# Patient Record
Sex: FEMALE | Race: WHITE | NOT HISPANIC OR LATINO | Employment: FULL TIME | URBAN - METROPOLITAN AREA
[De-identification: names, ages, dates, MRNs, and addresses within clinical notes are randomized per-mention and may not be internally consistent; named-entity substitution may affect disease eponyms.]

---

## 2017-03-22 ENCOUNTER — ALLSCRIPTS OFFICE VISIT (OUTPATIENT)
Dept: OTHER | Facility: OTHER | Age: 47
End: 2017-03-22

## 2017-03-22 DIAGNOSIS — M25.551 PAIN IN RIGHT HIP: ICD-10-CM

## 2017-03-23 ENCOUNTER — HOSPITAL ENCOUNTER (OUTPATIENT)
Dept: RADIOLOGY | Facility: HOSPITAL | Age: 47
Discharge: HOME | End: 2017-03-23
Payer: COMMERCIAL

## 2017-03-23 ENCOUNTER — TRANSCRIBE ORDERS (OUTPATIENT)
Dept: ADMINISTRATIVE | Facility: HOSPITAL | Age: 47
End: 2017-03-23

## 2017-03-23 DIAGNOSIS — M25.551 PAIN IN RIGHT HIP: ICD-10-CM

## 2017-03-23 PROCEDURE — 73502 X-RAY EXAM HIP UNI 2-3 VIEWS: CPT

## 2017-03-24 ENCOUNTER — GENERIC CONVERSION - ENCOUNTER (OUTPATIENT)
Dept: OTHER | Facility: OTHER | Age: 47
End: 2017-03-24

## 2017-03-28 ENCOUNTER — GENERIC CONVERSION - ENCOUNTER (OUTPATIENT)
Dept: OTHER | Facility: OTHER | Age: 47
End: 2017-03-28

## 2017-04-04 ENCOUNTER — ALLSCRIPTS OFFICE VISIT (OUTPATIENT)
Dept: OTHER | Facility: OTHER | Age: 47
End: 2017-04-04

## 2017-06-02 ENCOUNTER — GENERIC CONVERSION - ENCOUNTER (OUTPATIENT)
Dept: OTHER | Facility: OTHER | Age: 47
End: 2017-06-02

## 2017-06-07 ENCOUNTER — GENERIC CONVERSION - ENCOUNTER (OUTPATIENT)
Dept: OTHER | Facility: OTHER | Age: 47
End: 2017-06-07

## 2017-08-15 ENCOUNTER — GENERIC CONVERSION - ENCOUNTER (OUTPATIENT)
Dept: OTHER | Facility: OTHER | Age: 47
End: 2017-08-15

## 2017-09-05 ENCOUNTER — GENERIC CONVERSION - ENCOUNTER (OUTPATIENT)
Dept: OTHER | Facility: OTHER | Age: 47
End: 2017-09-05

## 2017-09-06 DIAGNOSIS — C85.90 NON-HODGKIN LYMPHOMA (HCC): ICD-10-CM

## 2017-09-06 DIAGNOSIS — I47.1 SUPRAVENTRICULAR TACHYCARDIA (HCC): ICD-10-CM

## 2017-09-06 DIAGNOSIS — E03.9 HYPOTHYROIDISM: ICD-10-CM

## 2017-09-07 ENCOUNTER — APPOINTMENT (OUTPATIENT)
Dept: LAB | Facility: MEDICAL CENTER | Age: 47
End: 2017-09-07
Payer: COMMERCIAL

## 2017-09-07 ENCOUNTER — TRANSCRIBE ORDERS (OUTPATIENT)
Dept: LAB | Facility: MEDICAL CENTER | Age: 47
End: 2017-09-07

## 2017-09-07 DIAGNOSIS — I47.1 SUPRAVENTRICULAR TACHYCARDIA (HCC): ICD-10-CM

## 2017-09-07 DIAGNOSIS — C85.90 NON-HODGKIN LYMPHOMA (HCC): ICD-10-CM

## 2017-09-07 DIAGNOSIS — E03.9 HYPOTHYROIDISM: ICD-10-CM

## 2017-09-07 LAB
ALBUMIN SERPL BCP-MCNC: 3.4 G/DL (ref 3.5–5)
ALP SERPL-CCNC: 112 U/L (ref 46–116)
ALT SERPL W P-5'-P-CCNC: 14 U/L (ref 12–78)
ANION GAP SERPL CALCULATED.3IONS-SCNC: 10 MMOL/L (ref 4–13)
AST SERPL W P-5'-P-CCNC: 17 U/L (ref 5–45)
BASOPHILS # BLD AUTO: 0.05 THOUSANDS/ΜL (ref 0–0.1)
BASOPHILS NFR BLD AUTO: 1 % (ref 0–1)
BILIRUB SERPL-MCNC: 0.64 MG/DL (ref 0.2–1)
BUN SERPL-MCNC: 14 MG/DL (ref 5–25)
CALCIUM SERPL-MCNC: 9.1 MG/DL (ref 8.3–10.1)
CHLORIDE SERPL-SCNC: 101 MMOL/L (ref 100–108)
CHOLEST SERPL-MCNC: 184 MG/DL (ref 50–200)
CO2 SERPL-SCNC: 24 MMOL/L (ref 21–32)
CREAT SERPL-MCNC: 0.65 MG/DL (ref 0.6–1.3)
EOSINOPHIL # BLD AUTO: 0.31 THOUSAND/ΜL (ref 0–0.61)
EOSINOPHIL NFR BLD AUTO: 4 % (ref 0–6)
ERYTHROCYTE [DISTWIDTH] IN BLOOD BY AUTOMATED COUNT: 13.2 % (ref 11.6–15.1)
GFR SERPL CREATININE-BSD FRML MDRD: 105 ML/MIN/1.73SQ M
GLUCOSE P FAST SERPL-MCNC: 97 MG/DL (ref 65–99)
HCT VFR BLD AUTO: 46.4 % (ref 34.8–46.1)
HDLC SERPL-MCNC: 49 MG/DL (ref 40–60)
HGB BLD-MCNC: 15.4 G/DL (ref 11.5–15.4)
LDLC SERPL CALC-MCNC: 107 MG/DL (ref 0–100)
LYMPHOCYTES # BLD AUTO: 1.25 THOUSANDS/ΜL (ref 0.6–4.47)
LYMPHOCYTES NFR BLD AUTO: 15 % (ref 14–44)
MCH RBC QN AUTO: 29.8 PG (ref 26.8–34.3)
MCHC RBC AUTO-ENTMCNC: 33.2 G/DL (ref 31.4–37.4)
MCV RBC AUTO: 90 FL (ref 82–98)
MONOCYTES # BLD AUTO: 0.63 THOUSAND/ΜL (ref 0.17–1.22)
MONOCYTES NFR BLD AUTO: 8 % (ref 4–12)
NEUTROPHILS # BLD AUTO: 6.12 THOUSANDS/ΜL (ref 1.85–7.62)
NEUTS SEG NFR BLD AUTO: 72 % (ref 43–75)
NRBC BLD AUTO-RTO: 0 /100 WBCS
PLATELET # BLD AUTO: 300 THOUSANDS/UL (ref 149–390)
PMV BLD AUTO: 11.2 FL (ref 8.9–12.7)
POTASSIUM SERPL-SCNC: 4.1 MMOL/L (ref 3.5–5.3)
PROT SERPL-MCNC: 7.7 G/DL (ref 6.4–8.2)
RBC # BLD AUTO: 5.16 MILLION/UL (ref 3.81–5.12)
SODIUM SERPL-SCNC: 135 MMOL/L (ref 136–145)
TRIGL SERPL-MCNC: 141 MG/DL
TSH SERPL DL<=0.05 MIU/L-ACNC: 8.68 UIU/ML (ref 0.36–3.74)
WBC # BLD AUTO: 8.38 THOUSAND/UL (ref 4.31–10.16)

## 2017-09-07 PROCEDURE — 80061 LIPID PANEL: CPT

## 2017-09-07 PROCEDURE — 80053 COMPREHEN METABOLIC PANEL: CPT

## 2017-09-07 PROCEDURE — 85025 COMPLETE CBC W/AUTO DIFF WBC: CPT

## 2017-09-07 PROCEDURE — 84443 ASSAY THYROID STIM HORMONE: CPT

## 2017-09-07 PROCEDURE — 36415 COLL VENOUS BLD VENIPUNCTURE: CPT

## 2017-09-08 ENCOUNTER — GENERIC CONVERSION - ENCOUNTER (OUTPATIENT)
Dept: OTHER | Facility: OTHER | Age: 47
End: 2017-09-08

## 2017-10-09 ENCOUNTER — GENERIC CONVERSION - ENCOUNTER (OUTPATIENT)
Dept: OTHER | Facility: OTHER | Age: 47
End: 2017-10-09

## 2017-10-20 DIAGNOSIS — E03.9 HYPOTHYROIDISM: ICD-10-CM

## 2017-11-30 ENCOUNTER — APPOINTMENT (OUTPATIENT)
Dept: LAB | Facility: MEDICAL CENTER | Age: 47
End: 2017-11-30
Payer: COMMERCIAL

## 2017-11-30 ENCOUNTER — TRANSCRIBE ORDERS (OUTPATIENT)
Dept: LAB | Facility: MEDICAL CENTER | Age: 47
End: 2017-11-30

## 2017-11-30 DIAGNOSIS — E03.9 HYPOTHYROIDISM: ICD-10-CM

## 2017-11-30 PROCEDURE — 36415 COLL VENOUS BLD VENIPUNCTURE: CPT

## 2017-11-30 PROCEDURE — 84443 ASSAY THYROID STIM HORMONE: CPT

## 2017-12-01 ENCOUNTER — GENERIC CONVERSION - ENCOUNTER (OUTPATIENT)
Dept: OTHER | Facility: OTHER | Age: 47
End: 2017-12-01

## 2017-12-01 LAB — TSH SERPL DL<=0.05 MIU/L-ACNC: 8.93 UIU/ML (ref 0.36–3.74)

## 2018-01-04 ENCOUNTER — LAB CONVERSION - ENCOUNTER (OUTPATIENT)
Dept: FAMILY MEDICINE CLINIC | Facility: CLINIC | Age: 48
End: 2018-01-04

## 2018-01-10 NOTE — MISCELLANEOUS
Message  Return to work or school:    She is able to return to work on  12/1/2016      Please excuse from work 11/28/2016 through 11/30/2016 due to NILAM LEVI        Signatures   Electronically signed by : Jinny Holder DO; Nov 30 2016 11:47AM EST                       (Author)

## 2018-01-11 NOTE — RESULT NOTES
Verified Results  (1) CBC/PLT/DIFF [id number] 07:10AM Perlie Days Order Number: [id number]     Test Name Result Flag Reference   WBC COUNT 8 38 Thousand/uL  4 31-10 16   RBC COUNT 5 16 Million/uL H 3 81-5 12   HEMOGLOBIN 15 4 g/dL  11 5-15 4   HEMATOCRIT 46 4 % H 34 8-46  1   MCV 90 fL  82-98   MCH 29 8 pg  26 8-34 3   MCHC 33 2 g/dL  31 4-37 4   RDW 13 2 %  11 6-15 1   MPV 11 2 fL  8 9-12 7   PLATELET COUNT 684 Thousands/uL  149-390   nRBC AUTOMATED 0 /100 WBCs     NEUTROPHILS RELATIVE PERCENT 72 %  43-75   LYMPHOCYTES RELATIVE PERCENT 15 %  14-44   MONOCYTES RELATIVE PERCENT 8 %  4-12   EOSINOPHILS RELATIVE PERCENT 4 %  0-6   BASOPHILS RELATIVE PERCENT 1 %  0-1   NEUTROPHILS ABSOLUTE COUNT 6 12 Thousands/? ??L  1 85-7 62   LYMPHOCYTES ABSOLUTE COUNT 1 25 Thousands/? ??L  0 60-4 47   MONOCYTES ABSOLUTE COUNT 0 63 Thousand/? ??L  0 17-1 22   EOSINOPHILS ABSOLUTE COUNT 0 31 Thousand/? ??L  0 00-0 61   BASOPHILS ABSOLUTE COUNT 0 05 Thousands/? ??L  0 00-0 10     (1) COMPREHENSIVE METABOLIC PANEL [id number] 55:30MF Perlie Days Order Number: [id number]     Test Name Result Flag Reference   SODIUM 135 mmol/L L 136-145   POTASSIUM 4 1 mmol/L  3 5-5 3   CHLORIDE 101 mmol/L  100-108   CARBON DIOXIDE 24 mmol/L  21-32   ANION GAP (CALC) 10 mmol/L  4-13   BLOOD UREA NITROGEN 14 mg/dL  5-25   CREATININE 0 65 mg/dL  0 60-1 30   Standardized to IDMS reference method   CALCIUM 9 1 mg/dL  8 3-10 1   BILI, TOTAL 0 64 mg/dL  0 20-1 00   ALK PHOSPHATAS 112 U/L     ALT (SGPT) 14 U/L  12-78   Specimen collection should occur prior to Sulfasalazine and/or Sulfapyridine administration due to the potential for falsely depressed results  AST(SGOT) 17 U/L  5-45   Specimen collection should occur prior to Sulfasalazine administration due to the potential for falsely depressed results     ALBUMIN 3 4 g/dL L 3 5-5 0   TOTAL PROTEIN 7 7 g/dL  6 4-8 2   eGFR 105 ml/min/1 73sq m     National Kidney Disease Education Program recommendations are as follows:  GFR calculation is accurate only with a steady state creatinine  Chronic Kidney disease less than 60 ml/min/1 73 sq  meters  Kidney failure less than 15 ml/min/1 73 sq  meters  GLUCOSE FASTING 97 mg/dL  65-99   Specimen collection should occur prior to Sulfasalazine administration due to the potential for falsely depressed results  Specimen collection should occur prior to Sulfapyridine administration due to the potential for falsely elevated results  (1) LIPID PANEL, FASTING 07Sep2017 07:10AM [facility] Order Number: [id number]     Test Name Result Flag Reference   CHOLESTEROL 184 mg/dL     HDL,DIRECT 49 mg/dL  40-60   Specimen collection should occur prior to Metamizole administration due to the potential for falsley depressed results  LDL CHOLESTEROL CALCULATED 107 mg/dL H 0-100   Triglyceride:        Normal ??? ??? ??? ??? ??? ??? ??? <150 mg/dl   ??? ??? ???Borderline High ??? ??? 150-199 mg/dl   ??? ??? ? ?? High ??? ??? ??? ??? ??? ??? ??? 200-499 mg/dl   ??? ??? ? ??Very High ??? ??? ??? ??? ??? >499 mg/dl      Cholesterol:       Desirable ??? ??? ??? ??? <200 mg/dl   ??? ??? Borderline High ??? 200-239 mg/dl   ??? ??? High ??? ??? ??? ??? ??? ??? >239 mg/dl      HDL Cholesterol:       High ??? ???>59 mg/dL   ??? ??? Low ??? ??? <41 mg/dL      This screening LDL is a calculated result  It does not have the accuracy of the Direct Measured LDL in the monitoring of patients with hyperlipidemia and/or statin therapy  Direct Measure LDL (IMI099) must be ordered separately in these patients  TRIGLYCERIDES 141 mg/dL  <=150   Specimen collection should occur prior to N-Acetylcysteine or Metamizole administration due to the potential for falsely depressed results       (1) TSH [id number] 07:10AM [facility] Order Number: [id number]     Test Name Result Flag Reference   TSH 8 680 uIU/mL H 0 358-3 740   Patients undergoing fluorescein dye angiography may retain small amounts of fluorescein in the body for 48-72 hours post procedure  Samples containing fluorescein can produce falsely depressed TSH values  If the patient had this procedure,a specimen should be resubmitted post fluorescein clearance  The recommended reference ranges for TSH during pregnancy are as follows:  First trimester 0 1 to 2 5 uIU/mL  Second trimester  0 2 to 3 0 uIU/mL  Third trimester 0 3 to 3 0 uIU/m       Plan  Hypothyroidism    · From  Levothyroxine Sodium 25 MCG Oral Tablet Take 1 tablet by mouth   every day To Levothyroxine Sodium 25 MCG Oral Tablet TAKE ONE & ONE-HALF  TABLETS DAILY   · (1) TSH; Status:Active;  Requested K:[id number];

## 2018-01-12 DIAGNOSIS — E03.9 HYPOTHYROIDISM: ICD-10-CM

## 2018-01-12 NOTE — RESULT NOTES
Verified Results  VAS UPPER LIMB VENOUS DUPLEX SCAN, UNILATERAL/LIMITED [id number] 12:43PM Anjel Cota Order Number: [id number]    - Patient Instructions: To schedule this appointment, please contact Central Scheduling at [phone]   Order Number: [id number]    - Patient Instructions: To schedule this appointment, please contact Central Scheduling at [phone]  Test Name Result Flag Reference   VAS UPPER LIMB VENOUS DUPLEX SCAN, UNILATERAL/LIMITED (Report)     [facility] REPORT   CLINICAL:   Indications: Right Limb Pain [M79 609]  Right sided shoulder pain  History of Hodgkins lymphoma  Risk Factors: The patient has history of malignancy  She has no history of   DVT  FINDINGS:      Segment    Right      Left               Impression    Impression       Int  Jugular Normal (Patent) Normal (Patent)             CONCLUSION:   Impression   RIGHT UPPER LIMB:   No evidence of acute or chronic deep vein thrombosis  No evidence of superficial thrombophlebitis noted  Doppler evaluation shows a normal response to augmentation maneuvers  LEFT UPPER LIMB LIMITED:   Evaluation shows no evidence of thrombus in the internal jugular vein,   subclavian vein, and the axillary vein        Tech note- Results called to Dr Dixie Traylor following the exam       SIGNATURE:   Electronically Signed by: Neel Christianson MD on 2016-07-26 04:46:42 PM

## 2018-01-13 VITALS
HEIGHT: 68 IN | HEART RATE: 64 BPM | DIASTOLIC BLOOD PRESSURE: 76 MMHG | SYSTOLIC BLOOD PRESSURE: 130 MMHG | BODY MASS INDEX: 37.89 KG/M2 | WEIGHT: 250 LBS

## 2018-01-14 VITALS
WEIGHT: 249.13 LBS | DIASTOLIC BLOOD PRESSURE: 72 MMHG | SYSTOLIC BLOOD PRESSURE: 122 MMHG | HEIGHT: 68 IN | BODY MASS INDEX: 37.76 KG/M2

## 2018-01-15 NOTE — RESULT NOTES
Verified Results  US HEAD NECK SOFT TISSUE [id number] 05:02PM Michelle Cook     Test Name Result Flag Reference   US HEAD NECK SOFT TISSUE (Report)     RIGHT NECK ULTRASOUND     INDICATION: Palpable abnormality in the right neck for 3 to 4 weeks  COMPARISON: None  TECHNIQUE:  Real-time ultrasound of the right neck palpable finding was performed with a linear transducer with both volumetric sweeps and still imaging techniques  FINDINGS: In the area of palpable concern there are several lymph nodes identified  The largest is abnormal in size measuring 3 3 x 1 4 x 2 2 cm  It maintains a normal reniform shape  Its hilum is obscured  Additional adjacent mildly hypertrophic    borderline abnormal lymph nodes are seen as well  This is located in the area of palpable concern reported inferior to the ear  This is adjacent to the margin of the parotid gland and superior to the carotid bifurcation  Correlate with clinical    parameters for evidence of reactive versus neoplastic etiology  Consider CT of the neck for further imaging  IMPRESSION:     Abnormal enlarged cervical chain lymph nodes corresponding to area of palpable concern    ##sigslh##sigslh     Findings were relayed and logged by the radiology liaison shortly following the time of this dictation        Signed by:   Carolynn Hassan MD   1/28/16

## 2018-01-16 NOTE — RESULT NOTES
Verified Results  (1) TSH [id number] 08:44AM [facility] Order Number: [id number]    Patients undergoing fluorescein dye angiography may retain small amounts of fluorescein in the body for 48-72 hours post procedure  Samples containing fluorescein can produce falsely depressed TSH values  If the patient had this procedure,a specimen should be resubmitted post fluorescein clearance          The recommended reference ranges for TSH during pregnancy are as follows:  First trimester 0 1 to 2 5 uIU/mL  Second trimester  0 2 to 3 0 uIU/mL  Third trimester 0 3 to 3 0 uIU/m     Test Name Result Flag Reference   TSH 3 734 uIU/mL  0 358-3 740

## 2018-01-17 ENCOUNTER — TRANSCRIBE ORDERS (OUTPATIENT)
Dept: RADIOLOGY | Facility: MEDICAL CENTER | Age: 48
End: 2018-01-17

## 2018-01-17 ENCOUNTER — ALLSCRIPTS OFFICE VISIT (OUTPATIENT)
Dept: OTHER | Facility: OTHER | Age: 48
End: 2018-01-17

## 2018-01-17 ENCOUNTER — APPOINTMENT (OUTPATIENT)
Dept: LAB | Facility: MEDICAL CENTER | Age: 48
End: 2018-01-17
Payer: COMMERCIAL

## 2018-01-17 ENCOUNTER — GENERIC CONVERSION - ENCOUNTER (OUTPATIENT)
Dept: OTHER | Facility: OTHER | Age: 48
End: 2018-01-17

## 2018-01-17 DIAGNOSIS — E03.9 HYPOTHYROIDISM: ICD-10-CM

## 2018-01-17 LAB — TSH SERPL DL<=0.05 MIU/L-ACNC: 14.8 UIU/ML (ref 0.36–3.74)

## 2018-01-17 PROCEDURE — 36415 COLL VENOUS BLD VENIPUNCTURE: CPT

## 2018-01-17 PROCEDURE — 84443 ASSAY THYROID STIM HORMONE: CPT

## 2018-01-18 NOTE — PROGRESS NOTES
Assessment   1  Former smoker (V15 82) (M04 303)   2  Acute upper respiratory infection (465 9) (J06 9)    Plan   Acute upper respiratory infection    · Azithromycin 250 MG Oral Tablet; TAKE 2 TABLETS ON DAY 1 THEN TAKE 1    TABLET A DAY FOR 4 DAYS   · Follow Up if Not Better Evaluation and Treatment  Follow-up  Status: Complete  Done:    97HLH3261  Flu vaccine need    · Stop: Fluzone Quadrivalent 0 5 ML Intramuscular Suspension  Pap smear for cervical cancer screening    · (1) PAP SMEAR CONVENTIONAL; Status:Temporary Deferral - Patient reports item    recently done,CALLED [facility] FOR REPORT;    1/17/2019  Maturation index required? : No    Discussion/Summary      Rx for Z-Juan  Get Mucinex over-the-counter  Call symptoms continue or increase  Patient will get her TSH done today  Possible side effects of new medications were reviewed with the patient/guardian today  The treatment plan was reviewed with the patient/guardian  The patient/guardian understands and agrees with the treatment plan      Chief Complaint   1  Cold Symptoms  Cold      History of Present Illness   HPI: Patient been having cold symptoms for the last couple days  Had a dry cough, feels congested  Had chills, questionable fever  Sneezing a little bit  Has tried TheraFlu  Also been getting some watery stools off and on  No nausea or vomiting    Cold Symptoms:    Cass Moon presents with complaints of constant episodes of moderate cold symptoms  Episodes started about 3 days ago  She is currently experiencing cold symptoms  Symptoms are unchanged  Associated symptoms include nasal congestion,-- dry cough-- and-- chills, but-- no fever  The patient presents with complaints of mild diarrhea, described as loose  Review of Systems        Constitutional: as noted in HPI       ENT: as noted in HPI        Cardiovascular: no complaints of slow or fast heart rate, no chest pain, no palpitations, no leg claudication or lower extremity edema  Respiratory: as noted in HPI  Gastrointestinal: as noted in HPI  Genitourinary: no complaints of dysuria, no incontinence, no pelvic pain, no dysmenorrhea, no vaginal discharge or abnormal vaginal bleeding  Active Problems   1  Acute pain of right hip (719 45) (M25 551)   2  Acute pain of right shoulder (719 41) (M25 511)   3  Depression screen (V79 0) (Z13 89)   4  Fatigue (780 79) (R53 83)   5  Flu vaccine need (V04 81) (Z23)   6  Hypothyroidism (244 9) (E03 9)   7  Increased BMI (783 9) (R63 8)   8  Need for influenza vaccination (V04 81) (Z23)   9  Non Hodgkin's lymphoma (202 80) (C85 90)   10  Pap smear for cervical cancer screening (V76 2) (Z12 4)   11  Paroxysmal atrial tachycardia (427 0) (I47 1)   12  Premature atrial contraction (427 61) (I49 1)   13  Premature ventricular contractions (427 69) (I49 3)   14  Screening for colon cancer (V76 51) (Z12 11)    Past Medical History   1  History of Chronic allergic conjunctivitis (372 14) (H10 45)   2  History of Contusion Of Left Chest Wall With Intact Skin Surface (922 1)   3  History of Denial Of Any Significant Medical History   4  History of Encounter for screening for cardiovascular disorders (V81 2) (Z13 6)   5  History of Foot pain, left (729 5) (M79 672)   6  History of Foot Sprain (845 10)   7  History of allergic rhinitis (V12 69) (Z87 09)   8  History of folliculitis (F89 7) (C47 0)   9  History of pulmonary embolism (V12 55) (Z86 711)   10  History of viral gastroenteritis (V12 09) (Z86 19)   11  History of Lump in neck (784 2) (R22 1)   12  History of Other headache syndrome (339 89) (G44 89)   13  History of Plantar fasciitis (728 71) (M72 2)   14  History of Visit for screening mammogram (I37 75) (Z12 31)  Active Problems And Past Medical History Reviewed: The active problems and past medical history were reviewed and updated today  Family History   Father    1   Family history of Coronary Artery Disease (V17 49)  Brother    2  Family history of Diabetes Mellitus (V18 0)   3  Family history of Diabetes Mellitus (V18 0)  Family History    4  Family history of Diabetes Mellitus (V18 0)   5  Family history of Heart Disease (V17 49)  Family History Reviewed: The family history was reviewed and updated today  Social History    · Denied: History of Alcohol Use (History)   · Former smoker (R22 91) (E60 205)  The social history was reviewed and updated today  The social history was reviewed and is unchanged  Surgical History   1  History of Biopsy Lymph Node   2  History of Lymphadenectomy   3  History of Tonsillectomy  Surgical History Reviewed: The surgical history was reviewed and updated today  Current Meds    1  Levothyroxine Sodium 50 MCG Oral Tablet; Take 1 tablet daily; Therapy: 90RHV8250 to (Florinda Epstein)  Requested for: 51LAS2660; Last     Rx:[id number] Ordered   2  Magnesium Oxide 500 MG Oral Tablet; TAKE 1 TABLET DAILY; Therapy: 33VVH8807 to Recorded   3  Xarelto 20 MG Oral Tablet; One daily Recorded     The medication list was reviewed and updated today  Allergies   1  No Known Drug Allergies    Vitals    Recorded: 31ILG5898 01:22PM   Temperature 30 0 F   Systolic 868   Diastolic 82   Height 5 ft 8 in   Weight 282 lb 8 oz   BMI Calculated 42 95   BSA Calculated 2 37     Physical Exam        Constitutional      General appearance: No acute distress, well appearing and well nourished  Pulmonary      Respiratory effort: No increased work of breathing or signs of respiratory distress  Auscultation of lungs: Clear to auscultation  Cardiovascular      Auscultation of heart: Normal rate and rhythm, normal S1 and S2, without murmurs         Examination of extremities for edema and/or varicosities: Normal        Psychiatric      Orientation to person, place, and time: Normal        Mood and affect: Normal           Signatures    Electronically signed by : Madalyn Ramos Byron Demarco DO; Jan 17 2018  1:58PM EST                       (Author)

## 2018-01-18 NOTE — RESULT NOTES
Verified Results  * XR FOOT 3+ VIEW LEFT [id number] 02:36PM Kaden Yao Order Number: [id number]     Test Name Result Flag Reference   XR FOOT 3+ VW LEFT (Report)     LEFT FOOT     INDICATION: Lateral left foot pain  COMPARISON: None     VIEWS: AP, lateral and oblique ; 3 images     FINDINGS:     There is no acute fracture or dislocation  Small plantar calcaneal spur  No lytic or blastic lesions are seen  Soft tissues are unremarkable  IMPRESSION:     No acute osseous abnormality  Small plantar calcaneal spur  Workstation performed: [id number]     Signed by:   Warrick Sarin Wilbern Fothergill, MD   10/11/16

## 2018-01-18 NOTE — RESULT NOTES
Message   hip xray is normal  how is her hip pain? Verified Results  * XR HIP/PELV 2-3 VWS RIGHT W PELVIS IF PERFORMED [id number] 04:36PM Gideon Mahmood Order Number: [id number]     Test Name Result Flag Reference   * XR HIP/PELV 2-3 VWS RIGHT (Report)     RIGHT HIP     INDICATION: Right hip pain  COMPARISON: None     VIEWS: AP pelvis (2) and 2 coned down views of the hip     IMAGES: 4     FINDINGS:     There is no acute fracture or dislocation  No degenerative changes  No lytic or blastic lesions are seen  Soft tissues are unremarkable  IMPRESSION:     No acute osseous abnormality  Workstation performed: [id number]     Signed by:   Katherne Lesch Raynard Farrow, MD   3/23/17

## 2018-01-22 VITALS
TEMPERATURE: 98.3 F | DIASTOLIC BLOOD PRESSURE: 82 MMHG | SYSTOLIC BLOOD PRESSURE: 140 MMHG | WEIGHT: 282.5 LBS | HEIGHT: 68 IN | BODY MASS INDEX: 42.81 KG/M2

## 2018-01-23 NOTE — RESULT NOTES
Verified Results  (1) TSH [id number] 08:00AM Elizabeth Varela Order Number: [id number]     Test Name Result Flag Reference   TSH 8 930 uIU/mL H 0 358-3 740   Patients undergoing fluorescein dye angiography may retain small amounts of fluorescein in the body for 48-72 hours post procedure  Samples containing fluorescein can produce falsely depressed TSH values  If the patient had this procedure,a specimen should be resubmitted post fluorescein clearance  The recommended reference ranges for TSH during pregnancy are as follows:  First trimester 0 1 to 2 5 uIU/mL  Second trimester  0 2 to 3 0 uIU/mL  Third trimester 0 3 to 3 0 uIU/m       Plan  Hypothyroidism    · Levothyroxine Sodium 50 MCG Oral Tablet; Take 1 tablet daily   · (1) TSH; Status:Active;  Requested EAS:[id number];

## 2018-01-23 NOTE — RESULT NOTES
Verified Results  (1) TSH [id number] 02:06PM Sofia Nash Order Number: [id number]     Test Name Result Flag Reference   TSH 14 800 uIU/mL H 0 358-3 740   Patients undergoing fluorescein dye angiography may retain small amounts of fluorescein in the body for 48-72 hours post procedure  Samples containing fluorescein can produce falsely depressed TSH values  If the patient had this procedure,a specimen should be resubmitted post fluorescein clearance  The recommended reference ranges for TSH during pregnancy are as follows:  First trimester 0 1 to 2 5 uIU/mL  Second trimester  0 2 to 3 0 uIU/mL  Third trimester 0 3 to 3 0 uIU/m       Plan  Hypothyroidism    · Levothyroxine Sodium 75 MCG Oral Tablet; TAKE ONE (1) TABLET DAILY   · (1) TSH; Status:Active;  Requested CWV:90OON6911;

## 2018-02-14 ENCOUNTER — APPOINTMENT (OUTPATIENT)
Dept: LAB | Facility: MEDICAL CENTER | Age: 48
End: 2018-02-14
Payer: COMMERCIAL

## 2018-02-14 ENCOUNTER — TRANSCRIBE ORDERS (OUTPATIENT)
Dept: LAB | Facility: MEDICAL CENTER | Age: 48
End: 2018-02-14

## 2018-02-14 DIAGNOSIS — C84.41 PERIPHERAL T CELL LYMPHOMA OF LYMPH NODES OF NECK (HCC): Primary | ICD-10-CM

## 2018-02-14 DIAGNOSIS — C84.41 PERIPHERAL T CELL LYMPHOMA OF LYMPH NODES OF NECK (HCC): ICD-10-CM

## 2018-02-14 LAB
ALBUMIN SERPL BCP-MCNC: 3.5 G/DL (ref 3.5–5)
ALP SERPL-CCNC: 122 U/L (ref 46–116)
ALT SERPL W P-5'-P-CCNC: 15 U/L (ref 12–78)
ANION GAP SERPL CALCULATED.3IONS-SCNC: 8 MMOL/L (ref 4–13)
AST SERPL W P-5'-P-CCNC: 15 U/L (ref 5–45)
BASOPHILS # BLD AUTO: 0.06 THOUSANDS/ΜL (ref 0–0.1)
BASOPHILS NFR BLD AUTO: 1 % (ref 0–1)
BILIRUB SERPL-MCNC: 0.51 MG/DL (ref 0.2–1)
BUN SERPL-MCNC: 12 MG/DL (ref 5–25)
CALCIUM SERPL-MCNC: 8.9 MG/DL (ref 8.3–10.1)
CHLORIDE SERPL-SCNC: 106 MMOL/L (ref 100–108)
CO2 SERPL-SCNC: 24 MMOL/L (ref 21–32)
CREAT SERPL-MCNC: 0.67 MG/DL (ref 0.6–1.3)
EOSINOPHIL # BLD AUTO: 0.36 THOUSAND/ΜL (ref 0–0.61)
EOSINOPHIL NFR BLD AUTO: 5 % (ref 0–6)
ERYTHROCYTE [DISTWIDTH] IN BLOOD BY AUTOMATED COUNT: 13.2 % (ref 11.6–15.1)
GFR SERPL CREATININE-BSD FRML MDRD: 104 ML/MIN/1.73SQ M
GLUCOSE P FAST SERPL-MCNC: 113 MG/DL (ref 65–99)
HCT VFR BLD AUTO: 45.5 % (ref 34.8–46.1)
HGB BLD-MCNC: 15.2 G/DL (ref 11.5–15.4)
LYMPHOCYTES # BLD AUTO: 1.29 THOUSANDS/ΜL (ref 0.6–4.47)
LYMPHOCYTES NFR BLD AUTO: 17 % (ref 14–44)
MCH RBC QN AUTO: 29.5 PG (ref 26.8–34.3)
MCHC RBC AUTO-ENTMCNC: 33.4 G/DL (ref 31.4–37.4)
MCV RBC AUTO: 88 FL (ref 82–98)
MONOCYTES # BLD AUTO: 0.65 THOUSAND/ΜL (ref 0.17–1.22)
MONOCYTES NFR BLD AUTO: 9 % (ref 4–12)
NEUTROPHILS # BLD AUTO: 5.24 THOUSANDS/ΜL (ref 1.85–7.62)
NEUTS SEG NFR BLD AUTO: 68 % (ref 43–75)
NRBC BLD AUTO-RTO: 0 /100 WBCS
PLATELET # BLD AUTO: 295 THOUSANDS/UL (ref 149–390)
PMV BLD AUTO: 10.5 FL (ref 8.9–12.7)
POTASSIUM SERPL-SCNC: 4.1 MMOL/L (ref 3.5–5.3)
PROT SERPL-MCNC: 7.9 G/DL (ref 6.4–8.2)
RBC # BLD AUTO: 5.16 MILLION/UL (ref 3.81–5.12)
SODIUM SERPL-SCNC: 138 MMOL/L (ref 136–145)
WBC # BLD AUTO: 7.63 THOUSAND/UL (ref 4.31–10.16)

## 2018-02-14 PROCEDURE — 80053 COMPREHEN METABOLIC PANEL: CPT

## 2018-02-14 PROCEDURE — 36415 COLL VENOUS BLD VENIPUNCTURE: CPT

## 2018-02-14 PROCEDURE — 85025 COMPLETE CBC W/AUTO DIFF WBC: CPT

## 2018-03-01 DIAGNOSIS — E03.9 HYPOTHYROIDISM: ICD-10-CM

## 2018-03-05 ENCOUNTER — APPOINTMENT (OUTPATIENT)
Dept: LAB | Facility: MEDICAL CENTER | Age: 48
End: 2018-03-05
Payer: COMMERCIAL

## 2018-03-05 ENCOUNTER — TRANSCRIBE ORDERS (OUTPATIENT)
Dept: RADIOLOGY | Facility: MEDICAL CENTER | Age: 48
End: 2018-03-05

## 2018-03-05 DIAGNOSIS — E03.9 HYPOTHYROIDISM: ICD-10-CM

## 2018-03-05 LAB — TSH SERPL DL<=0.05 MIU/L-ACNC: 5.23 UIU/ML (ref 0.36–3.74)

## 2018-03-05 PROCEDURE — 84443 ASSAY THYROID STIM HORMONE: CPT

## 2018-03-05 PROCEDURE — 36415 COLL VENOUS BLD VENIPUNCTURE: CPT

## 2018-03-14 ENCOUNTER — TELEPHONE (OUTPATIENT)
Dept: FAMILY MEDICINE CLINIC | Facility: CLINIC | Age: 48
End: 2018-03-14

## 2018-03-14 DIAGNOSIS — R53.83 FATIGUE, UNSPECIFIED TYPE: Primary | ICD-10-CM

## 2018-03-14 PROBLEM — I49.1 PREMATURE ATRIAL CONTRACTION: Status: ACTIVE | Noted: 2017-04-04

## 2018-03-14 PROBLEM — I49.3 PREMATURE VENTRICULAR CONTRACTIONS: Status: ACTIVE | Noted: 2017-04-04

## 2018-03-14 NOTE — TELEPHONE ENCOUNTER
Patient wants to get Vitamin D level checked thinks it has to do with her being tired and thyroid issue

## 2018-03-16 ENCOUNTER — APPOINTMENT (OUTPATIENT)
Dept: LAB | Facility: MEDICAL CENTER | Age: 48
End: 2018-03-16
Payer: COMMERCIAL

## 2018-03-16 ENCOUNTER — TRANSCRIBE ORDERS (OUTPATIENT)
Dept: RADIOLOGY | Facility: MEDICAL CENTER | Age: 48
End: 2018-03-16

## 2018-03-16 LAB — 25(OH)D3 SERPL-MCNC: 21.6 NG/ML (ref 30–100)

## 2018-03-16 PROCEDURE — 82306 VITAMIN D 25 HYDROXY: CPT

## 2018-03-16 PROCEDURE — 36415 COLL VENOUS BLD VENIPUNCTURE: CPT

## 2018-03-26 ENCOUNTER — TELEPHONE (OUTPATIENT)
Dept: FAMILY MEDICINE CLINIC | Facility: CLINIC | Age: 48
End: 2018-03-26

## 2018-03-26 NOTE — TELEPHONE ENCOUNTER
Patient had CT scan of neck  Showed some right submandibular lymph nodes    Does she have follow-up with her oncologist?

## 2018-04-05 ENCOUNTER — APPOINTMENT (OUTPATIENT)
Dept: LAB | Facility: MEDICAL CENTER | Age: 48
End: 2018-04-05
Payer: COMMERCIAL

## 2018-04-05 ENCOUNTER — TRANSCRIBE ORDERS (OUTPATIENT)
Dept: RADIOLOGY | Facility: MEDICAL CENTER | Age: 48
End: 2018-04-05

## 2018-04-05 DIAGNOSIS — C84.41 PERIPHERAL T CELL LYMPHOMA OF LYMPH NODES OF NECK (HCC): Primary | ICD-10-CM

## 2018-04-05 LAB
ALBUMIN SERPL BCP-MCNC: 3.6 G/DL (ref 3.5–5)
ALP SERPL-CCNC: 118 U/L (ref 46–116)
ALT SERPL W P-5'-P-CCNC: 16 U/L (ref 12–78)
ANION GAP SERPL CALCULATED.3IONS-SCNC: 5 MMOL/L (ref 4–13)
AST SERPL W P-5'-P-CCNC: 21 U/L (ref 5–45)
BASOPHILS # BLD AUTO: 0.07 THOUSANDS/ΜL (ref 0–0.1)
BASOPHILS NFR BLD AUTO: 1 % (ref 0–1)
BILIRUB SERPL-MCNC: 0.55 MG/DL (ref 0.2–1)
BUN SERPL-MCNC: 14 MG/DL (ref 5–25)
CALCIUM SERPL-MCNC: 8.9 MG/DL (ref 8.3–10.1)
CHLORIDE SERPL-SCNC: 107 MMOL/L (ref 100–108)
CO2 SERPL-SCNC: 27 MMOL/L (ref 21–32)
CREAT SERPL-MCNC: 0.72 MG/DL (ref 0.6–1.3)
EOSINOPHIL # BLD AUTO: 0.25 THOUSAND/ΜL (ref 0–0.61)
EOSINOPHIL NFR BLD AUTO: 4 % (ref 0–6)
ERYTHROCYTE [DISTWIDTH] IN BLOOD BY AUTOMATED COUNT: 13.5 % (ref 11.6–15.1)
GFR SERPL CREATININE-BSD FRML MDRD: 99 ML/MIN/1.73SQ M
GLUCOSE P FAST SERPL-MCNC: 105 MG/DL (ref 65–99)
HCT VFR BLD AUTO: 47.5 % (ref 34.8–46.1)
HGB BLD-MCNC: 15.1 G/DL (ref 11.5–15.4)
LDH SERPL-CCNC: 160 U/L (ref 81–234)
LYMPHOCYTES # BLD AUTO: 1.23 THOUSANDS/ΜL (ref 0.6–4.47)
LYMPHOCYTES NFR BLD AUTO: 17 % (ref 14–44)
MCH RBC QN AUTO: 29.2 PG (ref 26.8–34.3)
MCHC RBC AUTO-ENTMCNC: 31.8 G/DL (ref 31.4–37.4)
MCV RBC AUTO: 92 FL (ref 82–98)
MONOCYTES # BLD AUTO: 0.58 THOUSAND/ΜL (ref 0.17–1.22)
MONOCYTES NFR BLD AUTO: 8 % (ref 4–12)
NEUTROPHILS # BLD AUTO: 5.01 THOUSANDS/ΜL (ref 1.85–7.62)
NEUTS SEG NFR BLD AUTO: 70 % (ref 43–75)
NRBC BLD AUTO-RTO: 0 /100 WBCS
PLATELET # BLD AUTO: 309 THOUSANDS/UL (ref 149–390)
PMV BLD AUTO: 10.6 FL (ref 8.9–12.7)
POTASSIUM SERPL-SCNC: 4.2 MMOL/L (ref 3.5–5.3)
PROT SERPL-MCNC: 8 G/DL (ref 6.4–8.2)
RBC # BLD AUTO: 5.18 MILLION/UL (ref 3.81–5.12)
SODIUM SERPL-SCNC: 139 MMOL/L (ref 136–145)
WBC # BLD AUTO: 7.16 THOUSAND/UL (ref 4.31–10.16)

## 2018-04-05 PROCEDURE — 80053 COMPREHEN METABOLIC PANEL: CPT

## 2018-04-05 PROCEDURE — 85025 COMPLETE CBC W/AUTO DIFF WBC: CPT

## 2018-04-05 PROCEDURE — 36415 COLL VENOUS BLD VENIPUNCTURE: CPT

## 2018-04-05 PROCEDURE — 83615 LACTATE (LD) (LDH) ENZYME: CPT

## 2018-04-09 PROCEDURE — 88173 CYTOPATH EVAL FNA REPORT: CPT | Performed by: PATHOLOGY

## 2018-04-11 ENCOUNTER — LAB REQUISITION (OUTPATIENT)
Dept: LAB | Facility: HOSPITAL | Age: 48
End: 2018-04-11
Payer: COMMERCIAL

## 2018-04-11 DIAGNOSIS — R22.1 LOCALIZED SWELLING, MASS OR LUMP OF NECK: ICD-10-CM

## 2018-04-12 ENCOUNTER — TELEPHONE (OUTPATIENT)
Dept: OTHER | Facility: HOSPITAL | Age: 48
End: 2018-04-12

## 2018-04-20 ENCOUNTER — ANESTHESIA EVENT (OUTPATIENT)
Dept: PERIOP | Facility: HOSPITAL | Age: 48
End: 2018-04-20
Payer: COMMERCIAL

## 2018-04-23 RX ORDER — MELATONIN
1000 EVERY MORNING
COMMUNITY

## 2018-04-23 RX ORDER — LEVOTHYROXINE SODIUM 0.07 MG/1
75 TABLET ORAL DAILY
COMMUNITY
End: 2018-12-19 | Stop reason: SDUPTHER

## 2018-04-23 RX ORDER — MULTIVITAMIN
2 TABLET ORAL 2 TIMES DAILY
COMMUNITY
End: 2021-04-01

## 2018-04-23 NOTE — PRE-PROCEDURE INSTRUCTIONS
Pre-Surgery Instructions:   Medication Instructions    acetaminophen (TYLENOL) 325 mg tablet Patient was instructed by Physician and understands   b complex vitamins tablet Patient was instructed by Physician and understands   cholecalciferol (VITAMIN D3) 1,000 units tablet Patient was instructed by Physician and understands   levothyroxine 75 mcg tablet Patient was instructed by Physician and understands   MAGNESIUM OXIDE PO Patient was instructed by Physician and understands   Multiple Vitamin (MULTIVITAMIN) tablet Patient was instructed by Physician and understands   NON FORMULARY Patient was instructed by Physician and understands   rivaroxaban (XARELTO) 20 mg tablet Patient was instructed by Physician and understands   Zinc 50 MG CAPS Patient was instructed by Physician and understands  Pt instructed by Dr Eduardo Godinez to take Levothyroxine*with a sip of water the morning of surgery  Pt given/reviewed  Luke's preop instructions and chlorhexadine soap   Pt to hold asa/NSAIDS/vitamins/herbal supplements one week before surgery or as per Dr Laurel Burt

## 2018-04-23 NOTE — ANESTHESIA PREPROCEDURE EVALUATION
Review of Systems/Medical History  Patient summary reviewed  Chart reviewed  No history of anesthetic complications     Cardiovascular  Dysrhythmias (Hx palpitations, rapid heartbeat, had Holter monitor--to see cardiologist for F/U), ,   PE,  Pulmonary  Negative pulmonary ROS        GI/Hepatic  Negative GI/hepatic ROS          Negative  ROS        Endo/Other  History of thyroid disease , hypothyroidism,   Obesity  super morbid obesity   GYN  Negative gynecology ROS          Hematology  Negative hematology ROS      Musculoskeletal    Arthritis     Neurology  Negative neurology ROS      Psychology   Negative psychology ROS              Physical Exam    Airway  Comment: Full neck  Mallampati score: II  TM Distance: >3 FB  Neck ROM: full     Dental   Comment: Chipped and missing teeth--upper, No notable dental hx     Cardiovascular  Rhythm: regular, Rate: normal, Cardiovascular exam normal    Pulmonary  Pulmonary exam normal Breath sounds clear to auscultation,     Other Findings        Anesthesia Plan  ASA Score- 3     Anesthesia Type- general with ASA Monitors  Additional Monitors:   Airway Plan:         Plan Factors- Patient instructed to abstain from smoking on day of procedure  Patient smoked on day of surgery  Induction- intravenous  Postoperative Plan-     Informed Consent- Anesthetic plan and risks discussed with patient

## 2018-04-26 ENCOUNTER — ANESTHESIA (OUTPATIENT)
Dept: PERIOP | Facility: HOSPITAL | Age: 48
End: 2018-04-26
Payer: COMMERCIAL

## 2018-04-26 ENCOUNTER — HOSPITAL ENCOUNTER (OUTPATIENT)
Facility: HOSPITAL | Age: 48
Setting detail: OUTPATIENT SURGERY
Discharge: HOME | End: 2018-04-26
Attending: OTOLARYNGOLOGY | Admitting: OTOLARYNGOLOGY
Payer: COMMERCIAL

## 2018-04-26 VITALS
DIASTOLIC BLOOD PRESSURE: 79 MMHG | RESPIRATION RATE: 16 BRPM | BODY MASS INDEX: 44.45 KG/M2 | HEART RATE: 82 BPM | SYSTOLIC BLOOD PRESSURE: 132 MMHG | TEMPERATURE: 97.5 F | WEIGHT: 283.2 LBS | HEIGHT: 67 IN | OXYGEN SATURATION: 95 %

## 2018-04-26 DIAGNOSIS — R59.0 LOCALIZED ENLARGED LYMPH NODES: ICD-10-CM

## 2018-04-26 DIAGNOSIS — C85.91 NON-HODGKIN LYMPHOMA OF LYMPH NODES OF NECK, UNSPECIFIED NON-HODGKIN LYMPHOMA TYPE (HCC): Primary | ICD-10-CM

## 2018-04-26 PROCEDURE — 88342 IMHCHEM/IMCYTCHM 1ST ANTB: CPT | Performed by: PATHOLOGY

## 2018-04-26 PROCEDURE — 88341 IMHCHEM/IMCYTCHM EA ADD ANTB: CPT

## 2018-04-26 PROCEDURE — 88305 TISSUE EXAM BY PATHOLOGIST: CPT | Performed by: PATHOLOGY

## 2018-04-26 PROCEDURE — 88184 FLOWCYTOMETRY/ TC 1 MARKER: CPT | Performed by: OTOLARYNGOLOGY

## 2018-04-26 PROCEDURE — 88365 INSITU HYBRIDIZATION (FISH): CPT | Performed by: PATHOLOGY

## 2018-04-26 PROCEDURE — 88341 IMHCHEM/IMCYTCHM EA ADD ANTB: CPT | Performed by: PATHOLOGY

## 2018-04-26 PROCEDURE — 81342 TRG GENE REARRANGEMENT ANAL: CPT | Performed by: PATHOLOGY

## 2018-04-26 PROCEDURE — 81261 IGH GENE REARRANGE AMP METH: CPT | Performed by: PATHOLOGY

## 2018-04-26 PROCEDURE — 88342 IMHCHEM/IMCYTCHM 1ST ANTB: CPT

## 2018-04-26 RX ORDER — ONDANSETRON 2 MG/ML
4 INJECTION INTRAMUSCULAR; INTRAVENOUS EVERY 6 HOURS PRN
Status: DISCONTINUED | OUTPATIENT
Start: 2018-04-26 | End: 2018-04-26 | Stop reason: HOSPADM

## 2018-04-26 RX ORDER — FENTANYL CITRATE 50 UG/ML
50 INJECTION, SOLUTION INTRAMUSCULAR; INTRAVENOUS
Status: DISCONTINUED | OUTPATIENT
Start: 2018-04-26 | End: 2018-04-26 | Stop reason: HOSPADM

## 2018-04-26 RX ORDER — OXYCODONE HYDROCHLORIDE 5 MG/1
5 TABLET ORAL EVERY 4 HOURS PRN
Qty: 30 TABLET | Refills: 0 | Status: SHIPPED | OUTPATIENT
Start: 2018-04-26 | End: 2018-05-06

## 2018-04-26 RX ORDER — PROMETHAZINE HYDROCHLORIDE 25 MG/ML
12.5 INJECTION, SOLUTION INTRAMUSCULAR; INTRAVENOUS EVERY 4 HOURS PRN
Status: DISCONTINUED | OUTPATIENT
Start: 2018-04-26 | End: 2018-04-26 | Stop reason: HOSPADM

## 2018-04-26 RX ORDER — OXYCODONE HCL 5 MG/5 ML
5 SOLUTION, ORAL ORAL EVERY 4 HOURS PRN
Status: DISCONTINUED | OUTPATIENT
Start: 2018-04-26 | End: 2018-04-26 | Stop reason: HOSPADM

## 2018-04-26 RX ORDER — SODIUM CHLORIDE 9 MG/ML
125 INJECTION, SOLUTION INTRAVENOUS CONTINUOUS
Status: DISCONTINUED | OUTPATIENT
Start: 2018-04-26 | End: 2018-04-26 | Stop reason: HOSPADM

## 2018-04-26 RX ORDER — LIDOCAINE HYDROCHLORIDE AND EPINEPHRINE 10; 10 MG/ML; UG/ML
INJECTION, SOLUTION INFILTRATION; PERINEURAL AS NEEDED
Status: DISCONTINUED | OUTPATIENT
Start: 2018-04-26 | End: 2018-04-26 | Stop reason: HOSPADM

## 2018-04-26 RX ORDER — ONDANSETRON 2 MG/ML
INJECTION INTRAMUSCULAR; INTRAVENOUS AS NEEDED
Status: DISCONTINUED | OUTPATIENT
Start: 2018-04-26 | End: 2018-04-26 | Stop reason: SURG

## 2018-04-26 RX ORDER — PROPOFOL 10 MG/ML
INJECTION, EMULSION INTRAVENOUS AS NEEDED
Status: DISCONTINUED | OUTPATIENT
Start: 2018-04-26 | End: 2018-04-26 | Stop reason: SURG

## 2018-04-26 RX ORDER — MIDAZOLAM HYDROCHLORIDE 1 MG/ML
INJECTION INTRAMUSCULAR; INTRAVENOUS AS NEEDED
Status: DISCONTINUED | OUTPATIENT
Start: 2018-04-26 | End: 2018-04-26 | Stop reason: SURG

## 2018-04-26 RX ORDER — GLYCOPYRROLATE 0.2 MG/ML
INJECTION INTRAMUSCULAR; INTRAVENOUS AS NEEDED
Status: DISCONTINUED | OUTPATIENT
Start: 2018-04-26 | End: 2018-04-26 | Stop reason: SURG

## 2018-04-26 RX ORDER — ACETAMINOPHEN 160 MG/5ML
650 SUSPENSION, ORAL (FINAL DOSE FORM) ORAL ONCE
Status: DISCONTINUED | OUTPATIENT
Start: 2018-04-26 | End: 2018-04-26 | Stop reason: HOSPADM

## 2018-04-26 RX ORDER — LIDOCAINE HYDROCHLORIDE 10 MG/ML
INJECTION, SOLUTION INFILTRATION; PERINEURAL AS NEEDED
Status: DISCONTINUED | OUTPATIENT
Start: 2018-04-26 | End: 2018-04-26 | Stop reason: SURG

## 2018-04-26 RX ORDER — DEXAMETHASONE SODIUM PHOSPHATE 4 MG/ML
INJECTION, SOLUTION INTRA-ARTICULAR; INTRALESIONAL; INTRAMUSCULAR; INTRAVENOUS; SOFT TISSUE AS NEEDED
Status: DISCONTINUED | OUTPATIENT
Start: 2018-04-26 | End: 2018-04-26 | Stop reason: SURG

## 2018-04-26 RX ORDER — MAGNESIUM HYDROXIDE 1200 MG/15ML
LIQUID ORAL AS NEEDED
Status: DISCONTINUED | OUTPATIENT
Start: 2018-04-26 | End: 2018-04-26 | Stop reason: HOSPADM

## 2018-04-26 RX ORDER — FENTANYL CITRATE 50 UG/ML
INJECTION, SOLUTION INTRAMUSCULAR; INTRAVENOUS AS NEEDED
Status: DISCONTINUED | OUTPATIENT
Start: 2018-04-26 | End: 2018-04-26 | Stop reason: SURG

## 2018-04-26 RX ORDER — ROCURONIUM BROMIDE 10 MG/ML
INJECTION, SOLUTION INTRAVENOUS AS NEEDED
Status: DISCONTINUED | OUTPATIENT
Start: 2018-04-26 | End: 2018-04-26 | Stop reason: SURG

## 2018-04-26 RX ADMIN — MIDAZOLAM HYDROCHLORIDE 4 MG: 1 INJECTION, SOLUTION INTRAMUSCULAR; INTRAVENOUS at 13:30

## 2018-04-26 RX ADMIN — LIDOCAINE HYDROCHLORIDE 80 MG: 10 INJECTION, SOLUTION INFILTRATION; PERINEURAL at 13:40

## 2018-04-26 RX ADMIN — PROPOFOL 200 MG: 10 INJECTION, EMULSION INTRAVENOUS at 13:40

## 2018-04-26 RX ADMIN — DEXAMETHASONE SODIUM PHOSPHATE 8 MG: 4 INJECTION, SOLUTION INTRAMUSCULAR; INTRAVENOUS at 14:28

## 2018-04-26 RX ADMIN — ROCURONIUM BROMIDE 50 MG: 10 INJECTION INTRAVENOUS at 13:41

## 2018-04-26 RX ADMIN — SODIUM CHLORIDE 125 ML/HR: 0.9 INJECTION, SOLUTION INTRAVENOUS at 12:15

## 2018-04-26 RX ADMIN — FENTANYL CITRATE 100 MCG: 50 INJECTION, SOLUTION INTRAMUSCULAR; INTRAVENOUS at 13:40

## 2018-04-26 RX ADMIN — NEOSTIGMINE METHYLSULFATE 4 MG: 1 INJECTION, SOLUTION INTRAMUSCULAR; INTRAVENOUS; SUBCUTANEOUS at 14:29

## 2018-04-26 RX ADMIN — TRIMETHOBENZAMIDE HYDROCHLORIDE 200 MG: 100 INJECTION INTRAMUSCULAR at 15:01

## 2018-04-26 RX ADMIN — SODIUM CHLORIDE: 0.9 INJECTION, SOLUTION INTRAVENOUS at 14:30

## 2018-04-26 RX ADMIN — GLYCOPYRROLATE 0.6 MG: 0.2 INJECTION, SOLUTION INTRAMUSCULAR; INTRAVENOUS at 14:29

## 2018-04-26 RX ADMIN — ONDANSETRON HYDROCHLORIDE 4 MG: 2 INJECTION, SOLUTION INTRAVENOUS at 14:28

## 2018-04-26 NOTE — DISCHARGE INSTRUCTIONS
MELQUIADES Crooks  Facial Plastic & Reconstructive Surgery   Post-Operative Care  Office [phone]  Cell [phone]               At Home (in the days immediately following the procedure):          Try to sleep with your head elevated on 2-3 pillows   Iced packs placed over wound will help reduce swelling  They should be used 20 minutes on/ 20 minutes off while awake for the first full day  Crushed ice in ziplock bags or frozen peas or corn work well   The neck should be cleaned with a Q-tip soaked in hydrogen peroxide mixed 50/50 with water   Apply antibiotic ointment (Bacitracin) or Vaseline to the external incision 3-4 times per day   Take your medicines as prescribed  REMEMBER:  DO NOT DRIVE WHILE TAKING PAIN MEDICATIONS   You may shower with luke-warm water only     You may get out of bed and go to the bathroom with assistance  Eat light, soft meals as tolerated, avoiding gas-stimulating foods  Follow-up care:   Eat before coming to the office for post-operative visits  Rest for the first week after the procedure, avoiding excessive physical activities, hard chewing, lifting objects over 8 lbs (about the weight of a phone book), or bending over  We request that you do not travel by plane for one week after surgery  Clean the wound at least twice daily using 1/2 hydrogen peroxide 1/2 water solution to remove any crusting (scabbing)  Lubricate your wound after cleaning with Q-tips and antibiotic ointment to soften hardened crusts  Follow-up visits: At one week, the sutures will be removed; you may drive yourself to this appointment (as long as you are no longer taking prescription/narcotic pain medicines)  After dressing removal, make-up may be worn, avoiding the incision lines  Additional follow-up visits will be scheduled at this time  Note that final results may not be apparent until [place] after surgery      Healing Care:   After surgery try not to roll onto the wound while asleep  Clean the external skin gently but thoroughly with soap  The use of alcohol and tobacco products prolong swelling and healing and are best avoided for 2 weeks after surgery  Do not expose your wound to sun for 4-6 weeks after surgery  Use sunscreen (SPF 30 or higher) for 6 months after surgery if sun exposure is absolutely necessary  Avoid any physical exercise that can cause over-heating or over-exertion for two weeks after the surgery  Complete healing may take 12 months  It is to your advantage to return for all postoperative visits so that long-term results may be evaluated  Remember:  DO NOT TAKE VITAMIN E, ASPIRIN, IBUPROFEN (advil, motrin, nuprin), NAPROSYN (naproxen, aleve) or any other so-called non-steroidal anti-inflammatory drugs (NSAIDS) for 2 weeks before and 2 weeks after surgery  Frequently Asked Questions:  When can I shower and shampoo my hair? You may shower the day after your surgery, BUT KEEP ANY DRESSING DRY  This may mean you wash your face/hair in the sink instead  It is important that you do not use hot water, as this can increase the swelling  Lukewarm water is best       When will the swelling and bruising go away? This usually takes 7-10 days or so, but may take less or more time, depending on the individual     When can I take aspirin or advil? You should not take aspirin, advil (ibuprofen), alleve (naproxen) or any other non-steroidal anti-inflammatory drugs for 2 weeks prior to surgery or 2 weeks afterwards  The same is true for vitamin E, ginko, garlic pills, and other natural supplements  When can I wear my glasses? You will be able to wear contact lenses as soon as you feel comfortable  You may wear glasses one to two weeks after surgery, depending on the type of surgery you had  In any case, you must be careful not to place any pressure on the wound  When can I wear makeup?    Make-up can be applied after suture removal, but not directly on the incisions until 3 weeks after the procedure  When will I see my results? Final results in any reconstructive surgery can take 12 months  However, notable changes should be evident in the weeks immediately after the procedure  What about exercise? Please adhere to the following schedule:   Up to week 1 after surgery:  REST! No strenuous exercise  Walking is ok  Week 1-3 after surgery: You may begin light aerobic exercise, but no bending over/straining/lifting weights  Week 3+: You may begin more strenuous exercise, such as yoga, stretching, bending over, lifting weights  Please remember to start slowly  Week 6+: You may resume contact sports, such as soccer, basketball, etc    How to contact us:    Phone: If you have questions or concerns, please call us at [phone] during business hours (8 am to 5 pm)  On nights and weekends, you may page the ENT surgeon on call  at [facility]   In case of emergency, please call [phone]

## 2018-04-26 NOTE — ANESTHESIA POSTPROCEDURE EVALUATION
Post-Op Assessment Note      CV Status:  Stable    Mental Status:  Alert and awake    Hydration Status:  Euvolemic    PONV Controlled:  Controlled    Airway Patency:  Patent    Post Op Vitals Reviewed: Yes          Staff: Anesthesiologist           BP (P) 154/80 (04/26/18 1445)    Temp (P) 97 5 °F (36 4 °C) (04/26/18 1445)    Pulse (P) 83 (04/26/18 1445)   Resp (P) 22 (04/26/18 1445)    SpO2 (P) 97 % (04/26/18 1445)

## 2018-04-26 NOTE — OP NOTE
OPERATIVE REPORT  PATIENT NAME: Giuseppe Gandara    :  1970  MRN: [id number]  Pt Location: [facility] OR ROOM 08    SURGERY DATE: 2018    Surgeon(s) and Role:     * Cesar Green MD - Primary    Preop Diagnosis:  Localized enlarged lymph nodes [R59 0]    Post-Op Diagnosis Codes:     * Localized enlarged lymph nodes [R59 0]    Procedure(s) (LRB):  OPEN DEEP CERVICAL LYMPH NODE EXCISOION/BIOPSY (N/A)    Specimen(s):  ID Type Source Tests Collected by Time Destination   1 : Right level 2 lymph node  Tissue Lymph Node TISSUE EXAM Cesar Green MD 2018 1419    2 : Right level 2 lymph node Tissue Lymph Node TISSUE EXAM Cesar Green MD 2018 1424        Estimated Blood Loss:   3 mL    Drains:  None     Anesthesia Type:   General    Operative Indications:  Localized enlarged lymph nodes [R59 0]      Operative Findings:  Enlarged abnormal architecture lymph nodes throughout level 2a  Marginal mandibular branch of the facial nerve found and intact  Complications:   None    Procedure and Technique:  The patient was positively identified and transferred onto the operating table in the supine position  Appropriate monitoring devices were put in place, anesthesia was induced and the patient was intubated without difficulty  A shoulder roll was placed, and the patients neck was examined  The previous scar from her prior neck incision was marked in the right submandibular area  Before proceeding further, the time-out procedure was completed  Local anesthesia in the form of 1% lidocaine with 1/100,000 epinephrine was then injected to the marked site  The patients neck was then prepped and draped in the usual sterile fashion  Skin incision was then made at the marked site in a transverse fashion using a 15 blade  Scar was excised  Dissection continued through subcutaneous tissues and platysma using the 15 blade   The capsule of the neck mass was immediately encountered, and the mass was noted to have an appearance and consistency consistent with lymph node  The node was fragile and without obvious capsule  Dissection was carefully carried out around the mass, dissecting it free off the overlying platysma muscle, posteriorly from the SCM, and carrying dissection out around the mass in its entirety using mosquito clamp and bipolar cautery  Remaining fascial attachments were divided, and the mass was removed, measured, and sent to pathology for fresh and permanent section evaluation  The surgical site was irrigated with saline, and hemostasis was ensured using bipolar cautery  The surgical site was then closed in multiple layers  Platysma was reapproximated using 4-0 Vicryl stitches in an running fashion  Skin itself was closed using a 5-0 moncryl stitch in a running sub-cuticular fashion, followed by  steri-strips  Anesthesia was then reversed, and drapes were removed  The patient was awakened, extubated and taken to the recovery room in stable condition  All counts were correct at the end of the case, and no complications were encountered       I was present for the entire procedure and A qualified resident physician was not available    Patient Disposition:  PACU  and extubated and stable    SIGNATURE: Dylon Ness MD  DATE: April 26, 2018  TIME: 2:38 PM

## 2018-04-30 LAB — SCAN RESULT: NORMAL

## 2018-05-04 ENCOUNTER — OFFICE VISIT (OUTPATIENT)
Dept: OTOLARYNGOLOGY | Facility: CLINIC | Age: 48
End: 2018-05-04

## 2018-05-04 DIAGNOSIS — R59.0 LOCALIZED ENLARGED LYMPH NODES: ICD-10-CM

## 2018-05-04 DIAGNOSIS — C84.41 PERIPHERAL T CELL LYMPHOMA OF LYMPH NODES OF NECK (HCC): Primary | ICD-10-CM

## 2018-05-04 PROCEDURE — 99024 POSTOP FOLLOW-UP VISIT: CPT | Performed by: OTOLARYNGOLOGY

## 2018-05-04 NOTE — LETTER
May 4, 2018     Mariana Poole MD  [address] [address]    Patient: Shin Frank   YOB: 1970   Date of Visit: 5/4/2018       Dear Dr Esteban Calderon:    Thank you for referring Shin Frank to me for evaluation  Below are my notes for this consultation  If you have questions, please do not hesitate to call me  I look forward to following your patient along with you           Sincerely,        Rick Ac MD        CC: Isabela Mayo, DO

## 2018-05-04 NOTE — PROGRESS NOTES
Ravi Blanc returns for re-evaluation after excisional biopsy of right level 2 lymph nodes  Biopsy returned for T-cell lymphoma  This is consistent with her previous pathology  She denies any problems with the incision  Otherwise doing very well  Neck incision is healing very well  No drainage  No bruising  No hematoma  We discussed with her the nature of the pathology  She will follow up with her oncologist as scheduled on the 18th  She returned our office in 3 months for re-evaluation or sooner should she have any further problems

## 2018-05-17 ENCOUNTER — TELEPHONE (OUTPATIENT)
Dept: FAMILY MEDICINE CLINIC | Facility: CLINIC | Age: 48
End: 2018-05-17

## 2018-05-17 NOTE — TELEPHONE ENCOUNTER
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

## 2018-05-17 NOTE — TELEPHONE ENCOUNTER
----- Message from Erick Vasquez sent at 5/17/2018  8:21 AM EDT -----  Regarding: FW: Test Results Question  Contact: [phone]      ----- Message -----  From: Yousif Raza  Sent: 5/17/2018   6:58 AM  To: [facility]  Subject: Test Results Question                            I have a question about LAB RESULTS resulted on 5/8/18 at 2:26 PM   Why does it not give my final results on here? And can you call me with the final results please  If i need to make an appt that is fine also  I already know what the prliminary results were and they were sending the biopsy out for another opinion  Thank you!

## 2018-06-11 ENCOUNTER — TRANSCRIBE ORDERS (OUTPATIENT)
Dept: ADMINISTRATIVE | Facility: HOSPITAL | Age: 48
End: 2018-06-11

## 2018-06-11 DIAGNOSIS — C84.41 PERIPHERAL T CELL LYMPHOMA OF LYMPH NODES OF HEAD, FACE, AND NECK (HCC): Primary | ICD-10-CM

## 2018-07-06 ENCOUNTER — OFFICE VISIT (OUTPATIENT)
Dept: FAMILY MEDICINE CLINIC | Facility: CLINIC | Age: 48
End: 2018-07-06
Payer: COMMERCIAL

## 2018-07-06 VITALS
BODY MASS INDEX: 42.59 KG/M2 | DIASTOLIC BLOOD PRESSURE: 76 MMHG | WEIGHT: 281 LBS | HEIGHT: 68 IN | SYSTOLIC BLOOD PRESSURE: 128 MMHG

## 2018-07-06 DIAGNOSIS — M25.552 PAIN OF LEFT HIP JOINT: ICD-10-CM

## 2018-07-06 DIAGNOSIS — M79.675 PAIN OF TOE OF LEFT FOOT: Primary | ICD-10-CM

## 2018-07-06 DIAGNOSIS — E03.9 ACQUIRED HYPOTHYROIDISM: ICD-10-CM

## 2018-07-06 PROBLEM — Z86.711 HISTORY OF PULMONARY EMBOLISM: Status: ACTIVE | Noted: 2018-07-06

## 2018-07-06 PROCEDURE — 99213 OFFICE O/P EST LOW 20 MIN: CPT | Performed by: FAMILY MEDICINE

## 2018-07-06 PROCEDURE — 3008F BODY MASS INDEX DOCD: CPT | Performed by: FAMILY MEDICINE

## 2018-07-06 NOTE — PROGRESS NOTES
Assessment/Plan: For her toe, we will refer to Podiatry  For her hip, patient will see if symptoms continue or increase  She will call us if that is the case  She will follow up with Oncology as scheduled  I did give her slip for TSH to check after she is done with radiation treatment  We will see her back in the next couple months or p r n  No problem-specific Assessment & Plan notes found for this encounter  Diagnoses and all orders for this visit:    Pain of toe of left foot  -     Ambulatory referral to Podiatry; Future    Pain of left hip joint    Acquired hypothyroidism  -     TSH, 3rd generation with Free T4 reflex          Subjective:      Patient ID: Andressa Webb is a 50 y o  female  Patient stated for the last month or so has been having some left 2nd toe pain that hurts her the more she is on it  It is swollen by the end of the day  Hurts at the base of the 2nd toe  Also states if she sitting for awhile when she gets up, she has left hip pain  This pain gets better when she is walking more  She has not tried any medications for it  Of note, patient has a reoccurrence of her lymphoma  This time it is in her right neck, it is B-cell type  She is going to get radiation treatment  She will need to have her thyroid checked after the radiation treatment  Toe Pain    The incident occurred more than 1 week ago  There was no injury mechanism  The pain is present in the left toes  The quality of the pain is described as aching  The pain is moderate  The pain has been intermittent since onset  She reports no foreign bodies present  The symptoms are aggravated by weight bearing  She has tried non-weight bearing for the symptoms  Thyroid Problem   Presents for follow-up visit  Symptoms include weight gain  Patient reports no weight loss  The symptoms have been stable         The following portions of the patient's history were reviewed and updated as appropriate:   She  has a past medical history of Allergic rhinitis; Arthritis; Chronic allergic conjunctivitis; Fluttering sensation of heart; Foot pain, left; Full dentures; History of cancer chemotherapy (2016); History of dilation and curettage; tonsillectomy; Hypothyroid; Neck mass; Non Hodgkin's lymphoma ([facility] ); Obese; Port-a-cath in place (2016); Post-menopausal; Pulmonary embolism ([facility] ); Tachycardia; Tinnitus; Wears glasses; Wears partial dentures; and [place] teeth extracted  She   Patient Active Problem List    Diagnosis Date Noted    History of pulmonary embolism 07/06/2018    Pain of toe of left foot 07/06/2018    Pain of left hip joint 07/06/2018    Localized enlarged lymph nodes 04/26/2018    Premature atrial contraction 04/04/2017    Premature ventricular contractions 04/04/2017    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 ([place] [address] ) 08/31/2016    Non Hodgkin's lymphoma ([place] [address] ) 07/26/2016    Acquired hypothyroidism 07/22/2015    Fatigue 07/21/2015     She  has a past surgical history that includes Cyst Removal (Left); pr bx/remv,lymph node,deep cerv (N/A, 3/15/2016); Lymphadenectomy; Tonsillectomy; Portacath placement; Dilation and curettage of uterus; Tubal ligation; Colonoscopy; and FACIAL/NECK BIOPSY (N/A, 4/26/2018)  Her family history includes Coronary artery disease in her father; Diabetes in her brother and family; Heart disease in her family  She  reports that she quit smoking about 22 years ago  She has a 16 50 pack-year smoking history  She has never used smokeless tobacco  She reports that she drinks alcohol  She reports that she does not use drugs  Current Outpatient Prescriptions   Medication Sig Dispense Refill    acetaminophen (TYLENOL) 325 mg tablet Take 650 mg by mouth every 6 (six) hours as needed for mild pain        b complex vitamins tablet Take 1 tablet by mouth daily      cholecalciferol (VITAMIN D3) 1,000 units tablet Take 1,000 Units by mouth daily      levothyroxine 75 mcg tablet Take 75 mcg by mouth daily      MAGNESIUM OXIDE PO Take by mouth daily      Multiple Vitamin (MULTIVITAMIN) tablet Take 2 tablets by mouth daily      NON FORMULARY CBD oil,,,7drops twice a day      rivaroxaban (XARELTO) 20 mg tablet Take 20 mg by mouth daily with dinner        Zinc 50 MG CAPS Take by mouth daily       No current facility-administered medications for this visit  Current Outpatient Prescriptions on File Prior to Visit   Medication Sig    acetaminophen (TYLENOL) 325 mg tablet Take 650 mg by mouth every 6 (six) hours as needed for mild pain   b complex vitamins tablet Take 1 tablet by mouth daily    cholecalciferol (VITAMIN D3) 1,000 units tablet Take 1,000 Units by mouth daily    levothyroxine 75 mcg tablet Take 75 mcg by mouth daily    MAGNESIUM OXIDE PO Take by mouth daily    Multiple Vitamin (MULTIVITAMIN) tablet Take 2 tablets by mouth daily    NON FORMULARY CBD oil,,,7drops twice a day    rivaroxaban (XARELTO) 20 mg tablet Take 20 mg by mouth daily with dinner      Zinc 50 MG CAPS Take by mouth daily     No current facility-administered medications on file prior to visit  She has No Known Allergies       Review of Systems   Constitutional: Positive for weight gain  Negative for weight loss  Respiratory: Negative  Cardiovascular: Negative  Gastrointestinal: Negative  Endocrine:        As per HPI   Genitourinary: Negative  Hematological:        As per HPI         Objective:      /76   Ht 5' 8" (1 727 m)   Wt 127 kg (281 lb)   BMI 42 73 kg/m²          Physical Exam   Constitutional: She is oriented to person, place, and time  She appears well-developed and well-nourished  No distress  Musculoskeletal: She exhibits tenderness (Base of left 2nd toe )  Neurological: She is alert and oriented to person, place, and time  Skin: She is not diaphoretic  Psychiatric: She has a normal mood and affect   Her behavior is normal  Judgment and thought content normal    Vitals reviewed

## 2018-07-12 ENCOUNTER — TRANSCRIBE ORDERS (OUTPATIENT)
Dept: ADMINISTRATIVE | Facility: HOSPITAL | Age: 48
End: 2018-07-12

## 2018-07-12 DIAGNOSIS — C84.41 PERIPHERAL T CELL LYMPHOMA OF LYMPH NODES OF HEAD, FACE, AND NECK (HCC): Primary | ICD-10-CM

## 2018-07-17 ENCOUNTER — APPOINTMENT (OUTPATIENT)
Dept: LAB | Facility: MEDICAL CENTER | Age: 48
End: 2018-07-17
Payer: COMMERCIAL

## 2018-07-17 ENCOUNTER — TRANSCRIBE ORDERS (OUTPATIENT)
Dept: ADMINISTRATIVE | Facility: HOSPITAL | Age: 48
End: 2018-07-17

## 2018-07-17 DIAGNOSIS — C84.41 PERIPHERAL T CELL LYMPHOMA OF LYMPH NODES OF NECK (HCC): Primary | ICD-10-CM

## 2018-07-17 DIAGNOSIS — C84.41 PERIPHERAL T CELL LYMPHOMA OF LYMPH NODES OF NECK (HCC): ICD-10-CM

## 2018-07-17 LAB
ALBUMIN SERPL BCP-MCNC: 3.5 G/DL (ref 3.5–5)
ALP SERPL-CCNC: 104 U/L (ref 46–116)
ALT SERPL W P-5'-P-CCNC: 19 U/L (ref 12–78)
ANION GAP SERPL CALCULATED.3IONS-SCNC: 7 MMOL/L (ref 4–13)
AST SERPL W P-5'-P-CCNC: 18 U/L (ref 5–45)
BASOPHILS # BLD AUTO: 0.09 THOUSANDS/ΜL (ref 0–0.1)
BASOPHILS NFR BLD AUTO: 1 % (ref 0–1)
BILIRUB SERPL-MCNC: 0.52 MG/DL (ref 0.2–1)
BUN SERPL-MCNC: 14 MG/DL (ref 5–25)
CALCIUM SERPL-MCNC: 8.8 MG/DL (ref 8.3–10.1)
CHLORIDE SERPL-SCNC: 107 MMOL/L (ref 100–108)
CO2 SERPL-SCNC: 22 MMOL/L (ref 21–32)
CREAT SERPL-MCNC: 0.73 MG/DL (ref 0.6–1.3)
EOSINOPHIL # BLD AUTO: 0.29 THOUSAND/ΜL (ref 0–0.61)
EOSINOPHIL NFR BLD AUTO: 4 % (ref 0–6)
ERYTHROCYTE [DISTWIDTH] IN BLOOD BY AUTOMATED COUNT: 13.2 % (ref 11.6–15.1)
GFR SERPL CREATININE-BSD FRML MDRD: 97 ML/MIN/1.73SQ M
GLUCOSE P FAST SERPL-MCNC: 117 MG/DL (ref 65–99)
HCT VFR BLD AUTO: 46.3 % (ref 34.8–46.1)
HGB BLD-MCNC: 14.7 G/DL (ref 11.5–15.4)
IMM GRANULOCYTES # BLD AUTO: 0.03 THOUSAND/UL (ref 0–0.2)
IMM GRANULOCYTES NFR BLD AUTO: 0 % (ref 0–2)
LDH SERPL-CCNC: 186 U/L (ref 81–234)
LYMPHOCYTES # BLD AUTO: 1.17 THOUSANDS/ΜL (ref 0.6–4.47)
LYMPHOCYTES NFR BLD AUTO: 15 % (ref 14–44)
MCH RBC QN AUTO: 29 PG (ref 26.8–34.3)
MCHC RBC AUTO-ENTMCNC: 31.7 G/DL (ref 31.4–37.4)
MCV RBC AUTO: 91 FL (ref 82–98)
MONOCYTES # BLD AUTO: 0.66 THOUSAND/ΜL (ref 0.17–1.22)
MONOCYTES NFR BLD AUTO: 9 % (ref 4–12)
NEUTROPHILS # BLD AUTO: 5.57 THOUSANDS/ΜL (ref 1.85–7.62)
NEUTS SEG NFR BLD AUTO: 71 % (ref 43–75)
NRBC BLD AUTO-RTO: 0 /100 WBCS
PLATELET # BLD AUTO: 303 THOUSANDS/UL (ref 149–390)
PMV BLD AUTO: 10.9 FL (ref 8.9–12.7)
POTASSIUM SERPL-SCNC: 4 MMOL/L (ref 3.5–5.3)
PROT SERPL-MCNC: 7.7 G/DL (ref 6.4–8.2)
RBC # BLD AUTO: 5.07 MILLION/UL (ref 3.81–5.12)
SODIUM SERPL-SCNC: 136 MMOL/L (ref 136–145)
WBC # BLD AUTO: 7.81 THOUSAND/UL (ref 4.31–10.16)

## 2018-07-17 PROCEDURE — 83615 LACTATE (LD) (LDH) ENZYME: CPT

## 2018-07-17 PROCEDURE — 85025 COMPLETE CBC W/AUTO DIFF WBC: CPT

## 2018-07-17 PROCEDURE — 80053 COMPREHEN METABOLIC PANEL: CPT

## 2018-07-17 PROCEDURE — 36415 COLL VENOUS BLD VENIPUNCTURE: CPT

## 2018-07-18 ENCOUNTER — HOSPITAL ENCOUNTER (OUTPATIENT)
Dept: CT IMAGING | Facility: HOSPITAL | Age: 48
Discharge: HOME | End: 2018-07-18
Attending: INTERNAL MEDICINE
Payer: COMMERCIAL

## 2018-07-18 DIAGNOSIS — C84.41 PERIPHERAL T CELL LYMPHOMA OF LYMPH NODES OF HEAD, FACE, AND NECK (HCC): ICD-10-CM

## 2018-07-18 PROCEDURE — 74177 CT ABD & PELVIS W/CONTRAST: CPT

## 2018-07-18 PROCEDURE — 71260 CT THORAX DX C+: CPT

## 2018-07-18 PROCEDURE — 70491 CT SOFT TISSUE NECK W/DYE: CPT

## 2018-07-18 RX ADMIN — IOHEXOL 80 ML: 350 INJECTION, SOLUTION INTRAVENOUS at 11:18

## 2018-07-25 ENCOUNTER — ANESTHESIA EVENT (OUTPATIENT)
Dept: PERIOP | Facility: HOSPITAL | Age: 48
End: 2018-07-25
Payer: COMMERCIAL

## 2018-07-26 ENCOUNTER — ANESTHESIA (OUTPATIENT)
Dept: PERIOP | Facility: HOSPITAL | Age: 48
End: 2018-07-26
Payer: COMMERCIAL

## 2018-07-26 ENCOUNTER — HOSPITAL ENCOUNTER (OUTPATIENT)
Facility: HOSPITAL | Age: 48
Setting detail: OUTPATIENT SURGERY
Discharge: HOME | End: 2018-07-26
Attending: INTERNAL MEDICINE | Admitting: INTERNAL MEDICINE
Payer: COMMERCIAL

## 2018-07-26 VITALS
SYSTOLIC BLOOD PRESSURE: 120 MMHG | HEART RATE: 78 BPM | WEIGHT: 281 LBS | RESPIRATION RATE: 16 BRPM | TEMPERATURE: 97.9 F | DIASTOLIC BLOOD PRESSURE: 80 MMHG | OXYGEN SATURATION: 96 % | BODY MASS INDEX: 42.59 KG/M2 | HEIGHT: 68 IN

## 2018-07-26 DIAGNOSIS — C84.41: ICD-10-CM

## 2018-07-26 DIAGNOSIS — R19.7 DIARRHEA: ICD-10-CM

## 2018-07-26 PROCEDURE — 88342 IMHCHEM/IMCYTCHM 1ST ANTB: CPT | Performed by: PATHOLOGY

## 2018-07-26 PROCEDURE — 88341 IMHCHEM/IMCYTCHM EA ADD ANTB: CPT | Performed by: PATHOLOGY

## 2018-07-26 PROCEDURE — 88305 TISSUE EXAM BY PATHOLOGIST: CPT | Performed by: PATHOLOGY

## 2018-07-26 RX ORDER — SODIUM CHLORIDE, SODIUM LACTATE, POTASSIUM CHLORIDE, CALCIUM CHLORIDE 600; 310; 30; 20 MG/100ML; MG/100ML; MG/100ML; MG/100ML
125 INJECTION, SOLUTION INTRAVENOUS CONTINUOUS
Status: DISCONTINUED | OUTPATIENT
Start: 2018-07-26 | End: 2018-07-30 | Stop reason: HOSPADM

## 2018-07-26 RX ORDER — PROPOFOL 10 MG/ML
INJECTION, EMULSION INTRAVENOUS AS NEEDED
Status: DISCONTINUED | OUTPATIENT
Start: 2018-07-26 | End: 2018-07-26 | Stop reason: SURG

## 2018-07-26 RX ADMIN — PROPOFOL 30 MG: 10 INJECTION, EMULSION INTRAVENOUS at 12:07

## 2018-07-26 RX ADMIN — SODIUM CHLORIDE, SODIUM LACTATE, POTASSIUM CHLORIDE, AND CALCIUM CHLORIDE: .6; .31; .03; .02 INJECTION, SOLUTION INTRAVENOUS at 11:51

## 2018-07-26 RX ADMIN — PROPOFOL 30 MG: 10 INJECTION, EMULSION INTRAVENOUS at 12:08

## 2018-07-26 RX ADMIN — PROPOFOL 30 MG: 10 INJECTION, EMULSION INTRAVENOUS at 12:12

## 2018-07-26 RX ADMIN — PROPOFOL 30 MG: 10 INJECTION, EMULSION INTRAVENOUS at 12:15

## 2018-07-26 RX ADMIN — PROPOFOL 30 MG: 10 INJECTION, EMULSION INTRAVENOUS at 12:06

## 2018-07-26 RX ADMIN — LIDOCAINE HYDROCHLORIDE 100 MG: 20 INJECTION, SOLUTION INTRAVENOUS at 11:54

## 2018-07-26 RX ADMIN — PROPOFOL 30 MG: 10 INJECTION, EMULSION INTRAVENOUS at 12:10

## 2018-07-26 RX ADMIN — PROPOFOL 50 MG: 10 INJECTION, EMULSION INTRAVENOUS at 11:57

## 2018-07-26 RX ADMIN — PROPOFOL 30 MG: 10 INJECTION, EMULSION INTRAVENOUS at 12:04

## 2018-07-26 RX ADMIN — SODIUM CHLORIDE, SODIUM LACTATE, POTASSIUM CHLORIDE, AND CALCIUM CHLORIDE: .6; .31; .03; .02 INJECTION, SOLUTION INTRAVENOUS at 12:05

## 2018-07-26 RX ADMIN — PROPOFOL 30 MG: 10 INJECTION, EMULSION INTRAVENOUS at 12:01

## 2018-07-26 RX ADMIN — PROPOFOL 150 MG: 10 INJECTION, EMULSION INTRAVENOUS at 11:55

## 2018-07-26 RX ADMIN — PROPOFOL 30 MG: 10 INJECTION, EMULSION INTRAVENOUS at 12:17

## 2018-07-26 RX ADMIN — SODIUM CHLORIDE, POTASSIUM CHLORIDE, SODIUM LACTATE AND CALCIUM CHLORIDE 125 ML/HR: 600; 310; 30; 20 INJECTION, SOLUTION INTRAVENOUS at 10:08

## 2018-07-26 RX ADMIN — PROPOFOL 30 MG: 10 INJECTION, EMULSION INTRAVENOUS at 11:59

## 2018-07-26 NOTE — ANESTHESIA POSTPROCEDURE EVALUATION
Post-Op Assessment Note      CV Status:  Stable    Mental Status:  Alert and awake    Hydration Status:  Euvolemic    PONV Controlled:  Controlled    Airway Patency:  Patent    Post Op Vitals Reviewed: Yes          Staff: CRNA           BP   110/61   Temp   97 6   Pulse  77   Resp   16   SpO2   98

## 2018-07-26 NOTE — OP NOTE
OPERATIVE REPORT  PATIENT NAME: Rhett Bruno    :  1970  MRN: [id number]  Pt Location: [address] [place] [address] OR ROOM 01    SURGERY DATE: 2018    Surgeon(s) and Role:     * Eliud Melvin MD - Primary    Preop Diagnosis:  Peripheral T-cell lymphoma of lymph nodes of head, face, or neck ([address] ) [C84 41]  Diarrhea [R19 7]    Post-Op Diagnosis Codes:     * Peripheral T-cell lymphoma of lymph nodes of head, face, or neck (HCC) [C84 41]     * Diarrhea [R19 7]    Procedure(s) (LRB):  ESOPHAGOGASTRODUODENOSCOPY (EGD) with multiple bx (N/A)  COLONOSCOPY (N/A)    Specimen(s):  ID Type Source Tests Collected by Time Destination   1 : antrum Tissue Stomach TISSUE EXAM Eliud Melvin MD 2018 1200    2 : body Tissue Stomach TISSUE EXAM Eliud Melvin MD 2018 1200    3 :  Tissue Large Intestine, Cecum TISSUE EXAM Eliud Melvin MD 2018 1211    4 :  Tissue Large Intestine, Right/Ascending Colon TISSUE EXAM Eliud Melvin MD 2018 1212    5 :  Tissue Large Intestine, Left/Descending Colon TISSUE EXAM Eliud Melvin MD 2018 1213    6 :  Tissue Rectum TISSUE EXAM Eliud Melvin MD 2018 1215        Estimated Blood Loss:   Minimal    Drains: None       Anesthesia Type:   IV Sedation with Anesthesia    Operative Indications:  Peripheral T-cell lymphoma of lymph nodes of head, face, or neck ([facility] ) [C84 41]  Diarrhea [R19 7]      Operative Findings:  EGD:  Normal esophagus  GERD  Small hiatal hernia  Gastritis s/p random biopsies from antrum and body to r/o H  Pylori    Colonoscopy:  Small internal hemorrhoids  Otherwise normal colonoscopy  However given h/o diarrhea and lymphoma, random biopsies obtained from cecum, right and left colon as well as rectal biopsies  Complications:   None    Procedure and Technique:  The patient was identified in the endoscopy suite  Informed consent was obtained  He was placed in left lateral position   A  scope was used to examine the esophagus, stomach and duodenum up to second portion  The sedation was provided by the anesthesia department  The patient tolerated the procedure well and there were no complications  Post-Op diagnosis  Normal esophagus  GERD  Small hiatal hernia  Gastritis s/p random biopsies from antrum and body to r/o H  pylori    Recommendations  1  Follow up pathology  2  GERD dietary and lifestyle changes  3  Continue PPI  The patient was identified in the endoscopy suite  Informed consent was obtained  He was placed in the left lateral position  A  scope was used to examine the entire colon up to cecum  The sedation was provided by the anesthesia department  Colonoscopy withdrawal time was 8 minutes from the cecum  Prep was good  The patient tolerated procedure well and there were no complications  Post-Op Diagnosis  Small internal hemorrhoids  Otherwise normal colonoscopy  However given h/o diarrhea and lymphoma, random biopsies obtained from cecum, right and left colon as well as rectal biopsies  Recommendations  1  Follow up pathology  2  High fiber diet and supplements  3  Next colonoscopy pending results of biopsies       I was present for the entire procedure    Patient Disposition:  PACU     SIGNATURE: Diane Smith MD  DATE: July 26, 2018  TIME: 12:21 PM

## 2018-07-26 NOTE — DISCHARGE INSTRUCTIONS
Colonoscopy   WHAT YOU NEED TO KNOW:   A colonoscopy is a procedure to examine the inside of your colon (intestine) with a scope  Polyps or tissue growths may have been removed during your colonoscopy  It is normal to feel bloated and to have some abdominal discomfort  You should be passing gas  If you have hemorrhoids or you had polyps removed, you may have a small amount of bleeding  DISCHARGE INSTRUCTIONS:   Seek care immediately if:   · You have a large amount of bright red blood in your bowel movements  · Your abdomen is hard and firm and you have severe pain  · You have sudden trouble breathing  Contact your healthcare provider if:   · You develop a rash or hives  · You have a fever within 24 hours of your procedure       · You have not had a bowel movement for 3 days after your procedure  · You have questions or concerns about your condition or care  Activity:   · Do not lift, strain, or run  for 3 days after your procedure  · Rest after your procedure  You have been given medicine to relax you  Do not  drive or make important decisions until the day after your procedure  Return to your normal activity as directed  · Relieve gas and discomfort from bloating  by lying on your right side with a heating pad on your abdomen  You may need to take short walks to help the gas move out  Eat small meals until bloating is relieved  If you had polyps removed: For 7 days after your procedure:  · Do not  take aspirin  · Do not  go on long car rides  Follow up with your healthcare provider as directed:  Write down your questions so you remember to ask them during your visits  © 2017 [address] is for End User's use only and may not be sold, redistributed or otherwise used for commercial purposes  All illustrations and images included in CareNotes® are the copyrighted property of [employer]  or [employer]    The above information is an  only  It is not intended as medical advice for individual conditions or treatments  Talk to your doctor, nurse or pharmacist before following any medical regimen to see if it is safe and effective for you  Upper Endoscopy   WHAT YOU NEED TO KNOW:   An upper endoscopy is also called an upper gastrointestinal (GI) endoscopy, or an esophagogastroduodenoscopy (EGD)  You may feel bloated, gassy, or have some abdominal discomfort after your procedure  Your throat may be sore for 24 to 36 hours  You may burp or pass gas from air that is still inside your body  DISCHARGE INSTRUCTIONS:   Call 911 if:   · You have sudden chest pain or trouble breathing  Seek care immediately if:   · You feel dizzy or faint  · You have trouble swallowing  · You have severe throat pain  · Your bowel movements are very dark or black  · Your abdomen is hard and firm and you have severe pain  · You vomit blood  Contact your healthcare provider if:   · You feel full or bloated and cannot burp or pass gas  · You have not had a bowel movement for 3 days after your procedure  · You have neck pain  · You have a fever or chills  · You have nausea or are vomiting  · You have a rash or hives  · You have questions or concerns about your endoscopy  Relieve a sore throat:  Suck on throat lozenges or crushed ice  Gargle with a small amount of warm salt water  Mix 1 teaspoon of salt and 1 cup of warm water to make salt water  Relieve gas and discomfort from bloating:  Lie on your right side with a heating pad on your abdomen  Take short walks to help pass gas  Eat small meals until bloating is relieved  Rest after your procedure:  Do not drive or make important decisions until the day after your procedure  Return to your normal activity as directed  You can usually return to work the day after your procedure    Follow up with your healthcare provider as directed:  Write down your questions so you remember to ask them during your visits  © 2017 [address] [employer] Information is for End User's use only and may not be sold, redistributed or otherwise used for commercial purposes  All illustrations and images included in CareNotes® are the copyrighted property of [employer]  or [employer]  The above information is an  only  It is not intended as medical advice for individual conditions or treatments  Talk to your doctor, nurse or pharmacist before following any medical regimen to see if it is safe and effective for you

## 2018-07-26 NOTE — ANESTHESIA PREPROCEDURE EVALUATION
Review of Systems/Medical History  Patient summary reviewed  Chart reviewed  No history of anesthetic complications     Cardiovascular   Pulmonary  Smoker ex-smoker  , No sleep apnea ,        GI/Hepatic            Endo/Other  History of thyroid disease , hypothyroidism,   Obesity  morbid obesity   GYN       Hematology   Musculoskeletal    Arthritis     Neurology   Psychology           Physical Exam    Airway       Dental   upper dentures and lower dentures,     Cardiovascular  Rhythm: regular, Rate: normal,     Pulmonary  Breath sounds clear to auscultation,     Other Findings        Anesthesia Plan  ASA Score- 3     Anesthesia Type- IV sedation with anesthesia with ASA Monitors  Additional Monitors:   Airway Plan:         Plan Factors-    Induction- intravenous  Postoperative Plan-     Informed Consent- Anesthetic plan and risks discussed with patient

## 2018-07-30 ENCOUNTER — TRANSCRIBE ORDERS (OUTPATIENT)
Dept: ADMINISTRATIVE | Facility: HOSPITAL | Age: 48
End: 2018-07-30

## 2018-07-30 DIAGNOSIS — C84.41 PERIPHERAL T CELL LYMPHOMA OF LYMPH NODES OF HEAD, FACE, AND NECK (HCC): Primary | ICD-10-CM

## 2018-08-10 ENCOUNTER — TRANSCRIBE ORDERS (OUTPATIENT)
Dept: RADIOLOGY | Facility: MEDICAL CENTER | Age: 48
End: 2018-08-10

## 2018-08-10 ENCOUNTER — APPOINTMENT (OUTPATIENT)
Dept: LAB | Facility: MEDICAL CENTER | Age: 48
End: 2018-08-10
Payer: COMMERCIAL

## 2018-08-10 DIAGNOSIS — C85.11 B-CELL LYMPHOMA OF LYMPH NODES OF NECK, UNSPECIFIED B-CELL LYMPHOMA TYPE (HCC): ICD-10-CM

## 2018-08-10 DIAGNOSIS — C84.41 PERIPHERAL T CELL LYMPHOMA OF LYMPH NODES OF NECK (HCC): ICD-10-CM

## 2018-08-10 DIAGNOSIS — C83.00 LYMPHOMA MALIGNANT, LYMPHOCYTIC (HCC): Primary | ICD-10-CM

## 2018-08-10 DIAGNOSIS — C83.00 LYMPHOMA MALIGNANT, LYMPHOCYTIC (HCC): ICD-10-CM

## 2018-08-10 LAB
ALBUMIN SERPL BCP-MCNC: 3.4 G/DL (ref 3.5–5)
ALP SERPL-CCNC: 103 U/L (ref 46–116)
ALT SERPL W P-5'-P-CCNC: 16 U/L (ref 12–78)
ANION GAP SERPL CALCULATED.3IONS-SCNC: 7 MMOL/L (ref 4–13)
AST SERPL W P-5'-P-CCNC: 17 U/L (ref 5–45)
BASOPHILS # BLD AUTO: 0.09 THOUSANDS/ΜL (ref 0–0.1)
BASOPHILS NFR BLD AUTO: 1 % (ref 0–1)
BILIRUB SERPL-MCNC: 0.67 MG/DL (ref 0.2–1)
BUN SERPL-MCNC: 9 MG/DL (ref 5–25)
CALCIUM SERPL-MCNC: 8.5 MG/DL (ref 8.3–10.1)
CHLORIDE SERPL-SCNC: 107 MMOL/L (ref 100–108)
CO2 SERPL-SCNC: 24 MMOL/L (ref 21–32)
CREAT SERPL-MCNC: 0.7 MG/DL (ref 0.6–1.3)
EOSINOPHIL # BLD AUTO: 0.33 THOUSAND/ΜL (ref 0–0.61)
EOSINOPHIL NFR BLD AUTO: 4 % (ref 0–6)
ERYTHROCYTE [DISTWIDTH] IN BLOOD BY AUTOMATED COUNT: 13.2 % (ref 11.6–15.1)
GFR SERPL CREATININE-BSD FRML MDRD: 102 ML/MIN/1.73SQ M
GLUCOSE P FAST SERPL-MCNC: 110 MG/DL (ref 65–99)
HCT VFR BLD AUTO: 46.7 % (ref 34.8–46.1)
HGB BLD-MCNC: 14.7 G/DL (ref 11.5–15.4)
IGA SERPL-MCNC: 329 MG/DL (ref 70–400)
IGG SERPL-MCNC: 1290 MG/DL (ref 700–1600)
IGM SERPL-MCNC: 29 MG/DL (ref 40–230)
IMM GRANULOCYTES # BLD AUTO: 0.03 THOUSAND/UL (ref 0–0.2)
IMM GRANULOCYTES NFR BLD AUTO: 0 % (ref 0–2)
LYMPHOCYTES # BLD AUTO: 1.11 THOUSANDS/ΜL (ref 0.6–4.47)
LYMPHOCYTES NFR BLD AUTO: 13 % (ref 14–44)
MCH RBC QN AUTO: 28.8 PG (ref 26.8–34.3)
MCHC RBC AUTO-ENTMCNC: 31.5 G/DL (ref 31.4–37.4)
MCV RBC AUTO: 91 FL (ref 82–98)
MONOCYTES # BLD AUTO: 0.74 THOUSAND/ΜL (ref 0.17–1.22)
MONOCYTES NFR BLD AUTO: 9 % (ref 4–12)
NEUTROPHILS # BLD AUTO: 6.04 THOUSANDS/ΜL (ref 1.85–7.62)
NEUTS SEG NFR BLD AUTO: 73 % (ref 43–75)
NRBC BLD AUTO-RTO: 0 /100 WBCS
PLATELET # BLD AUTO: 285 THOUSANDS/UL (ref 149–390)
PMV BLD AUTO: 10.7 FL (ref 8.9–12.7)
POTASSIUM SERPL-SCNC: 4 MMOL/L (ref 3.5–5.3)
PROT SERPL-MCNC: 7.4 G/DL (ref 6.4–8.2)
RBC # BLD AUTO: 5.11 MILLION/UL (ref 3.81–5.12)
SODIUM SERPL-SCNC: 138 MMOL/L (ref 136–145)
WBC # BLD AUTO: 8.34 THOUSAND/UL (ref 4.31–10.16)

## 2018-08-10 PROCEDURE — 85025 COMPLETE CBC W/AUTO DIFF WBC: CPT

## 2018-08-10 PROCEDURE — 86803 HEPATITIS C AB TEST: CPT

## 2018-08-10 PROCEDURE — 36415 COLL VENOUS BLD VENIPUNCTURE: CPT

## 2018-08-10 PROCEDURE — 82784 ASSAY IGA/IGD/IGG/IGM EACH: CPT

## 2018-08-10 PROCEDURE — 80053 COMPREHEN METABOLIC PANEL: CPT

## 2018-08-10 PROCEDURE — 87340 HEPATITIS B SURFACE AG IA: CPT

## 2018-08-11 LAB
HBV SURFACE AG SER QL: NORMAL
HCV AB SER QL: NORMAL

## 2018-08-15 ENCOUNTER — TELEPHONE (OUTPATIENT)
Dept: FAMILY MEDICINE CLINIC | Facility: CLINIC | Age: 48
End: 2018-08-15

## 2018-08-17 ENCOUNTER — OFFICE VISIT (OUTPATIENT)
Dept: OTOLARYNGOLOGY | Facility: CLINIC | Age: 48
End: 2018-08-17
Payer: COMMERCIAL

## 2018-08-17 VITALS
SYSTOLIC BLOOD PRESSURE: 122 MMHG | HEIGHT: 68 IN | WEIGHT: 281 LBS | HEART RATE: 75 BPM | BODY MASS INDEX: 42.59 KG/M2 | DIASTOLIC BLOOD PRESSURE: 62 MMHG | RESPIRATION RATE: 16 BRPM

## 2018-08-17 DIAGNOSIS — C84.48 PERIPHERAL T CELL LYMPHOMA OF LYMPH NODES OF MULTIPLE SITES (HCC): Primary | ICD-10-CM

## 2018-08-17 PROCEDURE — 99213 OFFICE O/P EST LOW 20 MIN: CPT | Performed by: OTOLARYNGOLOGY

## 2018-08-17 NOTE — LETTER
August 17, 2018     Khurram Steele DO  [address] [facility] [address]    Patient: Stef White   YOB: 1970   Date of Visit: 8/17/2018       Dear Dr Ines Thornton: Thank you for referring Stef White to me for evaluation  Below are my notes for this consultation  If you have questions, please do not hesitate to call me  I look forward to following your patient along with you  Sincerely,        Jo Xiong MD        CC: MD Jo Robledo MD  8/17/2018  6:09 PM  Sign at close encounter  Stef White is a 50 y  o female who presents for re-evaluation of lymphoma  She has recently started chemotherapy with plan for possible radiation therapy at a later time  She feels that she has healed very well from her biopsy  No recent weight loss  She has had some resolution of the swelling of her lymph nodes after 1 treatment of chemotherapy      Past Medical History:   Diagnosis Date    Allergic rhinitis     last assessed 04/06/16    Arthritis     b/l feet    Chronic allergic conjunctivitis     Fluttering sensation of heart     "periodically, not often since on magnesium"    Foot pain, left     Full dentures     upper    History of cancer chemotherapy 2016    History of dilation and curettage     Hx of tonsillectomy     Hypothyroid     Irregular heart beat     Neck mass     R neck mass-excised 3/15/2016,, 4/23/18 noted enlarged lymph node right neck-bx today 4/26/2018    Non Hodgkin's lymphoma ([address] )     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    Obese     Port-a-cath in place 2016    pt reports 'Has it flushed q 4weeks" right chest wall    Post-menopausal     since chemo 4/2016  no menses    Pulmonary embolism ([address] )     last assessed 10/31/16 attributed to Non-Hodgkins hx    Tachycardia     awaiting cardiology eval,,,4/23/18 "pt reports saw cardiologist at that time and placed on magnesium and "hardly notices it"    Tinnitus     occas    Wears glasses     Wears partial dentures     /lower    [place] teeth extracted        /62 (BP Location: Left arm, Patient Position: Sitting, Cuff Size: Standard)   Pulse 75   Resp 16   Ht 5' 8" (1 727 m)   Wt 127 kg (281 lb)   BMI 42 73 kg/m²        Physical Exam   Constitutional: Oriented to person, place, and time  Well-developed and well-nourished, no apparent distress, non-toxic appearance  Cooperative, able to hear and answer questions without difficulty  Voice: Normal voice quality  Head: Normocephalic, atraumatic  No scars, masses or lesions  Face: Symmetric, no edema, no sinus tenderness  Eyes: Vision grossly intact, extra-ocular movement intact  Right Ear: External ear normal   Auditory canal clear  No post-auricular erythema or tenderness  Left Ear: External ear normal   Auditory canal clear  No post-auricular erythema or tenderness  Nose: Septum midline, nares clear  Mucosa moist, turbinates well appearing  No crusting, polyps or discharge evident  Oral cavity: Dentition intact  Mucosa moist, lips normal   Tongue mobile, floor of mouth normal   Hard palate unremarkable  No masses or lesions  Oropharynx: Uvula is midline, soft palate normal   Unremarkable oropharyngeal inlet  Tonsils unremarkable  Posterior pharyngeal wall clear  No masses or lesions  Salivary glands:  Parotid glands and submandibular glands symmetric, no enlargement or tenderness  Neck: Bilateral level 2 and 3 lymphadenopathy with 2-3 cm lymph nodes in level 2a bilaterally  Thyroid: normal in size, unremarkable without tenderness or palpable nodules  Pulmonary/Chest: Normal effort and rate  No respiratory distress  Musculoskeletal: Normal range of motion  Neurological: Cranial nerves 2-12 intact  Skin: Skin is warm and dry  Psychiatric: Normal mood and affect          A/P:  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

## 2018-08-17 NOTE — PROGRESS NOTES
Refugio Miller is a 50 y  o female who presents for re-evaluation of lymphoma  She has recently started chemotherapy with plan for possible radiation therapy at a later time  She feels that she has healed very well from her biopsy  No recent weight loss  She has had some resolution of the swelling of her lymph nodes after 1 treatment of chemotherapy  Past Medical History:   Diagnosis Date    Allergic rhinitis     last assessed 04/06/16    Arthritis     b/l feet    Chronic allergic conjunctivitis     Fluttering sensation of heart     "periodically, not often since on magnesium"    Foot pain, left     Full dentures     upper    History of cancer chemotherapy 2016    History of dilation and curettage     Hx of tonsillectomy     Hypothyroid     Irregular heart beat     Neck mass     R neck mass-excised 3/15/2016,, 4/23/18 noted enlarged lymph node right neck-bx today 4/26/2018    Non Hodgkin's lymphoma ([address] )     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    Obese     Port-a-cath in place 2016    pt reports 'Has it flushed q 4weeks" right chest wall    Post-menopausal     since chemo 4/2016  no menses    Pulmonary embolism ([address] )     last assessed 10/31/16 attributed to Non-Hodgkins hx    Tachycardia     awaiting cardiology eval,,,4/23/18 "pt reports saw cardiologist at that time and placed on magnesium and "hardly notices it"    Tinnitus     occas    Wears glasses     Wears partial dentures     /lower    [place] teeth extracted        /62 (BP Location: Left arm, Patient Position: Sitting, Cuff Size: Standard)   Pulse 75   Resp 16   Ht 5' 8" (1 727 m)   Wt 127 kg (281 lb)   BMI 42 73 kg/m²       Physical Exam   Constitutional: Oriented to person, place, and time  Well-developed and well-nourished, no apparent distress, non-toxic appearance  Cooperative, able to hear and answer questions without difficulty  Voice: Normal voice quality  Head: Normocephalic, atraumatic    No scars, masses or lesions  Face: Symmetric, no edema, no sinus tenderness  Eyes: Vision grossly intact, extra-ocular movement intact  Right Ear: External ear normal   Auditory canal clear  No post-auricular erythema or tenderness  Left Ear: External ear normal   Auditory canal clear  No post-auricular erythema or tenderness  Nose: Septum midline, nares clear  Mucosa moist, turbinates well appearing  No crusting, polyps or discharge evident  Oral cavity: Dentition intact  Mucosa moist, lips normal   Tongue mobile, floor of mouth normal   Hard palate unremarkable  No masses or lesions  Oropharynx: Uvula is midline, soft palate normal   Unremarkable oropharyngeal inlet  Tonsils unremarkable  Posterior pharyngeal wall clear  No masses or lesions  Salivary glands:  Parotid glands and submandibular glands symmetric, no enlargement or tenderness  Neck: Bilateral level 2 and 3 lymphadenopathy with 2-3 cm lymph nodes in level 2a bilaterally  Thyroid: normal in size, unremarkable without tenderness or palpable nodules  Pulmonary/Chest: Normal effort and rate  No respiratory distress  Musculoskeletal: Normal range of motion  Neurological: Cranial nerves 2-12 intact  Skin: Skin is warm and dry  Psychiatric: Normal mood and affect  A/P:  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

## 2018-10-08 ENCOUNTER — TRANSCRIBE ORDERS (OUTPATIENT)
Dept: ADMINISTRATIVE | Facility: HOSPITAL | Age: 48
End: 2018-10-08

## 2018-10-08 ENCOUNTER — APPOINTMENT (OUTPATIENT)
Dept: LAB | Facility: HOSPITAL | Age: 48
End: 2018-10-08
Attending: INTERNAL MEDICINE
Payer: COMMERCIAL

## 2018-10-08 DIAGNOSIS — C84.41 PERIPHERAL T CELL LYMPHOMA OF LYMPH NODES OF HEAD, FACE, AND NECK (HCC): ICD-10-CM

## 2018-10-08 DIAGNOSIS — C84.41 PERIPHERAL T CELL LYMPHOMA OF LYMPH NODES OF HEAD, FACE, AND NECK (HCC): Primary | ICD-10-CM

## 2018-10-08 LAB
ALBUMIN SERPL BCP-MCNC: 4 G/DL (ref 3.5–5.7)
ALP SERPL-CCNC: 85 U/L (ref 40–150)
ALT SERPL W P-5'-P-CCNC: 9 U/L (ref 7–52)
ANION GAP SERPL CALCULATED.3IONS-SCNC: 8 MMOL/L (ref 4–13)
AST SERPL W P-5'-P-CCNC: 17 U/L (ref 13–39)
BASOPHILS # BLD AUTO: 0.1 THOUSANDS/ΜL (ref 0–0.1)
BASOPHILS NFR BLD AUTO: 1 % (ref 0–2)
BILIRUB SERPL-MCNC: 0.6 MG/DL (ref 0.2–1)
BUN SERPL-MCNC: 11 MG/DL (ref 7–25)
CALCIUM SERPL-MCNC: 9.5 MG/DL (ref 8.6–10.5)
CHLORIDE SERPL-SCNC: 104 MMOL/L (ref 98–107)
CO2 SERPL-SCNC: 26 MMOL/L (ref 21–31)
CREAT SERPL-MCNC: 0.67 MG/DL (ref 0.6–1.2)
EOSINOPHIL # BLD AUTO: 0.3 THOUSAND/ΜL (ref 0–0.61)
EOSINOPHIL NFR BLD AUTO: 4 % (ref 0–5)
ERYTHROCYTE [DISTWIDTH] IN BLOOD BY AUTOMATED COUNT: 13.8 % (ref 11.5–14.5)
GFR SERPL CREATININE-BSD FRML MDRD: 104 ML/MIN/1.73SQ M
GLUCOSE P FAST SERPL-MCNC: 110 MG/DL (ref 65–99)
HCT VFR BLD AUTO: 44.8 % (ref 34.8–46.1)
HGB BLD-MCNC: 15.3 G/DL (ref 12–16)
IGA SERPL-MCNC: 336 MG/DL (ref 70–400)
IGG SERPL-MCNC: 1360 MG/DL (ref 700–1600)
IGM SERPL-MCNC: 24 MG/DL (ref 40–230)
LYMPHOCYTES # BLD AUTO: 0.7 THOUSANDS/ΜL (ref 0.6–4.47)
LYMPHOCYTES NFR BLD AUTO: 8 % (ref 21–51)
MCH RBC QN AUTO: 30.5 PG (ref 26–34)
MCHC RBC AUTO-ENTMCNC: 34.2 G/DL (ref 31–37)
MCV RBC AUTO: 89 FL (ref 81–99)
MONOCYTES # BLD AUTO: 0.7 THOUSAND/ΜL (ref 0.17–1.22)
MONOCYTES NFR BLD AUTO: 8 % (ref 2–12)
NEUTROPHILS # BLD AUTO: 6.9 THOUSANDS/ΜL (ref 1.4–6.5)
NEUTS SEG NFR BLD AUTO: 79 % (ref 42–75)
NRBC BLD AUTO-RTO: 0 /100 WBCS
PLATELET # BLD AUTO: 311 THOUSANDS/UL (ref 149–390)
PMV BLD AUTO: 8.5 FL (ref 8.6–11.7)
POTASSIUM SERPL-SCNC: 4.2 MMOL/L (ref 3.5–5.5)
PROT SERPL-MCNC: 7.6 G/DL (ref 6.4–8.9)
RBC # BLD AUTO: 5.01 MILLION/UL (ref 3.9–5.2)
SODIUM SERPL-SCNC: 138 MMOL/L (ref 134–143)
TSH SERPL DL<=0.05 MIU/L-ACNC: 3.61 UIU/ML (ref 0.45–5.33)
WBC # BLD AUTO: 8.8 THOUSAND/UL (ref 4.8–10.8)

## 2018-10-08 PROCEDURE — 80053 COMPREHEN METABOLIC PANEL: CPT

## 2018-10-08 PROCEDURE — 36415 COLL VENOUS BLD VENIPUNCTURE: CPT

## 2018-10-08 PROCEDURE — 82784 ASSAY IGA/IGD/IGG/IGM EACH: CPT

## 2018-10-08 PROCEDURE — 84443 ASSAY THYROID STIM HORMONE: CPT | Performed by: FAMILY MEDICINE

## 2018-10-08 PROCEDURE — 85025 COMPLETE CBC W/AUTO DIFF WBC: CPT

## 2018-10-11 ENCOUNTER — HOSPITAL ENCOUNTER (OUTPATIENT)
Dept: CT IMAGING | Facility: HOSPITAL | Age: 48
Discharge: HOME | End: 2018-10-11
Attending: INTERNAL MEDICINE
Payer: COMMERCIAL

## 2018-10-11 DIAGNOSIS — C84.41 PERIPHERAL T CELL LYMPHOMA OF LYMPH NODES OF HEAD, FACE, AND NECK (HCC): ICD-10-CM

## 2018-10-11 PROCEDURE — 70492 CT SFT TSUE NCK W/O & W/DYE: CPT

## 2018-10-11 PROCEDURE — 71270 CT THORAX DX C-/C+: CPT

## 2018-10-11 PROCEDURE — 74170 CT ABD WO CNTRST FLWD CNTRST: CPT

## 2018-10-11 RX ADMIN — IOHEXOL 80 ML: 350 INJECTION, SOLUTION INTRAVENOUS at 08:04

## 2018-11-06 ENCOUNTER — APPOINTMENT (OUTPATIENT)
Dept: LAB | Facility: MEDICAL CENTER | Age: 48
End: 2018-11-06
Payer: COMMERCIAL

## 2018-11-06 ENCOUNTER — TRANSCRIBE ORDERS (OUTPATIENT)
Dept: LAB | Facility: MEDICAL CENTER | Age: 48
End: 2018-11-06

## 2018-11-06 DIAGNOSIS — C85.11 B-CELL LYMPHOMA OF LYMPH NODES OF NECK, UNSPECIFIED B-CELL LYMPHOMA TYPE (HCC): ICD-10-CM

## 2018-11-06 DIAGNOSIS — C84.41 PERIPHERAL T CELL LYMPHOMA OF LYMPH NODES OF NECK (HCC): Primary | ICD-10-CM

## 2018-11-06 DIAGNOSIS — C84.41 PERIPHERAL T CELL LYMPHOMA OF LYMPH NODES OF NECK (HCC): ICD-10-CM

## 2018-11-06 LAB
ALBUMIN SERPL BCP-MCNC: 3.3 G/DL (ref 3.5–5)
ALP SERPL-CCNC: 111 U/L (ref 46–116)
ALT SERPL W P-5'-P-CCNC: 15 U/L (ref 12–78)
ANION GAP SERPL CALCULATED.3IONS-SCNC: 6 MMOL/L (ref 4–13)
AST SERPL W P-5'-P-CCNC: 17 U/L (ref 5–45)
BASOPHILS # BLD AUTO: 0.08 THOUSANDS/ΜL (ref 0–0.1)
BASOPHILS NFR BLD AUTO: 1 % (ref 0–1)
BILIRUB SERPL-MCNC: 0.8 MG/DL (ref 0.2–1)
BUN SERPL-MCNC: 12 MG/DL (ref 5–25)
CALCIUM SERPL-MCNC: 8.6 MG/DL (ref 8.3–10.1)
CHLORIDE SERPL-SCNC: 105 MMOL/L (ref 100–108)
CO2 SERPL-SCNC: 24 MMOL/L (ref 21–32)
CREAT SERPL-MCNC: 0.72 MG/DL (ref 0.6–1.3)
EOSINOPHIL # BLD AUTO: 0.4 THOUSAND/ΜL (ref 0–0.61)
EOSINOPHIL NFR BLD AUTO: 4 % (ref 0–6)
ERYTHROCYTE [DISTWIDTH] IN BLOOD BY AUTOMATED COUNT: 13.5 % (ref 11.6–15.1)
GFR SERPL CREATININE-BSD FRML MDRD: 99 ML/MIN/1.73SQ M
GLUCOSE P FAST SERPL-MCNC: 108 MG/DL (ref 65–99)
HCT VFR BLD AUTO: 46.5 % (ref 34.8–46.1)
HGB BLD-MCNC: 14.8 G/DL (ref 11.5–15.4)
IMM GRANULOCYTES # BLD AUTO: 0.03 THOUSAND/UL (ref 0–0.2)
IMM GRANULOCYTES NFR BLD AUTO: 0 % (ref 0–2)
LYMPHOCYTES # BLD AUTO: 0.82 THOUSANDS/ΜL (ref 0.6–4.47)
LYMPHOCYTES NFR BLD AUTO: 9 % (ref 14–44)
MCH RBC QN AUTO: 29.4 PG (ref 26.8–34.3)
MCHC RBC AUTO-ENTMCNC: 31.8 G/DL (ref 31.4–37.4)
MCV RBC AUTO: 92 FL (ref 82–98)
MONOCYTES # BLD AUTO: 0.94 THOUSAND/ΜL (ref 0.17–1.22)
MONOCYTES NFR BLD AUTO: 10 % (ref 4–12)
NEUTROPHILS # BLD AUTO: 7.04 THOUSANDS/ΜL (ref 1.85–7.62)
NEUTS SEG NFR BLD AUTO: 76 % (ref 43–75)
NRBC BLD AUTO-RTO: 0 /100 WBCS
PLATELET # BLD AUTO: 299 THOUSANDS/UL (ref 149–390)
PMV BLD AUTO: 10.7 FL (ref 8.9–12.7)
POTASSIUM SERPL-SCNC: 4 MMOL/L (ref 3.5–5.3)
PROT SERPL-MCNC: 7.5 G/DL (ref 6.4–8.2)
RBC # BLD AUTO: 5.03 MILLION/UL (ref 3.81–5.12)
SODIUM SERPL-SCNC: 135 MMOL/L (ref 136–145)
WBC # BLD AUTO: 9.31 THOUSAND/UL (ref 4.31–10.16)

## 2018-11-06 PROCEDURE — 85025 COMPLETE CBC W/AUTO DIFF WBC: CPT

## 2018-11-06 PROCEDURE — 80053 COMPREHEN METABOLIC PANEL: CPT

## 2018-11-06 PROCEDURE — 36415 COLL VENOUS BLD VENIPUNCTURE: CPT

## 2018-11-07 ENCOUNTER — APPOINTMENT (EMERGENCY)
Dept: RADIOLOGY | Facility: HOSPITAL | Age: 48
End: 2018-11-07
Payer: COMMERCIAL

## 2018-11-07 ENCOUNTER — HOSPITAL ENCOUNTER (EMERGENCY)
Facility: HOSPITAL | Age: 48
Discharge: HOME | End: 2018-11-07
Attending: EMERGENCY MEDICINE | Admitting: EMERGENCY MEDICINE
Payer: COMMERCIAL

## 2018-11-07 VITALS
BODY MASS INDEX: 44.92 KG/M2 | WEIGHT: 293 LBS | DIASTOLIC BLOOD PRESSURE: 75 MMHG | HEART RATE: 73 BPM | SYSTOLIC BLOOD PRESSURE: 115 MMHG | TEMPERATURE: 97 F | RESPIRATION RATE: 20 BRPM | OXYGEN SATURATION: 93 %

## 2018-11-07 DIAGNOSIS — C84.48 PERIPHERAL T CELL LYMPHOMA OF LYMPH NODES OF MULTIPLE SITES (HCC): ICD-10-CM

## 2018-11-07 DIAGNOSIS — R91.8 HILAR MASS: ICD-10-CM

## 2018-11-07 DIAGNOSIS — C85.90 NON HODGKIN'S LYMPHOMA (HCC): ICD-10-CM

## 2018-11-07 DIAGNOSIS — R07.9 CHEST PAIN, UNSPECIFIED TYPE: Primary | ICD-10-CM

## 2018-11-07 LAB
ALBUMIN SERPL BCP-MCNC: 3.1 G/DL (ref 3.5–5)
ALP SERPL-CCNC: 121 U/L (ref 46–116)
ALT SERPL W P-5'-P-CCNC: 16 U/L (ref 12–78)
ANION GAP SERPL CALCULATED.3IONS-SCNC: 10 MMOL/L (ref 4–13)
APTT PPP: 28 SECONDS (ref 24–36)
AST SERPL W P-5'-P-CCNC: 16 U/L (ref 5–45)
BASOPHILS # BLD AUTO: 0.06 THOUSANDS/ΜL (ref 0–0.1)
BASOPHILS NFR BLD AUTO: 1 % (ref 0–1)
BILIRUB SERPL-MCNC: 0.3 MG/DL (ref 0.2–1)
BUN SERPL-MCNC: 11 MG/DL (ref 5–25)
CALCIUM SERPL-MCNC: 8.7 MG/DL (ref 8.3–10.1)
CHLORIDE SERPL-SCNC: 104 MMOL/L (ref 100–108)
CO2 SERPL-SCNC: 26 MMOL/L (ref 21–32)
CREAT SERPL-MCNC: 0.93 MG/DL (ref 0.6–1.3)
DEPRECATED D DIMER PPP: <270 NG/ML (FEU) (ref 0–424)
EOSINOPHIL # BLD AUTO: 0.37 THOUSAND/ΜL (ref 0–0.61)
EOSINOPHIL NFR BLD AUTO: 4 % (ref 0–6)
ERYTHROCYTE [DISTWIDTH] IN BLOOD BY AUTOMATED COUNT: 13.5 % (ref 11.6–15.1)
GFR SERPL CREATININE-BSD FRML MDRD: 72 ML/MIN/1.73SQ M
GLUCOSE SERPL-MCNC: 122 MG/DL (ref 65–140)
HCT VFR BLD AUTO: 43.9 % (ref 34.8–46.1)
HGB BLD-MCNC: 14.3 G/DL (ref 11.5–15.4)
IMM GRANULOCYTES # BLD AUTO: 0.03 THOUSAND/UL (ref 0–0.2)
IMM GRANULOCYTES NFR BLD AUTO: 0 % (ref 0–2)
INR PPP: 1 (ref 0.86–1.17)
LYMPHOCYTES # BLD AUTO: 0.85 THOUSANDS/ΜL (ref 0.6–4.47)
LYMPHOCYTES NFR BLD AUTO: 10 % (ref 14–44)
MCH RBC QN AUTO: 29.7 PG (ref 26.8–34.3)
MCHC RBC AUTO-ENTMCNC: 32.6 G/DL (ref 31.4–37.4)
MCV RBC AUTO: 91 FL (ref 82–98)
MONOCYTES # BLD AUTO: 0.89 THOUSAND/ΜL (ref 0.17–1.22)
MONOCYTES NFR BLD AUTO: 11 % (ref 4–12)
NEUTROPHILS # BLD AUTO: 6.24 THOUSANDS/ΜL (ref 1.85–7.62)
NEUTS SEG NFR BLD AUTO: 74 % (ref 43–75)
NRBC BLD AUTO-RTO: 0 /100 WBCS
PLATELET # BLD AUTO: 294 THOUSANDS/UL (ref 149–390)
PMV BLD AUTO: 9.9 FL (ref 8.9–12.7)
POTASSIUM SERPL-SCNC: 3.7 MMOL/L (ref 3.5–5.3)
PROT SERPL-MCNC: 7.3 G/DL (ref 6.4–8.2)
PROTHROMBIN TIME: 12.7 SECONDS (ref 11.8–14.2)
RBC # BLD AUTO: 4.81 MILLION/UL (ref 3.81–5.12)
SODIUM SERPL-SCNC: 140 MMOL/L (ref 136–145)
TROPONIN I SERPL-MCNC: <0.02 NG/ML
WBC # BLD AUTO: 8.44 THOUSAND/UL (ref 4.31–10.16)

## 2018-11-07 PROCEDURE — 71046 X-RAY EXAM CHEST 2 VIEWS: CPT

## 2018-11-07 PROCEDURE — 84484 ASSAY OF TROPONIN QUANT: CPT | Performed by: EMERGENCY MEDICINE

## 2018-11-07 PROCEDURE — 36415 COLL VENOUS BLD VENIPUNCTURE: CPT | Performed by: EMERGENCY MEDICINE

## 2018-11-07 PROCEDURE — 96361 HYDRATE IV INFUSION ADD-ON: CPT

## 2018-11-07 PROCEDURE — 85730 THROMBOPLASTIN TIME PARTIAL: CPT | Performed by: EMERGENCY MEDICINE

## 2018-11-07 PROCEDURE — 80053 COMPREHEN METABOLIC PANEL: CPT | Performed by: EMERGENCY MEDICINE

## 2018-11-07 PROCEDURE — 96374 THER/PROPH/DIAG INJ IV PUSH: CPT

## 2018-11-07 PROCEDURE — 85610 PROTHROMBIN TIME: CPT | Performed by: EMERGENCY MEDICINE

## 2018-11-07 PROCEDURE — 85379 FIBRIN DEGRADATION QUANT: CPT | Performed by: EMERGENCY MEDICINE

## 2018-11-07 PROCEDURE — 99285 EMERGENCY DEPT VISIT HI MDM: CPT

## 2018-11-07 PROCEDURE — 93005 ELECTROCARDIOGRAM TRACING: CPT

## 2018-11-07 PROCEDURE — 85025 COMPLETE CBC W/AUTO DIFF WBC: CPT | Performed by: EMERGENCY MEDICINE

## 2018-11-07 RX ORDER — SODIUM CHLORIDE 9 MG/ML
125 INJECTION, SOLUTION INTRAVENOUS CONTINUOUS
Status: DISCONTINUED | OUTPATIENT
Start: 2018-11-07 | End: 2018-11-07 | Stop reason: HOSPADM

## 2018-11-07 RX ORDER — DIPHENHYDRAMINE HYDROCHLORIDE 50 MG/ML
25 INJECTION INTRAMUSCULAR; INTRAVENOUS ONCE
Status: COMPLETED | OUTPATIENT
Start: 2018-11-07 | End: 2018-11-07

## 2018-11-07 RX ADMIN — DIPHENHYDRAMINE HYDROCHLORIDE 25 MG: 50 INJECTION, SOLUTION INTRAMUSCULAR; INTRAVENOUS at 17:54

## 2018-11-07 RX ADMIN — SODIUM CHLORIDE 125 ML/HR: 0.9 INJECTION, SOLUTION INTRAVENOUS at 16:19

## 2018-11-07 NOTE — DISCHARGE INSTRUCTIONS
Rest   If uncontrolled / worsening symptoms call 911  Follow with oncology tomorrow    Chest Pain   WHAT YOU NEED TO KNOW:   What causes chest pain? Chest pain can be caused by a range of conditions, from not serious to life-threatening  Chest pain can be a symptom of a digestive problem, such as acid reflux or a stomach ulcer  An anxiety attack or a strong emotion, such as anger, can also cause chest pain  Infection, inflammation, or a fracture in the bones or cartilage in your chest can cause pain or discomfort  Sometimes chest pain or pressure is caused by poor blood flow to your heart (angina)  Chest pain may also be caused by life-threatening conditions such as a heart attack or blood clot in your lungs  What other symptoms might I have with chest pain? · A burning feeling behind your breastbone    · A racing or slow heartbeat     · Fever or sweating     · Nausea or vomiting     · Shortness of breath     · Discomfort or pressure that spreads from your chest to your back, jaw, or arm     · Feeling weak, tired, or faint  How is the cause of chest pain diagnosed? Your healthcare provider will examine you  Describe your chest pain in as much detail as possible  Tell him or her where your pain is and when it began  Tell the provider if you notice anything that makes the pain worse or better  Tell him or her if it is constant or comes and goes  Your healthcare provider will ask about any medicines you use and medical conditions you have  He or she will also examine you  You may also need any of the following tests:  · An EKG  is a test that records your heart's electrical activity  · Blood tests  check for heart damage and signs of a heart attack  · An echocardiogram  uses sound waves to see if blood is flowing normally through your heart  · An ultrasound, x-ray, CT, or MRI scan  may show the cause of your chest pain   You may be given contrast liquid to help your heart show up better in the pictures  Tell the healthcare provider if you have ever had an allergic reaction to contrast liquid  Do not enter the MRI room with anything metal  Metal can cause serious injury  Tell the healthcare provider if you have any metal in or on your body  · An endoscopy  may be done to check for ulcers or problem with your esophagus  How is chest pain treated? Medicines may be given to treat the cause of your chest pain  Examples include pain medicine, anxiety medicine, or medicines to increase blood flow to your heart  Do not  take certain medicines without asking your healthcare provider first  These include NSAIDs, herbal or vitamin supplements, or hormones (estrogen or progestin)  What are some healthy living tips? The following are general healthy guidelines  If your chest pain is caused by a heart problem, your healthcare provider will give you specific guidelines to follow  · Do not smoke  Nicotine and other chemicals in cigarettes and cigars can cause lung and heart damage  Ask your healthcare provider for information if you currently smoke and need help to quit  E-cigarettes or smokeless tobacco still contain nicotine  Talk to your healthcare provider before you use these products  · Eat a variety of healthy, low-fat foods  Healthy foods include fruits, vegetables, whole-grain breads, low-fat dairy products, beans, lean meats, and fish  Ask for more information about a heart healthy diet  · Ask about activity  Your healthcare provider will tell you which activities to limit or avoid  Ask when you can drive, return to work, and have sex  Ask about the best exercise plan for you  · Maintain a healthy weight  Ask your healthcare provider how much you should weigh  Ask him or her to help you create a weight loss plan if you are overweight    Call 911 if:   · You have any of the following signs of a heart attack:      ¨ Squeezing, pressure, or pain in your chest that lasts longer than 5 minutes or returns    ¨ Discomfort or pain in your back, neck, jaw, stomach, or arm     ¨ Trouble breathing    ¨ Nausea or vomiting    ¨ Lightheadedness or a sudden cold sweat, especially with chest pain or trouble breathing    When should I seek immediate care? · You have chest discomfort that gets worse, even with medicine  · You cough or vomit blood  · Your bowel movements are black or bloody  · You cannot stop vomiting, or it hurts to swallow  When should I contact my healthcare provider? · You have questions or concerns about your condition or care  CARE AGREEMENT:   You have the right to help plan your care  Learn about your health condition and how it may be treated  Discuss treatment options with your caregivers to decide what care you want to receive  You always have the right to refuse treatment  The above information is an  only  It is not intended as medical advice for individual conditions or treatments  Talk to your doctor, nurse or pharmacist before following any medical regimen to see if it is safe and effective for you  © 2017 [address] [employer] Information is for End User's use only and may not be sold, redistributed or otherwise used for commercial purposes  All illustrations and images included in CareNotes® are the copyrighted property of A D A M [employer]  or Jm [employer]

## 2018-11-07 NOTE — ED PROVIDER NOTES
History  Chief Complaint   Patient presents with    Chest Pain     Patient states left sided chest pain  Patient states she was coming from receiving chemo therapy when the pain started  Patient states pain is worse with a deep breath  Pt gives hx of having chemo tx today  For dx of Perf T-Cell Lymphoma  Pt states immediately after felt pressure left upper chest and left upper back with taking a deep breath  No fever/chills  No N/V  No headache/dizziness  Pt states it was first  Time for [facility]  Perf  T-Cell Lymphoma; PE; Hypothyroid  History provided by:  Patient  Chest Pain   Pain location:  L chest  Pain quality: aching and pressure    Pain severity:  Mild  Onset quality:  Sudden  Chronicity:  New  Context: breathing and at rest    Context: no movement and no trauma    Relieved by:  None tried  Worsened by:  Deep breathing  Ineffective treatments:  None tried  Associated symptoms: no abdominal pain, no altered mental status, no anorexia, no anxiety, no claudication, no cough, no diaphoresis, no dizziness, no dysphagia, no fever, no headache, no heartburn, no nausea, no near-syncope, no palpitations, no syncope and not vomiting  Back pain: left upper  Risk factors: obesity and prior DVT/PE    Risk factors: no coronary artery disease and no diabetes mellitus        Prior to Admission Medications   Prescriptions Last Dose Informant Patient Reported? Taking? MAGNESIUM OXIDE PO   Yes Yes   Sig: Take by mouth daily   Multiple Vitamin (MULTIVITAMIN) tablet   Yes Yes   Sig: Take 2 tablets by mouth daily   Zinc 50 MG CAPS   Yes Yes   Sig: Take by mouth daily   acetaminophen (TYLENOL) 325 mg tablet   Yes Yes   Sig: Take 650 mg by mouth every 6 (six) hours as needed for mild pain     b complex vitamins tablet   Yes Yes   Sig: Take 1 tablet by mouth daily   cholecalciferol (VITAMIN D3) 1,000 units tablet   Yes Yes   Sig: Take 1,000 Units by mouth daily   levothyroxine 75 mcg tablet   Yes Yes Sig: Take 75 mcg by mouth daily   rivaroxaban (XARELTO) 20 mg tablet   Yes Yes   Sig: Take 20 mg by mouth daily with dinner        Facility-Administered Medications: None       Past Medical History:   Diagnosis Date    Allergic rhinitis     last assessed 04/06/16    Arthritis     b/l feet    Chronic allergic conjunctivitis     Fluttering sensation of heart     "periodically, not often since on magnesium"    Foot pain, left     Full dentures     upper    History of cancer chemotherapy 2016    History of dilation and curettage     Hx of tonsillectomy     Hypothyroid     Irregular heart beat     Neck mass     R neck mass-excised 3/15/2016,, 4/23/18 noted enlarged lymph node right neck-bx today 4/26/2018    Non Hodgkin's lymphoma ([facility] [address] )     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    Obese     Port-A-Cath in place 2016    pt reports 'Has it flushed q 4weeks" right chest wall    Post-menopausal     since chemo 4/2016  no menses    Pulmonary embolism ([facility] [address] )     last assessed 10/31/16 attributed to Non-Hodgkins hx    Tachycardia     awaiting cardiology eval,,,4/23/18 "pt reports saw cardiologist at that time and placed on magnesium and "hardly notices it"    Tinnitus     occas    Wears glasses     Wears partial dentures     /lower    Edgar teeth extracted        Past Surgical History:   Procedure Laterality Date    COLONOSCOPY      COLONOSCOPY N/A 7/26/2018    Procedure: COLONOSCOPY with multiple bx;  Surgeon: Mindy Qiu MD;  Location: [facility] MAIN OR;  Service: Gastroenterology    CYST REMOVAL Left     L  neck    DILATION AND CURETTAGE OF UTERUS      ESOPHAGOGASTRODUODENOSCOPY N/A 7/26/2018    Procedure: ESOPHAGOGASTRODUODENOSCOPY (EGD) with multiple bx;  Surgeon: Mindy Qiu MD;  Location: [facility] MAIN OR;  Service: Gastroenterology    FACIAL/NECK BIOPSY N/A 4/26/2018    Procedure: OPEN DEEP CERVICAL LYMPH NODE EXCISOION/BIOPSY;  Surgeon: Danny Hays MD;  Location: [facility];  Service: ENT    LYMPHADENECTOMY      PORTACATH PLACEMENT      MS BX/REMV,LYMPH NODE,DEEP CERV N/A 3/15/2016    Procedure: DISSECTION [facility] NODE NECK/DEEP NECK MASS ;  Surgeon: Kylee Lindo DO;  Location: [facility];  Service: ENT    TONSILLECTOMY      TUBAL LIGATION         Family History   Problem Relation Age of Onset    Coronary artery disease Father     Diabetes Brother     Diabetes Family     Heart disease Family      I have reviewed and agree with the history as documented  Social History   Substance Use Topics    Smoking status: Former Smoker     Packs/day: 1 50     Years: 11 00     Quit date: 1996    Smokeless tobacco: Never Used    Alcohol use No        Review of Systems   Constitutional: Negative for appetite change, chills, diaphoresis and fever  HENT: Negative  Negative for congestion, drooling, facial swelling, trouble swallowing and voice change  Eyes: Negative  Negative for pain and visual disturbance  Respiratory: Negative for apnea, cough, choking, chest tightness and stridor  Cardiovascular: Positive for chest pain  Negative for palpitations, claudication, syncope and near-syncope  Gastrointestinal: Negative  Negative for abdominal pain, anorexia, heartburn, nausea and vomiting  Genitourinary: Negative  Negative for flank pain, hematuria and pelvic pain  Musculoskeletal: Negative for neck pain and neck stiffness  Back pain: left upper  Skin: Negative  Negative for pallor, rash and wound  Allergic/Immunologic: Positive for immunocompromised state  Neurological: Negative  Negative for dizziness, tremors, syncope, facial asymmetry, speech difficulty, light-headedness and headaches  Psychiatric/Behavioral: Negative  Negative for confusion, decreased concentration, hallucinations and self-injury  Physical Exam  Physical Exam   Constitutional: She is oriented to person, place, and time  She appears well-developed and well-nourished  She is active and cooperative  Non-toxic appearance  She does not have a sickly appearance  She does not appear ill  No distress  obese   HENT:   Head: Normocephalic and atraumatic  Right Ear: Hearing, tympanic membrane and ear canal normal    Left Ear: Hearing, tympanic membrane and ear canal normal    Nose: Nose normal    Mouth/Throat: Oropharynx is clear and moist  Mucous membranes are not dry and not cyanotic  No oropharyngeal exudate, posterior oropharyngeal edema or posterior oropharyngeal erythema  Eyes: Pupils are equal, round, and reactive to light  Conjunctivae and EOM are normal    Neck: Normal range of motion and phonation normal  Neck supple  No JVD present  No spinous process tenderness present  Cardiovascular: Normal rate, regular rhythm, intact distal pulses and normal pulses  No extrasystoles are present  No perf edema or calf tenderness   Pulmonary/Chest: Effort normal  No accessory muscle usage or stridor  No tachypnea  No respiratory distress  She has no wheezes  She has no rhonchi  She has no rales  Abdominal: Soft  Bowel sounds are normal  She exhibits no distension  There is no tenderness  There is no rigidity, no guarding and no CVA tenderness  Neurological: She is alert and oriented to person, place, and time  She has normal strength and normal reflexes  No cranial nerve deficit  She displays a negative Romberg sign  Skin: Skin is warm and dry  No petechiae and no rash noted  She is not diaphoretic  No cyanosis  No pallor  Psychiatric: She has a normal mood and affect  Her speech is normal and behavior is normal  Thought content normal  Cognition and memory are normal    Vitals reviewed        Vital Signs  ED Triage Vitals [11/07/18 1558]   Temperature Pulse Respirations Blood Pressure SpO2   (!) 97 °F (36 1 °C) 91 16 142/79 93 %      Temp Source Heart Rate Source Patient Position - Orthostatic VS BP Location FiO2 (%)   Temporal Monitor Sitting Left arm --      Pain Score       3           Vitals: 11/07/18 1700 11/07/18 1730 11/07/18 1800 11/07/18 1830   BP: 119/57 117/67 118/76 115/75   Pulse: 78 80 74 73   Patient Position - Orthostatic VS: Lying Lying Lying Lying       Visual Acuity      ED Medications  Medications   diphenhydrAMINE (BENADRYL) injection 25 mg (25 mg Intravenous Given 11/7/18 1754)       Diagnostic Studies  Results Reviewed     Procedure Component Value Units Date/Time    Comprehensive metabolic panel [[id number]]  (Abnormal) Collected:  11/07/18 1618    Lab Status:  Final result Specimen:  Blood from Arm, Right Updated:  11/07/18 1647     Sodium 140 mmol/L      Potassium 3 7 mmol/L      Chloride 104 mmol/L      CO2 26 mmol/L      ANION GAP 10 mmol/L      BUN 11 mg/dL      Creatinine 0 93 mg/dL      Glucose 122 mg/dL      Calcium 8 7 mg/dL      AST 16 U/L      ALT 16 U/L      Alkaline Phosphatase 121 (H) U/L      Total Protein 7 3 g/dL      Albumin 3 1 (L) g/dL      Total Bilirubin 0 30 mg/dL      eGFR 72 ml/min/1 73sq m     Narrative:         National Kidney Disease Education Program recommendations are as follows:  GFR calculation is accurate only with a steady state creatinine  Chronic Kidney disease less than 60 ml/min/1 73 sq  meters  Kidney failure less than 15 ml/min/1 73 sq  meters      Troponin I [[id number]]  (Normal) Collected:  11/07/18 1618    Lab Status:  Final result Specimen:  Blood from Arm, Right Updated:  11/07/18 1642     Troponin I <0 02 ng/mL     D-Dimer [[id number]]  (Normal) Collected:  11/07/18 1618    Lab Status:  Final result Specimen:  Blood from Arm, Right Updated:  11/07/18 1637     D-Dimer, Quant <270 ng/ml (FEU)     Protime-INR [[id number]]  (Normal) Collected:  11/07/18 1618    Lab Status:  Final result Specimen:  Blood from Arm, Right Updated:  11/07/18 1634     Protime 12 7 seconds      INR 1 00    APTT [[id number]]  (Normal) Collected:  11/07/18 1618    Lab Status:  Final result Specimen:  Blood from Arm, Right Updated:  11/07/18 1634     PTT 28 seconds CBC and differential [[id number]]  (Abnormal) Collected:  11/07/18 1618    Lab Status:  Final result Specimen:  Blood from Arm, Right Updated:  11/07/18 1623     WBC 8 44 Thousand/uL      RBC 4 81 Million/uL      Hemoglobin 14 3 g/dL      Hematocrit 43 9 %      MCV 91 fL      MCH 29 7 pg      MCHC 32 6 g/dL      RDW 13 5 %      MPV 9 9 fL      Platelets 050 Thousands/uL      nRBC 0 /100 WBCs      Neutrophils Relative 74 %      Immat GRANS % 0 %      Lymphocytes Relative 10 (L) %      Monocytes Relative 11 %      Eosinophils Relative 4 %      Basophils Relative 1 %      Neutrophils Absolute 6 24 Thousands/µL      Immature Grans Absolute 0 03 Thousand/uL      Lymphocytes Absolute 0 85 Thousands/µL      Monocytes Absolute 0 89 Thousand/µL      Eosinophils Absolute 0 37 Thousand/µL      Basophils Absolute 0 06 Thousands/µL                  XR chest 2 views   Final Result by SALENA Hdz MD (11/07 1654)      Left hilar mass  Suspect small amount of loculated left interlobar fluid  Workstation performed: [id number]                    Procedures  ECG 12 Lead Documentation  Date/Time: 11/7/2018 4:27 PM  Performed by: Lesvia Ly  Authorized by: Annelise ARNETT     ECG reviewed by me, the ED Provider: yes    Patient location:  ED  Interpretation:     Interpretation: non-specific    Rate:     ECG rate assessment: normal    Rhythm:     Rhythm: sinus rhythm    Ectopy:     Ectopy: none             Phone Contacts  ED Phone Contact    ED Course  ED Course as of Nov 08 1111   Wed Nov 07, 2018   1626 WBC: 8 44   1626 Hemoglobin: 14 3   1626 HCT: 43 9   1649 WBC: 8 44   1649 Hemoglobin: 14 3   1649 HCT: 43 9   1649 Pain improving     1707 D-DIMER QUANTITATIVE: <270   1708 Left hilar mass  Suspect small amount of loculated left interlobar fluid  7314 Paged oncology- Dr Efren Roca    [id number] Discussed case and reviewed results , etc with Melida Pierre  Would try Benadryl and see how pt feels   If improved - would follow in office tomorrow    Pt with resolved discomfort after Benadryl      HEART Risk Score      Most Recent Value   History  0 Filed at: 11/07/2018 1824   ECG  1 Filed at: 11/07/2018 1824   Age  1 Filed at: 11/07/2018 1824   Risk Factors  2 Filed at: 11/07/2018 1824   Troponin  0 Filed at: 11/07/2018 1824   Heart Score Risk Calculator   History  0 Filed at: 11/07/2018 1824   ECG  1 Filed at: 11/07/2018 1824   Age  1 Filed at: 11/07/2018 1824   Risk Factors  2 Filed at: 11/07/2018 1824   Troponin  0 Filed at: 11/07/2018 1824   HEART Score  4 Filed at: 11/07/2018 1824   HEART Score  4 Filed at: 11/07/2018 1824                            MDM  CritCare Time    Disposition  Final diagnoses:   Chest pain, unspecified type   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 ([facility] [address] )   Non Hodgkin's lymphoma ([facility] [address] )   Hilar mass     Time reflects when diagnosis was documented in both MDM as applicable and the Disposition within this note     Time User Action Codes Description Comment    11/7/2018  6:22 PM Efren Whitsett Add [R07 9] Chest pain, unspecified type     11/7/2018  6:22 PM Efren Kiara Add [C84 48] 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 ([facility] [address] )     11/7/2018  6:22 PM Efren Whitsett Add [C85 90] Non Hodgkin's lymphoma ([facility] [address] )     11/7/2018  6:22 PM Efren Kiara Add [R91 8] Hilar mass       ED Disposition     ED Disposition Condition Comment    Discharge  [facility] discharge to home/self care      Condition at discharge: Stable        Follow-up Information     Follow up With Specialties Details Why [address], [address]   [address]  [address]  [phone]            Discharge Medication List as of 11/7/2018  6:24 PM      CONTINUE these medications which have NOT CHANGED    Details   acetaminophen (TYLENOL) 325 mg tablet Take 650 mg by mouth every 6 (six) hours as needed for mild pain , Historical Med      b complex vitamins tablet Take 1 tablet by mouth daily, Historical Med      cholecalciferol (VITAMIN D3) 1,000 units tablet Take 1,000 Units by mouth daily, Historical Med      levothyroxine 75 mcg tablet Take 75 mcg by mouth daily, Historical Med      MAGNESIUM OXIDE PO Take by mouth daily, Historical Med      Multiple Vitamin (MULTIVITAMIN) tablet Take 2 tablets by mouth daily, Historical Med      rivaroxaban (XARELTO) 20 mg tablet Take 20 mg by mouth daily with dinner  , Starting Tue 1/30/2018, Historical Med      Zinc 50 MG CAPS Take by mouth daily, Historical Med           No discharge procedures on file      ED Provider  Electronically Signed by           Mary Bo DO  11/08/18 2617

## 2018-11-08 LAB
ATRIAL RATE: 86 BPM
P AXIS: 41 DEGREES
PR INTERVAL: 140 MS
QRS AXIS: 3 DEGREES
QRSD INTERVAL: 88 MS
QT INTERVAL: 382 MS
QTC INTERVAL: 457 MS
T WAVE AXIS: 16 DEGREES
VENTRICULAR RATE: 86 BPM

## 2018-11-08 PROCEDURE — 93010 ELECTROCARDIOGRAM REPORT: CPT | Performed by: INTERNAL MEDICINE

## 2018-11-13 ENCOUNTER — APPOINTMENT (OUTPATIENT)
Dept: LAB | Facility: MEDICAL CENTER | Age: 48
End: 2018-11-13
Payer: COMMERCIAL

## 2018-11-13 ENCOUNTER — TRANSCRIBE ORDERS (OUTPATIENT)
Dept: LAB | Facility: MEDICAL CENTER | Age: 48
End: 2018-11-13

## 2018-11-13 DIAGNOSIS — C85.80 MARGINAL ZONE B-CELL LYMPHOMA (HCC): Primary | ICD-10-CM

## 2018-11-13 DIAGNOSIS — C85.80: ICD-10-CM

## 2018-11-13 DIAGNOSIS — C85.80 MARGINAL ZONE B-CELL LYMPHOMA (HCC): ICD-10-CM

## 2018-11-13 LAB
ALBUMIN SERPL BCP-MCNC: 3.4 G/DL (ref 3.5–5)
ALP SERPL-CCNC: 115 U/L (ref 46–116)
ALT SERPL W P-5'-P-CCNC: 13 U/L (ref 12–78)
ANION GAP SERPL CALCULATED.3IONS-SCNC: 5 MMOL/L (ref 4–13)
AST SERPL W P-5'-P-CCNC: 13 U/L (ref 5–45)
BASOPHILS # BLD AUTO: 0.11 THOUSANDS/ΜL (ref 0–0.1)
BASOPHILS NFR BLD AUTO: 1 % (ref 0–1)
BILIRUB SERPL-MCNC: 0.45 MG/DL (ref 0.2–1)
BUN SERPL-MCNC: 14 MG/DL (ref 5–25)
CALCIUM SERPL-MCNC: 8.7 MG/DL (ref 8.3–10.1)
CHLORIDE SERPL-SCNC: 107 MMOL/L (ref 100–108)
CO2 SERPL-SCNC: 26 MMOL/L (ref 21–32)
CREAT SERPL-MCNC: 0.78 MG/DL (ref 0.6–1.3)
EOSINOPHIL # BLD AUTO: 0.42 THOUSAND/ΜL (ref 0–0.61)
EOSINOPHIL NFR BLD AUTO: 5 % (ref 0–6)
ERYTHROCYTE [DISTWIDTH] IN BLOOD BY AUTOMATED COUNT: 13.2 % (ref 11.6–15.1)
GFR SERPL CREATININE-BSD FRML MDRD: 90 ML/MIN/1.73SQ M
GLUCOSE P FAST SERPL-MCNC: 95 MG/DL (ref 65–99)
HCT VFR BLD AUTO: 46.7 % (ref 34.8–46.1)
HGB BLD-MCNC: 14.9 G/DL (ref 11.5–15.4)
IMM GRANULOCYTES # BLD AUTO: 0.04 THOUSAND/UL (ref 0–0.2)
IMM GRANULOCYTES NFR BLD AUTO: 1 % (ref 0–2)
LDH SERPL-CCNC: 171 U/L (ref 81–234)
LYMPHOCYTES # BLD AUTO: 0.18 THOUSANDS/ΜL (ref 0.6–4.47)
LYMPHOCYTES NFR BLD AUTO: 2 % (ref 14–44)
MCH RBC QN AUTO: 29.7 PG (ref 26.8–34.3)
MCHC RBC AUTO-ENTMCNC: 31.9 G/DL (ref 31.4–37.4)
MCV RBC AUTO: 93 FL (ref 82–98)
MONOCYTES # BLD AUTO: 0.63 THOUSAND/ΜL (ref 0.17–1.22)
MONOCYTES NFR BLD AUTO: 7 % (ref 4–12)
NEUTROPHILS # BLD AUTO: 7.3 THOUSANDS/ΜL (ref 1.85–7.62)
NEUTS SEG NFR BLD AUTO: 84 % (ref 43–75)
NRBC BLD AUTO-RTO: 0 /100 WBCS
PLATELET # BLD AUTO: 331 THOUSANDS/UL (ref 149–390)
PMV BLD AUTO: 10.5 FL (ref 8.9–12.7)
POTASSIUM SERPL-SCNC: 4.3 MMOL/L (ref 3.5–5.3)
PROT SERPL-MCNC: 7.5 G/DL (ref 6.4–8.2)
RBC # BLD AUTO: 5.02 MILLION/UL (ref 3.81–5.12)
SODIUM SERPL-SCNC: 138 MMOL/L (ref 136–145)
WBC # BLD AUTO: 8.68 THOUSAND/UL (ref 4.31–10.16)

## 2018-11-13 PROCEDURE — 36415 COLL VENOUS BLD VENIPUNCTURE: CPT

## 2018-11-13 PROCEDURE — 80053 COMPREHEN METABOLIC PANEL: CPT

## 2018-11-13 PROCEDURE — 85025 COMPLETE CBC W/AUTO DIFF WBC: CPT

## 2018-11-13 PROCEDURE — 83615 LACTATE (LD) (LDH) ENZYME: CPT

## 2018-12-04 ENCOUNTER — APPOINTMENT (OUTPATIENT)
Dept: LAB | Facility: MEDICAL CENTER | Age: 48
End: 2018-12-04
Payer: COMMERCIAL

## 2018-12-04 DIAGNOSIS — C85.11 B-CELL LYMPHOMA OF LYMPH NODES OF NECK, UNSPECIFIED B-CELL LYMPHOMA TYPE (HCC): ICD-10-CM

## 2018-12-04 LAB
ALBUMIN SERPL BCP-MCNC: 3.2 G/DL (ref 3.5–5)
ALP SERPL-CCNC: 120 U/L (ref 46–116)
ALT SERPL W P-5'-P-CCNC: 15 U/L (ref 12–78)
ANION GAP SERPL CALCULATED.3IONS-SCNC: 8 MMOL/L (ref 4–13)
AST SERPL W P-5'-P-CCNC: 11 U/L (ref 5–45)
BASOPHILS # BLD AUTO: 0.09 THOUSANDS/ΜL (ref 0–0.1)
BASOPHILS NFR BLD AUTO: 1 % (ref 0–1)
BILIRUB SERPL-MCNC: 0.37 MG/DL (ref 0.2–1)
BUN SERPL-MCNC: 14 MG/DL (ref 5–25)
CALCIUM SERPL-MCNC: 8.7 MG/DL (ref 8.3–10.1)
CHLORIDE SERPL-SCNC: 108 MMOL/L (ref 100–108)
CO2 SERPL-SCNC: 23 MMOL/L (ref 21–32)
CREAT SERPL-MCNC: 0.72 MG/DL (ref 0.6–1.3)
EOSINOPHIL # BLD AUTO: 0.58 THOUSAND/ΜL (ref 0–0.61)
EOSINOPHIL NFR BLD AUTO: 7 % (ref 0–6)
ERYTHROCYTE [DISTWIDTH] IN BLOOD BY AUTOMATED COUNT: 13.1 % (ref 11.6–15.1)
GFR SERPL CREATININE-BSD FRML MDRD: 99 ML/MIN/1.73SQ M
GLUCOSE P FAST SERPL-MCNC: 114 MG/DL (ref 65–99)
HCT VFR BLD AUTO: 47.2 % (ref 34.8–46.1)
HGB BLD-MCNC: 15.1 G/DL (ref 11.5–15.4)
IMM GRANULOCYTES # BLD AUTO: 0.02 THOUSAND/UL (ref 0–0.2)
IMM GRANULOCYTES NFR BLD AUTO: 0 % (ref 0–2)
LYMPHOCYTES # BLD AUTO: 0.69 THOUSANDS/ΜL (ref 0.6–4.47)
LYMPHOCYTES NFR BLD AUTO: 9 % (ref 14–44)
MCH RBC QN AUTO: 29.5 PG (ref 26.8–34.3)
MCHC RBC AUTO-ENTMCNC: 32 G/DL (ref 31.4–37.4)
MCV RBC AUTO: 92 FL (ref 82–98)
MONOCYTES # BLD AUTO: 0.7 THOUSAND/ΜL (ref 0.17–1.22)
MONOCYTES NFR BLD AUTO: 9 % (ref 4–12)
NEUTROPHILS # BLD AUTO: 5.77 THOUSANDS/ΜL (ref 1.85–7.62)
NEUTS SEG NFR BLD AUTO: 74 % (ref 43–75)
NRBC BLD AUTO-RTO: 0 /100 WBCS
PLATELET # BLD AUTO: 195 THOUSANDS/UL (ref 149–390)
PMV BLD AUTO: 10.4 FL (ref 8.9–12.7)
POTASSIUM SERPL-SCNC: 4.1 MMOL/L (ref 3.5–5.3)
PROT SERPL-MCNC: 7.4 G/DL (ref 6.4–8.2)
RBC # BLD AUTO: 5.11 MILLION/UL (ref 3.81–5.12)
SODIUM SERPL-SCNC: 139 MMOL/L (ref 136–145)
WBC # BLD AUTO: 7.85 THOUSAND/UL (ref 4.31–10.16)

## 2018-12-04 PROCEDURE — 85025 COMPLETE CBC W/AUTO DIFF WBC: CPT

## 2018-12-04 PROCEDURE — 36415 COLL VENOUS BLD VENIPUNCTURE: CPT

## 2018-12-04 PROCEDURE — 80053 COMPREHEN METABOLIC PANEL: CPT

## 2018-12-11 ENCOUNTER — APPOINTMENT (OUTPATIENT)
Dept: LAB | Facility: MEDICAL CENTER | Age: 48
End: 2018-12-11
Payer: COMMERCIAL

## 2018-12-11 ENCOUNTER — TRANSCRIBE ORDERS (OUTPATIENT)
Dept: ADMINISTRATIVE | Facility: HOSPITAL | Age: 48
End: 2018-12-11

## 2018-12-11 DIAGNOSIS — C85.80 MARGINAL ZONE B-CELL LYMPHOMA (HCC): Primary | ICD-10-CM

## 2018-12-11 DIAGNOSIS — C85.11 B-CELL LYMPHOMA OF LYMPH NODES OF NECK, UNSPECIFIED B-CELL LYMPHOMA TYPE (HCC): ICD-10-CM

## 2018-12-11 DIAGNOSIS — C85.80 MARGINAL ZONE B-CELL LYMPHOMA (HCC): ICD-10-CM

## 2018-12-11 LAB
BASOPHILS # BLD AUTO: 0.07 THOUSANDS/ΜL (ref 0–0.1)
BASOPHILS NFR BLD AUTO: 1 % (ref 0–1)
EOSINOPHIL # BLD AUTO: 0.66 THOUSAND/ΜL (ref 0–0.61)
EOSINOPHIL NFR BLD AUTO: 9 % (ref 0–6)
ERYTHROCYTE [DISTWIDTH] IN BLOOD BY AUTOMATED COUNT: 13 % (ref 11.6–15.1)
HCT VFR BLD AUTO: 45.6 % (ref 34.8–46.1)
HGB BLD-MCNC: 15.1 G/DL (ref 11.5–15.4)
IMM GRANULOCYTES # BLD AUTO: 0.01 THOUSAND/UL (ref 0–0.2)
IMM GRANULOCYTES NFR BLD AUTO: 0 % (ref 0–2)
LYMPHOCYTES # BLD AUTO: 0.27 THOUSANDS/ΜL (ref 0.6–4.47)
LYMPHOCYTES NFR BLD AUTO: 4 % (ref 14–44)
MCH RBC QN AUTO: 30.4 PG (ref 26.8–34.3)
MCHC RBC AUTO-ENTMCNC: 33.1 G/DL (ref 31.4–37.4)
MCV RBC AUTO: 92 FL (ref 82–98)
MONOCYTES # BLD AUTO: 0.49 THOUSAND/ΜL (ref 0.17–1.22)
MONOCYTES NFR BLD AUTO: 7 % (ref 4–12)
NEUTROPHILS # BLD AUTO: 5.72 THOUSANDS/ΜL (ref 1.85–7.62)
NEUTS SEG NFR BLD AUTO: 79 % (ref 43–75)
NRBC BLD AUTO-RTO: 0 /100 WBCS
PLATELET # BLD AUTO: 264 THOUSANDS/UL (ref 149–390)
PMV BLD AUTO: 10.4 FL (ref 8.9–12.7)
RBC # BLD AUTO: 4.96 MILLION/UL (ref 3.81–5.12)
WBC # BLD AUTO: 7.22 THOUSAND/UL (ref 4.31–10.16)

## 2018-12-11 PROCEDURE — 85025 COMPLETE CBC W/AUTO DIFF WBC: CPT

## 2018-12-11 PROCEDURE — 36415 COLL VENOUS BLD VENIPUNCTURE: CPT

## 2018-12-19 DIAGNOSIS — E03.9 ACQUIRED HYPOTHYROIDISM: Primary | ICD-10-CM

## 2018-12-20 RX ORDER — LEVOTHYROXINE SODIUM 0.07 MG/1
TABLET ORAL
Qty: 90 TABLET | Refills: 3 | Status: SHIPPED | OUTPATIENT
Start: 2018-12-20 | End: 2019-09-06 | Stop reason: SDUPTHER

## 2018-12-31 ENCOUNTER — TRANSCRIBE ORDERS (OUTPATIENT)
Dept: ADMINISTRATIVE | Facility: HOSPITAL | Age: 48
End: 2018-12-31

## 2018-12-31 ENCOUNTER — APPOINTMENT (OUTPATIENT)
Dept: LAB | Facility: MEDICAL CENTER | Age: 48
End: 2018-12-31
Payer: COMMERCIAL

## 2018-12-31 DIAGNOSIS — C85.11 B-CELL LYMPHOMA OF LYMPH NODES OF NECK, UNSPECIFIED B-CELL LYMPHOMA TYPE (HCC): ICD-10-CM

## 2018-12-31 DIAGNOSIS — C85.11 B-CELL LYMPHOMA OF LYMPH NODES OF NECK, UNSPECIFIED B-CELL LYMPHOMA TYPE (HCC): Primary | ICD-10-CM

## 2018-12-31 LAB
ALBUMIN SERPL BCP-MCNC: 3.6 G/DL (ref 3.5–5)
ALP SERPL-CCNC: 120 U/L (ref 46–116)
ALT SERPL W P-5'-P-CCNC: 13 U/L (ref 12–78)
ANION GAP SERPL CALCULATED.3IONS-SCNC: 9 MMOL/L (ref 4–13)
AST SERPL W P-5'-P-CCNC: 15 U/L (ref 5–45)
BASOPHILS # BLD AUTO: 0.1 THOUSANDS/ΜL (ref 0–0.1)
BASOPHILS NFR BLD AUTO: 2 % (ref 0–1)
BILIRUB SERPL-MCNC: 0.59 MG/DL (ref 0.2–1)
BUN SERPL-MCNC: 11 MG/DL (ref 5–25)
CALCIUM SERPL-MCNC: 9 MG/DL (ref 8.3–10.1)
CHLORIDE SERPL-SCNC: 107 MMOL/L (ref 100–108)
CO2 SERPL-SCNC: 24 MMOL/L (ref 21–32)
CREAT SERPL-MCNC: 0.83 MG/DL (ref 0.6–1.3)
EOSINOPHIL # BLD AUTO: 0.73 THOUSAND/ΜL (ref 0–0.61)
EOSINOPHIL NFR BLD AUTO: 11 % (ref 0–6)
ERYTHROCYTE [DISTWIDTH] IN BLOOD BY AUTOMATED COUNT: 13.6 % (ref 11.6–15.1)
GFR SERPL CREATININE-BSD FRML MDRD: 82 ML/MIN/1.73SQ M
GLUCOSE P FAST SERPL-MCNC: 107 MG/DL (ref 65–99)
HCT VFR BLD AUTO: 46.5 % (ref 34.8–46.1)
HGB BLD-MCNC: 15 G/DL (ref 11.5–15.4)
IMM GRANULOCYTES # BLD AUTO: 0.02 THOUSAND/UL (ref 0–0.2)
IMM GRANULOCYTES NFR BLD AUTO: 0 % (ref 0–2)
LDH SERPL-CCNC: 204 U/L (ref 81–234)
LYMPHOCYTES # BLD AUTO: 0.67 THOUSANDS/ΜL (ref 0.6–4.47)
LYMPHOCYTES NFR BLD AUTO: 10 % (ref 14–44)
MCH RBC QN AUTO: 29.9 PG (ref 26.8–34.3)
MCHC RBC AUTO-ENTMCNC: 32.3 G/DL (ref 31.4–37.4)
MCV RBC AUTO: 93 FL (ref 82–98)
MONOCYTES # BLD AUTO: 0.7 THOUSAND/ΜL (ref 0.17–1.22)
MONOCYTES NFR BLD AUTO: 11 % (ref 4–12)
NEUTROPHILS # BLD AUTO: 4.37 THOUSANDS/ΜL (ref 1.85–7.62)
NEUTS SEG NFR BLD AUTO: 66 % (ref 43–75)
NRBC BLD AUTO-RTO: 0 /100 WBCS
PLATELET # BLD AUTO: 216 THOUSANDS/UL (ref 149–390)
PMV BLD AUTO: 10.4 FL (ref 8.9–12.7)
POTASSIUM SERPL-SCNC: 4 MMOL/L (ref 3.5–5.3)
PROT SERPL-MCNC: 7.5 G/DL (ref 6.4–8.2)
RBC # BLD AUTO: 5.01 MILLION/UL (ref 3.81–5.12)
SODIUM SERPL-SCNC: 140 MMOL/L (ref 136–145)
WBC # BLD AUTO: 6.59 THOUSAND/UL (ref 4.31–10.16)

## 2018-12-31 PROCEDURE — 83615 LACTATE (LD) (LDH) ENZYME: CPT

## 2018-12-31 PROCEDURE — 36415 COLL VENOUS BLD VENIPUNCTURE: CPT

## 2018-12-31 PROCEDURE — 85025 COMPLETE CBC W/AUTO DIFF WBC: CPT

## 2018-12-31 PROCEDURE — 80053 COMPREHEN METABOLIC PANEL: CPT

## 2019-01-02 ENCOUNTER — TRANSCRIBE ORDERS (OUTPATIENT)
Dept: ADMINISTRATIVE | Facility: HOSPITAL | Age: 49
End: 2019-01-02

## 2019-01-02 DIAGNOSIS — C85.80 MARGINAL ZONE LYMPHOMA (HCC): ICD-10-CM

## 2019-01-02 DIAGNOSIS — C85.11 B-CELL LYMPHOMA OF LYMPH NODES OF NECK, UNSPECIFIED B-CELL LYMPHOMA TYPE (HCC): ICD-10-CM

## 2019-01-02 DIAGNOSIS — C85.80 LYMPHOSARCOMA, MIXED CELL TYPE (HCC): Primary | ICD-10-CM

## 2019-01-07 ENCOUNTER — APPOINTMENT (OUTPATIENT)
Dept: LAB | Facility: HOSPITAL | Age: 49
End: 2019-01-07
Attending: INTERNAL MEDICINE
Payer: COMMERCIAL

## 2019-01-07 DIAGNOSIS — C85.80 MARGINAL ZONE LYMPHOMA (HCC): ICD-10-CM

## 2019-01-07 DIAGNOSIS — C85.11 B-CELL LYMPHOMA OF LYMPH NODES OF NECK, UNSPECIFIED B-CELL LYMPHOMA TYPE (HCC): ICD-10-CM

## 2019-01-07 LAB
BASOPHILS # BLD AUTO: 0.1 THOUSANDS/ΜL (ref 0–0.1)
BASOPHILS NFR BLD AUTO: 1 % (ref 0–2)
EOSINOPHIL # BLD AUTO: 1 THOUSAND/ΜL (ref 0–0.61)
EOSINOPHIL NFR BLD AUTO: 13 % (ref 0–5)
ERYTHROCYTE [DISTWIDTH] IN BLOOD BY AUTOMATED COUNT: 14.2 % (ref 11.5–14.5)
HCT VFR BLD AUTO: 45 % (ref 34.8–46.1)
HGB BLD-MCNC: 15.2 G/DL (ref 12–16)
LYMPHOCYTES # BLD AUTO: 0.2 THOUSANDS/ΜL (ref 0.6–4.47)
LYMPHOCYTES NFR BLD AUTO: 2 % (ref 21–51)
MCH RBC QN AUTO: 30.6 PG (ref 26–34)
MCHC RBC AUTO-ENTMCNC: 33.9 G/DL (ref 31–37)
MCV RBC AUTO: 91 FL (ref 81–99)
MONOCYTES # BLD AUTO: 0.5 THOUSAND/ΜL (ref 0.17–1.22)
MONOCYTES NFR BLD AUTO: 6 % (ref 2–12)
NEUTROPHILS # BLD AUTO: 5.8 THOUSANDS/ΜL (ref 1.4–6.5)
NEUTS SEG NFR BLD AUTO: 77 % (ref 42–75)
NRBC BLD AUTO-RTO: 0 /100 WBCS
PLATELET # BLD AUTO: 269 THOUSANDS/UL (ref 149–390)
PMV BLD AUTO: 8.2 FL (ref 8.6–11.7)
RBC # BLD AUTO: 4.97 MILLION/UL (ref 3.9–5.2)
WBC # BLD AUTO: 7.5 THOUSAND/UL (ref 4.8–10.8)

## 2019-01-07 PROCEDURE — 85025 COMPLETE CBC W/AUTO DIFF WBC: CPT

## 2019-01-07 PROCEDURE — 36415 COLL VENOUS BLD VENIPUNCTURE: CPT

## 2019-01-18 ENCOUNTER — APPOINTMENT (OUTPATIENT)
Dept: LAB | Facility: HOSPITAL | Age: 49
End: 2019-01-18
Payer: COMMERCIAL

## 2019-01-18 ENCOUNTER — HOSPITAL ENCOUNTER (OUTPATIENT)
Dept: RADIOLOGY | Facility: HOSPITAL | Age: 49
Discharge: HOME | End: 2019-01-18
Payer: COMMERCIAL

## 2019-01-18 ENCOUNTER — TRANSCRIBE ORDERS (OUTPATIENT)
Dept: ADMINISTRATIVE | Facility: HOSPITAL | Age: 49
End: 2019-01-18

## 2019-01-18 DIAGNOSIS — Z92.21 PERSONAL HISTORY OF CHEMOTHERAPY: ICD-10-CM

## 2019-01-18 DIAGNOSIS — C85.80 MARGINAL ZONE B-CELL LYMPHOMA (HCC): ICD-10-CM

## 2019-01-18 DIAGNOSIS — C85.80 MARGINAL ZONE B-CELL LYMPHOMA (HCC): Primary | ICD-10-CM

## 2019-01-18 LAB
BASOPHILS # BLD AUTO: 0.1 THOUSANDS/ΜL (ref 0–0.1)
BASOPHILS NFR BLD AUTO: 1 % (ref 0–2)
EOSINOPHIL # BLD AUTO: 0.8 THOUSAND/ΜL (ref 0–0.61)
EOSINOPHIL NFR BLD AUTO: 10 % (ref 0–5)
ERYTHROCYTE [DISTWIDTH] IN BLOOD BY AUTOMATED COUNT: 14.7 % (ref 11.5–14.5)
HCT VFR BLD AUTO: 43.5 % (ref 34.8–46.1)
HGB BLD-MCNC: 14.6 G/DL (ref 12–16)
LYMPHOCYTES # BLD AUTO: 0.4 THOUSANDS/ΜL (ref 0.6–4.47)
LYMPHOCYTES NFR BLD AUTO: 6 % (ref 21–51)
MCH RBC QN AUTO: 30.8 PG (ref 26–34)
MCHC RBC AUTO-ENTMCNC: 33.6 G/DL (ref 31–37)
MCV RBC AUTO: 92 FL (ref 81–99)
MONOCYTES # BLD AUTO: 1.3 THOUSAND/ΜL (ref 0.17–1.22)
MONOCYTES NFR BLD AUTO: 17 % (ref 2–12)
NEUTROPHILS # BLD AUTO: 5 THOUSANDS/ΜL (ref 1.4–6.5)
NEUTS SEG NFR BLD AUTO: 66 % (ref 42–75)
NRBC BLD AUTO-RTO: 0 /100 WBCS
PLATELET # BLD AUTO: 276 THOUSANDS/UL (ref 149–390)
PMV BLD AUTO: 8.6 FL (ref 8.6–11.7)
RBC # BLD AUTO: 4.74 MILLION/UL (ref 3.9–5.2)
WBC # BLD AUTO: 7.6 THOUSAND/UL (ref 4.8–10.8)

## 2019-01-18 PROCEDURE — 71046 X-RAY EXAM CHEST 2 VIEWS: CPT

## 2019-01-18 PROCEDURE — 36415 COLL VENOUS BLD VENIPUNCTURE: CPT

## 2019-01-18 PROCEDURE — 85025 COMPLETE CBC W/AUTO DIFF WBC: CPT

## 2019-01-24 ENCOUNTER — HOSPITAL ENCOUNTER (OUTPATIENT)
Dept: CT IMAGING | Facility: HOSPITAL | Age: 49
Discharge: HOME | End: 2019-01-24
Payer: COMMERCIAL

## 2019-01-24 ENCOUNTER — HOSPITAL ENCOUNTER (OUTPATIENT)
Dept: CT IMAGING | Facility: HOSPITAL | Age: 49
Discharge: HOME | End: 2019-01-24
Attending: INTERNAL MEDICINE
Payer: COMMERCIAL

## 2019-01-24 DIAGNOSIS — C85.80 LYMPHOSARCOMA, MIXED CELL TYPE (HCC): ICD-10-CM

## 2019-01-24 PROCEDURE — 71260 CT THORAX DX C+: CPT

## 2019-01-24 PROCEDURE — 70491 CT SOFT TISSUE NECK W/DYE: CPT

## 2019-01-24 PROCEDURE — 74177 CT ABD & PELVIS W/CONTRAST: CPT

## 2019-01-24 RX ADMIN — IOHEXOL 100 ML: 350 INJECTION, SOLUTION INTRAVENOUS at 08:11

## 2019-01-26 ENCOUNTER — HOSPITAL ENCOUNTER (EMERGENCY)
Facility: HOSPITAL | Age: 49
Discharge: HOME | End: 2019-01-27
Attending: EMERGENCY MEDICINE | Admitting: EMERGENCY MEDICINE
Payer: COMMERCIAL

## 2019-01-26 ENCOUNTER — APPOINTMENT (EMERGENCY)
Dept: RADIOLOGY | Facility: HOSPITAL | Age: 49
End: 2019-01-26
Payer: COMMERCIAL

## 2019-01-26 DIAGNOSIS — J20.9 BRONCHOSPASM WITH BRONCHITIS, ACUTE: Primary | ICD-10-CM

## 2019-01-26 DIAGNOSIS — J10.1 INFLUENZA A: ICD-10-CM

## 2019-01-26 LAB
ALBUMIN SERPL BCP-MCNC: 3.2 G/DL (ref 3.5–5)
ALP SERPL-CCNC: 126 U/L (ref 46–116)
ALT SERPL W P-5'-P-CCNC: 10 U/L (ref 12–78)
ANION GAP SERPL CALCULATED.3IONS-SCNC: 12 MMOL/L (ref 4–13)
APTT PPP: 33 SECONDS (ref 26–38)
AST SERPL W P-5'-P-CCNC: 15 U/L (ref 5–45)
BASOPHILS # BLD AUTO: 0.08 THOUSANDS/ΜL (ref 0–0.1)
BASOPHILS NFR BLD AUTO: 1 % (ref 0–1)
BILIRUB SERPL-MCNC: 0.3 MG/DL (ref 0.2–1)
BUN SERPL-MCNC: 12 MG/DL (ref 5–25)
CALCIUM SERPL-MCNC: 9 MG/DL (ref 8.3–10.1)
CHLORIDE SERPL-SCNC: 105 MMOL/L (ref 100–108)
CO2 SERPL-SCNC: 26 MMOL/L (ref 21–32)
CREAT SERPL-MCNC: 0.84 MG/DL (ref 0.6–1.3)
DEPRECATED D DIMER PPP: 273 NG/ML (FEU)
EOSINOPHIL # BLD AUTO: 0.72 THOUSAND/ΜL (ref 0–0.61)
EOSINOPHIL NFR BLD AUTO: 13 % (ref 0–6)
ERYTHROCYTE [DISTWIDTH] IN BLOOD BY AUTOMATED COUNT: 14.4 % (ref 11.6–15.1)
GFR SERPL CREATININE-BSD FRML MDRD: 81 ML/MIN/1.73SQ M
GLUCOSE SERPL-MCNC: 132 MG/DL (ref 65–140)
HCT VFR BLD AUTO: 42.5 % (ref 34.8–46.1)
HGB BLD-MCNC: 13.9 G/DL (ref 11.5–15.4)
IMM GRANULOCYTES # BLD AUTO: 0.02 THOUSAND/UL (ref 0–0.2)
IMM GRANULOCYTES NFR BLD AUTO: 0 % (ref 0–2)
INR PPP: 1.56 (ref 0.86–1.17)
LYMPHOCYTES # BLD AUTO: 0.33 THOUSANDS/ΜL (ref 0.6–4.47)
LYMPHOCYTES NFR BLD AUTO: 6 % (ref 14–44)
MCH RBC QN AUTO: 30.8 PG (ref 26.8–34.3)
MCHC RBC AUTO-ENTMCNC: 32.7 G/DL (ref 31.4–37.4)
MCV RBC AUTO: 94 FL (ref 82–98)
MONOCYTES # BLD AUTO: 0.78 THOUSAND/ΜL (ref 0.17–1.22)
MONOCYTES NFR BLD AUTO: 14 % (ref 4–12)
NEUTROPHILS # BLD AUTO: 3.69 THOUSANDS/ΜL (ref 1.85–7.62)
NEUTS SEG NFR BLD AUTO: 66 % (ref 43–75)
NRBC BLD AUTO-RTO: 0 /100 WBCS
PLATELET # BLD AUTO: 166 THOUSANDS/UL (ref 149–390)
PMV BLD AUTO: 10.2 FL (ref 8.9–12.7)
POTASSIUM SERPL-SCNC: 3.7 MMOL/L (ref 3.5–5.3)
PROT SERPL-MCNC: 7.1 G/DL (ref 6.4–8.2)
PROTHROMBIN TIME: 17.9 SECONDS (ref 11.8–14.2)
RBC # BLD AUTO: 4.51 MILLION/UL (ref 3.81–5.12)
SODIUM SERPL-SCNC: 143 MMOL/L (ref 136–145)
TROPONIN I SERPL-MCNC: <0.02 NG/ML
WBC # BLD AUTO: 5.62 THOUSAND/UL (ref 4.31–10.16)

## 2019-01-26 PROCEDURE — 94640 AIRWAY INHALATION TREATMENT: CPT

## 2019-01-26 PROCEDURE — 99285 EMERGENCY DEPT VISIT HI MDM: CPT

## 2019-01-26 PROCEDURE — 80053 COMPREHEN METABOLIC PANEL: CPT | Performed by: EMERGENCY MEDICINE

## 2019-01-26 PROCEDURE — 85379 FIBRIN DEGRADATION QUANT: CPT | Performed by: EMERGENCY MEDICINE

## 2019-01-26 PROCEDURE — 85610 PROTHROMBIN TIME: CPT | Performed by: EMERGENCY MEDICINE

## 2019-01-26 PROCEDURE — 36415 COLL VENOUS BLD VENIPUNCTURE: CPT

## 2019-01-26 PROCEDURE — 71046 X-RAY EXAM CHEST 2 VIEWS: CPT

## 2019-01-26 PROCEDURE — 85730 THROMBOPLASTIN TIME PARTIAL: CPT | Performed by: EMERGENCY MEDICINE

## 2019-01-26 PROCEDURE — 93005 ELECTROCARDIOGRAM TRACING: CPT

## 2019-01-26 PROCEDURE — 84484 ASSAY OF TROPONIN QUANT: CPT | Performed by: EMERGENCY MEDICINE

## 2019-01-26 PROCEDURE — 85025 COMPLETE CBC W/AUTO DIFF WBC: CPT | Performed by: EMERGENCY MEDICINE

## 2019-01-26 RX ORDER — IPRATROPIUM BROMIDE AND ALBUTEROL SULFATE 2.5; .5 MG/3ML; MG/3ML
3 SOLUTION RESPIRATORY (INHALATION)
Status: DISCONTINUED | OUTPATIENT
Start: 2019-01-26 | End: 2019-01-27 | Stop reason: HOSPADM

## 2019-01-26 RX ADMIN — IPRATROPIUM BROMIDE AND ALBUTEROL SULFATE 3 ML: 2.5; .5 SOLUTION RESPIRATORY (INHALATION) at 21:33

## 2019-01-27 VITALS
OXYGEN SATURATION: 95 % | BODY MASS INDEX: 44.18 KG/M2 | TEMPERATURE: 99.7 F | RESPIRATION RATE: 20 BRPM | HEART RATE: 96 BPM | SYSTOLIC BLOOD PRESSURE: 125 MMHG | WEIGHT: 290.57 LBS | DIASTOLIC BLOOD PRESSURE: 73 MMHG

## 2019-01-27 LAB
FLUAV AG SPEC QL IA: POSITIVE
FLUBV AG SPEC QL IA: NEGATIVE

## 2019-01-27 PROCEDURE — 87400 INFLUENZA A/B EACH AG IA: CPT | Performed by: EMERGENCY MEDICINE

## 2019-01-27 RX ORDER — ALBUTEROL SULFATE 90 UG/1
2 AEROSOL, METERED RESPIRATORY (INHALATION) EVERY 4 HOURS PRN
Status: DISCONTINUED | OUTPATIENT
Start: 2019-01-27 | End: 2019-01-27 | Stop reason: HOSPADM

## 2019-01-27 RX ORDER — IPRATROPIUM BROMIDE AND ALBUTEROL SULFATE 2.5; .5 MG/3ML; MG/3ML
3 SOLUTION RESPIRATORY (INHALATION)
Status: DISCONTINUED | OUTPATIENT
Start: 2019-01-27 | End: 2019-01-27 | Stop reason: HOSPADM

## 2019-01-27 RX ORDER — ALBUTEROL SULFATE 90 UG/1
2 AEROSOL, METERED RESPIRATORY (INHALATION) EVERY 4 HOURS PRN
Qty: 1 INHALER | Refills: 0 | Status: SHIPPED | OUTPATIENT
Start: 2019-01-27 | End: 2020-01-27

## 2019-01-27 RX ORDER — OSELTAMIVIR PHOSPHATE 75 MG/1
75 CAPSULE ORAL ONCE
Status: COMPLETED | OUTPATIENT
Start: 2019-01-27 | End: 2019-01-27

## 2019-01-27 RX ORDER — PREDNISONE 20 MG/1
40 TABLET ORAL ONCE
Status: COMPLETED | OUTPATIENT
Start: 2019-01-27 | End: 2019-01-27

## 2019-01-27 RX ORDER — ALBUTEROL SULFATE 2.5 MG/3ML
2.5 SOLUTION RESPIRATORY (INHALATION) ONCE
Status: COMPLETED | OUTPATIENT
Start: 2019-01-27 | End: 2019-01-27

## 2019-01-27 RX ORDER — PREDNISONE 20 MG/1
TABLET ORAL
Qty: 8 TABLET | Refills: 0 | Status: SHIPPED | OUTPATIENT
Start: 2019-01-27 | End: 2019-03-15 | Stop reason: ALTCHOICE

## 2019-01-27 RX ORDER — IPRATROPIUM BROMIDE AND ALBUTEROL SULFATE 2.5; .5 MG/3ML; MG/3ML
3 SOLUTION RESPIRATORY (INHALATION)
Qty: 360 ML | Refills: 0 | Status: SHIPPED | OUTPATIENT
Start: 2019-01-27 | End: 2019-05-21 | Stop reason: SDUPTHER

## 2019-01-27 RX ORDER — ALBUTEROL SULFATE 2.5 MG/3ML
2.5 SOLUTION RESPIRATORY (INHALATION) EVERY 4 HOURS PRN
Qty: 75 ML | Refills: 0 | Status: SHIPPED | OUTPATIENT
Start: 2019-01-27 | End: 2019-05-21 | Stop reason: SDUPTHER

## 2019-01-27 RX ORDER — OSELTAMIVIR PHOSPHATE 75 MG/1
75 CAPSULE ORAL 2 TIMES DAILY
Qty: 10 CAPSULE | Refills: 0 | Status: SHIPPED | OUTPATIENT
Start: 2019-01-27 | End: 2019-02-01

## 2019-01-27 RX ADMIN — PREDNISONE 40 MG: 20 TABLET ORAL at 03:28

## 2019-01-27 RX ADMIN — ALBUTEROL SULFATE 2.5 MG: 2.5 SOLUTION RESPIRATORY (INHALATION) at 00:26

## 2019-01-27 RX ADMIN — OSELTAMIVIR PHOSPHATE 75 MG: 75 CAPSULE ORAL at 03:29

## 2019-01-27 RX ADMIN — ALBUTEROL SULFATE 2 PUFF: 90 AEROSOL, METERED RESPIRATORY (INHALATION) at 03:30

## 2019-01-27 RX ADMIN — IPRATROPIUM BROMIDE AND ALBUTEROL SULFATE 3 ML: 2.5; .5 SOLUTION RESPIRATORY (INHALATION) at 03:29

## 2019-01-27 NOTE — ED PROVIDER NOTES
History  Chief Complaint   Patient presents with    Shortness of Breath     Patient has been short of breath and wheezing for "four days" Patient is current on an antibiotic for the same thing  22-year-old female presents with continued shortness of breath and wheezing  Despite antibiotic treatment  She has a history of B-cell non-Hodgkin's lymphoma  She is has undergone 3 cycles of Rituxan / bendamustine is followed by Dr Pradeep Bonilla of [facility]  She completed  In the middle of the month and is currently on Levaquin 750 mg daily she is on day 8 of a 10 day course  She is denying any fever chills but complains of wheezing and cough she has a slightly stuffy nose she has no prior history of asthma and is not needed to use meter dose inhalers in the past   She is scheduled for next chemotherapy treatment on the 30th she is denying any chest pain she was diagnosed with a pulmonary embolism at the completion of initial chemotherapy in 2016 and is on Xarelto  He denies any nausea or vomiting she otherwise feels well she has had no lower extremity edema no rashes no abdominal pain no pleuritic pain  Prior to Admission Medications   Prescriptions Last Dose Informant Patient Reported? Taking?    MAGNESIUM OXIDE PO   Yes Yes   Sig: Take by mouth daily   Multiple Vitamin (MULTIVITAMIN) tablet   Yes Yes   Sig: Take 2 tablets by mouth daily   Zinc 50 MG CAPS   Yes Yes   Sig: Take by mouth daily   b complex vitamins tablet   Yes Yes   Sig: Take 1 tablet by mouth daily   cholecalciferol (VITAMIN D3) 1,000 units tablet   Yes Yes   Sig: Take 1,000 Units by mouth daily   levothyroxine 75 mcg tablet   No Yes   Sig: TAKE 1 TABLET BY MOUTH  DAILY   rivaroxaban (XARELTO) 20 mg tablet   Yes Yes   Sig: Take 20 mg by mouth daily with dinner        Facility-Administered Medications: None       Past Medical History:   Diagnosis Date    Allergic rhinitis     last assessed 04/06/16    Arthritis     b/l feet    Chronic allergic conjunctivitis     Fluttering sensation of heart     "periodically, not often since on magnesium"    Foot pain, left     Full dentures     upper    History of cancer chemotherapy 2016    History of dilation and curettage     Hx of tonsillectomy     Hypothyroid     Irregular heart beat     Neck mass     R neck mass-excised 3/15/2016,, 4/23/18 noted enlarged lymph node right neck-bx today 4/26/2018    Non Hodgkin's lymphoma ([address] )     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    Obese     Port-A-Cath in place 2016    pt reports 'Has it flushed q 4weeks" right chest wall    Post-menopausal     since chemo 4/2016  no menses    Pulmonary embolism ([address] )     last assessed 10/31/16 attributed to Non-Hodgkins hx    Tachycardia     awaiting cardiology eval,,,4/23/18 "pt reports saw cardiologist at that time and placed on magnesium and "hardly notices it"    Tinnitus     occas    Wears glasses     Wears partial dentures     /lower    Mcintosh teeth extracted        Past Surgical History:   Procedure Laterality Date    COLONOSCOPY      COLONOSCOPY N/A 7/26/2018    Procedure: COLONOSCOPY with multiple bx;  Surgeon: Dalila Higgins MD;  Location: [facility] MAIN OR;  Service: Gastroenterology    CYST REMOVAL Left     L  neck    DILATION AND CURETTAGE OF UTERUS      ESOPHAGOGASTRODUODENOSCOPY N/A 7/26/2018    Procedure: ESOPHAGOGASTRODUODENOSCOPY (EGD) with multiple bx;  Surgeon: Dalila Higgins MD;  Location: [facility] MAIN OR;  Service: Gastroenterology    FACIAL/NECK BIOPSY N/A 4/26/2018    Procedure: OPEN DEEP CERVICAL LYMPH NODE EXCISOION/BIOPSY;  Surgeon: Alex Colón MD;  Location: [facility];  Service: ENT    LYMPHADENECTOMY      PORTACATH PLACEMENT      [place] BX/REMV,LYMPH NODE,DEEP CERV N/A 3/15/2016    Procedure: DISSECTION [facility] NODE NECK/DEEP NECK MASS ;  Surgeon: Christine Lambert DO;  Location: [facility];  Service: ENT    TONSILLECTOMY      TUBAL LIGATION         Family History   Problem Relation Age of Onset    Coronary artery disease Father     Diabetes Brother     Diabetes Family     Heart disease Family      I have reviewed and agree with the history as documented  Social History   Substance Use Topics    Smoking status: Former Smoker     Packs/day: 1 50     Years: 11 00     Quit date: 1996    Smokeless tobacco: Never Used    Alcohol use No        Review of Systems   Constitutional: Negative for activity change, chills and fever  HENT: Positive for congestion  Negative for ear pain, rhinorrhea, sneezing and sore throat  Eyes: Negative for discharge  Respiratory: Positive for cough, chest tightness, shortness of breath and wheezing  Cardiovascular: Negative for chest pain and leg swelling  Gastrointestinal: Positive for abdominal pain  Negative for blood in stool, diarrhea, nausea and vomiting  Endocrine: Negative for polyuria  Genitourinary: Negative for dysuria, frequency and urgency  Musculoskeletal: Negative for back pain and myalgias  Skin: Negative for rash  Neurological: Negative for dizziness and numbness  Hematological: Negative for adenopathy  Psychiatric/Behavioral: Negative for confusion  All other systems reviewed and are negative  Physical Exam  Physical Exam   Constitutional: She is oriented to person, place, and time  She appears well-developed and well-nourished  No distress  HENT:   Head: Normocephalic and atraumatic  Right Ear: External ear normal    Left Ear: External ear normal    Nose: Nose normal    Mouth/Throat: Oropharynx is clear and moist  No oropharyngeal exudate  Eyes: Pupils are equal, round, and reactive to light  Conjunctivae and EOM are normal  Right eye exhibits no discharge  Left eye exhibits no discharge  Neck: Normal range of motion  Neck supple  No midline or paraspinous tenderness   Cardiovascular: Normal rate, regular rhythm, normal heart sounds and intact distal pulses      Pulmonary/Chest: Effort normal  No respiratory distress  She has wheezes  She has no rales  She exhibits no tenderness  Abdominal: Soft  Bowel sounds are normal  She exhibits no distension and no mass  There is no tenderness  There is no rebound and no guarding  Back no midline or CVA tenderness   Musculoskeletal: Normal range of motion  She exhibits no edema, tenderness or deformity  Neurological: She is alert and oriented to person, place, and time  No cranial nerve deficit or sensory deficit  She exhibits normal muscle tone  Coordination normal    Gait steady   Skin: Skin is warm and dry  Capillary refill takes less than 2 seconds  She is not diaphoretic  Psychiatric: She has a normal mood and affect  Vitals reviewed        Vital Signs  ED Triage Vitals [01/26/19 2120]   Temperature Pulse Respirations Blood Pressure SpO2   99 7 °F (37 6 °C) 97 21 160/88 93 %      Temp Source Heart Rate Source Patient Position - Orthostatic VS BP Location FiO2 (%)   Temporal Monitor Sitting Left arm --      Pain Score       No Pain           Vitals:    01/27/19 0100 01/27/19 0130 01/27/19 0230 01/27/19 0330   BP: 122/77 128/76 114/60 125/73   Pulse: (!) 108 103 97 96   Patient Position - Orthostatic VS:           Visual Acuity      ED Medications  Medications   albuterol inhalation solution 2 5 mg (2 5 mg Nebulization Given 1/27/19 0026)   oseltamivir (TAMIFLU) capsule 75 mg (75 mg Oral Given 1/27/19 0329)   predniSONE tablet 40 mg (40 mg Oral Given 1/27/19 0328)       Diagnostic Studies  Results Reviewed     Procedure Component Value Units Date/Time    Rapid Influenza Screen with Reflex PCR [[id number]]  (Abnormal) Collected:  01/27/19 0037    Lab Status:  Final result Specimen:  Nasopharyngeal from Nasopharyngeal Swab Updated:  01/27/19 0132     Rapid Influenza A Ag Positive (A)     Rapid Influenza B Ag Negative    D-dimer, quantitative [[id number]]  (Normal) Collected:  01/26/19 2130    Lab Status:  Final result Specimen:  Blood from Arm, Right Updated: 01/26/19 2357     D-Dimer, Quant 273 ng/ml (FEU)     Troponin I [[id number]]  (Normal) Collected:  01/26/19 2130    Lab Status:  Final result Specimen:  Blood from Arm, Right Updated:  01/26/19 2156     Troponin I <0 02 ng/mL     Comprehensive metabolic panel [[id number]]  (Abnormal) Collected:  01/26/19 2130    Lab Status:  Final result Specimen:  Blood from Arm, Right Updated:  01/26/19 2152     Sodium 143 mmol/L      Potassium 3 7 mmol/L      Chloride 105 mmol/L      CO2 26 mmol/L      ANION GAP 12 mmol/L      BUN 12 mg/dL      Creatinine 0 84 mg/dL      Glucose 132 mg/dL      Calcium 9 0 mg/dL      AST 15 U/L      ALT 10 (L) U/L      Alkaline Phosphatase 126 (H) U/L      Total Protein 7 1 g/dL      Albumin 3 2 (L) g/dL      Total Bilirubin 0 30 mg/dL      eGFR 81 ml/min/1 73sq m     Narrative:         National Kidney Disease Education Program recommendations are as follows:  GFR calculation is accurate only with a steady state creatinine  Chronic Kidney disease less than 60 ml/min/1 73 sq  meters  Kidney failure less than 15 ml/min/1 73 sq  meters      Protime-INR [[id number]]  (Abnormal) Collected:  01/26/19 2130    Lab Status:  Final result Specimen:  Blood from Arm, Right Updated:  01/26/19 2149     Protime 17 9 (H) seconds      INR 1 56 (H)    APTT [[id number]]  (Normal) Collected:  01/26/19 2130    Lab Status:  Final result Specimen:  Blood from Arm, Right Updated:  01/26/19 2149     PTT 33 seconds     CBC and differential [[id number]]  (Abnormal) Collected:  01/26/19 2130    Lab Status:  Final result Specimen:  Blood from Arm, Right Updated:  01/26/19 2139     WBC 5 62 Thousand/uL      RBC 4 51 Million/uL      Hemoglobin 13 9 g/dL      Hematocrit 42 5 %      MCV 94 fL      MCH 30 8 pg      MCHC 32 7 g/dL      RDW 14 4 %      MPV 10 2 fL      Platelets 336 Thousands/uL      nRBC 0 /100 WBCs      Neutrophils Relative 66 %      Immat GRANS % 0 %      Lymphocytes Relative 6 (L) %      Monocytes Relative 14 (H) % Eosinophils Relative 13 (H) %      Basophils Relative 1 %      Neutrophils Absolute 3 69 Thousands/µL      Immature Grans Absolute 0 02 Thousand/uL      Lymphocytes Absolute 0 33 (L) Thousands/µL      Monocytes Absolute 0 78 Thousand/µL      Eosinophils Absolute 0 72 (H) Thousand/µL      Basophils Absolute 0 08 Thousands/µL                  X-ray chest 2 views   Final Result by Ines Duran DO (01/27 0221)   Redemonstration of the cavitary masslike lesion in the left perihilar region which measures approximately 7 7 x 5 8 cm in size  Some additional airspace opacities/atelectasis in the left perihilar region and left lower lung field are redemonstrated as    well  The findings are better appreciated on the recent CT of the chest, abdomen, and pelvis  Overall there has been no significant interval change  Workstation performed: [id number]                    Procedures  ECG 12 Lead Documentation  Date/Time: 1/26/2019 11:27 PM  Performed by: Niles Lesch  Authorized by: Niles Lesch     Indications / Diagnosis:  Sob  ECG reviewed by me, the ED Provider: yes    Patient location:  ED  Rate:     ECG rate:  95    ECG rate assessment: normal    Rhythm:     Rhythm: sinus rhythm    QRS:     QRS axis:  Normal  Comments:      No acute ischemic changes             Phone Contacts  ED Phone Contact    ED Course  ED Course as of Jan 27 1803   Ismael Hoang Jan 27, 2019   0016 Will proceed with pulm consult once cxr read  Patient feels improved after 1st neb will admin second neb    0233 Case reviewed with Dr Garima Pena recommends tamiflu,  prednisone, continue levaquin, albuterol mdi f/u with heme/onc on Monday                                MDM  Number of Diagnoses or Management Options  Bronchospasm with bronchitis, acute: Influenza A:   Diagnosis management comments: Mdm:   Postobstructive asthma    Influenza, pneumonia, PE, acute coronary syndrome,    CritCare Time    Disposition  Final diagnoses: Bronchospasm with bronchitis, acute   Influenza A     Time reflects when diagnosis was documented in both MDM as applicable and the Disposition within this note     Time User Action Codes Description Comment    1/27/2019  2:54 AM She Santizo Add [J20 9] Bronchospasm with bronchitis, acute     1/27/2019  2:54 AM Saadia Crawley Add [J10 1] Influenza A       ED Disposition     ED Disposition Condition Comment    Discharge  [facility] discharge to home/self care  Condition at discharge: Good        Follow-up Information     Follow up With Specialties Details Why Contact Info       Call Dr Meghan Butt heme/onc for followup              Discharge Medication List as of 1/27/2019  3:02 AM      START taking these medications    Details   albuterol (2 5 mg/3 mL) 0 083 % nebulizer solution Take 1 vial (2 5 mg total) by nebulization every 4 (four) hours as needed for wheezing, Starting Sun 1/27/2019, Until Mon 1/27/2020, Print      albuterol (PROVENTIL HFA,VENTOLIN HFA) 90 mcg/act inhaler Inhale 2 puffs every 4 (four) hours as needed for wheezing, Starting Sun 1/27/2019, Until Mon 1/27/2020, Print      ipratropium-albuterol (DUO-NEB) 0 5-2 5 mg/3 mL nebulizer solution Take 1 vial (3 mL total) by nebulization every 6 (six) hours, Starting Sun 1/27/2019, Print      oseltamivir (TAMIFLU) 75 mg capsule Take 1 capsule (75 mg total) by mouth 2 (two) times a day for 9 doses, Starting Sun 1/27/2019, Until Fri 2/1/2019, Print      predniSONE 20 mg tablet 2-20mg tabs (40mg) by mouth daily with food for 4 days, Print         CONTINUE these medications which have NOT CHANGED    Details   b complex vitamins tablet Take 1 tablet by mouth daily, Historical Med      cholecalciferol (VITAMIN D3) 1,000 units tablet Take 1,000 Units by mouth daily, Historical Med      levothyroxine 75 mcg tablet TAKE 1 TABLET BY MOUTH  DAILY, Normal      MAGNESIUM OXIDE PO Take by mouth daily, Historical Med      Multiple Vitamin (MULTIVITAMIN) tablet Take 2 tablets by mouth daily, Historical Med      rivaroxaban (XARELTO) 20 mg tablet Take 20 mg by mouth daily with dinner  , Starting Tue 1/30/2018, Historical Med      Zinc 50 MG CAPS Take by mouth daily, Historical Med           No discharge procedures on file      ED Provider  Electronically Signed by           Deann Knowles MD  01/27/19 6523

## 2019-01-27 NOTE — DISCHARGE INSTRUCTIONS
Acute Bronchitis   WHAT YOU NEED TO KNOW:   Acute bronchitis is swelling and irritation in the air passages of your lungs  This irritation may cause you to cough or have other breathing problems  Acute bronchitis often starts because of another illness, such as a cold or the flu  The illness spreads from your nose and throat to your windpipe and airways  Bronchitis is often called a chest cold  Acute bronchitis lasts about 3 to 6 weeks and is usually not a serious illness  Your cough can last for several weeks  DISCHARGE INSTRUCTIONS:   Return to the emergency department if:   · You cough up blood  · Your lips or fingernails turn blue  · You feel like you are not getting enough air when you breathe  Contact your healthcare provider if:   · You have a fever  · Your breathing problems do not go away or get worse  · Your cough does not get better within 4 weeks  · You have questions or concerns about your condition or care  Self-care:   · Get more rest   Rest helps your body to heal  Slowly start to do more each day  Rest when you feel it is needed  · Avoid irritants in the air  Avoid chemicals, fumes, and dust  Wear a face mask if you must work around dust or fumes  Stay inside on days when air pollution levels are high  If you have allergies, stay inside when pollen counts are high  Do not use aerosol products, such as spray-on deodorant, bug spray, and hair spray  · Do not smoke or be around others who smoke  Nicotine and other chemicals in cigarettes and cigars damages the cilia that move mucus out of your lungs  Ask your healthcare provider for information if you currently smoke and need help to quit  E-cigarettes or smokeless tobacco still contain nicotine  Talk to your healthcare provider before you use these products  · Drink liquids as directed  Liquids help keep your air passages moist and help you cough up mucus   You may need to drink more liquids when you have acute bronchitis  Ask how much liquid to drink each day and which liquids are best for you  · Use a humidifier or vaporizer  Use a cool mist humidifier or a vaporizer to increase air moisture in your home  This may make it easier for you to breathe and help decrease your cough  Decrease risk for acute bronchitis:   · Get the vaccinations you need  Ask your healthcare provider if you should get vaccinated against the flu or pneumonia  · Prevent the spread of germs  You can decrease your risk of acute bronchitis and other illnesses by doing the following:     [facility] your hands often with soap and water  Carry germ-killing hand lotion or gel with you  You can use the lotion or gel to clean your hands when soap and water are not available  ¨ Do not touch your eyes, nose, or mouth unless you have washed your hands first     ¨ Always cover your mouth when you cough to prevent the spread of germs  It is best to cough into a tissue or your shirt sleeve instead of into your hand  Ask those around you cover their mouths when they cough  ¨ Try to avoid people who have a cold or the flu  If you are sick, stay away from others as much as possible  Medicines: Your healthcare provider may  give you any of the following:  · Ibuprofen or acetaminophen  are medicines that help lower your fever  They are available without a doctor's order  Ask your healthcare provider which medicine is right for you  Ask how much to take and how often to take it  Follow directions  These medicines can cause stomach bleeding if not taken correctly  Ibuprofen can cause kidney damage  Do not take ibuprofen if you have kidney disease, an ulcer, or allergies to aspirin  Acetaminophen can cause liver damage  Do not take more than 4,000 milligrams in 24 hours  · Decongestants  help loosen mucus in your lungs and make it easier to cough up  This can help you breathe easier  · Cough suppressants  decrease your urge to cough   If your cough produces mucus, do not take a cough suppressant unless your healthcare provider tells you to  Your healthcare provider may suggest that you take a cough suppressant at night so you can rest     · Inhalers  may be given  Your healthcare provider may give you one or more inhalers to help you breathe easier and cough less  An inhaler gives your medicine to open your airways  Ask your healthcare provider to show you how to use your inhaler correctly  · Take your medicine as directed  Contact your healthcare provider if you think your medicine is not helping or if you have side effects  Tell him of her if you are allergic to any medicine  Keep a list of the medicines, vitamins, and herbs you take  Include the amounts, and when and why you take them  Bring the list or the pill bottles to follow-up visits  Carry your medicine list with you in case of an emergency  Follow up with your healthcare provider as directed:  Write down questions you have so you will remember to ask them during your follow-up visits  © 2017 [employer] Information is for End User's use only and may not be sold, redistributed or otherwise used for commercial purposes  All illustrations and images included in CareNotes® are the copyrighted property of [employer]  or [employer]  The above information is an  only  It is not intended as medical advice for individual conditions or treatments  Talk to your doctor, nurse or pharmacist before following any medical regimen to see if it is safe and effective for you  Influenza   WHAT YOU NEED TO KNOW:   Influenza (the flu) is an infection caused by the influenza virus  The flu is easily spread when an infected person coughs, sneezes, or has close contact with others  You may be able to spread the flu to others for 1 week or longer after signs or symptoms appear     DISCHARGE INSTRUCTIONS:   Call 911 for any of the following:   · You have trouble breathing, and your lips look purple or blue  · You have a seizure  Return to the emergency department if:   · You are dizzy, or you are urinating less or not at all  · You have a headache with a stiff neck, and you feel tired or confused  · You have new pain or pressure in your chest     · Your symptoms, such as shortness of breath, vomiting, or diarrhea, get worse  · Your symptoms, such as fever and coughing, seem to get better, but then get worse  Contact your healthcare provider if:   · You have new muscle pain or weakness  · You have questions or concerns about your condition or care  Medicines: You may need any of the following:  · Acetaminophen  decreases pain and fever  It is available without a doctor's order  Ask how much to take and how often to take it  Follow directions  Acetaminophen can cause liver damage if not taken correctly  · NSAIDs , such as ibuprofen, help decrease swelling, pain, and fever  This medicine is available with or without a doctor's order  NSAIDs can cause stomach bleeding or kidney problems in certain people  If you take blood thinner medicine, always ask your healthcare provider if NSAIDs are safe for you  Always read the medicine label and follow directions  · Antivirals  help fight a viral infection  · Take your medicine as directed  Contact your healthcare provider if you think your medicine is not helping or if you have side effects  Tell him or her if you are allergic to any medicine  Keep a list of the medicines, vitamins, and herbs you take  Include the amounts, and when and why you take them  Bring the list or the pill bottles to follow-up visits  Carry your medicine list with you in case of an emergency  Rest  as much as you can to help you recover  Drink liquids as directed  to help prevent dehydration  Ask how much liquid to drink each day and which liquids are best for you  Prevent the spread of influenza:   · Wash your hands often  Use soap and water  Wash your hands after you use the bathroom, change a child's diapers, or sneeze  Wash your hands before you prepare or eat food  Use gel hand cleanser when soap and water are not available  Do not touch your eyes, nose, or mouth unless you have washed your hands first            · Cover your mouth when you sneeze or cough  Cough into a tissue or the bend of your arm  · Clean shared items with a germ-killing   Clean table surfaces, doorknobs, and light switches  Do not share towels, silverware, and dishes with people who are sick  Wash bed sheets, towels, silverware, and dishes with soap and water  · Wear a mask  over your mouth and nose if you are sick or are near anyone who is sick  · Stay away from others  if you are sick  · Influenza vaccine  helps prevent influenza (flu)  Everyone older than 6 months should get a yearly influenza vaccine  Get the vaccine as soon as it is available, usually in September or October each year  Follow up with your healthcare provider as directed:  Write down your questions so you remember to ask them during your visits  © 2017 [address] [facility] Information is for End User's use only and may not be sold, redistributed or otherwise used for commercial purposes  All illustrations and images included in CareNotes® are the copyrighted property of [employer] M [employer] Inc  or [employer]  The above information is an  only  It is not intended as medical advice for individual conditions or treatments  Talk to your doctor, nurse or pharmacist before following any medical regimen to see if it is safe and effective for you  Plenty of fluids  Albuterol  Neb or meter dose inhaler every 4 hours as needed for chest tightness wheezing or cough   DuoNeb 4 times a day for 5 days      Finished Levaquin   finished Tamiflu   finished additional 4 days of prednisone   return at any time for worsening or change in breathing lightheadedness chest pain needing inhalers or nebs more often than every 4 hours

## 2019-01-28 LAB
ATRIAL RATE: 95 BPM
P AXIS: 44 DEGREES
PR INTERVAL: 138 MS
QRS AXIS: 7 DEGREES
QRSD INTERVAL: 82 MS
QT INTERVAL: 366 MS
QTC INTERVAL: 459 MS
T WAVE AXIS: 14 DEGREES
VENTRICULAR RATE: 95 BPM

## 2019-01-28 PROCEDURE — 93010 ELECTROCARDIOGRAM REPORT: CPT | Performed by: INTERNAL MEDICINE

## 2019-03-01 ENCOUNTER — TRANSCRIBE ORDERS (OUTPATIENT)
Dept: ADMINISTRATIVE | Facility: HOSPITAL | Age: 49
End: 2019-03-01

## 2019-03-01 ENCOUNTER — APPOINTMENT (OUTPATIENT)
Dept: LAB | Facility: HOSPITAL | Age: 49
End: 2019-03-01
Payer: COMMERCIAL

## 2019-03-01 DIAGNOSIS — C85.11 UNSPECIFIED B-CELL LYMPHOMA, LYMPH NODES OF HEAD, FACE, AND NECK (HCC): ICD-10-CM

## 2019-03-01 DIAGNOSIS — C85.80 MARGINAL ZONE B-CELL LYMPHOMA (HCC): ICD-10-CM

## 2019-03-01 DIAGNOSIS — C85.80 MARGINAL ZONE B-CELL LYMPHOMA (HCC): Primary | ICD-10-CM

## 2019-03-01 LAB
ALBUMIN SERPL BCP-MCNC: 3.2 G/DL (ref 3.5–5)
ALP SERPL-CCNC: 126 U/L (ref 46–116)
ALT SERPL W P-5'-P-CCNC: 18 U/L (ref 12–78)
ANION GAP SERPL CALCULATED.3IONS-SCNC: 10 MMOL/L (ref 4–13)
AST SERPL W P-5'-P-CCNC: 15 U/L (ref 5–45)
BASOPHILS # BLD AUTO: 0.07 THOUSANDS/ΜL (ref 0–0.1)
BASOPHILS NFR BLD AUTO: 1 % (ref 0–1)
BILIRUB SERPL-MCNC: 0.3 MG/DL (ref 0.2–1)
BUN SERPL-MCNC: 19 MG/DL (ref 5–25)
CALCIUM SERPL-MCNC: 8.3 MG/DL (ref 8.3–10.1)
CHLORIDE SERPL-SCNC: 104 MMOL/L (ref 100–108)
CO2 SERPL-SCNC: 24 MMOL/L (ref 21–32)
CREAT SERPL-MCNC: 0.84 MG/DL (ref 0.6–1.3)
EOSINOPHIL # BLD AUTO: 0.47 THOUSAND/ΜL (ref 0–0.61)
EOSINOPHIL NFR BLD AUTO: 7 % (ref 0–6)
ERYTHROCYTE [DISTWIDTH] IN BLOOD BY AUTOMATED COUNT: 15.3 % (ref 11.6–15.1)
GFR SERPL CREATININE-BSD FRML MDRD: 81 ML/MIN/1.73SQ M
GLUCOSE SERPL-MCNC: 107 MG/DL (ref 65–140)
HCT VFR BLD AUTO: 40.7 % (ref 34.8–46.1)
HGB BLD-MCNC: 13.2 G/DL (ref 11.5–15.4)
IMM GRANULOCYTES # BLD AUTO: 0.03 THOUSAND/UL (ref 0–0.2)
IMM GRANULOCYTES NFR BLD AUTO: 0 % (ref 0–2)
LYMPHOCYTES # BLD AUTO: 0.07 THOUSANDS/ΜL (ref 0.6–4.47)
LYMPHOCYTES NFR BLD AUTO: 1 % (ref 14–44)
MCH RBC QN AUTO: 31.5 PG (ref 26.8–34.3)
MCHC RBC AUTO-ENTMCNC: 32.4 G/DL (ref 31.4–37.4)
MCV RBC AUTO: 97 FL (ref 82–98)
MONOCYTES # BLD AUTO: 1.5 THOUSAND/ΜL (ref 0.17–1.22)
MONOCYTES NFR BLD AUTO: 21 % (ref 4–12)
NEUTROPHILS # BLD AUTO: 4.9 THOUSANDS/ΜL (ref 1.85–7.62)
NEUTS SEG NFR BLD AUTO: 70 % (ref 43–75)
NRBC BLD AUTO-RTO: 0 /100 WBCS
PLATELET # BLD AUTO: 208 THOUSANDS/UL (ref 149–390)
PMV BLD AUTO: 9.6 FL (ref 8.9–12.7)
POTASSIUM SERPL-SCNC: 4 MMOL/L (ref 3.5–5.3)
PROT SERPL-MCNC: 6.4 G/DL (ref 6.4–8.2)
RBC # BLD AUTO: 4.19 MILLION/UL (ref 3.81–5.12)
SODIUM SERPL-SCNC: 138 MMOL/L (ref 136–145)
WBC # BLD AUTO: 7.04 THOUSAND/UL (ref 4.31–10.16)

## 2019-03-01 PROCEDURE — 36415 COLL VENOUS BLD VENIPUNCTURE: CPT

## 2019-03-01 PROCEDURE — 85025 COMPLETE CBC W/AUTO DIFF WBC: CPT

## 2019-03-01 PROCEDURE — 80053 COMPREHEN METABOLIC PANEL: CPT

## 2019-03-08 ENCOUNTER — TRANSITIONAL CARE MANAGEMENT (OUTPATIENT)
Dept: FAMILY MEDICINE CLINIC | Facility: CLINIC | Age: 49
End: 2019-03-08

## 2019-03-11 ENCOUNTER — OFFICE VISIT (OUTPATIENT)
Dept: SURGERY | Facility: CLINIC | Age: 49
End: 2019-03-11
Payer: COMMERCIAL

## 2019-03-11 VITALS
WEIGHT: 271 LBS | TEMPERATURE: 98.9 F | RESPIRATION RATE: 20 BRPM | SYSTOLIC BLOOD PRESSURE: 122 MMHG | BODY MASS INDEX: 41.07 KG/M2 | DIASTOLIC BLOOD PRESSURE: 68 MMHG | HEIGHT: 68 IN | HEART RATE: 88 BPM

## 2019-03-11 DIAGNOSIS — C84.48 PERIPHERAL T CELL LYMPHOMA OF LYMPH NODES OF MULTIPLE SITES (HCC): Primary | ICD-10-CM

## 2019-03-11 DIAGNOSIS — I26.99 PULMONARY EMBOLISM ON RIGHT (HCC): ICD-10-CM

## 2019-03-11 PROBLEM — C85.80 MARGINAL ZONE LYMPHOMA (HCC): Status: ACTIVE | Noted: 2018-11-02

## 2019-03-11 PROCEDURE — 99204 OFFICE O/P NEW MOD 45 MIN: CPT | Performed by: SURGERY

## 2019-03-11 RX ORDER — ACETAMINOPHEN 325 MG/1
650 TABLET ORAL EVERY 4 HOURS PRN
COMMUNITY

## 2019-03-11 RX ORDER — SODIUM CHLORIDE, SODIUM LACTATE, POTASSIUM CHLORIDE, CALCIUM CHLORIDE 600; 310; 30; 20 MG/100ML; MG/100ML; MG/100ML; MG/100ML
125 INJECTION, SOLUTION INTRAVENOUS CONTINUOUS
Status: CANCELLED | OUTPATIENT
Start: 2019-03-11

## 2019-03-11 RX ORDER — ONDANSETRON 2 MG/ML
4 INJECTION INTRAMUSCULAR; INTRAVENOUS ONCE
Status: CANCELLED | OUTPATIENT
Start: 2019-03-11 | End: 2019-03-11

## 2019-03-11 RX ORDER — CEFAZOLIN SODIUM 1 G/50ML
1000 SOLUTION INTRAVENOUS ONCE
Status: CANCELLED | OUTPATIENT
Start: 2019-03-21 | End: 2019-03-11

## 2019-03-11 RX ORDER — LORATADINE 10 MG/1
10 TABLET ORAL AS NEEDED
COMMUNITY
End: 2020-02-24

## 2019-03-11 RX ORDER — SENNA AND DOCUSATE SODIUM 50; 8.6 MG/1; MG/1
1 TABLET, FILM COATED ORAL 2 TIMES DAILY PRN
COMMUNITY
Start: 2019-03-06 | End: 2022-02-15 | Stop reason: ALTCHOICE

## 2019-03-11 RX ORDER — ZINC SULFATE 50(220)MG
50 CAPSULE ORAL
COMMUNITY
End: 2019-03-15 | Stop reason: SDUPTHER

## 2019-03-11 NOTE — H&P (VIEW-ONLY)
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 [facility])  The patient presents with a 1year-old right subclavian vein MediPort who is catheter has fractured between the right clavicle and right 1st rib  She is in between cycles of chemotherapy and wishes to have it replaced prior to the next scheduled treatment in 2 weeks  After review of the report from a [facility] dated March 7, 2019 I have recommended replacement of the MediPort catheter  I will attempt to replace it over wire  If this is not technically possible we will simply remove the existing catheter in replace the entirely with anyone  The technical details of the procedures well as the benefits and risks were explained to the patient the presence of her  including the risks related to anesthesia, bleeding, infection, neurovascular injury, and 2% risk of pneumothorax requiring admission and chest tube placement  All questions were answered to the satisfaction of the patient and informed consent obtained to proceed  Pulmonary embolism on right [facility])  Patient was instructed to hold her Xarelto 5 days prior to the scheduled procedure  Diagnoses and all orders for this visit:    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 [facility])    Pulmonary embolism on right ([address] )    Other orders  -     senna-docusate sodium (SENOKOT-S) 8 6-50 mg per tablet; Take 1 tablet by mouth 2 (two) times a day as needed  -     acetaminophen (TYLENOL) 325 mg tablet; Take 325 mg by mouth  -     zinc sulfate (ZINCATE) 220 mg capsule; Take 50 mg by mouth  -     loratadine (CLARITIN) 10 mg tablet; Take 10 mg by mouth          Subjective:      Patient ID: Joan Medina is a 46 y o  female  03- Patient is here today for removal of Mediport of right chest and pre op Mediport of left chest    The Mediport in her right chest has a crack in it   Britany Chang      The following portions of the patient's history were reviewed and updated as appropriate: allergies, current medications, past family history, past medical history, past social history, past surgical history and problem list     Review of Systems   Hematological:        Patient has a remote history for pulmonary embolism 3 years ago for which she is currently still on Xarelto  All other systems reviewed and are negative  Objective:      /68   Pulse 88   Temp 98 9 °F (37 2 °C) (Tympanic)   Resp 20   Ht 5' 8" (1 727 m)   Wt 123 kg (271 lb)   BMI 41 21 kg/m²           Physical Exam   Constitutional: She is oriented to person, place, and time  She appears well-developed and well-nourished  HENT:   Head: Normocephalic and atraumatic  Eyes: Pupils are equal, round, and reactive to light  Conjunctivae are normal    Neck: Normal range of motion  Neck supple  Cardiovascular: Normal rate and regular rhythm  Pulmonary/Chest: Effort normal and breath sounds normal    Abdominal: Soft  Bowel sounds are normal    Musculoskeletal: Normal range of motion  Neurological: She is alert and oriented to person, place, and time  Skin: Skin is warm and dry  Right subclavian vein MediPort in its normal position on the right chest wall     Psychiatric: Her behavior is normal  Judgment and thought content normal

## 2019-03-11 NOTE — H&P
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 [facility])  The patient presents with a 1year-old right subclavian vein MediPort who is catheter has fractured between the right clavicle and right 1st rib  She is in between cycles of chemotherapy and wishes to have it replaced prior to the next scheduled treatment in 2 weeks  After review of the report from a [facility] dated March 7, 2019 I have recommended replacement of the MediPort catheter  I will attempt to replace it over wire  If this is not technically possible we will simply remove the existing catheter in replace the entirely with anyone  The technical details of the procedures well as the benefits and risks were explained to the patient the presence of her  including the risks related to anesthesia, bleeding, infection, neurovascular injury, and 2% risk of pneumothorax requiring admission and chest tube placement  All questions were answered to the satisfaction of the patient and informed consent obtained to proceed  Pulmonary embolism on right [facility])  Patient was instructed to hold her Xarelto 5 days prior to the scheduled procedure  Diagnoses and all orders for this visit:    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 [facility])    Pulmonary embolism on right ([address] )    Other orders  -     senna-docusate sodium (SENOKOT-S) 8 6-50 mg per tablet; Take 1 tablet by mouth 2 (two) times a day as needed  -     acetaminophen (TYLENOL) 325 mg tablet; Take 325 mg by mouth  -     zinc sulfate (ZINCATE) 220 mg capsule; Take 50 mg by mouth  -     loratadine (CLARITIN) 10 mg tablet; Take 10 mg by mouth          Subjective:      Patient ID: Jose Nova is a 46 y o  female  03- Patient is here today for removal of Mediport of right chest and pre op Mediport of left chest    The Mediport in her right chest has a crack in it   Ruchi Russell      The following portions of the patient's history were reviewed and updated as appropriate: allergies, current medications, past family history, past medical history, past social history, past surgical history and problem list     Review of Systems   Hematological:        Patient has a remote history for pulmonary embolism 3 years ago for which she is currently still on Xarelto  All other systems reviewed and are negative  Objective:      /68   Pulse 88   Temp 98 9 °F (37 2 °C) (Tympanic)   Resp 20   Ht 5' 8" (1 727 m)   Wt 123 kg (271 lb)   BMI 41 21 kg/m²           Physical Exam   Constitutional: She is oriented to person, place, and time  She appears well-developed and well-nourished  HENT:   Head: Normocephalic and atraumatic  Eyes: Pupils are equal, round, and reactive to light  Conjunctivae are normal    Neck: Normal range of motion  Neck supple  Cardiovascular: Normal rate and regular rhythm  Pulmonary/Chest: Effort normal and breath sounds normal    Abdominal: Soft  Bowel sounds are normal    Musculoskeletal: Normal range of motion  Neurological: She is alert and oriented to person, place, and time  Skin: Skin is warm and dry  Right subclavian vein MediPort in its normal position on the right chest wall     Psychiatric: Her behavior is normal  Judgment and thought content normal

## 2019-03-11 NOTE — ASSESSMENT & PLAN NOTE
The patient presents with a 1year-old right subclavian vein MediPort who is catheter has fractured between the right clavicle and right 1st rib  She is in between cycles of chemotherapy and wishes to have it replaced prior to the next scheduled treatment in 2 weeks  After review of the report from a [facility] dated March 7, 2019 I have recommended replacement of the MediPort catheter  I will attempt to replace it over wire  If this is not technically possible we will simply remove the existing catheter in replace the entirely with anyone  The technical details of the procedures well as the benefits and risks were explained to the patient the presence of her  including the risks related to anesthesia, bleeding, infection, neurovascular injury, and 2% risk of pneumothorax requiring admission and chest tube placement  All questions were answered to the satisfaction of the patient and informed consent obtained to proceed

## 2019-03-11 NOTE — LETTER
March 11, 2019     Carmelita Curiel, DO  [address] 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 [facility])  The patient presents with a 1year-old right subclavian vein MediPort who is catheter has fractured between the right clavicle and right 1st rib  She is in between cycles of chemotherapy and wishes to have it replaced prior to the next scheduled treatment in 2 weeks  After review of the report from a [facility] dated March 7, 2019 I have recommended replacement of the MediPort catheter  I will attempt to replace it over wire  If this is not technically possible we will simply remove the existing catheter in replace the entirely with anyone  The technical details of the procedures well as the benefits and risks were explained to the patient the presence of her  including the risks related to anesthesia, bleeding, infection, neurovascular injury, and 2% risk of pneumothorax requiring admission and chest tube placement  All questions were answered to the satisfaction of the patient and informed consent obtained to proceed  Pulmonary embolism on right [facility])  Patient was instructed to hold her Xarelto 5 days prior to the scheduled procedure         Diagnoses and all orders for this visit:    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 [facility])    Pulmonary embolism on right ([address] )    Other orders  -     senna-docusate sodium (SENOKOT-S) 8 6-50 mg per tablet; Take 1 tablet by mouth 2 (two) times a day as needed  -     acetaminophen (TYLENOL) 325 mg tablet; Take 325 mg by mouth  -     zinc sulfate (ZINCATE) 220 mg capsule; Take 50 mg by mouth  -     loratadine (CLARITIN) 10 mg tablet; Take 10 mg by mouth          Subjective:      Patient ID: Evelina Wheat is a 46 y o  female  03- Patient is here today for removal of Mediport of right chest and pre op Mediport of left chest    The Mediport in her right chest has a crack in it  Mort Shackleton      The following portions of the patient's history were reviewed and updated as appropriate: allergies, current medications, past family history, past medical history, past social history, past surgical history and problem list     Review of Systems   Hematological:        Patient has a remote history for pulmonary embolism 3 years ago for which she is currently still on Xarelto  All other systems reviewed and are negative  Objective:      /68   Pulse 88   Temp 98 9 °F (37 2 °C) (Tympanic)   Resp 20   Ht 5' 8" (1 727 m)   Wt 123 kg (271 lb)   BMI 41 21 kg/m²           Physical Exam   Constitutional: She is oriented to person, place, and time  She appears well-developed and well-nourished  HENT:   Head: Normocephalic and atraumatic  Eyes: Pupils are equal, round, and reactive to light  Conjunctivae are normal    Neck: Normal range of motion  Neck supple  Cardiovascular: Normal rate and regular rhythm  Pulmonary/Chest: Effort normal and breath sounds normal    Abdominal: Soft  Bowel sounds are normal    Musculoskeletal: Normal range of motion  Neurological: She is alert and oriented to person, place, and time  Skin: Skin is warm and dry  Right subclavian vein MediPort in its normal position on the right chest wall     Psychiatric: Her behavior is normal  Judgment and thought content normal

## 2019-03-12 ENCOUNTER — TRANSCRIBE ORDERS (OUTPATIENT)
Dept: LAB | Facility: MEDICAL CENTER | Age: 49
End: 2019-03-12

## 2019-03-12 ENCOUNTER — APPOINTMENT (OUTPATIENT)
Dept: LAB | Facility: MEDICAL CENTER | Age: 49
End: 2019-03-12
Payer: COMMERCIAL

## 2019-03-12 DIAGNOSIS — C85.11 B-CELL LYMPHOMA OF LYMPH NODES OF NECK, UNSPECIFIED B-CELL LYMPHOMA TYPE (HCC): ICD-10-CM

## 2019-03-12 DIAGNOSIS — C83.30 DIFFUSE LARGE B-CELL LYMPHOMA, UNSPECIFIED BODY REGION (HCC): ICD-10-CM

## 2019-03-12 DIAGNOSIS — C85.11 B-CELL LYMPHOMA OF LYMPH NODES OF NECK, UNSPECIFIED B-CELL LYMPHOMA TYPE (HCC): Primary | ICD-10-CM

## 2019-03-12 LAB
ALBUMIN SERPL BCP-MCNC: 3.5 G/DL (ref 3.5–5)
ALP SERPL-CCNC: 151 U/L (ref 46–116)
ALT SERPL W P-5'-P-CCNC: 20 U/L (ref 12–78)
ANION GAP SERPL CALCULATED.3IONS-SCNC: 9 MMOL/L (ref 4–13)
AST SERPL W P-5'-P-CCNC: 24 U/L (ref 5–45)
BASOPHILS # BLD MANUAL: 0 THOUSAND/UL (ref 0–0.1)
BASOPHILS NFR MAR MANUAL: 0 % (ref 0–1)
BILIRUB SERPL-MCNC: 1.14 MG/DL (ref 0.2–1)
BUN SERPL-MCNC: 13 MG/DL (ref 5–25)
CALCIUM SERPL-MCNC: 8.5 MG/DL (ref 8.3–10.1)
CHLORIDE SERPL-SCNC: 100 MMOL/L (ref 100–108)
CO2 SERPL-SCNC: 26 MMOL/L (ref 21–32)
CREAT SERPL-MCNC: 0.65 MG/DL (ref 0.6–1.3)
EOSINOPHIL # BLD MANUAL: 0.47 THOUSAND/UL (ref 0–0.4)
EOSINOPHIL NFR BLD MANUAL: 6 % (ref 0–6)
ERYTHROCYTE [DISTWIDTH] IN BLOOD BY AUTOMATED COUNT: 13.6 % (ref 11.6–15.1)
GFR SERPL CREATININE-BSD FRML MDRD: 104 ML/MIN/1.73SQ M
GLUCOSE SERPL-MCNC: 122 MG/DL (ref 65–140)
HCT VFR BLD AUTO: 40.5 % (ref 34.8–46.1)
HGB BLD-MCNC: 13.6 G/DL (ref 11.5–15.4)
LYMPHOCYTES # BLD AUTO: 0.63 THOUSAND/UL (ref 0.6–4.47)
LYMPHOCYTES # BLD AUTO: 8 % (ref 14–44)
MAGNESIUM SERPL-MCNC: 2.2 MG/DL (ref 1.6–2.6)
MCH RBC QN AUTO: 31.7 PG (ref 26.8–34.3)
MCHC RBC AUTO-ENTMCNC: 33.6 G/DL (ref 31.4–37.4)
MCV RBC AUTO: 94 FL (ref 82–98)
MONOCYTES # BLD AUTO: 0.39 THOUSAND/UL (ref 0–1.22)
MONOCYTES NFR BLD: 5 % (ref 4–12)
NEUTROPHILS # BLD MANUAL: 6.33 THOUSAND/UL (ref 1.85–7.62)
NEUTS SEG NFR BLD AUTO: 81 % (ref 43–75)
NRBC BLD AUTO-RTO: 0 /100 WBCS
PLATELET # BLD AUTO: 125 THOUSANDS/UL (ref 149–390)
PLATELET BLD QL SMEAR: ABNORMAL
PMV BLD AUTO: 11.2 FL (ref 8.9–12.7)
POTASSIUM SERPL-SCNC: 3.8 MMOL/L (ref 3.5–5.3)
PROT SERPL-MCNC: 6.6 G/DL (ref 6.4–8.2)
RBC # BLD AUTO: 4.29 MILLION/UL (ref 3.81–5.12)
RBC MORPH BLD: NORMAL
SODIUM SERPL-SCNC: 135 MMOL/L (ref 136–145)
TOTAL CELLS COUNTED SPEC: 100
WBC # BLD AUTO: 7.82 THOUSAND/UL (ref 4.31–10.16)

## 2019-03-12 PROCEDURE — 80053 COMPREHEN METABOLIC PANEL: CPT

## 2019-03-12 PROCEDURE — 83735 ASSAY OF MAGNESIUM: CPT

## 2019-03-12 PROCEDURE — 85027 COMPLETE CBC AUTOMATED: CPT

## 2019-03-12 PROCEDURE — 85007 BL SMEAR W/DIFF WBC COUNT: CPT

## 2019-03-12 PROCEDURE — 36415 COLL VENOUS BLD VENIPUNCTURE: CPT

## 2019-03-15 RX ORDER — PROCHLORPERAZINE MALEATE 10 MG
10 TABLET ORAL EVERY 6 HOURS PRN
COMMUNITY
Start: 2018-08-09 | End: 2022-02-15 | Stop reason: ALTCHOICE

## 2019-03-15 RX ORDER — FLUTICASONE FUROATE AND VILANTEROL 100; 25 UG/1; UG/1
1 POWDER RESPIRATORY (INHALATION) EVERY MORNING
COMMUNITY

## 2019-03-15 NOTE — PRE-PROCEDURE INSTRUCTIONS
Pre-Surgery Instructions:   Medication Instructions    acetaminophen (TYLENOL) 325 mg tablet Instructed patient per Anesthesia Guidelines  Last dose on 3/20    albuterol (2 5 mg/3 mL) 0 083 % nebulizer solution Instructed patient per Anesthesia Guidelines  Last dose on 3/20    albuterol (PROVENTIL HFA,VENTOLIN HFA) 90 mcg/act inhaler Instructed patient per Anesthesia Guidelines  Use DOS 3/21    b complex vitamins tablet Instructed patient per Anesthesia Guidelines  Last dose on 3/20    cholecalciferol (VITAMIN D3) 1,000 units tablet Instructed patient per Anesthesia Guidelines  Last dose on 3/20    fluticasone-vilanterol (BREO ELLIPTA) 100-25 mcg/inh inhaler Instructed patient per Anesthesia Guidelines  Use DOS 3/21    ipratropium-albuterol (DUO-NEB) 0 5-2 5 mg/3 mL nebulizer solution Instructed patient per Anesthesia Guidelines  Use on DOS 3/21    levothyroxine 75 mcg tablet Instructed patient per Anesthesia Guidelines  Take on DOS 3/21 with a sip of water    loratadine (CLARITIN) 10 mg tablet Instructed patient per Anesthesia Guidelines  Take on DOS 3/21 with a sip of water    LORazepam (ATIVAN) 0 5 mg tablet Instructed patient per Anesthesia Guidelines  May take if needed DOS 3/21    MAGNESIUM OXIDE PO Instructed patient per Anesthesia Guidelines  Last dose on 3/20    Multiple Vitamin (MULTIVITAMIN) tablet Instructed patient per Anesthesia Guidelines  Last dose on 3/20    ondansetron (ZOFRAN) 8 mg tablet Instructed patient per Anesthesia Guidelines  Last dose on 3/20      prochlorperazine (COMPAZINE) 10 mg tablet Instructed patient per Anesthesia Guidelines  Last dose on 3/20    rivaroxaban (XARELTO) 20 mg tablet Instructed patient per Anesthesia Guidelines  Last dose on 3/16    senna-docusate sodium (SENOKOT-S) 8 6-50 mg per tablet Instructed patient per Anesthesia Guidelines  Last dose on 3/20    Zinc 50 MG CAPS Instructed patient per Anesthesia Guidelines    Last dose on 3/20 Spoke to [facility]    My Surgical Experience    The following information was developed to assist you to prepare for your operation  What do I need to do before coming to the hospital?   Arrange for a responsible person to drive you to and from the hospital    Arrange care for your children at home  Children are not allowed in the recovery areas of the hospital  Plan to wear clothing that is easy to put on and take off  Bathing  o Shower the evening before and the morning of your surgery with Hibiclens,an antibacterial soap  I reviewed the Pre Op Showering Instructions for Surgery Patients Sheet   o Remove nail polish and all body piercing jewelry  o Do not shave any body part for at least 24 hours before surgery-this includes face, arms, legs and upper body  Food  o Nothing to eat or drink after midnight the night before your surgery  This includes candy and chewing gum  o Do not drink alcohol 24 hours before surgery  Medicine  o Follow instructions you received from your surgeon about which medicines you may take on the day of surgery  o If instructed to take medicine on the morning of surgery, take pills with just a small sip of water  Call your prescribing doctor for specific infroamtion on what to do if you take insulin    What should I bring to the hospital?    Bring:   A list of the daily medicines, vitamins, minerals, herbals and nutritional supplements you take  Include the dosages of medicines and the time you take them each day   Glasses, dentures or hearing aids   Minimal clothing; you will be wearing hospital sleepwear   Photo ID; required to verify your identity   If you have a Living Will or Power of , bring a copy of the documents    Do not bring   Medicines or inhalers   Money, valuables or jewelry    What other information should I know about the day of surgery?  Notify your surgeons if you develop a cold, sore throat, cough, fever, rash or any other illness     Report to the Ambulatory Surgical/Same Day Surgery Unit   You will be instructed to stop at Registration only if you have not been pre-registered   Inform your  fi they do not stay that they will be asked by the staff to leave a phone number where they can be reached   Be available to be reached before surgery  In the event the operating room schedule changes, you may be asked to come in earlier or later than expected    *It is important to tell your doctor and others involved in your health care if you are taking or have been taking any non-prescription drugs, vitamins, minerals, herbals or other nutritional supplements   Any of these may interact with some food or medicines and cause a reaction

## 2019-03-18 ENCOUNTER — ANESTHESIA EVENT (OUTPATIENT)
Dept: PERIOP | Facility: HOSPITAL | Age: 49
End: 2019-03-18
Payer: COMMERCIAL

## 2019-03-18 NOTE — ANESTHESIA PREPROCEDURE EVALUATION
Review of Systems/Medical History  Patient summary reviewed  Chart reviewed      Cardiovascular  EKG reviewed,    Pulmonary  Smoker ex-smoker  , Shortness of breath,   Comment: Large lymph node impinging on bronchiole tube  GI/Hepatic  Negative GI/hepatic ROS   GERD ,             Endo/Other  History of thyroid disease , hypothyroidism,      GYN       Hematology    Lymphoma   Musculoskeletal    Arthritis     Neurology   Psychology         Lab Results   Component Value Date    WBC 7 82 03/12/2019    HGB 13 6 03/12/2019    HCT 40 5 03/12/2019    MCV 94 03/12/2019     (L) 03/12/2019     Lab Results   Component Value Date    GLUCOSE 94 07/22/2015    CALCIUM 8 5 03/12/2019     07/22/2015    K 3 8 03/12/2019    CO2 26 03/12/2019     03/12/2019    BUN 13 03/12/2019    CREATININE 0 65 03/12/2019     Lab Results   Component Value Date    INR 1 56 (H) 01/26/2019    INR 1 00 11/07/2018    INR 1 04 09/26/2016    PROTIME 17 9 (H) 01/26/2019    PROTIME 12 7 11/07/2018    PROTIME 13 8 09/26/2016     Lab Results   Component Value Date    PTT 33 01/26/2019     Replacement of existing Portocath  Physical Exam    Airway    Mallampati score: II  TM Distance: >3 FB  Neck ROM: full     Dental       Cardiovascular  Rhythm: regular, Rate: normal,     Pulmonary  Breath sounds clear to auscultation,     Other Findings        Anesthesia Plan  ASA Score- 3     Anesthesia Type- IV sedation with anesthesia with ASA Monitors  Additional Monitors:   Airway Plan:         Plan Factors-    Induction- intravenous  Postoperative Plan-     Informed Consent- Anesthetic plan and risks discussed with patient

## 2019-03-21 ENCOUNTER — APPOINTMENT (OUTPATIENT)
Dept: LAB | Facility: HOSPITAL | Age: 49
End: 2019-03-21
Payer: COMMERCIAL

## 2019-03-21 ENCOUNTER — ANESTHESIA (OUTPATIENT)
Dept: PERIOP | Facility: HOSPITAL | Age: 49
End: 2019-03-21
Payer: COMMERCIAL

## 2019-03-21 ENCOUNTER — APPOINTMENT (OUTPATIENT)
Dept: RADIOLOGY | Facility: HOSPITAL | Age: 49
End: 2019-03-21
Attending: SURGERY
Payer: COMMERCIAL

## 2019-03-21 ENCOUNTER — TRANSCRIBE ORDERS (OUTPATIENT)
Dept: ADMINISTRATIVE | Facility: HOSPITAL | Age: 49
End: 2019-03-21

## 2019-03-21 ENCOUNTER — APPOINTMENT (OUTPATIENT)
Dept: RADIOLOGY | Facility: HOSPITAL | Age: 49
End: 2019-03-21
Payer: COMMERCIAL

## 2019-03-21 ENCOUNTER — HOSPITAL ENCOUNTER (OUTPATIENT)
Facility: HOSPITAL | Age: 49
Setting detail: OUTPATIENT SURGERY
Discharge: HOME | End: 2019-03-21
Attending: SURGERY | Admitting: SURGERY
Payer: COMMERCIAL

## 2019-03-21 VITALS
SYSTOLIC BLOOD PRESSURE: 133 MMHG | RESPIRATION RATE: 18 BRPM | TEMPERATURE: 96.4 F | HEIGHT: 68 IN | WEIGHT: 279 LBS | DIASTOLIC BLOOD PRESSURE: 63 MMHG | HEART RATE: 77 BPM | BODY MASS INDEX: 42.28 KG/M2 | OXYGEN SATURATION: 96 %

## 2019-03-21 DIAGNOSIS — C85.11 B-CELL LYMPHOMA OF LYMPH NODES OF NECK, UNSPECIFIED B-CELL LYMPHOMA TYPE (HCC): ICD-10-CM

## 2019-03-21 LAB
ALBUMIN SERPL BCP-MCNC: 4.1 G/DL (ref 3.5–5.7)
ALP SERPL-CCNC: 108 U/L (ref 40–150)
ALT SERPL W P-5'-P-CCNC: 13 U/L (ref 7–52)
ANION GAP SERPL CALCULATED.3IONS-SCNC: 11 MMOL/L (ref 4–13)
AST SERPL W P-5'-P-CCNC: 31 U/L (ref 13–39)
BILIRUB SERPL-MCNC: 0.4 MG/DL (ref 0.2–1)
BUN SERPL-MCNC: 9 MG/DL (ref 7–25)
CALCIUM SERPL-MCNC: 9.5 MG/DL (ref 8.6–10.5)
CHLORIDE SERPL-SCNC: 104 MMOL/L (ref 98–107)
CO2 SERPL-SCNC: 24 MMOL/L (ref 21–31)
CREAT SERPL-MCNC: 0.76 MG/DL (ref 0.6–1.2)
EOSINOPHIL # BLD AUTO: 0.28 THOUSAND/UL (ref 0–0.61)
EOSINOPHIL NFR BLD MANUAL: 3 % (ref 0–6)
ERYTHROCYTE [DISTWIDTH] IN BLOOD BY AUTOMATED COUNT: 13.9 % (ref 11.5–14.5)
GFR SERPL CREATININE-BSD FRML MDRD: 92 ML/MIN/1.73SQ M
GLUCOSE P FAST SERPL-MCNC: 110 MG/DL (ref 65–99)
GLUCOSE SERPL-MCNC: 110 MG/DL (ref 65–99)
HCT VFR BLD AUTO: 37.7 % (ref 42–47)
HGB BLD-MCNC: 12.8 G/DL (ref 12–16)
LYMPHOCYTES # BLD AUTO: 0.93 THOUSAND/UL (ref 0.6–4.47)
LYMPHOCYTES # BLD AUTO: 10 % (ref 20–51)
MCH RBC QN AUTO: 31.6 PG (ref 26–34)
MCHC RBC AUTO-ENTMCNC: 34 G/DL (ref 31–37)
MCV RBC AUTO: 93 FL (ref 81–99)
MONOCYTES # BLD AUTO: 0.84 THOUSAND/UL (ref 0–1.22)
MONOCYTES NFR BLD AUTO: 9 % (ref 4–12)
MYELOCYTES NFR BLD MANUAL: 3 % (ref 0–1)
NEUTS SEG # BLD: 6.98 THOUSAND/UL (ref 1.81–6.82)
NEUTS SEG NFR BLD AUTO: 75 % (ref 42–75)
PLATELET # BLD AUTO: 247 THOUSANDS/UL (ref 149–390)
PLATELET BLD QL SMEAR: ADEQUATE
PMV BLD AUTO: 7.7 FL (ref 8.6–11.7)
POTASSIUM SERPL-SCNC: 4.1 MMOL/L (ref 3.5–5.5)
PROT SERPL-MCNC: 6.7 G/DL (ref 6.4–8.9)
RBC # BLD AUTO: 4.05 MILLION/UL (ref 3.9–5.2)
RBC MORPH BLD: NORMAL
SODIUM SERPL-SCNC: 139 MMOL/L (ref 134–143)
TOTAL CELLS COUNTED SPEC: 100
WBC # BLD AUTO: 9.3 THOUSAND/UL (ref 4.8–10.8)

## 2019-03-21 PROCEDURE — 76000 FLUOROSCOPY <1 HR PHYS/QHP: CPT

## 2019-03-21 PROCEDURE — 36561 INSERT TUNNELED CV CATH: CPT | Performed by: SURGERY

## 2019-03-21 PROCEDURE — 36561 INSERT TUNNELED CV CATH: CPT | Performed by: PHYSICIAN ASSISTANT

## 2019-03-21 PROCEDURE — 71045 X-RAY EXAM CHEST 1 VIEW: CPT

## 2019-03-21 PROCEDURE — 85007 BL SMEAR W/DIFF WBC COUNT: CPT | Performed by: INTERNAL MEDICINE

## 2019-03-21 PROCEDURE — 80053 COMPREHEN METABOLIC PANEL: CPT | Performed by: INTERNAL MEDICINE

## 2019-03-21 PROCEDURE — 85027 COMPLETE CBC AUTOMATED: CPT | Performed by: INTERNAL MEDICINE

## 2019-03-21 PROCEDURE — C1788 PORT, INDWELLING, IMP: HCPCS | Performed by: SURGERY

## 2019-03-21 DEVICE — POWERPORT M.R.I. IMPLANTABLE PORT WITH ATTACHABLE 8F CHRONOFLEX OPEN-ENDED SINGLE-LUMEN VENOUS CATHETER INTERMEDIATE KIT  (WITHOUT SUTURE PLUGS)
Type: IMPLANTABLE DEVICE | Site: CHEST | Status: FUNCTIONAL
Brand: POWERPORT M.R.I., CHRONOFLEX

## 2019-03-21 RX ORDER — SODIUM CHLORIDE, SODIUM LACTATE, POTASSIUM CHLORIDE, CALCIUM CHLORIDE 600; 310; 30; 20 MG/100ML; MG/100ML; MG/100ML; MG/100ML
75 INJECTION, SOLUTION INTRAVENOUS CONTINUOUS
Status: DISCONTINUED | OUTPATIENT
Start: 2019-03-21 | End: 2019-03-22 | Stop reason: HOSPADM

## 2019-03-21 RX ORDER — ONDANSETRON 2 MG/ML
4 INJECTION INTRAMUSCULAR; INTRAVENOUS EVERY 6 HOURS PRN
Status: DISCONTINUED | OUTPATIENT
Start: 2019-03-21 | End: 2019-03-22 | Stop reason: HOSPADM

## 2019-03-21 RX ORDER — ONDANSETRON 2 MG/ML
4 INJECTION INTRAMUSCULAR; INTRAVENOUS ONCE
Status: COMPLETED | OUTPATIENT
Start: 2019-03-21 | End: 2019-03-21

## 2019-03-21 RX ORDER — OXYCODONE HYDROCHLORIDE AND ACETAMINOPHEN 5; 325 MG/1; MG/1
1 TABLET ORAL EVERY 4 HOURS PRN
Status: DISCONTINUED | OUTPATIENT
Start: 2019-03-21 | End: 2019-03-22 | Stop reason: HOSPADM

## 2019-03-21 RX ORDER — FENTANYL CITRATE 50 UG/ML
INJECTION, SOLUTION INTRAMUSCULAR; INTRAVENOUS AS NEEDED
Status: DISCONTINUED | OUTPATIENT
Start: 2019-03-21 | End: 2019-03-21 | Stop reason: SURG

## 2019-03-21 RX ORDER — MIDAZOLAM HYDROCHLORIDE 1 MG/ML
INJECTION INTRAMUSCULAR; INTRAVENOUS AS NEEDED
Status: DISCONTINUED | OUTPATIENT
Start: 2019-03-21 | End: 2019-03-21 | Stop reason: SURG

## 2019-03-21 RX ORDER — SODIUM CHLORIDE, SODIUM LACTATE, POTASSIUM CHLORIDE, CALCIUM CHLORIDE 600; 310; 30; 20 MG/100ML; MG/100ML; MG/100ML; MG/100ML
125 INJECTION, SOLUTION INTRAVENOUS CONTINUOUS
Status: DISCONTINUED | OUTPATIENT
Start: 2019-03-21 | End: 2019-03-21 | Stop reason: HOSPADM

## 2019-03-21 RX ORDER — OXYCODONE HYDROCHLORIDE AND ACETAMINOPHEN 5; 325 MG/1; MG/1
2 TABLET ORAL EVERY 4 HOURS PRN
Status: DISCONTINUED | OUTPATIENT
Start: 2019-03-21 | End: 2019-03-22 | Stop reason: HOSPADM

## 2019-03-21 RX ORDER — PROPOFOL 10 MG/ML
INJECTION, EMULSION INTRAVENOUS AS NEEDED
Status: DISCONTINUED | OUTPATIENT
Start: 2019-03-21 | End: 2019-03-21 | Stop reason: SURG

## 2019-03-21 RX ORDER — ACETAMINOPHEN 325 MG/1
650 TABLET ORAL EVERY 6 HOURS PRN
Status: DISCONTINUED | OUTPATIENT
Start: 2019-03-21 | End: 2019-03-22 | Stop reason: HOSPADM

## 2019-03-21 RX ORDER — LEVALBUTEROL INHALATION SOLUTION 0.63 MG/3ML
0.63 SOLUTION RESPIRATORY (INHALATION) EVERY 8 HOURS PRN
Status: DISCONTINUED | OUTPATIENT
Start: 2019-03-21 | End: 2019-03-22 | Stop reason: HOSPADM

## 2019-03-21 RX ORDER — LIDOCAINE HYDROCHLORIDE AND EPINEPHRINE 10; 10 MG/ML; UG/ML
INJECTION, SOLUTION INFILTRATION; PERINEURAL AS NEEDED
Status: DISCONTINUED | OUTPATIENT
Start: 2019-03-21 | End: 2019-03-21 | Stop reason: HOSPADM

## 2019-03-21 RX ORDER — CEFAZOLIN SODIUM 1 G/50ML
1000 SOLUTION INTRAVENOUS ONCE
Status: COMPLETED | OUTPATIENT
Start: 2019-03-21 | End: 2019-03-21

## 2019-03-21 RX ADMIN — FENTANYL CITRATE 50 MCG: 50 INJECTION INTRAMUSCULAR; INTRAVENOUS at 10:06

## 2019-03-21 RX ADMIN — MIDAZOLAM HYDROCHLORIDE 1 MG: 1 INJECTION, SOLUTION INTRAMUSCULAR; INTRAVENOUS at 09:55

## 2019-03-21 RX ADMIN — SODIUM CHLORIDE, POTASSIUM CHLORIDE, SODIUM LACTATE AND CALCIUM CHLORIDE 125 ML/HR: 600; 310; 30; 20 INJECTION, SOLUTION INTRAVENOUS at 08:28

## 2019-03-21 RX ADMIN — ONDANSETRON 4 MG: 2 INJECTION INTRAMUSCULAR; INTRAVENOUS at 08:29

## 2019-03-21 RX ADMIN — PROPOFOL 80 MG: 10 INJECTION, EMULSION INTRAVENOUS at 09:56

## 2019-03-21 RX ADMIN — PROPOFOL 20 MG: 10 INJECTION, EMULSION INTRAVENOUS at 10:11

## 2019-03-21 RX ADMIN — CEFAZOLIN SODIUM 1000 MG: 1 SOLUTION INTRAVENOUS at 09:53

## 2019-03-21 RX ADMIN — MIDAZOLAM HYDROCHLORIDE 1 MG: 1 INJECTION, SOLUTION INTRAMUSCULAR; INTRAVENOUS at 09:47

## 2019-03-21 RX ADMIN — PROPOFOL 10 MG: 10 INJECTION, EMULSION INTRAVENOUS at 10:01

## 2019-03-21 RX ADMIN — FENTANYL CITRATE 50 MCG: 50 INJECTION INTRAMUSCULAR; INTRAVENOUS at 09:54

## 2019-03-21 NOTE — OP NOTE
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 ([facility] [address] ) [C84 48]    Post-Op Diagnosis Codes:     * 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 ([facility] [address] ) [C84 48]    Procedure(s) (LRB):  INSERTION VENOUS PORT (PORT-A-CATH) remove and replace (Right)    Specimen(s):  * No specimens in log *    Estimated Blood Loss:   Minimal    Drains:  * No LDAs found *    Anesthesia Type:   IV Sedation with Anesthesia    Operative Indications:  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 [facility]) [C84 48]  The patient is a 46year old female presenting with a fractured MediPort catheter for which replacement is now indicated prior to her next infusion therapy  Operative Findings: At the time of the procedure the catheter was able to be dissected out  Using Seldinger technique a wire was advanced through the existing catheter  Proper position confirmed fluoroscopically after which the old catheter was removed  The fracture was identified in the expected location based upon the patient's preoperative catheter interrogation  A new  8 [employer] MediPort was placed without event under fluoroscopic guidance  The catheter was accessed  It aspirated blood flush saline without resistance  Heparinized saline instilled and a chest x-ray obtained in recovery      Complications:   None    Procedure and Technique:  The patient was taken to the operating room  Time-out performed  Skin prepped and draped  The skin infiltrated with 1% lidocaine local anesthesia with epinephrine  The  vein cannulated percutaneously  Using Seldinger technique wire advanced and needle removed  Proper position of the wire confirmed fluoroscopically  The site for the MediPort was marked and infiltrated with 1% lidocaine local anesthesia with epinephrine  Skin incised  Hemostasis ensured with electrocautery  Catheter tunneled into its proper position  It was cut to length and connected to the MediPort  The MediPort was placed in the subcutaneous pocket  Under direct fluoroscopic guidance the dilator and introducer advanced over the wire  Wire and dilator removed  Catheter advanced through the peel-away introducer and the peel-away introducer removed  The length of the MediPort inspected fluoroscopically  It was found to be in good position  No kinks were noted  The MediPort was then accessed with the [facility] needle  It aspirated blood and flushed saline without resistance  Heparinized saline instilled in the catheter lumen  The needle removed  Wounds closed with subcuticular 4 Monocryl suture  Wounds dressed  The patient taken to recovery in stable condition where a chest x-ray was obtained  The patient tolerated the procedure well       I was present for the entire procedure    Patient Disposition:  PACU     SIGNATURE: Whitney Dixon MD  DATE: March 21, 2019  TIME: 10:40 AM

## 2019-03-21 NOTE — ANESTHESIA POSTPROCEDURE EVALUATION
Post-Op Assessment Note    CV Status:  Stable  Pain Score: 0    Pain management: adequate     Mental Status:  Alert   Hydration Status:  Stable   PONV Controlled:  None   Airway Patency:  Patent   Post Op Vitals Reviewed: Yes      Staff: Anesthesiologist           /74 (03/21/19 1029)    Temp 97 9 °F (36 6 °C) (03/21/19 1029)    Pulse 90 (03/21/19 1029)   Resp 18 (03/21/19 1029)    SpO2 97 % (03/21/19 1029)

## 2019-03-21 NOTE — DISCHARGE INSTRUCTIONS
How to Care for Your Implanted Venous Access Port   WHAT YOU NEED TO KNOW:   An implanted venous access port is a device used to give treatments and take blood  It may also be called a central venous access device (CVAD)  The port is a small container that is placed under your skin, usually in your upper chest  A port can also be placed in your arm or abdomen  The port is attached to a catheter that enters a large vein  DISCHARGE INSTRUCTIONS:   Prevent an infection:   · Wash your hands often  Use soap and water  Clean your hands before and after you care for your port  Remind everyone who cares for your port to wash their hands  · Wear clean medical gloves when you care for your port  Do not touch or handle your port unless you need to care for it  · Clean the skin around your port every day  Ask your healthcare provider what to use to clean your skin  · Check your skin for infection every day  Look for redness, swelling, or fluid oozing from the port site  Medicines:   · Topical medicine  may be needed to numb your skin before your port is accessed with a needle  Ask your healthcare provider if and when you should use the medicine  · Take your medicine as directed  Contact your healthcare provider if you think your medicine is not helping or if you have side effects  Tell him if you are allergic to any medicine  Keep a list of the medicines, vitamins, and herbs you take  Include the amounts, and when and why you take them  Bring the list or the pill bottles to follow-up visits  Carry your medicine list with you in case of an emergency  How to access your port: Your healthcare provider will show you or a family member how to access your port with a needle  Never  try to use your port without proper training  To access your port:  · Gather your supplies  If you have a cough, wear a mask while you prepare and access your port  · You may need to attach tubing to the needle   The tubing has a clamp that must stay closed when not in use  You will attach a syringe that contains saline to the tubing  Open the clamp and slowly push saline through the tubing and needle  Then close the clamp to remove air from the tubing before you access your port  Leave the syringe attached to the tubing  · Clean your port site and the skin around it  Ask your healthcare provider what solution to use  Clean your skin for 90 seconds or as directed  Allow the  to dry completely  Do not blow on the site to dry the area  · You may need to apply topical medicine to numb the port area  Use the numbing medicine as directed  The more your port is used, the less it will hurt  · With one hand, feel for the edges of your port  Use this hand to stretch the skin across your port, to help hold the port in place  With your other hand, insert the needle through your skin and into the center of the port  Push the needle in until you hit the back wall of the port  Once the needle is in place, open the tubing clamp, and slowly pull back on the syringe  If blood flows back into the tubing and syringe, the needle is in the proper place  If you do not see blood, you will need to change the position of the needle  Close the clamp on the tubing  Ask your healthcare provider how to throw away the blood-filled syringe correctly  · You may need to cover the site with a gauze bandage while the needle is in place  This keeps air, dirt, and germs from entering the port  Ask your healthcare provider how often you should change the bandage if you leave the needle in place  When to flush your port:  Flush your port with saline (salt water) before, after, and between medicines and treatments  Flush your port with heparin (a blood thinner) between each port use  Your port also needs to be flushed with heparin every 4 weeks when it is not being used regularly   You will use a syringe to push a small amount of saline or heparin into the port and catheter  Flush your port to keep the catheter from getting blocked and to prevent medicines from mixing in the tubing  How to give medicines through your port: Your healthcare provider will show you or a family member how to give medicines or liquids through your port  Medicines and treatments enter your port through tubing attached to the needle  How to remove the needle from your port: Wash your hands and put on clean medical gloves  Flush your port as directed  Remove the needle  Ask how to dispose of your needles if you do not have a needle container  You may need to cover your port site for a short time after you remove the needle  Implanted venous access information card: Your healthcare provider will give you a card with information about your port type  Keep this information in a safe place that is easy to find  Activity:  When your port is not being used, you can do your usual daily activities  You will be able to bathe, shower, or swim  Follow up with your healthcare provider as directed:  Write down your questions so you remember to ask them during your visits  Contact your healthcare provider if:   · You have a fever  · You run out of supplies to care for your skin or port  · Your port site is red, swollen, or draining pus  · Your port site turns cold, changes color, or you cannot feel it  · The veins in your neck or chest bulge  · You have trouble using your port  · You have questions or concerns about your condition or care  Seek care immediately or call 911 if:   · Blood soaks through your bandage  · You hear a bubbling noise when your port is flushed  · The skin over or around your port breaks open  · Your heart is jumping or fluttering  · You have a headache, blurred vision, and feel confused  · You have pain in your arm, neck, shoulder, or chest     · You have trouble breathing that is getting worse over time    © 2017 [employer] Information is for End User's use only and may not be sold, redistributed or otherwise used for commercial purposes  All illustrations and images included in CareNotes® are the copyrighted property of [employer]  or [employer]  The above information is an  only  It is not intended as medical advice for individual conditions or treatments  Talk to your doctor, nurse or pharmacist before following any medical regimen to see if it is safe and effective for you

## 2019-03-21 NOTE — DISCHARGE INSTR - AVS FIRST PAGE
[facility]    Discharge Instructions  Light activity for 2 weeks  No heavy lifting for 2 weeks  Max 10 lbs for 2 weeks  No driving for 3-7 days or until pain is well controlled  Remove dressing in 3-5 days  Surgical glue will fall off with time  You may shower over dressing, replace if soiled  Take discharge medications as prescribed  Notify our office for nausea, vomiting, fever, diarrhea, chest pain, trouble breathing  Follow up in our office in 2 weeks or sooner if needed  Call with additional questions or concerns [phone]

## 2019-04-01 ENCOUNTER — APPOINTMENT (OUTPATIENT)
Dept: LAB | Facility: MEDICAL CENTER | Age: 49
End: 2019-04-01
Payer: COMMERCIAL

## 2019-04-01 DIAGNOSIS — C83.30 DIFFUSE LARGE B-CELL LYMPHOMA, UNSPECIFIED BODY REGION (HCC): ICD-10-CM

## 2019-04-01 LAB
ANISOCYTOSIS BLD QL SMEAR: PRESENT
BASOPHILS # BLD MANUAL: 0.26 THOUSAND/UL (ref 0–0.1)
BASOPHILS NFR MAR MANUAL: 1 % (ref 0–1)
EOSINOPHIL # BLD MANUAL: 0.26 THOUSAND/UL (ref 0–0.4)
EOSINOPHIL NFR BLD MANUAL: 1 % (ref 0–6)
ERYTHROCYTE [DISTWIDTH] IN BLOOD BY AUTOMATED COUNT: 17.3 % (ref 11.6–15.1)
HCT VFR BLD AUTO: 36.8 % (ref 34.8–46.1)
HGB BLD-MCNC: 12.4 G/DL (ref 11.5–15.4)
LYMPHOCYTES # BLD AUTO: 0.52 THOUSAND/UL (ref 0.6–4.47)
LYMPHOCYTES # BLD AUTO: 2 % (ref 14–44)
MCH RBC QN AUTO: 33 PG (ref 26.8–34.3)
MCHC RBC AUTO-ENTMCNC: 33.7 G/DL (ref 31.4–37.4)
MCV RBC AUTO: 98 FL (ref 82–98)
MONOCYTES # BLD AUTO: 0.52 THOUSAND/UL (ref 0–1.22)
MONOCYTES NFR BLD: 2 % (ref 4–12)
NEUTROPHILS # BLD MANUAL: 24.53 THOUSAND/UL (ref 1.85–7.62)
NEUTS SEG NFR BLD AUTO: 94 % (ref 43–75)
NRBC BLD AUTO-RTO: 0 /100 WBCS
PLATELET # BLD AUTO: 187 THOUSANDS/UL (ref 149–390)
PLATELET BLD QL SMEAR: ADEQUATE
PMV BLD AUTO: 11.1 FL (ref 8.9–12.7)
RBC # BLD AUTO: 3.76 MILLION/UL (ref 3.81–5.12)
RBC MORPH BLD: PRESENT
WBC # BLD AUTO: 26.1 THOUSAND/UL (ref 4.31–10.16)

## 2019-04-01 PROCEDURE — 36415 COLL VENOUS BLD VENIPUNCTURE: CPT

## 2019-04-01 PROCEDURE — 85027 COMPLETE CBC AUTOMATED: CPT

## 2019-04-01 PROCEDURE — 85007 BL SMEAR W/DIFF WBC COUNT: CPT

## 2019-04-05 ENCOUNTER — OFFICE VISIT (OUTPATIENT)
Dept: SURGERY | Facility: CLINIC | Age: 49
End: 2019-04-05
Payer: COMMERCIAL

## 2019-04-05 DIAGNOSIS — Z45.2 ENCOUNTER FOR CARE RELATED TO VASCULAR ACCESS PORT: Primary | ICD-10-CM

## 2019-04-05 PROCEDURE — 99212 OFFICE O/P EST SF 10 MIN: CPT | Performed by: PHYSICIAN ASSISTANT

## 2019-04-05 RX ORDER — OMEPRAZOLE 10 MG/1
10 CAPSULE, DELAYED RELEASE ORAL
COMMUNITY

## 2019-04-17 ENCOUNTER — TRANSCRIBE ORDERS (OUTPATIENT)
Dept: ADMINISTRATIVE | Facility: HOSPITAL | Age: 49
End: 2019-04-17

## 2019-04-17 ENCOUNTER — APPOINTMENT (OUTPATIENT)
Dept: LAB | Facility: MEDICAL CENTER | Age: 49
End: 2019-04-17
Payer: COMMERCIAL

## 2019-04-17 DIAGNOSIS — C85.80 MARGINAL ZONE B-CELL LYMPHOMA (HCC): Primary | ICD-10-CM

## 2019-04-17 DIAGNOSIS — C85.80 MARGINAL ZONE B-CELL LYMPHOMA (HCC): ICD-10-CM

## 2019-04-17 DIAGNOSIS — C85.11 B-CELL LYMPHOMA OF LYMPH NODES OF NECK, UNSPECIFIED B-CELL LYMPHOMA TYPE (HCC): ICD-10-CM

## 2019-04-17 LAB
ALBUMIN SERPL BCP-MCNC: 3.6 G/DL (ref 3.5–5)
ALP SERPL-CCNC: 128 U/L (ref 46–116)
ALT SERPL W P-5'-P-CCNC: 15 U/L (ref 12–78)
ANION GAP SERPL CALCULATED.3IONS-SCNC: 7 MMOL/L (ref 4–13)
AST SERPL W P-5'-P-CCNC: 17 U/L (ref 5–45)
BASOPHILS # BLD AUTO: 0.1 THOUSANDS/ΜL (ref 0–0.1)
BASOPHILS NFR BLD AUTO: 2 % (ref 0–1)
BILIRUB SERPL-MCNC: 0.64 MG/DL (ref 0.2–1)
BUN SERPL-MCNC: 18 MG/DL (ref 5–25)
CALCIUM SERPL-MCNC: 8.5 MG/DL (ref 8.3–10.1)
CHLORIDE SERPL-SCNC: 110 MMOL/L (ref 100–108)
CO2 SERPL-SCNC: 24 MMOL/L (ref 21–32)
CREAT SERPL-MCNC: 0.77 MG/DL (ref 0.6–1.3)
EOSINOPHIL # BLD AUTO: 0.32 THOUSAND/ΜL (ref 0–0.61)
EOSINOPHIL NFR BLD AUTO: 6 % (ref 0–6)
ERYTHROCYTE [DISTWIDTH] IN BLOOD BY AUTOMATED COUNT: 19.6 % (ref 11.6–15.1)
GFR SERPL CREATININE-BSD FRML MDRD: 90 ML/MIN/1.73SQ M
GLUCOSE P FAST SERPL-MCNC: 112 MG/DL (ref 65–99)
HCT VFR BLD AUTO: 33.4 % (ref 34.8–46.1)
HGB BLD-MCNC: 11 G/DL (ref 11.5–15.4)
IMM GRANULOCYTES # BLD AUTO: 0.03 THOUSAND/UL (ref 0–0.2)
IMM GRANULOCYTES NFR BLD AUTO: 1 % (ref 0–2)
LYMPHOCYTES # BLD AUTO: 0.56 THOUSANDS/ΜL (ref 0.6–4.47)
LYMPHOCYTES NFR BLD AUTO: 10 % (ref 14–44)
MCH RBC QN AUTO: 33 PG (ref 26.8–34.3)
MCHC RBC AUTO-ENTMCNC: 32.9 G/DL (ref 31.4–37.4)
MCV RBC AUTO: 100 FL (ref 82–98)
MONOCYTES # BLD AUTO: 0.87 THOUSAND/ΜL (ref 0.17–1.22)
MONOCYTES NFR BLD AUTO: 16 % (ref 4–12)
NEUTROPHILS # BLD AUTO: 3.49 THOUSANDS/ΜL (ref 1.85–7.62)
NEUTS SEG NFR BLD AUTO: 65 % (ref 43–75)
NRBC BLD AUTO-RTO: 0 /100 WBCS
PLATELET # BLD AUTO: 241 THOUSANDS/UL (ref 149–390)
PMV BLD AUTO: 10 FL (ref 8.9–12.7)
POTASSIUM SERPL-SCNC: 4.2 MMOL/L (ref 3.5–5.3)
PROT SERPL-MCNC: 6.5 G/DL (ref 6.4–8.2)
RBC # BLD AUTO: 3.33 MILLION/UL (ref 3.81–5.12)
SODIUM SERPL-SCNC: 141 MMOL/L (ref 136–145)
WBC # BLD AUTO: 5.37 THOUSAND/UL (ref 4.31–10.16)

## 2019-04-17 PROCEDURE — 85025 COMPLETE CBC W/AUTO DIFF WBC: CPT

## 2019-04-17 PROCEDURE — 80053 COMPREHEN METABOLIC PANEL: CPT

## 2019-04-17 PROCEDURE — 36415 COLL VENOUS BLD VENIPUNCTURE: CPT

## 2019-05-09 ENCOUNTER — OFFICE VISIT (OUTPATIENT)
Dept: FAMILY MEDICINE CLINIC | Facility: CLINIC | Age: 49
End: 2019-05-09
Payer: COMMERCIAL

## 2019-05-09 VITALS
OXYGEN SATURATION: 96 % | BODY MASS INDEX: 43.56 KG/M2 | HEIGHT: 68 IN | HEART RATE: 67 BPM | SYSTOLIC BLOOD PRESSURE: 132 MMHG | WEIGHT: 287.4 LBS | DIASTOLIC BLOOD PRESSURE: 74 MMHG | TEMPERATURE: 98.4 F

## 2019-05-09 DIAGNOSIS — L03.211 CELLULITIS OF FACE: Primary | ICD-10-CM

## 2019-05-09 PROBLEM — C83.30 MALIGNANT LYMPHOMA, LARGE CELL, DIFFUSE (HCC): Status: ACTIVE | Noted: 2019-03-13

## 2019-05-09 PROBLEM — C83.30 MALIGNANT LYMPHOMA, LARGE CELL, DIFFUSE (HCC): Status: RESOLVED | Noted: 2019-03-13 | Resolved: 2019-05-09

## 2019-05-09 PROBLEM — Z45.2 ENCOUNTER FOR CARE RELATED TO VASCULAR ACCESS PORT: Status: RESOLVED | Noted: 2019-04-05 | Resolved: 2019-05-09

## 2019-05-09 PROBLEM — I26.99 PULMONARY EMBOLISM ON RIGHT (HCC): Status: RESOLVED | Noted: 2018-07-12 | Resolved: 2019-05-09

## 2019-05-09 PROCEDURE — 99213 OFFICE O/P EST LOW 20 MIN: CPT | Performed by: PHYSICIAN ASSISTANT

## 2019-05-09 RX ORDER — CEPHALEXIN 500 MG/1
500 CAPSULE ORAL EVERY 8 HOURS SCHEDULED
Qty: 21 CAPSULE | Refills: 0 | Status: SHIPPED | OUTPATIENT
Start: 2019-05-09 | End: 2019-05-16

## 2019-05-21 ENCOUNTER — OFFICE VISIT (OUTPATIENT)
Dept: FAMILY MEDICINE CLINIC | Facility: CLINIC | Age: 49
End: 2019-05-21
Payer: COMMERCIAL

## 2019-05-21 VITALS
WEIGHT: 287.6 LBS | TEMPERATURE: 98.4 F | SYSTOLIC BLOOD PRESSURE: 132 MMHG | DIASTOLIC BLOOD PRESSURE: 72 MMHG | HEIGHT: 68 IN | BODY MASS INDEX: 43.59 KG/M2

## 2019-05-21 DIAGNOSIS — J20.9 BRONCHOSPASM WITH BRONCHITIS, ACUTE: Primary | ICD-10-CM

## 2019-05-21 DIAGNOSIS — R05.3 PERSISTENT DRY COUGH: ICD-10-CM

## 2019-05-21 DIAGNOSIS — L29.9 ITCHY SKIN: ICD-10-CM

## 2019-05-21 PROCEDURE — 99213 OFFICE O/P EST LOW 20 MIN: CPT | Performed by: PHYSICIAN ASSISTANT

## 2019-05-21 RX ORDER — BENZONATATE 100 MG/1
100 CAPSULE ORAL 3 TIMES DAILY PRN
Qty: 20 CAPSULE | Refills: 0 | Status: SHIPPED | OUTPATIENT
Start: 2019-05-21 | End: 2019-05-30 | Stop reason: SDUPTHER

## 2019-05-21 RX ORDER — ALBUTEROL SULFATE 2.5 MG/3ML
2.5 SOLUTION RESPIRATORY (INHALATION) EVERY 4 HOURS PRN
Qty: 75 ML | Refills: 0 | Status: SHIPPED | OUTPATIENT
Start: 2019-05-21 | End: 2019-05-21

## 2019-05-21 RX ORDER — IPRATROPIUM BROMIDE AND ALBUTEROL SULFATE 2.5; .5 MG/3ML; MG/3ML
3 SOLUTION RESPIRATORY (INHALATION)
Qty: 360 ML | Refills: 0 | Status: SHIPPED | OUTPATIENT
Start: 2019-05-21 | End: 2021-04-01

## 2019-05-21 RX ORDER — IPRATROPIUM BROMIDE AND ALBUTEROL SULFATE 2.5; .5 MG/3ML; MG/3ML
3 SOLUTION RESPIRATORY (INHALATION)
Qty: 360 ML | Refills: 0 | Status: SHIPPED | OUTPATIENT
Start: 2019-05-21 | End: 2019-05-21

## 2019-05-21 RX ORDER — ALBUTEROL SULFATE 2.5 MG/3ML
2.5 SOLUTION RESPIRATORY (INHALATION) EVERY 4 HOURS PRN
Qty: 75 ML | Refills: 0 | Status: SHIPPED | OUTPATIENT
Start: 2019-05-21 | End: 2019-07-31 | Stop reason: SDUPTHER

## 2019-05-22 ENCOUNTER — APPOINTMENT (OUTPATIENT)
Dept: RADIOLOGY | Facility: MEDICAL CENTER | Age: 49
End: 2019-05-22
Payer: COMMERCIAL

## 2019-05-22 DIAGNOSIS — R05.3 PERSISTENT DRY COUGH: ICD-10-CM

## 2019-05-22 DIAGNOSIS — J20.9 BRONCHOSPASM WITH BRONCHITIS, ACUTE: ICD-10-CM

## 2019-05-22 PROCEDURE — 71046 X-RAY EXAM CHEST 2 VIEWS: CPT

## 2019-05-26 ENCOUNTER — APPOINTMENT (EMERGENCY)
Dept: RADIOLOGY | Facility: HOSPITAL | Age: 49
End: 2019-05-26
Payer: COMMERCIAL

## 2019-05-26 ENCOUNTER — APPOINTMENT (EMERGENCY)
Dept: CT IMAGING | Facility: HOSPITAL | Age: 49
End: 2019-05-26
Payer: COMMERCIAL

## 2019-05-26 ENCOUNTER — HOSPITAL ENCOUNTER (EMERGENCY)
Facility: HOSPITAL | Age: 49
Discharge: ANOTHER HOSPITAL | End: 2019-05-26
Attending: EMERGENCY MEDICINE | Admitting: EMERGENCY MEDICINE
Payer: COMMERCIAL

## 2019-05-26 VITALS
SYSTOLIC BLOOD PRESSURE: 106 MMHG | HEART RATE: 152 BPM | WEIGHT: 287.04 LBS | TEMPERATURE: 98 F | BODY MASS INDEX: 43.5 KG/M2 | HEIGHT: 68 IN | DIASTOLIC BLOOD PRESSURE: 58 MMHG | RESPIRATION RATE: 19 BRPM | OXYGEN SATURATION: 97 %

## 2019-05-26 DIAGNOSIS — R00.2 PALPITATIONS: Primary | ICD-10-CM

## 2019-05-26 DIAGNOSIS — R91.8 HILAR MASS: ICD-10-CM

## 2019-05-26 DIAGNOSIS — I48.91 ATRIAL FIBRILLATION WITH RAPID VENTRICULAR RESPONSE (HCC): ICD-10-CM

## 2019-05-26 LAB
ALBUMIN SERPL BCP-MCNC: 3.4 G/DL (ref 3.5–5)
ALP SERPL-CCNC: 127 U/L (ref 46–116)
ALT SERPL W P-5'-P-CCNC: 18 U/L (ref 12–78)
ANION GAP BLD CALC-SCNC: 18 MMOL/L (ref 4–13)
ANION GAP SERPL CALCULATED.3IONS-SCNC: 10 MMOL/L (ref 4–13)
ANION GAP SERPL CALCULATED.3IONS-SCNC: 10 MMOL/L (ref 4–13)
APTT PPP: 26 SECONDS (ref 26–38)
AST SERPL W P-5'-P-CCNC: 15 U/L (ref 5–45)
BASOPHILS # BLD AUTO: 0.08 THOUSANDS/ΜL (ref 0–0.1)
BASOPHILS NFR BLD AUTO: 1 % (ref 0–1)
BILIRUB SERPL-MCNC: 0.6 MG/DL (ref 0.2–1)
BUN BLD-MCNC: 9 MG/DL (ref 5–25)
BUN SERPL-MCNC: 11 MG/DL (ref 5–25)
BUN SERPL-MCNC: 11 MG/DL (ref 5–25)
CA-I BLD-SCNC: 1.12 MMOL/L (ref 1.12–1.32)
CALCIUM SERPL-MCNC: 9.4 MG/DL (ref 8.3–10.1)
CALCIUM SERPL-MCNC: 9.4 MG/DL (ref 8.3–10.1)
CHLORIDE BLD-SCNC: 104 MMOL/L (ref 100–108)
CHLORIDE SERPL-SCNC: 103 MMOL/L (ref 100–108)
CHLORIDE SERPL-SCNC: 104 MMOL/L (ref 100–108)
CO2 SERPL-SCNC: 24 MMOL/L (ref 21–32)
CO2 SERPL-SCNC: 25 MMOL/L (ref 21–32)
CREAT BLD-MCNC: 0.8 MG/DL (ref 0.6–1.3)
CREAT SERPL-MCNC: 0.92 MG/DL (ref 0.6–1.3)
CREAT SERPL-MCNC: 0.92 MG/DL (ref 0.6–1.3)
EOSINOPHIL # BLD AUTO: 0.38 THOUSAND/ΜL (ref 0–0.61)
EOSINOPHIL NFR BLD AUTO: 5 % (ref 0–6)
ERYTHROCYTE [DISTWIDTH] IN BLOOD BY AUTOMATED COUNT: 14.2 % (ref 11.6–15.1)
GFR SERPL CREATININE-BSD FRML MDRD: 73 ML/MIN/1.73SQ M
GFR SERPL CREATININE-BSD FRML MDRD: 73 ML/MIN/1.73SQ M
GFR SERPL CREATININE-BSD FRML MDRD: 86 ML/MIN/1.73SQ M
GLUCOSE SERPL-MCNC: 142 MG/DL (ref 65–140)
GLUCOSE SERPL-MCNC: 142 MG/DL (ref 65–140)
GLUCOSE SERPL-MCNC: 146 MG/DL (ref 65–140)
HCT VFR BLD AUTO: 44.8 % (ref 34.8–46.1)
HCT VFR BLD CALC: 47 % (ref 34.8–46.1)
HGB BLD-MCNC: 14.3 G/DL (ref 11.5–15.4)
HGB BLDA-MCNC: 16 G/DL (ref 11.5–15.4)
IMM GRANULOCYTES # BLD AUTO: 0.02 THOUSAND/UL (ref 0–0.2)
IMM GRANULOCYTES NFR BLD AUTO: 0 % (ref 0–2)
INR PPP: 1.14 (ref 0.86–1.17)
LACTATE SERPL-SCNC: 1.7 MMOL/L (ref 0.5–2)
LYMPHOCYTES # BLD AUTO: 0.95 THOUSANDS/ΜL (ref 0.6–4.47)
LYMPHOCYTES NFR BLD AUTO: 12 % (ref 14–44)
MAGNESIUM SERPL-MCNC: 2.1 MG/DL (ref 1.6–2.6)
MCH RBC QN AUTO: 32.9 PG (ref 26.8–34.3)
MCHC RBC AUTO-ENTMCNC: 31.9 G/DL (ref 31.4–37.4)
MCV RBC AUTO: 103 FL (ref 82–98)
MONOCYTES # BLD AUTO: 1.25 THOUSAND/ΜL (ref 0.17–1.22)
MONOCYTES NFR BLD AUTO: 15 % (ref 4–12)
NEUTROPHILS # BLD AUTO: 5.54 THOUSANDS/ΜL (ref 1.85–7.62)
NEUTS SEG NFR BLD AUTO: 67 % (ref 43–75)
NRBC BLD AUTO-RTO: 0 /100 WBCS
NT-PROBNP SERPL-MCNC: 148 PG/ML
PCO2 BLD: 23 MMOL/L (ref 21–32)
PLATELET # BLD AUTO: 184 THOUSANDS/UL (ref 149–390)
PMV BLD AUTO: 10.6 FL (ref 8.9–12.7)
POTASSIUM BLD-SCNC: 3.9 MMOL/L (ref 3.5–5.3)
POTASSIUM SERPL-SCNC: 3.9 MMOL/L (ref 3.5–5.3)
POTASSIUM SERPL-SCNC: 3.9 MMOL/L (ref 3.5–5.3)
PROT SERPL-MCNC: 7.1 G/DL (ref 6.4–8.2)
PROTHROMBIN TIME: 14 SECONDS (ref 11.8–14.2)
RBC # BLD AUTO: 4.34 MILLION/UL (ref 3.81–5.12)
SODIUM BLD-SCNC: 141 MMOL/L (ref 136–145)
SODIUM SERPL-SCNC: 137 MMOL/L (ref 136–145)
SODIUM SERPL-SCNC: 139 MMOL/L (ref 136–145)
SPECIMEN SOURCE: ABNORMAL
TROPONIN I SERPL-MCNC: <0.02 NG/ML
TSH SERPL DL<=0.05 MIU/L-ACNC: 2.11 UIU/ML (ref 0.36–3.74)
WBC # BLD AUTO: 8.22 THOUSAND/UL (ref 4.31–10.16)

## 2019-05-26 PROCEDURE — 71275 CT ANGIOGRAPHY CHEST: CPT

## 2019-05-26 PROCEDURE — 85025 COMPLETE CBC W/AUTO DIFF WBC: CPT | Performed by: EMERGENCY MEDICINE

## 2019-05-26 PROCEDURE — 83735 ASSAY OF MAGNESIUM: CPT | Performed by: EMERGENCY MEDICINE

## 2019-05-26 PROCEDURE — 83605 ASSAY OF LACTIC ACID: CPT | Performed by: PHYSICIAN ASSISTANT

## 2019-05-26 PROCEDURE — 80047 BASIC METABLC PNL IONIZED CA: CPT

## 2019-05-26 PROCEDURE — 84484 ASSAY OF TROPONIN QUANT: CPT | Performed by: EMERGENCY MEDICINE

## 2019-05-26 PROCEDURE — 80053 COMPREHEN METABOLIC PANEL: CPT | Performed by: EMERGENCY MEDICINE

## 2019-05-26 PROCEDURE — 96365 THER/PROPH/DIAG IV INF INIT: CPT

## 2019-05-26 PROCEDURE — 83880 ASSAY OF NATRIURETIC PEPTIDE: CPT | Performed by: EMERGENCY MEDICINE

## 2019-05-26 PROCEDURE — 96361 HYDRATE IV INFUSION ADD-ON: CPT

## 2019-05-26 PROCEDURE — 85730 THROMBOPLASTIN TIME PARTIAL: CPT | Performed by: EMERGENCY MEDICINE

## 2019-05-26 PROCEDURE — 80048 BASIC METABOLIC PNL TOTAL CA: CPT | Performed by: EMERGENCY MEDICINE

## 2019-05-26 PROCEDURE — 96367 TX/PROPH/DG ADDL SEQ IV INF: CPT

## 2019-05-26 PROCEDURE — 96376 TX/PRO/DX INJ SAME DRUG ADON: CPT

## 2019-05-26 PROCEDURE — 85610 PROTHROMBIN TIME: CPT | Performed by: EMERGENCY MEDICINE

## 2019-05-26 PROCEDURE — 84443 ASSAY THYROID STIM HORMONE: CPT | Performed by: EMERGENCY MEDICINE

## 2019-05-26 PROCEDURE — 85014 HEMATOCRIT: CPT

## 2019-05-26 PROCEDURE — 36415 COLL VENOUS BLD VENIPUNCTURE: CPT | Performed by: EMERGENCY MEDICINE

## 2019-05-26 PROCEDURE — 99285 EMERGENCY DEPT VISIT HI MDM: CPT

## 2019-05-26 PROCEDURE — 93005 ELECTROCARDIOGRAM TRACING: CPT

## 2019-05-26 PROCEDURE — 99285 EMERGENCY DEPT VISIT HI MDM: CPT | Performed by: PHYSICIAN ASSISTANT

## 2019-05-26 RX ORDER — ACETAMINOPHEN 325 MG/1
650 TABLET ORAL ONCE
Status: COMPLETED | OUTPATIENT
Start: 2019-05-26 | End: 2019-05-26

## 2019-05-26 RX ORDER — DILTIAZEM HYDROCHLORIDE 5 MG/ML
45 INJECTION INTRAVENOUS ONCE
Status: COMPLETED | OUTPATIENT
Start: 2019-05-26 | End: 2019-05-26

## 2019-05-26 RX ORDER — CALCIUM CHLORIDE 100 MG/ML
1 INJECTION INTRAVENOUS; INTRAVENTRICULAR ONCE
Status: DISCONTINUED | OUTPATIENT
Start: 2019-05-26 | End: 2019-05-26

## 2019-05-26 RX ORDER — CETIRIZINE HYDROCHLORIDE 10 MG/1
10 TABLET ORAL DAILY
COMMUNITY
End: 2021-04-01

## 2019-05-26 RX ORDER — 0.9 % SODIUM CHLORIDE 0.9 %
3 VIAL (ML) INJECTION AS NEEDED
Status: DISCONTINUED | OUTPATIENT
Start: 2019-05-26 | End: 2019-05-26 | Stop reason: HOSPADM

## 2019-05-26 RX ORDER — DILTIAZEM HYDROCHLORIDE 5 MG/ML
20 INJECTION INTRAVENOUS ONCE
Status: COMPLETED | OUTPATIENT
Start: 2019-05-26 | End: 2019-05-26

## 2019-05-26 RX ORDER — DILTIAZEM HYDROCHLORIDE 5 MG/ML
30 INJECTION INTRAVENOUS ONCE
Status: COMPLETED | OUTPATIENT
Start: 2019-05-26 | End: 2019-05-26

## 2019-05-26 RX ADMIN — ACETAMINOPHEN 650 MG: 325 TABLET, FILM COATED ORAL at 17:00

## 2019-05-26 RX ADMIN — SODIUM CHLORIDE 1000 ML: 0.9 INJECTION, SOLUTION INTRAVENOUS at 14:48

## 2019-05-26 RX ADMIN — DILTIAZEM HYDROCHLORIDE 20 MG: 5 INJECTION INTRAVENOUS at 13:16

## 2019-05-26 RX ADMIN — SODIUM CHLORIDE 1000 ML: 0.9 INJECTION, SOLUTION INTRAVENOUS at 13:20

## 2019-05-26 RX ADMIN — DILTIAZEM HYDROCHLORIDE 45 MG: 5 INJECTION INTRAVENOUS at 13:24

## 2019-05-26 RX ADMIN — SODIUM CHLORIDE 1000 ML: 0.9 INJECTION, SOLUTION INTRAVENOUS at 13:51

## 2019-05-26 RX ADMIN — DILTIAZEM HYDROCHLORIDE 30 MG: 5 INJECTION INTRAVENOUS at 15:03

## 2019-05-26 RX ADMIN — IOHEXOL 85 ML: 350 INJECTION, SOLUTION INTRAVENOUS at 13:46

## 2019-05-26 RX ADMIN — DILTIAZEM HYDROCHLORIDE 5 MG/HR: 5 INJECTION INTRAVENOUS at 15:24

## 2019-05-26 RX ADMIN — Medication 1 G: at 14:50

## 2019-05-28 LAB
ATRIAL RATE: 293 BPM
ATRIAL RATE: 300 BPM
P AXIS: 259 DEGREES
QRS AXIS: -12 DEGREES
QRS AXIS: -2 DEGREES
QRSD INTERVAL: 138 MS
QRSD INTERVAL: 84 MS
QT INTERVAL: 314 MS
QT INTERVAL: 336 MS
QTC INTERVAL: 449 MS
QTC INTERVAL: 538 MS
T WAVE AXIS: -20 DEGREES
T WAVE AXIS: -62 DEGREES
VENTRICULAR RATE: 123 BPM
VENTRICULAR RATE: 154 BPM

## 2019-05-28 PROCEDURE — 93010 ELECTROCARDIOGRAM REPORT: CPT | Performed by: INTERNAL MEDICINE

## 2019-05-30 DIAGNOSIS — R05.9 COUGH: Primary | ICD-10-CM

## 2019-05-30 DIAGNOSIS — R05.3 PERSISTENT DRY COUGH: ICD-10-CM

## 2019-05-30 RX ORDER — BENZONATATE 100 MG/1
100 CAPSULE ORAL 3 TIMES DAILY PRN
Qty: 20 CAPSULE | Refills: 0 | Status: SHIPPED | OUTPATIENT
Start: 2019-05-30 | End: 2021-01-11 | Stop reason: ALTCHOICE

## 2019-06-04 ENCOUNTER — APPOINTMENT (OUTPATIENT)
Dept: LAB | Facility: MEDICAL CENTER | Age: 49
End: 2019-06-04
Payer: COMMERCIAL

## 2019-06-04 ENCOUNTER — TRANSCRIBE ORDERS (OUTPATIENT)
Dept: ADMINISTRATIVE | Facility: HOSPITAL | Age: 49
End: 2019-06-04

## 2019-06-04 DIAGNOSIS — C83.30 DIFFUSE LARGE B-CELL LYMPHOMA, UNSPECIFIED BODY REGION (HCC): Primary | ICD-10-CM

## 2019-06-04 DIAGNOSIS — C83.30 DIFFUSE LARGE B-CELL LYMPHOMA, UNSPECIFIED BODY REGION (HCC): ICD-10-CM

## 2019-06-04 LAB
ALBUMIN SERPL BCP-MCNC: 3.3 G/DL (ref 3.5–5)
ALP SERPL-CCNC: 123 U/L (ref 46–116)
ALT SERPL W P-5'-P-CCNC: 12 U/L (ref 12–78)
ANION GAP SERPL CALCULATED.3IONS-SCNC: 7 MMOL/L (ref 4–13)
AST SERPL W P-5'-P-CCNC: 14 U/L (ref 5–45)
BASOPHILS # BLD AUTO: 0.07 THOUSANDS/ΜL (ref 0–0.1)
BASOPHILS NFR BLD AUTO: 1 % (ref 0–1)
BILIRUB SERPL-MCNC: 0.33 MG/DL (ref 0.2–1)
BUN SERPL-MCNC: 16 MG/DL (ref 5–25)
CALCIUM SERPL-MCNC: 8.5 MG/DL (ref 8.3–10.1)
CHLORIDE SERPL-SCNC: 104 MMOL/L (ref 100–108)
CO2 SERPL-SCNC: 28 MMOL/L (ref 21–32)
CREAT SERPL-MCNC: 0.92 MG/DL (ref 0.6–1.3)
EOSINOPHIL # BLD AUTO: 0.32 THOUSAND/ΜL (ref 0–0.61)
EOSINOPHIL NFR BLD AUTO: 6 % (ref 0–6)
ERYTHROCYTE [DISTWIDTH] IN BLOOD BY AUTOMATED COUNT: 14.1 % (ref 11.6–15.1)
GFR SERPL CREATININE-BSD FRML MDRD: 73 ML/MIN/1.73SQ M
GLUCOSE SERPL-MCNC: 152 MG/DL (ref 65–140)
HCT VFR BLD AUTO: 37.3 % (ref 34.8–46.1)
HGB BLD-MCNC: 12.1 G/DL (ref 11.5–15.4)
IMM GRANULOCYTES # BLD AUTO: 0.03 THOUSAND/UL (ref 0–0.2)
IMM GRANULOCYTES NFR BLD AUTO: 1 % (ref 0–2)
LYMPHOCYTES # BLD AUTO: 0.52 THOUSANDS/ΜL (ref 0.6–4.47)
LYMPHOCYTES NFR BLD AUTO: 9 % (ref 14–44)
MCH RBC QN AUTO: 32.8 PG (ref 26.8–34.3)
MCHC RBC AUTO-ENTMCNC: 32.4 G/DL (ref 31.4–37.4)
MCV RBC AUTO: 101 FL (ref 82–98)
MONOCYTES # BLD AUTO: 0.79 THOUSAND/ΜL (ref 0.17–1.22)
MONOCYTES NFR BLD AUTO: 14 % (ref 4–12)
NEUTROPHILS # BLD AUTO: 3.93 THOUSANDS/ΜL (ref 1.85–7.62)
NEUTS SEG NFR BLD AUTO: 69 % (ref 43–75)
NRBC BLD AUTO-RTO: 0 /100 WBCS
PLATELET # BLD AUTO: 164 THOUSANDS/UL (ref 149–390)
PMV BLD AUTO: 11.4 FL (ref 8.9–12.7)
POTASSIUM SERPL-SCNC: 3.7 MMOL/L (ref 3.5–5.3)
PROT SERPL-MCNC: 6.4 G/DL (ref 6.4–8.2)
RBC # BLD AUTO: 3.69 MILLION/UL (ref 3.81–5.12)
SODIUM SERPL-SCNC: 139 MMOL/L (ref 136–145)
WBC # BLD AUTO: 5.66 THOUSAND/UL (ref 4.31–10.16)

## 2019-06-04 PROCEDURE — 80053 COMPREHEN METABOLIC PANEL: CPT

## 2019-06-04 PROCEDURE — 36415 COLL VENOUS BLD VENIPUNCTURE: CPT

## 2019-06-04 PROCEDURE — 85025 COMPLETE CBC W/AUTO DIFF WBC: CPT

## 2019-06-05 ENCOUNTER — TRANSCRIBE ORDERS (OUTPATIENT)
Dept: ADMINISTRATIVE | Facility: HOSPITAL | Age: 49
End: 2019-06-05

## 2019-06-05 DIAGNOSIS — Z86.79 H/O ATRIAL FLUTTER: Primary | ICD-10-CM

## 2019-06-10 ENCOUNTER — TRANSCRIBE ORDERS (OUTPATIENT)
Dept: ADMINISTRATIVE | Facility: HOSPITAL | Age: 49
End: 2019-06-10

## 2019-06-10 ENCOUNTER — APPOINTMENT (OUTPATIENT)
Dept: LAB | Facility: MEDICAL CENTER | Age: 49
End: 2019-06-10
Payer: COMMERCIAL

## 2019-06-10 DIAGNOSIS — C85.80 MARGINAL ZONE LYMPHOMA (HCC): ICD-10-CM

## 2019-06-10 DIAGNOSIS — Z92.21 HISTORY OF CANCER CHEMOTHERAPY: ICD-10-CM

## 2019-06-10 DIAGNOSIS — C84.41 PERIPHERAL T CELL LYMPHOMA OF LYMPH NODES OF NECK (HCC): ICD-10-CM

## 2019-06-10 DIAGNOSIS — C83.30 LYMPHOMA, LARGE-CELL, DIFFUSE (HCC): Primary | ICD-10-CM

## 2019-06-10 DIAGNOSIS — C83.30 LYMPHOMA, LARGE-CELL, DIFFUSE (HCC): ICD-10-CM

## 2019-06-10 LAB
ALBUMIN SERPL BCP-MCNC: 3.3 G/DL (ref 3.5–5)
ALP SERPL-CCNC: 107 U/L (ref 46–116)
ALT SERPL W P-5'-P-CCNC: 12 U/L (ref 12–78)
ANION GAP SERPL CALCULATED.3IONS-SCNC: 6 MMOL/L (ref 4–13)
AST SERPL W P-5'-P-CCNC: 12 U/L (ref 5–45)
BASOPHILS # BLD AUTO: 0.1 THOUSANDS/ΜL (ref 0–0.1)
BASOPHILS NFR BLD AUTO: 2 % (ref 0–1)
BILIRUB SERPL-MCNC: 0.6 MG/DL (ref 0.2–1)
BUN SERPL-MCNC: 11 MG/DL (ref 5–25)
CALCIUM SERPL-MCNC: 8.7 MG/DL (ref 8.3–10.1)
CHLORIDE SERPL-SCNC: 106 MMOL/L (ref 100–108)
CO2 SERPL-SCNC: 26 MMOL/L (ref 21–32)
CREAT SERPL-MCNC: 0.8 MG/DL (ref 0.6–1.3)
EOSINOPHIL # BLD AUTO: 0.42 THOUSAND/ΜL (ref 0–0.61)
EOSINOPHIL NFR BLD AUTO: 7 % (ref 0–6)
ERYTHROCYTE [DISTWIDTH] IN BLOOD BY AUTOMATED COUNT: 13.3 % (ref 11.6–15.1)
GFR SERPL CREATININE-BSD FRML MDRD: 86 ML/MIN/1.73SQ M
GLUCOSE P FAST SERPL-MCNC: 96 MG/DL (ref 65–99)
HCT VFR BLD AUTO: 40.2 % (ref 34.8–46.1)
HGB BLD-MCNC: 13 G/DL (ref 11.5–15.4)
IMM GRANULOCYTES # BLD AUTO: 0.03 THOUSAND/UL (ref 0–0.2)
IMM GRANULOCYTES NFR BLD AUTO: 1 % (ref 0–2)
LYMPHOCYTES # BLD AUTO: 1.35 THOUSANDS/ΜL (ref 0.6–4.47)
LYMPHOCYTES NFR BLD AUTO: 22 % (ref 14–44)
MCH RBC QN AUTO: 32.6 PG (ref 26.8–34.3)
MCHC RBC AUTO-ENTMCNC: 32.3 G/DL (ref 31.4–37.4)
MCV RBC AUTO: 101 FL (ref 82–98)
MONOCYTES # BLD AUTO: 0.81 THOUSAND/ΜL (ref 0.17–1.22)
MONOCYTES NFR BLD AUTO: 13 % (ref 4–12)
NEUTROPHILS # BLD AUTO: 3.51 THOUSANDS/ΜL (ref 1.85–7.62)
NEUTS SEG NFR BLD AUTO: 55 % (ref 43–75)
NRBC BLD AUTO-RTO: 0 /100 WBCS
PLATELET # BLD AUTO: 170 THOUSANDS/UL (ref 149–390)
PMV BLD AUTO: 11.8 FL (ref 8.9–12.7)
POTASSIUM SERPL-SCNC: 4.1 MMOL/L (ref 3.5–5.3)
PROT SERPL-MCNC: 6.4 G/DL (ref 6.4–8.2)
RBC # BLD AUTO: 3.99 MILLION/UL (ref 3.81–5.12)
SODIUM SERPL-SCNC: 138 MMOL/L (ref 136–145)
WBC # BLD AUTO: 6.22 THOUSAND/UL (ref 4.31–10.16)

## 2019-06-10 PROCEDURE — 85025 COMPLETE CBC W/AUTO DIFF WBC: CPT

## 2019-06-10 PROCEDURE — 36415 COLL VENOUS BLD VENIPUNCTURE: CPT

## 2019-06-10 PROCEDURE — 80053 COMPREHEN METABOLIC PANEL: CPT

## 2019-06-17 ENCOUNTER — TRANSCRIBE ORDERS (OUTPATIENT)
Dept: ADMINISTRATIVE | Facility: HOSPITAL | Age: 49
End: 2019-06-17

## 2019-06-19 ENCOUNTER — APPOINTMENT (OUTPATIENT)
Dept: LAB | Facility: MEDICAL CENTER | Age: 49
End: 2019-06-19
Payer: COMMERCIAL

## 2019-06-19 ENCOUNTER — TRANSCRIBE ORDERS (OUTPATIENT)
Dept: ADMINISTRATIVE | Facility: HOSPITAL | Age: 49
End: 2019-06-19

## 2019-06-19 DIAGNOSIS — C85.80 OTHER SPECIFIED TYPES OF NON-HODGKIN LYMPHOMA, UNSPECIFIED SITE (HCC): Primary | ICD-10-CM

## 2019-06-19 DIAGNOSIS — C85.80 LARGE CELL LYMPHOMA (HCC): Primary | ICD-10-CM

## 2019-06-19 DIAGNOSIS — C85.80 LARGE CELL LYMPHOMA (HCC): ICD-10-CM

## 2019-06-19 LAB
ALBUMIN SERPL BCP-MCNC: 3.4 G/DL (ref 3.5–5)
ALP SERPL-CCNC: 104 U/L (ref 46–116)
ALT SERPL W P-5'-P-CCNC: 15 U/L (ref 12–78)
ANION GAP SERPL CALCULATED.3IONS-SCNC: 7 MMOL/L (ref 4–13)
APTT PPP: 36 SECONDS (ref 26–38)
AST SERPL W P-5'-P-CCNC: 11 U/L (ref 5–45)
B-HCG SERPL-ACNC: 4 MIU/ML
BASOPHILS # BLD AUTO: 0.14 THOUSANDS/ΜL (ref 0–0.1)
BASOPHILS NFR BLD AUTO: 2 % (ref 0–1)
BILIRUB DIRECT SERPL-MCNC: 0.11 MG/DL (ref 0–0.2)
BILIRUB SERPL-MCNC: 0.51 MG/DL (ref 0.2–1)
BUN SERPL-MCNC: 11 MG/DL (ref 5–25)
CALCIUM SERPL-MCNC: 8.9 MG/DL (ref 8.3–10.1)
CHLORIDE SERPL-SCNC: 108 MMOL/L (ref 100–108)
CO2 SERPL-SCNC: 26 MMOL/L (ref 21–32)
CREAT SERPL-MCNC: 0.74 MG/DL (ref 0.6–1.3)
EOSINOPHIL # BLD AUTO: 0.47 THOUSAND/ΜL (ref 0–0.61)
EOSINOPHIL NFR BLD AUTO: 5 % (ref 0–6)
ERYTHROCYTE [DISTWIDTH] IN BLOOD BY AUTOMATED COUNT: 13.3 % (ref 11.6–15.1)
ERYTHROCYTE [SEDIMENTATION RATE] IN BLOOD: 33 MM/HOUR (ref 0–20)
GFR SERPL CREATININE-BSD FRML MDRD: 95 ML/MIN/1.73SQ M
GLUCOSE P FAST SERPL-MCNC: 82 MG/DL (ref 65–99)
HCT VFR BLD AUTO: 40.4 % (ref 34.8–46.1)
HGB BLD-MCNC: 13.4 G/DL (ref 11.5–15.4)
IGA SERPL-MCNC: 157 MG/DL (ref 70–400)
IGG SERPL-MCNC: 680 MG/DL (ref 700–1600)
IGM SERPL-MCNC: 11 MG/DL (ref 40–230)
IMM GRANULOCYTES # BLD AUTO: 0.13 THOUSAND/UL (ref 0–0.2)
IMM GRANULOCYTES NFR BLD AUTO: 1 % (ref 0–2)
INR PPP: 3.89 (ref 0.86–1.17)
LDH SERPL-CCNC: 216 U/L (ref 81–234)
LYMPHOCYTES # BLD AUTO: 1.37 THOUSANDS/ΜL (ref 0.6–4.47)
LYMPHOCYTES NFR BLD AUTO: 15 % (ref 14–44)
MCH RBC QN AUTO: 31.5 PG (ref 26.8–34.3)
MCHC RBC AUTO-ENTMCNC: 33.2 G/DL (ref 31.4–37.4)
MCV RBC AUTO: 95 FL (ref 82–98)
MONOCYTES # BLD AUTO: 1.08 THOUSAND/ΜL (ref 0.17–1.22)
MONOCYTES NFR BLD AUTO: 12 % (ref 4–12)
NEUTROPHILS # BLD AUTO: 6.22 THOUSANDS/ΜL (ref 1.85–7.62)
NEUTS SEG NFR BLD AUTO: 65 % (ref 43–75)
NRBC BLD AUTO-RTO: 0 /100 WBCS
PLATELET # BLD AUTO: 213 THOUSANDS/UL (ref 149–390)
PMV BLD AUTO: 12.8 FL (ref 8.9–12.7)
POTASSIUM SERPL-SCNC: 4.2 MMOL/L (ref 3.5–5.3)
PROT SERPL-MCNC: 6.4 G/DL (ref 6.4–8.2)
PROTHROMBIN TIME: 38.1 SECONDS (ref 11.8–14.2)
RBC # BLD AUTO: 4.25 MILLION/UL (ref 3.81–5.12)
SODIUM SERPL-SCNC: 141 MMOL/L (ref 136–145)
WBC # BLD AUTO: 9.41 THOUSAND/UL (ref 4.31–10.16)

## 2019-06-19 PROCEDURE — 86803 HEPATITIS C AB TEST: CPT

## 2019-06-19 PROCEDURE — 83615 LACTATE (LD) (LDH) ENZYME: CPT

## 2019-06-19 PROCEDURE — 80048 BASIC METABOLIC PNL TOTAL CA: CPT

## 2019-06-19 PROCEDURE — 86706 HEP B SURFACE ANTIBODY: CPT

## 2019-06-19 PROCEDURE — 82784 ASSAY IGA/IGD/IGG/IGM EACH: CPT

## 2019-06-19 PROCEDURE — 85025 COMPLETE CBC W/AUTO DIFF WBC: CPT

## 2019-06-19 PROCEDURE — 86038 ANTINUCLEAR ANTIBODIES: CPT

## 2019-06-19 PROCEDURE — 80076 HEPATIC FUNCTION PANEL: CPT

## 2019-06-19 PROCEDURE — 86704 HEP B CORE ANTIBODY TOTAL: CPT

## 2019-06-19 PROCEDURE — 87389 HIV-1 AG W/HIV-1&-2 AB AG IA: CPT

## 2019-06-19 PROCEDURE — 36415 COLL VENOUS BLD VENIPUNCTURE: CPT

## 2019-06-19 PROCEDURE — 85610 PROTHROMBIN TIME: CPT

## 2019-06-19 PROCEDURE — 85652 RBC SED RATE AUTOMATED: CPT

## 2019-06-19 PROCEDURE — 87340 HEPATITIS B SURFACE AG IA: CPT

## 2019-06-19 PROCEDURE — 85730 THROMBOPLASTIN TIME PARTIAL: CPT

## 2019-06-19 PROCEDURE — 84702 CHORIONIC GONADOTROPIN TEST: CPT

## 2019-06-19 PROCEDURE — 87799 DETECT AGENT NOS DNA QUANT: CPT

## 2019-06-20 LAB
HBV CORE AB SER QL: NORMAL
HBV SURFACE AB SER-ACNC: <3.1 MIU/ML
HBV SURFACE AG SER QL: NORMAL
HCV AB SER QL: NORMAL

## 2019-06-21 LAB
HIV 1+2 AB+HIV1 P24 AG SERPL QL IA: NORMAL
RYE IGE QN: NEGATIVE

## 2019-06-25 ENCOUNTER — TRANSCRIBE ORDERS (OUTPATIENT)
Dept: ADMINISTRATIVE | Facility: HOSPITAL | Age: 49
End: 2019-06-25

## 2019-06-25 ENCOUNTER — APPOINTMENT (OUTPATIENT)
Dept: LAB | Facility: MEDICAL CENTER | Age: 49
End: 2019-06-25
Payer: COMMERCIAL

## 2019-06-25 DIAGNOSIS — C85.80 LARGE CELL LYMPHOMA (HCC): ICD-10-CM

## 2019-06-25 DIAGNOSIS — C83.30 DIFFUSE LARGE B-CELL LYMPHOMA, UNSPECIFIED BODY REGION (HCC): Primary | ICD-10-CM

## 2019-06-25 DIAGNOSIS — Z92.21 HISTORY OF CHEMOTHERAPY: ICD-10-CM

## 2019-06-25 DIAGNOSIS — C85.80 MARGINAL ZONE LYMPHOMA (HCC): ICD-10-CM

## 2019-06-25 DIAGNOSIS — C84.41 PERIPHERAL T CELL LYMPHOMA OF LYMPH NODES OF NECK (HCC): ICD-10-CM

## 2019-06-25 DIAGNOSIS — C83.30 DIFFUSE LARGE B-CELL LYMPHOMA, UNSPECIFIED BODY REGION (HCC): ICD-10-CM

## 2019-06-25 LAB
ALBUMIN SERPL BCP-MCNC: 3.5 G/DL (ref 3.5–5)
ALP SERPL-CCNC: 104 U/L (ref 46–116)
ALT SERPL W P-5'-P-CCNC: 12 U/L (ref 12–78)
ANION GAP SERPL CALCULATED.3IONS-SCNC: 6 MMOL/L (ref 4–13)
APTT PPP: 33 SECONDS (ref 23–37)
AST SERPL W P-5'-P-CCNC: 11 U/L (ref 5–45)
BASOPHILS # BLD AUTO: 0.13 THOUSANDS/ΜL (ref 0–0.1)
BASOPHILS NFR BLD AUTO: 1 % (ref 0–1)
BILIRUB SERPL-MCNC: 0.47 MG/DL (ref 0.2–1)
BUN SERPL-MCNC: 18 MG/DL (ref 5–25)
CALCIUM SERPL-MCNC: 9.1 MG/DL (ref 8.3–10.1)
CHLORIDE SERPL-SCNC: 107 MMOL/L (ref 100–108)
CO2 SERPL-SCNC: 25 MMOL/L (ref 21–32)
CREAT SERPL-MCNC: 0.77 MG/DL (ref 0.6–1.3)
EOSINOPHIL # BLD AUTO: 0.46 THOUSAND/ΜL (ref 0–0.61)
EOSINOPHIL NFR BLD AUTO: 5 % (ref 0–6)
ERYTHROCYTE [DISTWIDTH] IN BLOOD BY AUTOMATED COUNT: 13.5 % (ref 11.6–15.1)
GFR SERPL CREATININE-BSD FRML MDRD: 90 ML/MIN/1.73SQ M
GLUCOSE P FAST SERPL-MCNC: 102 MG/DL (ref 65–99)
HCT VFR BLD AUTO: 40.6 % (ref 34.8–46.1)
HGB BLD-MCNC: 13.2 G/DL (ref 11.5–15.4)
IMM GRANULOCYTES # BLD AUTO: 0.1 THOUSAND/UL (ref 0–0.2)
IMM GRANULOCYTES NFR BLD AUTO: 1 % (ref 0–2)
INR PPP: 1.72 (ref 0.84–1.19)
LYMPHOCYTES # BLD AUTO: 1.43 THOUSANDS/ΜL (ref 0.6–4.47)
LYMPHOCYTES NFR BLD AUTO: 16 % (ref 14–44)
MCH RBC QN AUTO: 31.8 PG (ref 26.8–34.3)
MCHC RBC AUTO-ENTMCNC: 32.5 G/DL (ref 31.4–37.4)
MCV RBC AUTO: 98 FL (ref 82–98)
MISCELLANEOUS LAB TEST RESULT: NORMAL
MONOCYTES # BLD AUTO: 0.76 THOUSAND/ΜL (ref 0.17–1.22)
MONOCYTES NFR BLD AUTO: 8 % (ref 4–12)
NEUTROPHILS # BLD AUTO: 6.21 THOUSANDS/ΜL (ref 1.85–7.62)
NEUTS SEG NFR BLD AUTO: 69 % (ref 43–75)
NRBC BLD AUTO-RTO: 0 /100 WBCS
PLATELET # BLD AUTO: 187 THOUSANDS/UL (ref 149–390)
PMV BLD AUTO: 12.5 FL (ref 8.9–12.7)
POTASSIUM SERPL-SCNC: 4.1 MMOL/L (ref 3.5–5.3)
PROT SERPL-MCNC: 6.8 G/DL (ref 6.4–8.2)
PROTHROMBIN TIME: 19.7 SECONDS (ref 11.6–14.5)
RBC # BLD AUTO: 4.15 MILLION/UL (ref 3.81–5.12)
SODIUM SERPL-SCNC: 138 MMOL/L (ref 136–145)
WBC # BLD AUTO: 9.09 THOUSAND/UL (ref 4.31–10.16)

## 2019-06-25 PROCEDURE — 85025 COMPLETE CBC W/AUTO DIFF WBC: CPT

## 2019-06-25 PROCEDURE — 36415 COLL VENOUS BLD VENIPUNCTURE: CPT

## 2019-06-25 PROCEDURE — 85610 PROTHROMBIN TIME: CPT

## 2019-06-25 PROCEDURE — 80053 COMPREHEN METABOLIC PANEL: CPT

## 2019-06-25 PROCEDURE — 85730 THROMBOPLASTIN TIME PARTIAL: CPT

## 2019-06-27 ENCOUNTER — OFFICE VISIT (OUTPATIENT)
Dept: CARDIOLOGY CLINIC | Facility: CLINIC | Age: 49
End: 2019-06-27
Payer: COMMERCIAL

## 2019-06-27 VITALS
HEIGHT: 68 IN | BODY MASS INDEX: 42.59 KG/M2 | HEART RATE: 72 BPM | WEIGHT: 281 LBS | SYSTOLIC BLOOD PRESSURE: 90 MMHG | DIASTOLIC BLOOD PRESSURE: 58 MMHG

## 2019-06-27 DIAGNOSIS — C84.48 PERIPHERAL T CELL LYMPHOMA OF LYMPH NODES OF MULTIPLE SITES (HCC): ICD-10-CM

## 2019-06-27 DIAGNOSIS — Z86.711 HISTORY OF PULMONARY EMBOLISM: ICD-10-CM

## 2019-06-27 DIAGNOSIS — I48.92 ATRIAL FLUTTER, UNSPECIFIED TYPE (HCC): Primary | ICD-10-CM

## 2019-06-27 DIAGNOSIS — I48.0 PAROXYSMAL ATRIAL FIBRILLATION (HCC): ICD-10-CM

## 2019-06-27 PROCEDURE — 93000 ELECTROCARDIOGRAM COMPLETE: CPT | Performed by: INTERNAL MEDICINE

## 2019-06-27 PROCEDURE — 99214 OFFICE O/P EST MOD 30 MIN: CPT | Performed by: INTERNAL MEDICINE

## 2019-06-27 RX ORDER — METOPROLOL TARTRATE 100 MG/1
100 TABLET ORAL 2 TIMES DAILY
Refills: 3 | COMMUNITY
Start: 2019-05-28 | End: 2019-09-06 | Stop reason: SDUPTHER

## 2019-06-28 ENCOUNTER — HOSPITAL ENCOUNTER (OUTPATIENT)
Dept: PULMONOLOGY | Facility: HOSPITAL | Age: 49
Discharge: HOME | End: 2019-06-28
Payer: COMMERCIAL

## 2019-06-28 DIAGNOSIS — C85.80 OTHER SPECIFIED TYPES OF NON-HODGKIN LYMPHOMA, UNSPECIFIED SITE (HCC): ICD-10-CM

## 2019-06-28 PROCEDURE — 94060 EVALUATION OF WHEEZING: CPT | Performed by: INTERNAL MEDICINE

## 2019-06-28 PROCEDURE — 94729 DIFFUSING CAPACITY: CPT | Performed by: INTERNAL MEDICINE

## 2019-06-28 PROCEDURE — 94060 EVALUATION OF WHEEZING: CPT

## 2019-06-28 PROCEDURE — 94726 PLETHYSMOGRAPHY LUNG VOLUMES: CPT | Performed by: INTERNAL MEDICINE

## 2019-06-28 PROCEDURE — 94727 GAS DIL/WSHOT DETER LNG VOL: CPT

## 2019-06-28 PROCEDURE — 94760 N-INVAS EAR/PLS OXIMETRY 1: CPT

## 2019-06-28 PROCEDURE — 94729 DIFFUSING CAPACITY: CPT

## 2019-06-28 RX ORDER — ALBUTEROL SULFATE 2.5 MG/3ML
2.5 SOLUTION RESPIRATORY (INHALATION) ONCE AS NEEDED
Status: COMPLETED | OUTPATIENT
Start: 2019-06-28 | End: 2019-06-28

## 2019-06-28 RX ADMIN — ALBUTEROL SULFATE 2.5 MG: 2.5 SOLUTION RESPIRATORY (INHALATION) at 07:34

## 2019-07-19 ENCOUNTER — APPOINTMENT (OUTPATIENT)
Dept: LAB | Facility: HOSPITAL | Age: 49
End: 2019-07-19
Payer: COMMERCIAL

## 2019-07-19 ENCOUNTER — TELEPHONE (OUTPATIENT)
Dept: CARDIOLOGY CLINIC | Facility: CLINIC | Age: 49
End: 2019-07-19

## 2019-07-19 ENCOUNTER — TRANSCRIBE ORDERS (OUTPATIENT)
Dept: ADMINISTRATIVE | Facility: HOSPITAL | Age: 49
End: 2019-07-19

## 2019-07-19 ENCOUNTER — HOSPITAL ENCOUNTER (OUTPATIENT)
Dept: NON INVASIVE DIAGNOSTICS | Facility: CLINIC | Age: 49
Discharge: HOME | End: 2019-07-19
Payer: COMMERCIAL

## 2019-07-19 DIAGNOSIS — C84.41 PERIPHERAL T CELL LYMPHOMA OF LYMPH NODES OF NECK (HCC): ICD-10-CM

## 2019-07-19 DIAGNOSIS — R00.2 PALPITATIONS: ICD-10-CM

## 2019-07-19 DIAGNOSIS — R00.2 PALPITATIONS: Primary | ICD-10-CM

## 2019-07-19 DIAGNOSIS — C83.30 DIFFUSE LARGE B-CELL LYMPHOMA, UNSPECIFIED BODY REGION (HCC): ICD-10-CM

## 2019-07-19 DIAGNOSIS — C85.80 MARGINAL ZONE LYMPHOMA (HCC): ICD-10-CM

## 2019-07-19 DIAGNOSIS — Z92.21 PERSONAL HISTORY OF CHEMOTHERAPY: ICD-10-CM

## 2019-07-19 DIAGNOSIS — C83.30 DIFFUSE LARGE B-CELL LYMPHOMA, UNSPECIFIED BODY REGION (HCC): Primary | ICD-10-CM

## 2019-07-19 LAB
ALBUMIN SERPL BCP-MCNC: 3.4 G/DL (ref 3.5–5)
ALP SERPL-CCNC: 114 U/L (ref 46–116)
ALT SERPL W P-5'-P-CCNC: 12 U/L (ref 12–78)
ANION GAP SERPL CALCULATED.3IONS-SCNC: 9 MMOL/L (ref 4–13)
AST SERPL W P-5'-P-CCNC: 11 U/L (ref 5–45)
BASOPHILS # BLD AUTO: 0.12 THOUSANDS/ΜL (ref 0–0.1)
BASOPHILS NFR BLD AUTO: 1 % (ref 0–1)
BILIRUB SERPL-MCNC: 0.5 MG/DL (ref 0.2–1)
BUN SERPL-MCNC: 14 MG/DL (ref 5–25)
CALCIUM SERPL-MCNC: 8.7 MG/DL (ref 8.3–10.1)
CHLORIDE SERPL-SCNC: 101 MMOL/L (ref 100–108)
CO2 SERPL-SCNC: 27 MMOL/L (ref 21–32)
CREAT SERPL-MCNC: 0.85 MG/DL (ref 0.6–1.3)
EOSINOPHIL # BLD AUTO: 0.47 THOUSAND/ΜL (ref 0–0.61)
EOSINOPHIL NFR BLD AUTO: 4 % (ref 0–6)
ERYTHROCYTE [DISTWIDTH] IN BLOOD BY AUTOMATED COUNT: 13.8 % (ref 11.6–15.1)
GFR SERPL CREATININE-BSD FRML MDRD: 80 ML/MIN/1.73SQ M
GLUCOSE SERPL-MCNC: 96 MG/DL (ref 65–140)
HCT VFR BLD AUTO: 39.9 % (ref 34.8–46.1)
HGB BLD-MCNC: 12.7 G/DL (ref 11.5–15.4)
IMM GRANULOCYTES # BLD AUTO: 0.2 THOUSAND/UL (ref 0–0.2)
IMM GRANULOCYTES NFR BLD AUTO: 2 % (ref 0–2)
LYMPHOCYTES # BLD AUTO: 1.26 THOUSANDS/ΜL (ref 0.6–4.47)
LYMPHOCYTES NFR BLD AUTO: 10 % (ref 14–44)
MCH RBC QN AUTO: 31.1 PG (ref 26.8–34.3)
MCHC RBC AUTO-ENTMCNC: 31.8 G/DL (ref 31.4–37.4)
MCV RBC AUTO: 98 FL (ref 82–98)
MONOCYTES # BLD AUTO: 1.57 THOUSAND/ΜL (ref 0.17–1.22)
MONOCYTES NFR BLD AUTO: 12 % (ref 4–12)
NEUTROPHILS # BLD AUTO: 9.53 THOUSANDS/ΜL (ref 1.85–7.62)
NEUTS SEG NFR BLD AUTO: 71 % (ref 43–75)
NRBC BLD AUTO-RTO: 0 /100 WBCS
PLATELET # BLD AUTO: 215 THOUSANDS/UL (ref 149–390)
PMV BLD AUTO: 11.5 FL (ref 8.9–12.7)
POTASSIUM SERPL-SCNC: 4 MMOL/L (ref 3.5–5.3)
PROT SERPL-MCNC: 6.7 G/DL (ref 6.4–8.2)
RBC # BLD AUTO: 4.09 MILLION/UL (ref 3.81–5.12)
SODIUM SERPL-SCNC: 137 MMOL/L (ref 136–145)
WBC # BLD AUTO: 13.15 THOUSAND/UL (ref 4.31–10.16)

## 2019-07-19 PROCEDURE — 85025 COMPLETE CBC W/AUTO DIFF WBC: CPT

## 2019-07-19 PROCEDURE — 93226 XTRNL ECG REC<48 HR SCAN A/R: CPT

## 2019-07-19 PROCEDURE — 36415 COLL VENOUS BLD VENIPUNCTURE: CPT

## 2019-07-19 PROCEDURE — 93225 XTRNL ECG REC<48 HRS REC: CPT

## 2019-07-19 PROCEDURE — 80053 COMPREHEN METABOLIC PANEL: CPT

## 2019-07-19 NOTE — TELEPHONE ENCOUNTER
Pt called complaining of her heart fluttering  She is on metoprolol and took it this morning, didn't seem to make it worse or better  She is just wondering if there is something she should do or if its a serious concern  She said her heart rate fluctuates from 100 to 70 bpm when she checks it on her monitor at home

## 2019-07-22 ENCOUNTER — TELEPHONE (OUTPATIENT)
Dept: SLEEP CENTER | Facility: CLINIC | Age: 49
End: 2019-07-22

## 2019-07-22 NOTE — TELEPHONE ENCOUNTER
----- Message from Satish Brandt MD sent at 7/20/2019  9:05 AM EDT -----  Approved  ----- Message -----  From: Marly Huerta MA  Sent: 7/19/2019  11:53 AM EDT  To: Sleep Medicine [place] Provider    This sleep study needs approval      If approved please sign and return to clerical pool  If denied please include reasons why  Also provide alternative testing if warranted  Please sign and return to clerical pool

## 2019-07-23 ENCOUNTER — TRANSCRIBE ORDERS (OUTPATIENT)
Dept: ADMINISTRATIVE | Facility: HOSPITAL | Age: 49
End: 2019-07-23

## 2019-07-23 ENCOUNTER — APPOINTMENT (OUTPATIENT)
Dept: RADIOLOGY | Facility: MEDICAL CENTER | Age: 49
End: 2019-07-23
Payer: COMMERCIAL

## 2019-07-23 DIAGNOSIS — R06.02 SHORTNESS OF BREATH: Primary | ICD-10-CM

## 2019-07-23 DIAGNOSIS — R05.9 COUGH: ICD-10-CM

## 2019-07-23 DIAGNOSIS — R06.02 SHORTNESS OF BREATH: ICD-10-CM

## 2019-07-23 PROCEDURE — 71046 X-RAY EXAM CHEST 2 VIEWS: CPT

## 2019-07-26 DIAGNOSIS — I48.92 ATRIAL FLUTTER, UNSPECIFIED TYPE (HCC): ICD-10-CM

## 2019-07-26 DIAGNOSIS — R00.2 PALPITATIONS: Primary | ICD-10-CM

## 2019-07-26 PROCEDURE — 93227 XTRNL ECG REC<48 HR R&I: CPT | Performed by: INTERNAL MEDICINE

## 2019-07-26 RX ORDER — DILTIAZEM HYDROCHLORIDE 120 MG/1
120 CAPSULE, EXTENDED RELEASE ORAL DAILY
Qty: 30 CAPSULE | Refills: 5 | Status: SHIPPED | OUTPATIENT
Start: 2019-07-26 | End: 2019-09-06 | Stop reason: SDUPTHER

## 2019-07-31 DIAGNOSIS — J20.9 BRONCHOSPASM WITH BRONCHITIS, ACUTE: ICD-10-CM

## 2019-07-31 RX ORDER — ALBUTEROL SULFATE 2.5 MG/3ML
2.5 SOLUTION RESPIRATORY (INHALATION) EVERY 4 HOURS PRN
Qty: 75 ML | Refills: 5 | Status: SHIPPED | OUTPATIENT
Start: 2019-07-31 | End: 2020-07-30

## 2019-08-06 ENCOUNTER — APPOINTMENT (EMERGENCY)
Dept: CT IMAGING | Facility: HOSPITAL | Age: 49
End: 2019-08-06
Payer: COMMERCIAL

## 2019-08-06 ENCOUNTER — HOSPITAL ENCOUNTER (EMERGENCY)
Facility: HOSPITAL | Age: 49
Discharge: HOME | End: 2019-08-06
Attending: EMERGENCY MEDICINE
Payer: COMMERCIAL

## 2019-08-06 ENCOUNTER — APPOINTMENT (EMERGENCY)
Dept: RADIOLOGY | Facility: HOSPITAL | Age: 49
End: 2019-08-06
Payer: COMMERCIAL

## 2019-08-06 ENCOUNTER — TRANSCRIBE ORDERS (OUTPATIENT)
Dept: ADMINISTRATIVE | Facility: HOSPITAL | Age: 49
End: 2019-08-06

## 2019-08-06 VITALS
BODY MASS INDEX: 44.07 KG/M2 | SYSTOLIC BLOOD PRESSURE: 107 MMHG | HEIGHT: 68 IN | DIASTOLIC BLOOD PRESSURE: 61 MMHG | WEIGHT: 290.79 LBS | OXYGEN SATURATION: 93 % | HEART RATE: 74 BPM | TEMPERATURE: 97.9 F | RESPIRATION RATE: 20 BRPM

## 2019-08-06 DIAGNOSIS — R06.83 SNORING: Primary | ICD-10-CM

## 2019-08-06 DIAGNOSIS — R91.8 MASS OF LEFT LUNG: ICD-10-CM

## 2019-08-06 DIAGNOSIS — I48.92 ATRIAL FLUTTER WITH RAPID VENTRICULAR RESPONSE (HCC): Primary | ICD-10-CM

## 2019-08-06 LAB
ALBUMIN SERPL BCP-MCNC: 3.2 G/DL (ref 3.5–5)
ALP SERPL-CCNC: 119 U/L (ref 46–116)
ALT SERPL W P-5'-P-CCNC: 10 U/L (ref 12–78)
ANION GAP SERPL CALCULATED.3IONS-SCNC: 11 MMOL/L (ref 4–13)
APTT PPP: 28 SECONDS (ref 23–37)
AST SERPL W P-5'-P-CCNC: 11 U/L (ref 5–45)
BACTERIA UR QL AUTO: NORMAL /HPF
BASOPHILS # BLD AUTO: 0.1 THOUSANDS/ΜL (ref 0–0.1)
BASOPHILS NFR BLD AUTO: 1 % (ref 0–1)
BILIRUB SERPL-MCNC: 0.5 MG/DL (ref 0.2–1)
BILIRUB UR QL STRIP: NEGATIVE
BUN SERPL-MCNC: 15 MG/DL (ref 5–25)
CALCIUM SERPL-MCNC: 9 MG/DL (ref 8.3–10.1)
CHLORIDE SERPL-SCNC: 105 MMOL/L (ref 100–108)
CLARITY UR: ABNORMAL
CO2 SERPL-SCNC: 25 MMOL/L (ref 21–32)
COLOR UR: YELLOW
CREAT SERPL-MCNC: 1.07 MG/DL (ref 0.6–1.3)
EOSINOPHIL # BLD AUTO: 0.24 THOUSAND/ΜL (ref 0–0.61)
EOSINOPHIL NFR BLD AUTO: 2 % (ref 0–6)
ERYTHROCYTE [DISTWIDTH] IN BLOOD BY AUTOMATED COUNT: 14.2 % (ref 11.6–15.1)
GFR SERPL CREATININE-BSD FRML MDRD: 61 ML/MIN/1.73SQ M
GLUCOSE SERPL-MCNC: 157 MG/DL (ref 65–140)
GLUCOSE UR STRIP-MCNC: NEGATIVE MG/DL
HCT VFR BLD AUTO: 40.6 % (ref 34.8–46.1)
HGB BLD-MCNC: 12.7 G/DL (ref 11.5–15.4)
HGB UR QL STRIP.AUTO: ABNORMAL
IMM GRANULOCYTES # BLD AUTO: 0.13 THOUSAND/UL (ref 0–0.2)
IMM GRANULOCYTES NFR BLD AUTO: 1 % (ref 0–2)
INR PPP: 1 (ref 0.84–1.19)
KETONES UR STRIP-MCNC: NEGATIVE MG/DL
LACTATE SERPL-SCNC: 2.2 MMOL/L (ref 0.5–2)
LEUKOCYTE ESTERASE UR QL STRIP: NEGATIVE
LIPASE SERPL-CCNC: 155 U/L (ref 73–393)
LYMPHOCYTES # BLD AUTO: 0.92 THOUSANDS/ΜL (ref 0.6–4.47)
LYMPHOCYTES NFR BLD AUTO: 7 % (ref 14–44)
MAGNESIUM SERPL-MCNC: 1.9 MG/DL (ref 1.6–2.6)
MCH RBC QN AUTO: 30.2 PG (ref 26.8–34.3)
MCHC RBC AUTO-ENTMCNC: 31.3 G/DL (ref 31.4–37.4)
MCV RBC AUTO: 96 FL (ref 82–98)
MONOCYTES # BLD AUTO: 1 THOUSAND/ΜL (ref 0.17–1.22)
MONOCYTES NFR BLD AUTO: 8 % (ref 4–12)
NEUTROPHILS # BLD AUTO: 10.23 THOUSANDS/ΜL (ref 1.85–7.62)
NEUTS SEG NFR BLD AUTO: 81 % (ref 43–75)
NITRITE UR QL STRIP: NEGATIVE
NON-SQ EPI CELLS URNS QL MICRO: NORMAL /HPF
NRBC BLD AUTO-RTO: 0 /100 WBCS
PH UR STRIP.AUTO: 6.5 [PH]
PLATELET # BLD AUTO: 213 THOUSANDS/UL (ref 149–390)
PMV BLD AUTO: 11.5 FL (ref 8.9–12.7)
POTASSIUM SERPL-SCNC: 3.9 MMOL/L (ref 3.5–5.3)
PROT SERPL-MCNC: 6.9 G/DL (ref 6.4–8.2)
PROT UR STRIP-MCNC: NEGATIVE MG/DL
PROTHROMBIN TIME: 13.2 SECONDS (ref 11.6–14.5)
RBC # BLD AUTO: 4.21 MILLION/UL (ref 3.81–5.12)
RBC #/AREA URNS AUTO: NORMAL /HPF
SODIUM SERPL-SCNC: 141 MMOL/L (ref 136–145)
SP GR UR STRIP.AUTO: <=1.005 (ref 1–1.03)
TROPONIN I SERPL-MCNC: <0.02 NG/ML
UROBILINOGEN UR QL STRIP.AUTO: 0.2 E.U./DL
WBC # BLD AUTO: 12.62 THOUSAND/UL (ref 4.31–10.16)
WBC #/AREA URNS AUTO: NORMAL /HPF

## 2019-08-06 PROCEDURE — 83690 ASSAY OF LIPASE: CPT | Performed by: EMERGENCY MEDICINE

## 2019-08-06 PROCEDURE — 80053 COMPREHEN METABOLIC PANEL: CPT | Performed by: EMERGENCY MEDICINE

## 2019-08-06 PROCEDURE — 99285 EMERGENCY DEPT VISIT HI MDM: CPT

## 2019-08-06 PROCEDURE — 93005 ELECTROCARDIOGRAM TRACING: CPT

## 2019-08-06 PROCEDURE — 85730 THROMBOPLASTIN TIME PARTIAL: CPT | Performed by: EMERGENCY MEDICINE

## 2019-08-06 PROCEDURE — NC001 PR NO CHARGE: Performed by: PHYSICIAN ASSISTANT

## 2019-08-06 PROCEDURE — 81001 URINALYSIS AUTO W/SCOPE: CPT | Performed by: EMERGENCY MEDICINE

## 2019-08-06 PROCEDURE — 96365 THER/PROPH/DIAG IV INF INIT: CPT

## 2019-08-06 PROCEDURE — 84484 ASSAY OF TROPONIN QUANT: CPT | Performed by: EMERGENCY MEDICINE

## 2019-08-06 PROCEDURE — 99285 EMERGENCY DEPT VISIT HI MDM: CPT | Performed by: EMERGENCY MEDICINE

## 2019-08-06 PROCEDURE — 83605 ASSAY OF LACTIC ACID: CPT | Performed by: EMERGENCY MEDICINE

## 2019-08-06 PROCEDURE — 85610 PROTHROMBIN TIME: CPT | Performed by: EMERGENCY MEDICINE

## 2019-08-06 PROCEDURE — 85025 COMPLETE CBC W/AUTO DIFF WBC: CPT | Performed by: EMERGENCY MEDICINE

## 2019-08-06 PROCEDURE — 96366 THER/PROPH/DIAG IV INF ADDON: CPT

## 2019-08-06 PROCEDURE — 71045 X-RAY EXAM CHEST 1 VIEW: CPT

## 2019-08-06 PROCEDURE — 83735 ASSAY OF MAGNESIUM: CPT | Performed by: EMERGENCY MEDICINE

## 2019-08-06 RX ORDER — CEFTRIAXONE 1 G/50ML
1000 INJECTION, SOLUTION INTRAVENOUS ONCE
Status: DISCONTINUED | OUTPATIENT
Start: 2019-08-06 | End: 2019-08-06

## 2019-08-06 RX ORDER — AZITHROMYCIN 250 MG/1
500 TABLET, FILM COATED ORAL ONCE
Status: DISCONTINUED | OUTPATIENT
Start: 2019-08-06 | End: 2019-08-06

## 2019-08-06 RX ADMIN — DILTIAZEM HYDROCHLORIDE 5 MG/HR: 5 INJECTION INTRAVENOUS at 11:39

## 2019-08-06 RX ADMIN — SODIUM CHLORIDE 1000 ML: 0.9 INJECTION, SOLUTION INTRAVENOUS at 11:26

## 2019-08-06 NOTE — ED PROVIDER NOTES
History  Chief Complaint   Patient presents with    Atrial Flutter     Just got done eating and took the dog out and started feeling fluttering in her chest about 20 min ago  Does get chest fluttering, but goes away  Today it didnt  Prior to Admission Medications   Prescriptions Last Dose Informant Patient Reported? Taking? Ibrutinib (IMBRUVICA) 560 MG TABS   Yes No   Sig: Take 560 mg by mouth daily    LORazepam (ATIVAN) 0 5 mg tablet  Self Yes No   Sig: Take 0 5 mg by mouth every 8 (eight) hours as needed    MAGNESIUM OXIDE PO  Self Yes No   Sig: Take 400 mg by mouth daily    Multiple Vitamin (MULTIVITAMIN) tablet  Self Yes No   Sig: Take 2 tablets by mouth 2 (two) times a day    Zinc 50 MG CAPS  Self Yes No   Sig: Take 1 capsule by mouth every morning    acetaminophen (TYLENOL) 325 mg tablet  Self Yes No   Sig: Take 650 mg by mouth every 4 (four) hours as needed    albuterol (2 5 mg/3 mL) 0 083 % nebulizer solution   No No   Sig: Take 1 vial (2 5 mg total) by nebulization every 4 (four) hours as needed for wheezing   albuterol (PROVENTIL HFA,VENTOLIN HFA) 90 mcg/act inhaler  Self No No   Sig: Inhale 2 puffs every 4 (four) hours as needed for wheezing   b complex vitamins tablet  Self Yes No   Sig: Take 1 tablet by mouth 2 (two) times a day    benzonatate (TESSALON PERLES) 100 mg capsule   No No   Sig: Take 1 capsule (100 mg total) by mouth 3 (three) times a day as needed for cough   cetirizine (ZyrTEC) 10 mg tablet   Yes No   Sig: Take 10 mg by mouth daily   cholecalciferol (VITAMIN D3) 1,000 units tablet  Self Yes No   Sig: Take 1,000 Units by mouth every morning    diltiazem (TIAZAC) 120 MG 24 hr capsule   No No   Sig: Take 1 capsule (120 mg total) by mouth daily   fluticasone-vilanterol (BREO ELLIPTA) 100-25 mcg/inh inhaler   Yes No   Sig: Inhale 1 puff every morning Rinse mouth after use     ipratropium-albuterol (DUO-NEB) 0 5-2 5 mg/3 mL nebulizer solution   No No   Sig: Take 1 vial (3 mL total) by nebulization every 6 (six) hours   levothyroxine 75 mcg tablet  Self No No   Sig: TAKE 1 TABLET BY MOUTH  DAILY   Patient taking differently: TAKE 1 TABLET BY MOUTH  every morning   loratadine (CLARITIN) 10 mg tablet  Self Yes No   Sig: Take 10 mg by mouth as needed    metoprolol tartrate (LOPRESSOR) 100 mg tablet   Yes No   Sig: Take 100 mg by mouth 2 (two) times a day   omeprazole (PriLOSEC) 10 mg delayed release capsule   Yes No   Sig: Take 10 mg by mouth   ondansetron (ZOFRAN) 8 mg tablet  Self Yes No   Sig: Take 8 mg by mouth every 8 (eight) hours as needed for nausea or vomiting (after chemo)    prochlorperazine (COMPAZINE) 10 mg tablet   Yes No   Sig: Take 10 mg by mouth every 6 (six) hours as needed   rivaroxaban (XARELTO) 20 mg tablet  Self Yes No   Sig: Take 20 mg by mouth daily with dinner     senna-docusate sodium (SENOKOT-S) 8 6-50 mg per tablet  Self Yes No   Sig: Take 1 tablet by mouth 2 (two) times a day as needed for constipation (as needed during chemo tx)       Facility-Administered Medications: None       Past Medical History:   Diagnosis Date    Allergic rhinitis     last assessed 04/06/16    Arthritis     b/l feet    Chronic allergic conjunctivitis     Fluttering heart     takes magnesium for same   EKG OK    Fluttering sensation of heart     "periodically, not often since on magnesium"    Foot pain, left     Full dentures     upper    History of cancer chemotherapy 2016    History of dilation and curettage     Hx of tonsillectomy     Hypothyroid     Irregular heart beat     Lymph nodes enlarged     in chest pushing on her bronchioles necessitating inhalers    Neck mass     R neck mass-excised 3/15/2016,, 4/23/18 noted enlarged lymph node right neck-bx today 4/26/2018    Non Hodgkin's lymphoma ([address] )     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    Obese     Port-A-Cath in place 2016    pt reports 'Has it flushed q 4weeks" right chest wall    Post-menopausal     since chemo 4/2016  no menses  Pulmonary embolism ([facility] [address] )     last assessed 10/31/16 attributed to Non-Hodgkins hx    Pulmonary embolism on right [facility]) 7/12/2018    Last Assessment & Plan:  She will need to hold her Xarelto 2 days prior to her procedure and may resume therapy 1-2 days post procedure (depending on whether she is producing blood tinged sputum or not)      Shortness of breath     due to chest tumor    Tachycardia     awaiting cardiology eval,,,4/23/18 "pt reports saw cardiologist at that time and placed on magnesium and "hardly notices it"    Tinnitus     occas    Wears glasses     Wears partial dentures     /lower    [place] teeth extracted        Past Surgical History:   Procedure Laterality Date    COLONOSCOPY      COLONOSCOPY N/A 7/26/2018    Procedure: COLONOSCOPY with multiple bx;  Surgeon: Aris Espinal MD;  Location: [address] [place] [address] MAIN OR;  Service: Gastroenterology    CYST REMOVAL Left     L  neck    DILATION AND CURETTAGE OF UTERUS      ESOPHAGOGASTRODUODENOSCOPY N/A 7/26/2018    Procedure: ESOPHAGOGASTRODUODENOSCOPY (EGD) with multiple bx;  Surgeon: Aris Espinal MD;  Location: [facility] MAIN OR;  Service: Gastroenterology    FACIAL/NECK BIOPSY N/A 4/26/2018    Procedure: OPEN DEEP CERVICAL LYMPH NODE EXCISOION/BIOPSY;  Surgeon: Samantha Nunez MD;  Location: [facility] Main OR;  Service: ENT    LYMPHADENECTOMY      PORTACATH PLACEMENT      [place] BX/REMV,LYMPH NODE,DEEP CERV N/A 3/15/2016    Procedure: DISSECTION [facility] NODE NECK/DEEP NECK MASS ;  Surgeon: Karin Amaya DO;  Location: [facility] Main OR;  Service: ENT    TONSILLECTOMY      TUBAL LIGATION      TUNNELED VENOUS PORT PLACEMENT Right 3/21/2019    Procedure: INSERTION VENOUS PORT (PORT-A-CATH) remove and replace;  Surgeon: Raeford Alpers, MD;  Location: [facility] MAIN OR;  Service: General       Family History   Problem Relation Age of Onset    Coronary artery disease Father     Diabetes Brother     Diabetes Family     Heart disease Family      I have reviewed and agree with the history as documented  Social History     Tobacco Use    Smoking status: Former Smoker     Packs/day: 1 50     Years: 11 00     Pack years: 16 50     Last attempt to quit:      Years since quittin 6    Smokeless tobacco: Never Used   Substance Use Topics    Alcohol use: Not Currently    Drug use: No        Review of Systems    Physical Exam  Physical Exam    Vital Signs  ED Triage Vitals [19 1109]   Temperature Pulse Respirations Blood Pressure SpO2   97 9 °F (36 6 °C) (!) 146 20 124/66 95 %      Temp Source Heart Rate Source Patient Position - Orthostatic VS BP Location FiO2 (%)   Temporal Monitor Sitting Right arm --      Pain Score       No Pain           Vitals:    19 1109   BP: 124/66   Pulse: (!) 146   Patient Position - Orthostatic VS: Sitting         Visual Acuity      ED Medications  Medications   diltiazem (CARDIZEM) 125 mg in sodium chloride 0 9 % 125 mL infusion (has no administration in time range)       Diagnostic Studies  Results Reviewed     Procedure Component Value Units Date/Time    CBC and differential [[id number]]     Lab Status:  No result Specimen:  Blood                  No orders to display              Procedures  Procedures       ED Course  ED Course as of Aug 06 [address] [place] [address] Aug 06, 2019   1113 Previous records reviewed, was seen by cardiology, Dr Mario Ortez, on 19  Pt seen also by attending Dr Megan Stewart who placed orders and will be seeing patient  Please disregard this note  MDM    Disposition  Final diagnoses:   None     ED Disposition     None      Follow-up Information    None         Patient's Medications   Discharge Prescriptions    No medications on file     No discharge procedures on file      ED Provider  Electronically Signed by           Simone Smith PA-C  [id number]

## 2019-08-06 NOTE — ED NOTES
Per Dr Roya Dially will be at a continuous drip of 5 mg/hour     Ry Servin RN  08/06/19 Renzo Delgado RN  08/06/19 2610

## 2019-08-06 NOTE — ED PROVIDER NOTES
History  Chief Complaint   Patient presents with    Atrial Flutter     Just got done eating and took the dog out and started feeling fluttering in her chest about 20 min ago  Does get chest fluttering, but goes away  Today it didnt  Palpitations   Palpitations quality:  Irregular  Onset quality:  Sudden  Timing:  Constant  Progression:  Unchanged  Chronicity:  Recurrent  Relieved by:  Nothing  Worsened by:  Nothing  Ineffective treatments:  Breathing exercises  Associated symptoms: no back pain, no chest pain, no cough, no diaphoresis, no dizziness, no nausea, no shortness of breath, no vomiting and no weakness    Risk factors: hx of PE             Pt presents from home, history of lymphoma and PE, on experimental cancer treatment, presents with heart palpitations  Pt is taking her meds as prescribed  Symptoms are worse with exertion and improved with rest   Pt o/w denies any symptoms  Pt denies ha, fevers, cough, cp, sob, n/v/d/c, abd pain, dysuria, focal def or syncope  Prior to Admission Medications   Prescriptions Last Dose Informant Patient Reported? Taking?    Ibrutinib (IMBRUVICA) 560 MG TABS   Yes Yes   Sig: Take 560 mg by mouth daily    LORazepam (ATIVAN) 0 5 mg tablet  Self Yes Yes   Sig: Take 0 5 mg by mouth every 8 (eight) hours as needed    MAGNESIUM OXIDE PO  Self Yes Yes   Sig: Take 400 mg by mouth daily    Multiple Vitamin (MULTIVITAMIN) tablet  Self Yes Yes   Sig: Take 2 tablets by mouth 2 (two) times a day    Zinc 50 MG CAPS  Self Yes Yes   Sig: Take 1 capsule by mouth every morning    acetaminophen (TYLENOL) 325 mg tablet  Self Yes Yes   Sig: Take 650 mg by mouth every 4 (four) hours as needed    albuterol (2 5 mg/3 mL) 0 083 % nebulizer solution   No Yes   Sig: Take 1 vial (2 5 mg total) by nebulization every 4 (four) hours as needed for wheezing   albuterol (PROVENTIL HFA,VENTOLIN HFA) 90 mcg/act inhaler  Self No Yes   Sig: Inhale 2 puffs every 4 (four) hours as needed for wheezing b complex vitamins tablet  Self Yes Yes   Sig: Take 1 tablet by mouth 2 (two) times a day    benzonatate (TESSALON PERLES) 100 mg capsule   No Yes   Sig: Take 1 capsule (100 mg total) by mouth 3 (three) times a day as needed for cough   cetirizine (ZyrTEC) 10 mg tablet   Yes Yes   Sig: Take 10 mg by mouth daily   cholecalciferol (VITAMIN D3) 1,000 units tablet  Self Yes Yes   Sig: Take 1,000 Units by mouth every morning    diltiazem (TIAZAC) 120 MG 24 hr capsule   No Yes   Sig: Take 1 capsule (120 mg total) by mouth daily   fluticasone-vilanterol (BREO ELLIPTA) 100-25 mcg/inh inhaler   Yes Yes   Sig: Inhale 1 puff every morning Rinse mouth after use     ipratropium-albuterol (DUO-NEB) 0 5-2 5 mg/3 mL nebulizer solution   No Yes   Sig: Take 1 vial (3 mL total) by nebulization every 6 (six) hours   levothyroxine 75 mcg tablet  Self No Yes   Sig: TAKE 1 TABLET BY MOUTH  DAILY   Patient taking differently: TAKE 1 TABLET BY MOUTH  every morning   loratadine (CLARITIN) 10 mg tablet  Self Yes Yes   Sig: Take 10 mg by mouth as needed    metoprolol tartrate (LOPRESSOR) 100 mg tablet   Yes Yes   Sig: Take 100 mg by mouth 2 (two) times a day   omeprazole (PriLOSEC) 10 mg delayed release capsule   Yes Yes   Sig: Take 10 mg by mouth   ondansetron (ZOFRAN) 8 mg tablet  Self Yes Yes   Sig: Take 8 mg by mouth every 8 (eight) hours as needed for nausea or vomiting (after chemo)    prochlorperazine (COMPAZINE) 10 mg tablet   Yes Yes   Sig: Take 10 mg by mouth every 6 (six) hours as needed   rivaroxaban (XARELTO) 20 mg tablet  Self Yes Yes   Sig: Take 20 mg by mouth daily with dinner     senna-docusate sodium (SENOKOT-S) 8 6-50 mg per tablet  Self Yes Yes   Sig: Take 1 tablet by mouth 2 (two) times a day as needed for constipation (as needed during chemo tx)       Facility-Administered Medications: None       Past Medical History:   Diagnosis Date    Allergic rhinitis     last assessed 04/06/16    Arthritis     b/l feet    Chronic allergic conjunctivitis     Fluttering heart     takes magnesium for same   EKG OK    Fluttering sensation of heart     "periodically, not often since on magnesium"    Foot pain, left     Full dentures     upper    History of cancer chemotherapy 2016    History of dilation and curettage     Hx of tonsillectomy     Hypothyroid     Irregular heart beat     Lymph nodes enlarged     in chest pushing on her bronchioles necessitating inhalers    Neck mass     R neck mass-excised 3/15/2016,, 4/23/18 noted enlarged lymph node right neck-bx today 4/26/2018    Non Hodgkin's lymphoma ([address] )     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    Obese     Port-A-Cath in place 2016    pt reports 'Has it flushed q 4weeks" right chest wall    Post-menopausal     since chemo 4/2016  no menses    Pulmonary embolism ([address] )     last assessed 10/31/16 attributed to Non-Hodgkins hx    Pulmonary embolism on right ([address] ) 7/12/2018    Last Assessment & Plan:  She will need to hold her Xarelto 2 days prior to her procedure and may resume therapy 1-2 days post procedure (depending on whether she is producing blood tinged sputum or not)      Shortness of breath     due to chest tumor    Tachycardia     awaiting cardiology eval,,,4/23/18 "pt reports saw cardiologist at that time and placed on magnesium and "hardly notices it"    Tinnitus     occas    Wears glasses     Wears partial dentures     /lower    [place] teeth extracted        Past Surgical History:   Procedure Laterality Date    COLONOSCOPY      COLONOSCOPY N/A 7/26/2018    Procedure: COLONOSCOPY with multiple bx;  Surgeon: Aris Espinal MD;  Location: [facility] MAIN OR;  Service: Gastroenterology    CYST REMOVAL Left     L  neck    DILATION AND CURETTAGE OF UTERUS      ESOPHAGOGASTRODUODENOSCOPY N/A 7/26/2018    Procedure: ESOPHAGOGASTRODUODENOSCOPY (EGD) with multiple bx;  Surgeon: Aris Espinal MD;  Location: [facility] MAIN OR;  Service: Gastroenterology    FACIAL/NECK BIOPSY N/A 2018    Procedure: OPEN DEEP CERVICAL LYMPH NODE EXCISOION/BIOPSY;  Surgeon: Ildefonso Hunter MD;  Location: [facility];  Service: ENT    LYMPHADENECTOMY      PORTACATH PLACEMENT      SC BX/REMV,LYMPH NODE,DEEP CERV N/A 3/15/2016    Procedure: DISSECTION [facility] NODE NECK/DEEP NECK MASS ;  Surgeon: Anitra Curry DO;  Location: [facility];  Service: ENT    TONSILLECTOMY      TUBAL LIGATION      TUNNELED VENOUS PORT PLACEMENT Right 3/21/2019    Procedure: INSERTION VENOUS PORT (PORT-A-CATH) remove and replace;  Surgeon: Mateusz Coronel MD;  Location: [address] [place], [place] [address] [place] MAIN OR;  Service: General       Family History   Problem Relation Age of Onset    Coronary artery disease Father     Diabetes Brother     Diabetes Family     Heart disease Family      I have reviewed and agree with the history as documented  Social History     Tobacco Use    Smoking status: Former Smoker     Packs/day: 1 50     Years: 11 00     Pack years: 16 50     Last attempt to quit:      Years since quittin 6    Smokeless tobacco: Never Used   Substance Use Topics    Alcohol use: Not Currently    Drug use: No        Review of Systems   Constitutional: Negative for activity change, appetite change, diaphoresis, fatigue and fever  HENT: Negative for congestion, facial swelling, mouth sores and trouble swallowing  Eyes: Negative for photophobia, discharge and visual disturbance  Respiratory: Negative for apnea, cough, shortness of breath and wheezing  Cardiovascular: Positive for palpitations  Negative for chest pain and leg swelling  Gastrointestinal: Negative for abdominal pain, constipation, diarrhea, nausea and vomiting  Endocrine: Negative for heat intolerance and polydipsia  Genitourinary: Negative for dysuria, flank pain, frequency and hematuria  Musculoskeletal: Negative for back pain, gait problem, myalgias and neck pain  Skin: Negative for rash and wound     Allergic/Immunologic: Negative for immunocompromised state  Neurological: Negative for dizziness, syncope, weakness, light-headedness and headaches  Hematological: Negative for adenopathy  Psychiatric/Behavioral: Negative for agitation, confusion and self-injury  The patient is not nervous/anxious  Physical Exam  Physical Exam   Constitutional: She is oriented to person, place, and time  She appears well-developed and well-nourished  No distress  HENT:   Head: Normocephalic and atraumatic  Right Ear: External ear normal    Left Ear: External ear normal    Nose: Nose normal    Mouth/Throat: Oropharynx is clear and moist  No oropharyngeal exudate  Eyes: Pupils are equal, round, and reactive to light  Conjunctivae and EOM are normal  Right eye exhibits no discharge  Left eye exhibits no discharge  No scleral icterus  Neck: Normal range of motion  Neck supple  No JVD present  No tracheal deviation present  No thyromegaly present  Cardiovascular: Normal heart sounds  An irregularly irregular rhythm present  Tachycardia present  No murmur heard  Pulmonary/Chest: Effort normal and breath sounds normal  No stridor  No respiratory distress  She has no wheezes  She has no rales  She exhibits no tenderness  Abdominal: Soft  Bowel sounds are normal  She exhibits no distension and no mass  There is no tenderness  There is no rebound and no guarding  Musculoskeletal: Normal range of motion  She exhibits no edema, tenderness or deformity  Lymphadenopathy:     She has no cervical adenopathy  Neurological: She is alert and oriented to person, place, and time  She has normal reflexes  She displays normal reflexes  No cranial nerve deficit  She exhibits normal muscle tone  Coordination normal    Skin: Skin is warm and dry  No rash noted  She is not diaphoretic  No erythema  No pallor  Psychiatric: She has a normal mood and affect   Her behavior is normal  Judgment and thought content normal    Nursing note and vitals reviewed  Vital Signs  ED Triage Vitals [08/06/19 1109]   Temperature Pulse Respirations Blood Pressure SpO2   97 9 °F (36 6 °C) (!) 146 20 124/66 95 %      Temp Source Heart Rate Source Patient Position - Orthostatic VS BP Location FiO2 (%)   Temporal Monitor Sitting Right arm --      Pain Score       No Pain           Vitals:    08/06/19 1230 08/06/19 1300 08/06/19 1400 08/06/19 1500   BP: 95/51 116/62 123/56 107/61   Pulse: 95 102 75 74   Patient Position - Orthostatic VS: Sitting Sitting Sitting Sitting         Visual Acuity      ED Medications  Medications   sodium chloride 0 9 % bolus 1,000 mL (0 mL Intravenous Stopped 8/6/19 1521)       Diagnostic Studies  Results Reviewed     Procedure Component Value Units Date/Time    Lactic acid, plasma [[id number]]  (Abnormal) Collected:  08/06/19 1124    Lab Status:  Final result Specimen:  Blood from Arm, Right Updated:  08/06/19 1202     LACTIC ACID 2 2 mmol/L     Narrative:       Result may be elevated if tourniquet was used during collection      Comprehensive metabolic panel [[id number]]  (Abnormal) Collected:  08/06/19 1124    Lab Status:  Final result Specimen:  Blood from Arm, Right Updated:  08/06/19 1157     Sodium 141 mmol/L      Potassium 3 9 mmol/L      Chloride 105 mmol/L      CO2 25 mmol/L      ANION GAP 11 mmol/L      BUN 15 mg/dL      Creatinine 1 07 mg/dL      Glucose 157 mg/dL      Calcium 9 0 mg/dL      AST 11 U/L      ALT 10 U/L      Alkaline Phosphatase 119 U/L      Total Protein 6 9 g/dL      Albumin 3 2 g/dL      Total Bilirubin 0 50 mg/dL      eGFR 61 ml/min/1 73sq m     Narrative:       [facility] guidelines for Chronic Kidney Disease (CKD):     Stage 1 with normal or high GFR (GFR > 90 mL/min/1 73 square meters)    Stage 2 Mild CKD (GFR = 60-89 mL/min/1 73 square meters)    Stage 3A Moderate CKD (GFR = 45-59 mL/min/1 73 square meters)    Stage 3B Moderate CKD (GFR = 30-44 mL/min/1 73 square meters)    Stage 4 Severe CKD (GFR = 15-29 mL/min/1 73 square meters)    Stage 5 End Stage CKD (GFR <15 mL/min/1 73 square meters)  Note: GFR calculation is accurate only with a steady state creatinine    Urine Microscopic [[id number]]  (Normal) Collected:  08/06/19 1132    Lab Status:  Final result Specimen:  Urine, Clean Catch Updated:  08/06/19 1153     RBC, UA None Seen /hpf      WBC, UA None Seen /hpf      Epithelial Cells Occasional /hpf      Bacteria, UA None Seen /hpf     Troponin I [[id number]]  (Normal) Collected:  08/06/19 1124    Lab Status:  Final result Specimen:  Blood from Arm, Right Updated:  08/06/19 1149     Troponin I <0 02 ng/mL     Protime-INR [[id number]]  (Normal) Collected:  08/06/19 1124    Lab Status:  Final result Specimen:  Blood from Arm, Right Updated:  08/06/19 1142     Protime 13 2 seconds      INR 1 00    APTT [[id number]]  (Normal) Collected:  08/06/19 1124    Lab Status:  Final result Specimen:  Blood from Arm, Right Updated:  08/06/19 1142     PTT 28 seconds     Lipase [[id number]]  (Normal) Collected:  08/06/19 1124    Lab Status:  Final result Specimen:  Blood from Arm, Right Updated:  08/06/19 1142     Lipase 155 u/L     Magnesium [[id number]]  (Normal) Collected:  08/06/19 1124    Lab Status:  Final result Specimen:  Blood from Arm, Right Updated:  08/06/19 1142     Magnesium 1 9 mg/dL     UA w Reflex to Microscopic w Reflex to Culture [[id number]]  (Abnormal) Collected:  08/06/19 1132    Lab Status:  Final result Specimen:  Urine, Clean Catch Updated:  08/06/19 1140     Color, UA Yellow     Clarity, UA Slightly Cloudy     Specific Gravity, UA <=1 005     pH, UA 6 5     Leukocytes, UA Negative     Nitrite, UA Negative     Protein, UA Negative mg/dl      Glucose, UA Negative mg/dl      Ketones, UA Negative mg/dl      Urobilinogen, UA 0 2 E U /dl      Bilirubin, UA Negative     Blood, UA Trace-Intact    CBC and differential [[id number]]  (Abnormal) Collected:  08/06/19 1124    Lab Status:  Final result Specimen:  Blood from Arm, Right Updated:  08/06/19 1133     WBC 12 62 Thousand/uL      RBC 4 21 Million/uL      Hemoglobin 12 7 g/dL      Hematocrit 40 6 %      MCV 96 fL      MCH 30 2 pg      MCHC 31 3 g/dL      RDW 14 2 %      MPV 11 5 fL      Platelets 526 Thousands/uL      nRBC 0 /100 WBCs      Neutrophils Relative 81 %      Immat GRANS % 1 %      Lymphocytes Relative 7 %      Monocytes Relative 8 %      Eosinophils Relative 2 %      Basophils Relative 1 %      Neutrophils Absolute 10 23 Thousands/µL      Immature Grans Absolute 0 13 Thousand/uL      Lymphocytes Absolute 0 92 Thousands/µL      Monocytes Absolute 1 00 Thousand/µL      Eosinophils Absolute 0 24 Thousand/µL      Basophils Absolute 0 10 Thousands/µL                  XR chest portable   Final Result by Eugenia Nina MD (08/06 1357)      Significant increase in left-sided consolidation with near whiteout of the left lung  Given rate of increased from prior, finding is most suggestive of superimposed pneumonia rather than rapid expansion of known left perihilar neoplasm  Workstation performed: [id number]                    Procedures  Procedures       ED Course                         Wells' Criteria for PE      Most Recent Value   Wells' Criteria for PE   Clinical signs and symptoms of DVT  0 Filed at: 08/06/2019 1441   PE is primary diagnosis or equally likely  3 Filed at: 08/06/2019 1441   HR >100  1 5 Filed at: 08/06/2019 1441   Immobilization at least 3 days or Surgery in the previous 4 weeks  0 Filed at: 08/06/2019 1441   Previous, objectively diagnosed PE or DVT  0 Filed at: 08/06/2019 1441   Hemoptysis  0 Filed at: 08/06/2019 1441   Malignancy with treatment within 6 months or palliative  1 Filed at: 08/06/2019 1441   Wells' Criteria Total  5 5 Filed at: 08/06/2019 1441            MDM  Number of Diagnoses or Management Options  Atrial flutter with rapid ventricular response ([address] ):    Mass of left lung:   Diagnosis management comments: IMP: afib/flutter due to dehydration, electrolyte abnormality, medication side affect  Consider CHF, PE, pneumonia  Doubt acs, dissection, tamponade, cva, bacteremia, surgical abd process  Plan: cardiac labs, ekg, cxr, ct chest, give ivf and iv rate control medications prn   - lactic acid 2 2  - cxr shows worsening left sided infiltrate  - ekg shows afib/flutter with RVR  - Pt w/ worsening infiltrate and flutter with conversion back to a sinus rhythm  Pt is requesting to go at this time  She understands that she may have a PE or other life threatening issue, but she wants to go and f/up w/ her pcp    She is instructed to return immediately for any worsening        Amount and/or Complexity of Data Reviewed  Clinical lab tests: ordered and reviewed  Tests in the radiology section of CPT®: ordered and reviewed  Tests in the medicine section of CPT®: ordered and reviewed  Decide to obtain previous medical records or to obtain history from someone other than the patient: yes  Obtain history from someone other than the patient: yes (Pt's )  Review and summarize past medical records: yes  Independent visualization of images, tracings, or specimens: yes    Risk of Complications, Morbidity, and/or Mortality  Presenting problems: high  Diagnostic procedures: high  Management options: high    Patient Progress  Patient progress: improved      Disposition  Final diagnoses:   Atrial flutter with rapid ventricular response ([address] )   Mass of left lung     Time reflects when diagnosis was documented in both MDM as applicable and the Disposition within this note     Time User Action Codes Description Comment    8/6/2019  2:53 PM Candida Landa Add [I48 92] Atrial flutter with rapid ventricular response ([address] )     8/6/2019  2:53 PM Candida Landa Add [R91 8] Mass of left lung       ED Disposition     ED Disposition Condition Date/Time Comment    Discharge Stable Tue Aug 6, 2019 [address] [place] [address] discharge to home/self care              Follow-up Information     Follow up With Specialties Details Why Contact Info    Latrice White DO Family Medicine Schedule an appointment as soon as possible for a visit in 1 day Return immediately, If symptoms worsen [address] [address]  [place] [address]  [phone]            Discharge Medication List as of 8/6/2019  2:54 PM      CONTINUE these medications which have NOT CHANGED    Details   acetaminophen (TYLENOL) 325 mg tablet Take 650 mg by mouth every 4 (four) hours as needed , Historical Med      albuterol (2 5 mg/3 mL) 0 083 % nebulizer solution Take 1 vial (2 5 mg total) by nebulization every 4 (four) hours as needed for wheezing, Starting Wed 7/31/2019, Until Thu 7/30/2020, Normal      albuterol (PROVENTIL HFA,VENTOLIN HFA) 90 mcg/act inhaler Inhale 2 puffs every 4 (four) hours as needed for wheezing, Starting Sun 1/27/2019, Until Mon 1/27/2020, Print      b complex vitamins tablet Take 1 tablet by mouth 2 (two) times a day , Historical Med      benzonatate (TESSALON PERLES) 100 mg capsule Take 1 capsule (100 mg total) by mouth 3 (three) times a day as needed for cough, Starting Thu 5/30/2019, Normal      cetirizine (ZyrTEC) 10 mg tablet Take 10 mg by mouth daily, Historical Med      cholecalciferol (VITAMIN D3) 1,000 units tablet Take 1,000 Units by mouth every morning , Historical Med      diltiazem (TIAZAC) 120 MG 24 hr capsule Take 1 capsule (120 mg total) by mouth daily, Starting Fri 7/26/2019, Normal      fluticasone-vilanterol (BREO ELLIPTA) 100-25 mcg/inh inhaler Inhale 1 puff every morning Rinse mouth after use , Historical Med      Ibrutinib (IMBRUVICA) 560 MG TABS Take 560 mg by mouth daily , Starting Thu 6/27/2019, Historical Med      ipratropium-albuterol (DUO-NEB) 0 5-2 5 mg/3 mL nebulizer solution Take 1 vial (3 mL total) by nebulization every 6 (six) hours, Starting Tue 5/21/2019, Normal      levothyroxine 75 mcg tablet TAKE 1 TABLET BY MOUTH  DAILY, Normal      loratadine (CLARITIN) 10 mg tablet Take 10 mg by mouth as needed , Historical Med      LORazepam (ATIVAN) 0 5 mg tablet Take 0 5 mg by mouth every 8 (eight) hours as needed , Starting Thu 11/8/2018, Historical Med      MAGNESIUM OXIDE PO Take 400 mg by mouth daily , Historical Med      metoprolol tartrate (LOPRESSOR) 100 mg tablet Take 100 mg by mouth 2 (two) times a day, Starting Tue 5/28/2019, Historical Med      Multiple Vitamin (MULTIVITAMIN) tablet Take 2 tablets by mouth 2 (two) times a day , Historical Med      omeprazole (PriLOSEC) 10 mg delayed release capsule Take 10 mg by mouth, Historical Med      ondansetron (ZOFRAN) 8 mg tablet Take 8 mg by mouth every 8 (eight) hours as needed for nausea or vomiting (after chemo) , Starting Fri 11/2/2018, Historical Med      prochlorperazine (COMPAZINE) 10 mg tablet Take 10 mg by mouth every 6 (six) hours as needed, Starting Thu 8/9/2018, Historical Med      rivaroxaban (XARELTO) 20 mg tablet Take 20 mg by mouth daily with dinner  , Starting Tue 1/30/2018, Historical Med      senna-docusate sodium (SENOKOT-S) 8 6-50 mg per tablet Take 1 tablet by mouth 2 (two) times a day as needed for constipation (as needed during chemo tx) , Starting Wed 3/6/2019, Historical Med      Zinc 50 MG CAPS Take 1 capsule by mouth every morning , Historical Med           No discharge procedures on file      ED Provider  Electronically Signed by           Sammy Gonzales DO  08/06/19 0022

## 2019-08-06 NOTE — CASE MANAGEMENT
This ED navigator interviewed the patient about resources that might be available to her  The patient denied needing any help at this time  ED Navigator care management screenin  Who do you live with? , children    2  What kind of house? How many steps into house/upstairs? House, two flights of stairs    3  Any medical equipment? W/C, walker, O2? NO    4  Where do you get O2 from? N/A    5  Home health care? If so who? NO    6  Meals on wheels? NO    7  Who helps with ADL's? Self    8  What pharmacy do you use? [facility], [place]    9  Do you have issues getting your meds? NO    10  Do you drive or who drives you? Patient drives  11  Insurance? [employer] #[email or web address]  12  Did you try to get an appointment with your PCP today?   NO

## 2019-08-07 LAB
ATRIAL RATE: 117 BPM
QRS AXIS: 24 DEGREES
QRSD INTERVAL: 88 MS
QT INTERVAL: 308 MS
QTC INTERVAL: 438 MS
T WAVE AXIS: 2 DEGREES
VENTRICULAR RATE: 122 BPM

## 2019-08-07 PROCEDURE — 93010 ELECTROCARDIOGRAM REPORT: CPT | Performed by: INTERNAL MEDICINE

## 2019-08-22 ENCOUNTER — TRANSCRIBE ORDERS (OUTPATIENT)
Dept: ADMINISTRATIVE | Facility: HOSPITAL | Age: 49
End: 2019-08-22

## 2019-08-22 ENCOUNTER — APPOINTMENT (OUTPATIENT)
Dept: LAB | Facility: MEDICAL CENTER | Age: 49
End: 2019-08-22
Payer: COMMERCIAL

## 2019-08-22 ENCOUNTER — APPOINTMENT (OUTPATIENT)
Dept: RADIOLOGY | Facility: MEDICAL CENTER | Age: 49
End: 2019-08-22
Payer: COMMERCIAL

## 2019-08-22 DIAGNOSIS — C84.41 PERIPHERAL T CELL LYMPHOMA OF LYMPH NODES OF NECK (HCC): ICD-10-CM

## 2019-08-22 DIAGNOSIS — R05.9 COUGH: ICD-10-CM

## 2019-08-22 DIAGNOSIS — R06.02 SHORTNESS OF BREATH: ICD-10-CM

## 2019-08-22 DIAGNOSIS — C83.30 DIFFUSE LARGE B-CELL LYMPHOMA, UNSPECIFIED BODY REGION (HCC): Primary | ICD-10-CM

## 2019-08-22 DIAGNOSIS — Z92.21 PERSONAL HISTORY OF CHEMOTHERAPY: ICD-10-CM

## 2019-08-22 DIAGNOSIS — C85.80 MARGINAL ZONE LYMPHOMA (HCC): ICD-10-CM

## 2019-08-22 DIAGNOSIS — C83.30 DIFFUSE LARGE B-CELL LYMPHOMA, UNSPECIFIED BODY REGION (HCC): ICD-10-CM

## 2019-08-22 LAB
ALBUMIN SERPL BCP-MCNC: 3 G/DL (ref 3.5–5)
ALP SERPL-CCNC: 111 U/L (ref 46–116)
ALT SERPL W P-5'-P-CCNC: 12 U/L (ref 12–78)
ANION GAP SERPL CALCULATED.3IONS-SCNC: 10 MMOL/L (ref 4–13)
AST SERPL W P-5'-P-CCNC: 15 U/L (ref 5–45)
BASOPHILS # BLD AUTO: 0.12 THOUSANDS/ΜL (ref 0–0.1)
BASOPHILS NFR BLD AUTO: 1 % (ref 0–1)
BILIRUB SERPL-MCNC: 0.69 MG/DL (ref 0.2–1)
BUN SERPL-MCNC: 12 MG/DL (ref 5–25)
CALCIUM SERPL-MCNC: 8.8 MG/DL (ref 8.3–10.1)
CHLORIDE SERPL-SCNC: 106 MMOL/L (ref 100–108)
CO2 SERPL-SCNC: 25 MMOL/L (ref 21–32)
CREAT SERPL-MCNC: 0.81 MG/DL (ref 0.6–1.3)
EOSINOPHIL # BLD AUTO: 0.25 THOUSAND/ΜL (ref 0–0.61)
EOSINOPHIL NFR BLD AUTO: 2 % (ref 0–6)
ERYTHROCYTE [DISTWIDTH] IN BLOOD BY AUTOMATED COUNT: 14.6 % (ref 11.6–15.1)
GFR SERPL CREATININE-BSD FRML MDRD: 85 ML/MIN/1.73SQ M
GLUCOSE SERPL-MCNC: 132 MG/DL (ref 65–140)
HCT VFR BLD AUTO: 33.7 % (ref 34.8–46.1)
HGB BLD-MCNC: 10.7 G/DL (ref 11.5–15.4)
IMM GRANULOCYTES # BLD AUTO: 0.15 THOUSAND/UL (ref 0–0.2)
IMM GRANULOCYTES NFR BLD AUTO: 1 % (ref 0–2)
LYMPHOCYTES # BLD AUTO: 0.77 THOUSANDS/ΜL (ref 0.6–4.47)
LYMPHOCYTES NFR BLD AUTO: 7 % (ref 14–44)
MCH RBC QN AUTO: 30.1 PG (ref 26.8–34.3)
MCHC RBC AUTO-ENTMCNC: 31.8 G/DL (ref 31.4–37.4)
MCV RBC AUTO: 95 FL (ref 82–98)
MONOCYTES # BLD AUTO: 0.84 THOUSAND/ΜL (ref 0.17–1.22)
MONOCYTES NFR BLD AUTO: 8 % (ref 4–12)
NEUTROPHILS # BLD AUTO: 9.13 THOUSANDS/ΜL (ref 1.85–7.62)
NEUTS SEG NFR BLD AUTO: 81 % (ref 43–75)
NRBC BLD AUTO-RTO: 0 /100 WBCS
PLATELET # BLD AUTO: 202 THOUSANDS/UL (ref 149–390)
PMV BLD AUTO: 12.7 FL (ref 8.9–12.7)
POTASSIUM SERPL-SCNC: 3.9 MMOL/L (ref 3.5–5.3)
PROT SERPL-MCNC: 6.5 G/DL (ref 6.4–8.2)
RBC # BLD AUTO: 3.55 MILLION/UL (ref 3.81–5.12)
SODIUM SERPL-SCNC: 141 MMOL/L (ref 136–145)
WBC # BLD AUTO: 11.26 THOUSAND/UL (ref 4.31–10.16)

## 2019-08-22 PROCEDURE — 80053 COMPREHEN METABOLIC PANEL: CPT

## 2019-08-22 PROCEDURE — 85025 COMPLETE CBC W/AUTO DIFF WBC: CPT

## 2019-08-22 PROCEDURE — 71046 X-RAY EXAM CHEST 2 VIEWS: CPT

## 2019-08-22 PROCEDURE — 36415 COLL VENOUS BLD VENIPUNCTURE: CPT

## 2019-09-01 ENCOUNTER — HOSPITAL ENCOUNTER (OUTPATIENT)
Dept: SLEEP CENTER | Facility: HOSPITAL | Age: 49
Discharge: HOME | End: 2019-09-01
Payer: COMMERCIAL

## 2019-09-01 DIAGNOSIS — R06.83 SNORING: ICD-10-CM

## 2019-09-01 PROCEDURE — G0399 HOME SLEEP TEST/TYPE 3 PORTA: HCPCS

## 2019-09-02 ENCOUNTER — HOSPITAL ENCOUNTER (EMERGENCY)
Facility: HOSPITAL | Age: 49
Discharge: HOME | End: 2019-09-02
Attending: EMERGENCY MEDICINE | Admitting: EMERGENCY MEDICINE
Payer: COMMERCIAL

## 2019-09-02 ENCOUNTER — APPOINTMENT (EMERGENCY)
Dept: RADIOLOGY | Facility: HOSPITAL | Age: 49
End: 2019-09-02
Payer: COMMERCIAL

## 2019-09-02 ENCOUNTER — APPOINTMENT (EMERGENCY)
Dept: CT IMAGING | Facility: HOSPITAL | Age: 49
End: 2019-09-02
Payer: COMMERCIAL

## 2019-09-02 VITALS
OXYGEN SATURATION: 94 % | BODY MASS INDEX: 44.21 KG/M2 | HEART RATE: 84 BPM | SYSTOLIC BLOOD PRESSURE: 125 MMHG | RESPIRATION RATE: 18 BRPM | TEMPERATURE: 98.6 F | HEIGHT: 68 IN | DIASTOLIC BLOOD PRESSURE: 74 MMHG

## 2019-09-02 DIAGNOSIS — R93.89 ABNORMAL CT OF THE CHEST: ICD-10-CM

## 2019-09-02 DIAGNOSIS — I48.92 ATRIAL FLUTTER WITH RAPID VENTRICULAR RESPONSE (HCC): Primary | ICD-10-CM

## 2019-09-02 LAB
ALBUMIN SERPL BCP-MCNC: 3 G/DL (ref 3.5–5)
ALP SERPL-CCNC: 123 U/L (ref 46–116)
ALT SERPL W P-5'-P-CCNC: 19 U/L (ref 12–78)
ANION GAP SERPL CALCULATED.3IONS-SCNC: 8 MMOL/L (ref 4–13)
AST SERPL W P-5'-P-CCNC: 21 U/L (ref 5–45)
BASOPHILS # BLD AUTO: 0.04 THOUSANDS/ΜL (ref 0–0.1)
BASOPHILS NFR BLD AUTO: 0 % (ref 0–1)
BILIRUB SERPL-MCNC: 0.6 MG/DL (ref 0.2–1)
BUN SERPL-MCNC: 15 MG/DL (ref 5–25)
CALCIUM SERPL-MCNC: 8.5 MG/DL (ref 8.3–10.1)
CHLORIDE SERPL-SCNC: 101 MMOL/L (ref 100–108)
CO2 SERPL-SCNC: 25 MMOL/L (ref 21–32)
CREAT SERPL-MCNC: 1.06 MG/DL (ref 0.6–1.3)
EOSINOPHIL # BLD AUTO: 0.51 THOUSAND/ΜL (ref 0–0.61)
EOSINOPHIL NFR BLD AUTO: 3 % (ref 0–6)
ERYTHROCYTE [DISTWIDTH] IN BLOOD BY AUTOMATED COUNT: 15.2 % (ref 11.6–15.1)
GFR SERPL CREATININE-BSD FRML MDRD: 61 ML/MIN/1.73SQ M
GLUCOSE SERPL-MCNC: 132 MG/DL (ref 65–140)
HCT VFR BLD AUTO: 44.2 % (ref 34.8–46.1)
HGB BLD-MCNC: 14.1 G/DL (ref 11.5–15.4)
IMM GRANULOCYTES # BLD AUTO: 0.25 THOUSAND/UL (ref 0–0.2)
IMM GRANULOCYTES NFR BLD AUTO: 2 % (ref 0–2)
LYMPHOCYTES # BLD AUTO: 0.89 THOUSANDS/ΜL (ref 0.6–4.47)
LYMPHOCYTES NFR BLD AUTO: 6 % (ref 14–44)
MAGNESIUM SERPL-MCNC: 2.2 MG/DL (ref 1.6–2.6)
MCH RBC QN AUTO: 29.6 PG (ref 26.8–34.3)
MCHC RBC AUTO-ENTMCNC: 31.9 G/DL (ref 31.4–37.4)
MCV RBC AUTO: 93 FL (ref 82–98)
MONOCYTES # BLD AUTO: 1.46 THOUSAND/ΜL (ref 0.17–1.22)
MONOCYTES NFR BLD AUTO: 9 % (ref 4–12)
NEUTROPHILS # BLD AUTO: 12.81 THOUSANDS/ΜL (ref 1.85–7.62)
NEUTS SEG NFR BLD AUTO: 80 % (ref 43–75)
NRBC BLD AUTO-RTO: 0 /100 WBCS
PLATELET # BLD AUTO: 218 THOUSANDS/UL (ref 149–390)
PMV BLD AUTO: 10.7 FL (ref 8.9–12.7)
POTASSIUM SERPL-SCNC: 3.9 MMOL/L (ref 3.5–5.3)
PROT SERPL-MCNC: 6.2 G/DL (ref 6.4–8.2)
RBC # BLD AUTO: 4.77 MILLION/UL (ref 3.81–5.12)
SODIUM SERPL-SCNC: 134 MMOL/L (ref 136–145)
TROPONIN I SERPL-MCNC: <0.02 NG/ML
TSH SERPL DL<=0.05 MIU/L-ACNC: 3.08 UIU/ML (ref 0.36–3.74)
WBC # BLD AUTO: 15.96 THOUSAND/UL (ref 4.31–10.16)

## 2019-09-02 PROCEDURE — 93005 ELECTROCARDIOGRAM TRACING: CPT

## 2019-09-02 PROCEDURE — 84484 ASSAY OF TROPONIN QUANT: CPT | Performed by: EMERGENCY MEDICINE

## 2019-09-02 PROCEDURE — 84443 ASSAY THYROID STIM HORMONE: CPT | Performed by: EMERGENCY MEDICINE

## 2019-09-02 PROCEDURE — 83735 ASSAY OF MAGNESIUM: CPT | Performed by: EMERGENCY MEDICINE

## 2019-09-02 PROCEDURE — 80053 COMPREHEN METABOLIC PANEL: CPT | Performed by: EMERGENCY MEDICINE

## 2019-09-02 PROCEDURE — 99285 EMERGENCY DEPT VISIT HI MDM: CPT | Performed by: EMERGENCY MEDICINE

## 2019-09-02 PROCEDURE — 71046 X-RAY EXAM CHEST 2 VIEWS: CPT

## 2019-09-02 PROCEDURE — 71275 CT ANGIOGRAPHY CHEST: CPT

## 2019-09-02 PROCEDURE — 99285 EMERGENCY DEPT VISIT HI MDM: CPT

## 2019-09-02 PROCEDURE — 85025 COMPLETE CBC W/AUTO DIFF WBC: CPT | Performed by: EMERGENCY MEDICINE

## 2019-09-02 RX ADMIN — IOHEXOL 85 ML: 350 INJECTION, SOLUTION INTRAVENOUS at 15:50

## 2019-09-02 NOTE — ED PROVIDER NOTES
History  Chief Complaint   Patient presents with    Palpitations     Patient states palpitations started about 30 minutes ago, denies chest pain/SOB, hx atrial flutter     58-year-old female presents with palpitations she feels like it is her atrial flutter  This started 30 minutes prior to arrival   She is maintained on metoprolol as well as diltiazem for her atrial flutter she has over the last several months she has been having increased increasing exacerbations  She can tell when it comes on and off she is anticoagulated on Xarelto  She does follow with Dr Mishel Kern  She denies any chest pain dizziness lightheadedness shortness of breath or pleuritic pain she has remote history of prior clot to the lungs in 2016  She has she has had prior echoes for this  She has had no recent adjustments to her medications she denies any fever chills cough or upper respiratory complaints no nausea or vomiting  Patient has hx of non-hogkins's lymphoma most recent chemo treatment 4 days ago consists of Poltazumab  Patient has a right Port-A-Cath in place  She denies worsening lower extremity edema  Shortly after arrival with placement a peripheral IV she states her palpitations resolved initial EKG does demonstrate atrial flutter rate of 133 and then her heart rate has improved to the 80s coinciding with her blood draw/PIV placement  LMP 2016          Prior to Admission Medications   Prescriptions Last Dose Informant Patient Reported? Taking?    Ibrutinib (IMBRUVICA) 560 MG TABS   Yes No   Sig: Take 560 mg by mouth daily    LORazepam (ATIVAN) 0 5 mg tablet  Self Yes No   Sig: Take 0 5 mg by mouth every 8 (eight) hours as needed    MAGNESIUM OXIDE PO  Self Yes No   Sig: Take 400 mg by mouth daily    Multiple Vitamin (MULTIVITAMIN) tablet  Self Yes No   Sig: Take 2 tablets by mouth 2 (two) times a day    Zinc 50 MG CAPS  Self Yes No   Sig: Take 1 capsule by mouth every morning    acetaminophen (TYLENOL) 325 mg tablet  Self Yes No   Sig: Take 650 mg by mouth every 4 (four) hours as needed    albuterol (2 5 mg/3 mL) 0 083 % nebulizer solution   No No   Sig: Take 1 vial (2 5 mg total) by nebulization every 4 (four) hours as needed for wheezing   albuterol (PROVENTIL HFA,VENTOLIN HFA) 90 mcg/act inhaler  Self No No   Sig: Inhale 2 puffs every 4 (four) hours as needed for wheezing   b complex vitamins tablet  Self Yes No   Sig: Take 1 tablet by mouth 2 (two) times a day    benzonatate (TESSALON PERLES) 100 mg capsule   No No   Sig: Take 1 capsule (100 mg total) by mouth 3 (three) times a day as needed for cough   cetirizine (ZyrTEC) 10 mg tablet   Yes No   Sig: Take 10 mg by mouth daily   cholecalciferol (VITAMIN D3) 1,000 units tablet  Self Yes No   Sig: Take 1,000 Units by mouth every morning    diltiazem (TIAZAC) 120 MG 24 hr capsule   No No   Sig: Take 1 capsule (120 mg total) by mouth daily   fluticasone-vilanterol (BREO ELLIPTA) 100-25 mcg/inh inhaler   Yes No   Sig: Inhale 1 puff every morning Rinse mouth after use     ipratropium-albuterol (DUO-NEB) 0 5-2 5 mg/3 mL nebulizer solution   No No   Sig: Take 1 vial (3 mL total) by nebulization every 6 (six) hours   levothyroxine 75 mcg tablet  Self No No   Sig: TAKE 1 TABLET BY MOUTH  DAILY   Patient taking differently: TAKE 1 TABLET BY MOUTH  every morning   loratadine (CLARITIN) 10 mg tablet  Self Yes No   Sig: Take 10 mg by mouth as needed    metoprolol tartrate (LOPRESSOR) 100 mg tablet   Yes No   Sig: Take 100 mg by mouth 2 (two) times a day   omeprazole (PriLOSEC) 10 mg delayed release capsule   Yes No   Sig: Take 10 mg by mouth   ondansetron (ZOFRAN) 8 mg tablet  Self Yes No   Sig: Take 8 mg by mouth every 8 (eight) hours as needed for nausea or vomiting (after chemo)    prochlorperazine (COMPAZINE) 10 mg tablet   Yes No   Sig: Take 10 mg by mouth every 6 (six) hours as needed   rivaroxaban (XARELTO) 20 mg tablet  Self Yes No   Sig: Take 20 mg by mouth daily with dinner senna-docusate sodium (SENOKOT-S) 8 6-50 mg per tablet  Self Yes No   Sig: Take 1 tablet by mouth 2 (two) times a day as needed for constipation (as needed during chemo tx)       Facility-Administered Medications: None       Past Medical History:   Diagnosis Date    Allergic rhinitis     last assessed 04/06/16    Arthritis     b/l feet    Chronic allergic conjunctivitis     Fluttering heart     takes magnesium for same   EKG OK    Fluttering sensation of heart     "periodically, not often since on magnesium"    Foot pain, left     Full dentures     upper    History of cancer chemotherapy 2016    History of dilation and curettage     Hx of tonsillectomy     Hypothyroid     Irregular heart beat     Lymph nodes enlarged     in chest pushing on her bronchioles necessitating inhalers    Neck mass     R neck mass-excised 3/15/2016,, 4/23/18 noted enlarged lymph node right neck-bx today 4/26/2018    Non Hodgkin's lymphoma ([address] )     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    Obese     Port-A-Cath in place 2016    pt reports 'Has it flushed q 4weeks" right chest wall    Post-menopausal     since chemo 4/2016  no menses    Pulmonary embolism ([address] )     last assessed 10/31/16 attributed to Non-Hodgkins hx    Pulmonary embolism on right ([address] ) 7/12/2018    Last Assessment & Plan:  She will need to hold her Xarelto 2 days prior to her procedure and may resume therapy 1-2 days post procedure (depending on whether she is producing blood tinged sputum or not)      Shortness of breath     due to chest tumor    Tachycardia     awaiting cardiology eval,,,4/23/18 "pt reports saw cardiologist at that time and placed on magnesium and "hardly notices it"    Tinnitus     occas    Wears glasses     Wears partial dentures     /lower    [place] teeth extracted        Past Surgical History:   Procedure Laterality Date    COLONOSCOPY      COLONOSCOPY N/A 7/26/2018    Procedure: COLONOSCOPY with multiple bx;  Surgeon: Mansfield Cowden Gavi De La Cruz MD;  Location: [address] [facility] MAIN OR;  Service: Gastroenterology    CYST REMOVAL Left     L  neck    DILATION AND CURETTAGE OF UTERUS      ESOPHAGOGASTRODUODENOSCOPY N/A 2018    Procedure: ESOPHAGOGASTRODUODENOSCOPY (EGD) with multiple bx;  Surgeon: Marci Vargas MD;  Location: [facility] MAIN OR;  Service: Gastroenterology    FACIAL/NECK BIOPSY N/A 2018    Procedure: OPEN DEEP CERVICAL LYMPH NODE EXCISOION/BIOPSY;  Surgeon: Meka Steele MD;  Location: [facility] Main [facility];  Service: ENT    LYMPHADENECTOMY      PORTACATH PLACEMENT      MD BX/REMV,LYMPH NODE,DEEP CERV N/A 3/15/2016    Procedure: DISSECTION [facility] NODE NECK/DEEP NECK MASS ;  Surgeon: Praveen Roca DO;  Location: [facility];  Service: ENT    TONSILLECTOMY      TUBAL LIGATION      TUNNELED VENOUS PORT PLACEMENT Right 3/21/2019    Procedure: INSERTION VENOUS PORT (PORT-A-CATH) remove and replace;  Surgeon: Chetna Braxton MD;  Location: [address] [facility] MAIN OR;  Service: General       Family History   Problem Relation Age of Onset    Coronary artery disease Father     Diabetes Brother     Diabetes Family     Heart disease Family      I have reviewed and agree with the history as documented  Social History     Tobacco Use    Smoking status: Former Smoker     Packs/day: 1 50     Years: 11 00     Pack years: 16 50     Last attempt to quit:      Years since quittin 6    Smokeless tobacco: Never Used   Substance Use Topics    Alcohol use: Not Currently    Drug use: No        Review of Systems   Constitutional: Negative for activity change, appetite change, chills, diaphoresis, fatigue and fever  HENT: Negative for congestion, ear pain, rhinorrhea, sneezing and sore throat  Eyes: Negative for discharge  Respiratory: Negative for cough and shortness of breath  Cardiovascular: Positive for palpitations  Negative for chest pain and leg swelling     Gastrointestinal: Negative for abdominal pain, blood in stool, diarrhea, nausea and vomiting  Endocrine: Negative for polyuria  Genitourinary: Negative for difficulty urinating and urgency  Musculoskeletal: Negative for back pain and myalgias  Skin: Negative for rash  Neurological: Negative for dizziness and numbness  Hematological: Negative for adenopathy  Psychiatric/Behavioral: Negative for confusion  All other systems reviewed and are negative  Physical Exam  Physical Exam   Constitutional: She is oriented to person, place, and time  She appears well-developed and well-nourished  No distress  HENT:   Head: Normocephalic and atraumatic  Right Ear: External ear normal    Left Ear: External ear normal    Nose: Nose normal    Mouth/Throat: Oropharynx is clear and moist  No oropharyngeal exudate  Eyes: Pupils are equal, round, and reactive to light  Conjunctivae are normal  Right eye exhibits no discharge  Left eye exhibits no discharge  Neck: Normal range of motion  Neck supple  Cardiovascular: Normal rate, regular rhythm, normal heart sounds and intact distal pulses  Pulmonary/Chest: Effort normal and breath sounds normal  No stridor  No respiratory distress  She has no wheezes  She has no rales  Rt devonte cath site slight ecchymosis   Abdominal: Soft  Bowel sounds are normal  She exhibits no distension and no mass  There is no tenderness  There is no rebound and no guarding  Back no midline or paraspinous tenderness   Musculoskeletal: Normal range of motion  She exhibits no edema, tenderness or deformity  Neurological: She is alert and oriented to person, place, and time  She displays normal reflexes  No cranial nerve deficit or sensory deficit  She exhibits normal muscle tone  Coordination normal    Gait steady   Skin: Skin is warm and dry  Capillary refill takes less than 2 seconds  Psychiatric: She has a normal mood and affect  Vitals reviewed        Vital Signs  ED Triage Vitals [09/02/19 1214]   Temperature Pulse Respirations Blood Pressure SpO2 98 6 °F (37 °C) (!) 133 16 128/72 95 %      Temp Source Heart Rate Source Patient Position - Orthostatic VS BP Location FiO2 (%)   Temporal Monitor Sitting Right arm --      Pain Score       No Pain           Vitals:    09/02/19 1330 09/02/19 1400 09/02/19 1430 09/02/19 1609   BP: 106/62 104/60 105/59 125/74   Pulse: 83 80 81 84   Patient Position - Orthostatic VS:    Lying         Visual Acuity      ED Medications  Medications   iohexol (OMNIPAQUE) 350 MG/ML injection (SINGLE-DOSE) 85 mL (85 mL Intravenous Given 9/2/19 1550)       Diagnostic Studies  Results Reviewed     Procedure Component Value Units Date/Time    TSH, 3rd generation with Free T4 reflex [[id number]]  (Normal) Collected:  09/02/19 1236    Lab Status:  Final result Specimen:  Blood from Arm, Right Updated:  09/02/19 1317     TSH 3RD GENERATON 3 081 uIU/mL     Narrative:       Patients undergoing fluorescein dye angiography may retain small amounts of fluorescein in the body for 48-72 hours post procedure  Samples containing fluorescein can produce falsely depressed TSH values  If the patient had this procedure,a specimen should be resubmitted post fluorescein clearance        Magnesium [[id number]]  (Normal) Collected:  09/02/19 1236    Lab Status:  Final result Specimen:  Blood from Arm, Right Updated:  09/02/19 1317     Magnesium 2 2 mg/dL     Troponin I [[id number]]  (Normal) Collected:  09/02/19 1236    Lab Status:  Final result Specimen:  Blood from Arm, Right Updated:  09/02/19 1259     Troponin I <0 02 ng/mL     Comprehensive metabolic panel [[id number]]  (Abnormal) Collected:  09/02/19 1236    Lab Status:  Final result Specimen:  Blood from Arm, Right Updated:  09/02/19 1257     Sodium 134 mmol/L      Potassium 3 9 mmol/L      Chloride 101 mmol/L      CO2 25 mmol/L      ANION GAP 8 mmol/L      BUN 15 mg/dL      Creatinine 1 06 mg/dL      Glucose 132 mg/dL      Calcium 8 5 mg/dL      AST 21 U/L      ALT 19 U/L      Alkaline Phosphatase 123 U/L Total Protein 6 2 g/dL      Albumin 3 0 g/dL      Total Bilirubin 0 60 mg/dL      eGFR 61 ml/min/1 73sq m     Narrative:       National Kidney Disease Foundation guidelines for Chronic Kidney Disease (CKD):     Stage 1 with normal or high GFR (GFR > 90 mL/min/1 73 square meters)    Stage 2 Mild CKD (GFR = 60-89 mL/min/1 73 square meters)    Stage 3A Moderate CKD (GFR = 45-59 mL/min/1 73 square meters)    Stage 3B Moderate CKD (GFR = 30-44 mL/min/1 73 square meters)    Stage 4 Severe CKD (GFR = 15-29 mL/min/1 73 square meters)    Stage 5 End Stage CKD (GFR <15 mL/min/1 73 square meters)  Note: GFR calculation is accurate only with a steady state creatinine    CBC and differential [[id number]]  (Abnormal) Collected:  09/02/19 1236    Lab Status:  Final result Specimen:  Blood from Arm, Right Updated:  09/02/19 1244     WBC 15 96 Thousand/uL      RBC 4 77 Million/uL      Hemoglobin 14 1 g/dL      Hematocrit 44 2 %      MCV 93 fL      MCH 29 6 pg      MCHC 31 9 g/dL      RDW 15 2 %      MPV 10 7 fL      Platelets 759 Thousands/uL      nRBC 0 /100 WBCs      Neutrophils Relative 80 %      Immat GRANS % 2 %      Lymphocytes Relative 6 %      Monocytes Relative 9 %      Eosinophils Relative 3 %      Basophils Relative 0 %      Neutrophils Absolute 12 81 Thousands/µL      Immature Grans Absolute 0 25 Thousand/uL      Lymphocytes Absolute 0 89 Thousands/µL      Monocytes Absolute 1 46 Thousand/µL      Eosinophils Absolute 0 51 Thousand/µL      Basophils Absolute 0 04 Thousands/µL                  CTA ED chest PE study   Final Result by Junior Shelley MD (09/02 1600)      No evidence of pulmonary embolus  Large left perihilar soft tissue mass again seen with interval increase in the left atrial invasion  Small left pleural effusion              Workstation performed: [id number]         XR chest 2 views   Final Result by Heide Perez MD (09/02 5088)      Near complete opacification of left hemithorax is reidentified, though there is currently a small amount of aeration in the left mid chest which was not present on the previous examination  Right lung is clear  Workstation performed: [id number]                    Procedures  ECG 12 Lead Documentation Only  Date/Time: 9/2/2019 12:11 PM  Performed by: Evette Cavazos MD  Authorized by: Evette Cavazos MD     Indications / Diagnosis:  Fluttering  ECG reviewed by me, the ED Provider: yes    Patient location:  ED  Previous ECG:     Previous ECG:  Compared to current    Comparison ECG info:  8/6/19 11:05    Similarity:  No change  Rhythm:     Rhythm: atrial flutter    QRS:     QRS axis:  Normal  Comments:      Atrial flutter; nonspecific st-t chagnes    ECG 12 Lead Documentation Only  Date/Time: 9/2/2019 4:01 PM  Performed by: Evette Cavazos MD  Authorized by: Evette Cavazos MD     Indications / Diagnosis:  Recheck rhythm  Patient location:  ED  Previous ECG:     Previous ECG:  Compared to current    Comparison ECG info:  9/2/19 1208    Similarity:  Changes noted  Rate:     ECG rate:  81    ECG rate assessment: normal    Rhythm:     Rhythm: sinus rhythm    QRS:     QRS axis:  Normal  Comments:      No acute ischemic changes now in NSR           ED Course  ED Course as of Sep 02 1949   Mon Sep 02, 2019   1713 Reviewed CT findings with patient and ; contacted Dr Jana Parham  office ([phone] hem/onc at [facility]  Case reviewed with Dr Arthur Andrade of [address] heme/onc recommends patient call the Dr John Alejandra office tomorrow while in [facility] for her vein mappinng for close outpt followup  Dr Priya Hood will be sending email to Dr Jana Parham;                              Nathalia Swanson' Criteria for PE      Most Recent Value   Wells' Criteria for PE   Clinical signs and symptoms of DVT  0 Filed at: 09/02/2019 1600   PE is primary diagnosis or equally likely  0 Filed at: 09/02/2019 1600   HR >100  1 5 Filed at: 09/02/2019 1600   Immobilization at least 3 days or Surgery in the previous 4 weeks  0 Filed at: 09/02/2019 1600   Previous, objectively diagnosed PE or DVT  1 5 Filed at: 09/02/2019 1600   Hemoptysis  0 Filed at: 09/02/2019 1600   Malignancy with treatment within 6 months or palliative  1 Filed at: 09/02/2019 1600   Wells' Criteria Total  4 Filed at: 09/02/2019 1600            MDM  Number of Diagnoses or Management Options  Abnormal CT of the chest:   Atrial flutter with rapid ventricular response [facility]):   Diagnosis management comments: Mdm:  Atrial fib flutter with rapid ventricular response worsening over the last several months  Patient is anticoagulated on Xarelto  Will check electrolytes TSH chest x-ray for CHF and re-evaluate  This time patient is not experiencing any chest symptoms such as shortness of breath pleuritic pain increasing dyspnea on exertion or lower extremity edema to make PE a consideration  Low clinical suspicion for pulmonary embolism  Secondary to perisist white out of left hemithorax recc CT chest will proceed with CTA; Reviewed CT findings with Dr Cheryl Seth to discuss left pulmonary consolidation - difficult to determine margins of mass vs atelectaisis recommended clincal correlation  Patient not kick patient presentation is not consistent with pneumonia  She describes the right submandibular mass improving after chemotherapy 4 days ago  Patient is oxygenating well no increasing dyspnea on exertion or desaturations after ambulation  The likely cause of her atrial flutter with rapid ventricular response is due to the increased invasion of the left atrium  Will contact patient's oncology team and at [address] to discuss findings  Patient will be in [facility] tomorrow and 3 days from now for routine testing  Will contact Dr Mildred Jenkins office ([phone]) as instructed by covering physician Dr Amor Sorto   No current indication for admission sx now c/w infectious etiology; discussed use of valsalva as means of breaking aflutter episodes  Patient and  aware of s/sx and reason for return  Disposition  Final diagnoses:   Atrial flutter with rapid ventricular response (HCC)   Abnormal CT of the chest - known left perihialr mass, increased invasion left atrium, left pulmonary consoltiation     Time reflects when diagnosis was documented in both MDM as applicable and the Disposition within this note     Time User Action Codes Description Comment    9/2/2019  5:37 PM Verdia Palin Add [I48 92] Atrial flutter with rapid ventricular response ([address] )     9/2/2019  5:38 PM Verdia Palin Add [R93 89] Abnormal CT of the chest     9/2/2019  5:39 PM Verdia Palin Modify [R93 89] Abnormal CT of the chest known left perihialr mass, increased invasion left atrium, left pulmonary consoltiation      ED Disposition     ED Disposition Condition Date/Time Comment    Discharge Stable Mon Sep 2, 2019  5:37 PM Kaur Good discharge to home/self care              Follow-up Information     Follow up With Specialties Details Why [address], [place] Internal Medicine Call in 1 day recheck this week with your [facility] medicine team [address] [place] [address]  [phone]            Discharge Medication List as of 9/2/2019  5:44 PM      CONTINUE these medications which have NOT CHANGED    Details   acetaminophen (TYLENOL) 325 mg tablet Take 650 mg by mouth every 4 (four) hours as needed , Historical Med      albuterol (2 5 mg/3 mL) 0 083 % nebulizer solution Take 1 vial (2 5 mg total) by nebulization every 4 (four) hours as needed for wheezing, Starting Wed 7/31/2019, Until Thu 7/30/2020, Normal      albuterol (PROVENTIL HFA,VENTOLIN HFA) 90 mcg/act inhaler Inhale 2 puffs every 4 (four) hours as needed for wheezing, Starting Sun 1/27/2019, Until Mon 1/27/2020, Print      b complex vitamins tablet Take 1 tablet by mouth 2 (two) times a day , Historical Med      benzonatate (TESSALON PERLES) 100 mg capsule Take 1 capsule (100 mg total) by mouth 3 (three) times a day as needed for cough, Starting Thu 5/30/2019, Normal      cetirizine (ZyrTEC) 10 mg tablet Take 10 mg by mouth daily, Historical Med      cholecalciferol (VITAMIN D3) 1,000 units tablet Take 1,000 Units by mouth every morning , Historical Med      diltiazem (TIAZAC) 120 MG 24 hr capsule Take 1 capsule (120 mg total) by mouth daily, Starting Fri 7/26/2019, Normal      fluticasone-vilanterol (BREO ELLIPTA) 100-25 mcg/inh inhaler Inhale 1 puff every morning Rinse mouth after use , Historical Med      Ibrutinib (IMBRUVICA) 560 MG TABS Take 560 mg by mouth daily , Starting Thu 6/27/2019, Historical Med      ipratropium-albuterol (DUO-NEB) 0 5-2 5 mg/3 mL nebulizer solution Take 1 vial (3 mL total) by nebulization every 6 (six) hours, Starting Tue 5/21/2019, Normal      levothyroxine 75 mcg tablet TAKE 1 TABLET BY MOUTH  DAILY, Normal      loratadine (CLARITIN) 10 mg tablet Take 10 mg by mouth as needed , Historical Med      LORazepam (ATIVAN) 0 5 mg tablet Take 0 5 mg by mouth every 8 (eight) hours as needed , Starting Thu 11/8/2018, Historical Med      MAGNESIUM OXIDE PO Take 400 mg by mouth daily , Historical Med      metoprolol tartrate (LOPRESSOR) 100 mg tablet Take 100 mg by mouth 2 (two) times a day, Starting Tue 5/28/2019, Historical Med      Multiple Vitamin (MULTIVITAMIN) tablet Take 2 tablets by mouth 2 (two) times a day , Historical Med      omeprazole (PriLOSEC) 10 mg delayed release capsule Take 10 mg by mouth, Historical Med      ondansetron (ZOFRAN) 8 mg tablet Take 8 mg by mouth every 8 (eight) hours as needed for nausea or vomiting (after chemo) , Starting Fri 11/2/2018, Historical Med      prochlorperazine (COMPAZINE) 10 mg tablet Take 10 mg by mouth every 6 (six) hours as needed, Starting Thu 8/9/2018, Historical Med      rivaroxaban (XARELTO) 20 mg tablet Take 20 mg by mouth daily with dinner  , Starting Tue 1/30/2018, Historical Med      senna-docusate sodium (SENOKOT-S) 8 6-50 mg per tablet Take 1 tablet by mouth 2 (two) times a day as needed for constipation (as needed during chemo tx) , Starting Wed 3/6/2019, Historical Med      Zinc 50 MG CAPS Take 1 capsule by mouth every morning , Historical Med           No discharge procedures on file      ED Provider  Electronically Signed by           Marisela Kirby MD  09/02/19 5922

## 2019-09-03 LAB
ATRIAL RATE: 153 BPM
QRS AXIS: 34 DEGREES
QRSD INTERVAL: 84 MS
QT INTERVAL: 290 MS
QTC INTERVAL: 431 MS
T WAVE AXIS: 121 DEGREES
VENTRICULAR RATE: 133 BPM

## 2019-09-03 PROCEDURE — 93010 ELECTROCARDIOGRAM REPORT: CPT | Performed by: INTERNAL MEDICINE

## 2019-09-04 ENCOUNTER — HOSPITAL ENCOUNTER (EMERGENCY)
Facility: HOSPITAL | Age: 49
Discharge: HOME | End: 2019-09-04
Attending: EMERGENCY MEDICINE | Admitting: EMERGENCY MEDICINE
Payer: COMMERCIAL

## 2019-09-04 VITALS
RESPIRATION RATE: 16 BRPM | SYSTOLIC BLOOD PRESSURE: 101 MMHG | DIASTOLIC BLOOD PRESSURE: 58 MMHG | BODY MASS INDEX: 42.77 KG/M2 | WEIGHT: 282.19 LBS | HEART RATE: 92 BPM | TEMPERATURE: 97.1 F | HEIGHT: 68 IN | OXYGEN SATURATION: 95 %

## 2019-09-04 DIAGNOSIS — I48.91 ATRIAL FIBRILLATION WITH RAPID VENTRICULAR RESPONSE (HCC): Primary | ICD-10-CM

## 2019-09-04 PROCEDURE — 99284 EMERGENCY DEPT VISIT MOD MDM: CPT | Performed by: EMERGENCY MEDICINE

## 2019-09-04 PROCEDURE — 99284 EMERGENCY DEPT VISIT MOD MDM: CPT

## 2019-09-04 PROCEDURE — 93005 ELECTROCARDIOGRAM TRACING: CPT

## 2019-09-04 NOTE — ED PROVIDER NOTES
Pt Name: Mumtaz Mansfield  MRN: [id number]  [place] 1970  Age/Sex: 46 y o  female  Date of evaluation: 9/4/2019  PCP: Hang Casas, [address] [place] [address]    Chief Complaint   Patient presents with    Rapid Heart Rate     pt states at [place] [address] she started with a rapid heart rate  pt was seen here for the same symptoms on Monday         HPI    Carlene Burns presents to the Emergency Department complaining of sudden onset of fast HR and palpitations  They have a home pulse ox for confirmation  She is on anticoagulation and rate controlling medications and follows closely with cardiology  She had a similar episode on Monday and had full work up with labs and CT  Today she had HR around 150 and then broke prior to EKG with IV placement          HPI      Past Medical and Surgical History    Past Medical History:   Diagnosis Date    Allergic rhinitis     last assessed 04/06/16    Arthritis     b/l feet    Chronic allergic conjunctivitis     Fluttering heart     takes magnesium for same   EKG OK    Fluttering sensation of heart     "periodically, not often since on magnesium"    Foot pain, left     Full dentures     upper    History of cancer chemotherapy 2016    History of dilation and curettage     Hx of tonsillectomy     Hypothyroid     Irregular heart beat     Lymph nodes enlarged     in chest pushing on her bronchioles necessitating inhalers    Neck mass     R neck mass-excised 3/15/2016,, 4/23/18 noted enlarged lymph node right neck-bx today 4/26/2018    Non Hodgkin's lymphoma ([address] )     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    Obese     Port-A-Cath in place 2016    pt reports 'Has it flushed q 4weeks" right chest wall    Post-menopausal     since chemo 4/2016  no menses    Pulmonary embolism ([address] )     last assessed 10/31/16 attributed to Non-Hodgkins hx    Pulmonary embolism on right ([address] ) 7/12/2018    Last Assessment & Plan:  She will need to hold her Xarelto 2 days prior to her procedure and may resume therapy 1-2 days post procedure (depending on whether she is producing blood tinged sputum or not)      Shortness of breath     due to chest tumor    Tachycardia     awaiting cardiology eval,,,18 "pt reports saw cardiologist at that time and placed on magnesium and "hardly notices it"    Tinnitus     occas    Wears glasses     Wears partial dentures     /lower    [place] teeth extracted        Past Surgical History:   Procedure Laterality Date    COLONOSCOPY      COLONOSCOPY N/A 2018    Procedure: COLONOSCOPY with multiple bx;  Surgeon: Mirta Chavez MD;  Location: [facility] MAIN OR;  Service: Gastroenterology    CYST REMOVAL Left     L  neck    DILATION AND CURETTAGE OF UTERUS      ESOPHAGOGASTRODUODENOSCOPY N/A 2018    Procedure: ESOPHAGOGASTRODUODENOSCOPY (EGD) with multiple bx;  Surgeon: Mirta Chavez MD;  Location: [facility] MAIN OR;  Service: Gastroenterology    FACIAL/NECK BIOPSY N/A 2018    Procedure: OPEN DEEP CERVICAL LYMPH NODE EXCISOION/BIOPSY;  Surgeon: Bryan Ca MD;  Location: [facility] Main OR;  Service: ENT    LYMPHADENECTOMY      PORTACATH PLACEMENT      RI BX/REMV,LYMPH NODE,DEEP CERV N/A 3/15/2016    Procedure: DISSECTION [facility] NODE NECK/DEEP NECK MASS ;  Surgeon: Raza Gonzalez DO;  Location: [facility];  Service: ENT    TONSILLECTOMY      TUBAL LIGATION      TUNNELED VENOUS PORT PLACEMENT Right 3/21/2019    Procedure: INSERTION VENOUS PORT (PORT-A-CATH) remove and replace;  Surgeon: Clark Duane, MD;  Location: [address] [place] [address] MAIN OR;  Service: General       Family History   Problem Relation Age of Onset    Coronary artery disease Father     Diabetes Brother     Diabetes Family     Heart disease Family        Social History     Tobacco Use    Smoking status: Former Smoker     Packs/day: 1 50     Years: 11 00     Pack years: 16 50     Last attempt to quit:      Years since quittin 6    Smokeless tobacco: Never Used   Substance Use Topics    Alcohol use: Not Currently    Drug use: No              Allergies    Allergies   Allergen Reactions    Medical Tape      Redness, soreness on skin       Home Medications    Prior to Admission medications    Medication Sig Start Date End Date Taking? Authorizing Provider   acetaminophen (TYLENOL) 325 mg tablet Take 650 mg by mouth every 4 (four) hours as needed     Historical Provider, MD   albuterol (2 5 mg/3 mL) 0 083 % nebulizer solution Take 1 vial (2 5 mg total) by nebulization every 4 (four) hours as needed for wheezing 7/31/19 7/30/20  [place] Car[place], DO   albuterol (PROVENTIL HFA,VENTOLIN HFA) 90 mcg/act inhaler Inhale 2 puffs every 4 (four) hours as needed for wheezing 1/27/19 1/27/20  Ye Cameron MD   b complex vitamins tablet Take 1 tablet by mouth 2 (two) times a day     Historical Provider, MD   benzonatate (TESSALON PERLES) 100 mg capsule Take 1 capsule (100 mg total) by mouth 3 (three) times a day as needed for cough 5/30/19   Vaibhav Hogan PA-C   cetirizine (ZyrTEC) 10 mg tablet Take 10 mg by mouth daily    Historical Provider, MD   cholecalciferol (VITAMIN D3) 1,000 units tablet Take 1,000 Units by mouth every morning     Historical Provider, MD   diltiazem (TIAZAC) 120 MG 24 hr capsule Take 1 capsule (120 mg total) by mouth daily 7/26/19   Alicia Berrios, DO   fluticasone-vilanterol (BREO ELLIPTA) 100-25 mcg/inh inhaler Inhale 1 puff every morning Rinse mouth after use      Historical Provider, MD   Ibrutinib (IMBRUVICA) 560 MG TABS Take 560 mg by mouth daily  6/27/19   Historical Provider, MD   ipratropium-albuterol (DUO-NEB) 0 5-2 5 mg/3 mL nebulizer solution Take 1 vial (3 mL total) by nebulization every 6 (six) hours 5/21/19   Vaibhav Hogan PA-C   levothyroxine 75 mcg tablet TAKE 1 TABLET BY MOUTH  DAILY  Patient taking differently: TAKE 1 TABLET BY MOUTH  every morning 12/20/18   Luis Abebe,    loratadine (CLARITIN) 10 mg tablet Take 10 mg by mouth as needed     Historical Provider, MD LORazepam (ATIVAN) 0 5 mg tablet Take 0 5 mg by mouth every 8 (eight) hours as needed  11/8/18   Historical Provider, MD   MAGNESIUM OXIDE PO Take 400 mg by mouth daily     Historical Provider, MD   metoprolol tartrate (LOPRESSOR) 100 mg tablet Take 100 mg by mouth 2 (two) times a day 5/28/19   Historical Provider, MD   Multiple Vitamin (MULTIVITAMIN) tablet Take 2 tablets by mouth 2 (two) times a day     Historical Provider, MD   omeprazole (PriLOSEC) 10 mg delayed release capsule Take 10 mg by mouth    Historical Provider, MD   ondansetron (ZOFRAN) 8 mg tablet Take 8 mg by mouth every 8 (eight) hours as needed for nausea or vomiting (after chemo)  11/2/18   Historical Provider, MD   prochlorperazine (COMPAZINE) 10 mg tablet Take 10 mg by mouth every 6 (six) hours as needed 8/9/18   Historical Provider, MD   rivaroxaban (XARELTO) 20 mg tablet Take 20 mg by mouth daily with dinner   1/30/18   Historical Provider, MD   senna-docusate sodium (SENOKOT-S) 8 6-50 mg per tablet Take 1 tablet by mouth 2 (two) times a day as needed for constipation (as needed during chemo tx)  3/6/19   Historical Provider, MD   Zinc 50 MG CAPS Take 1 capsule by mouth every morning     Historical Provider, MD           Review of Systems    Review of Systems   Constitutional: Negative for activity change, appetite change, chills, diaphoresis, fatigue and fever  HENT: Negative for congestion, postnasal drip, rhinorrhea, sinus pressure, sneezing and sore throat  Eyes: Negative for pain and visual disturbance  Respiratory: Negative for cough, chest tightness and shortness of breath  Cardiovascular: Positive for palpitations  Negative for chest pain and leg swelling  Gastrointestinal: Negative for abdominal distention, abdominal pain, constipation, diarrhea, nausea and vomiting  Endocrine: Negative for polydipsia, polyphagia and polyuria     Genitourinary: Negative for decreased urine volume, difficulty urinating, dysuria, flank pain, frequency and hematuria  Musculoskeletal: Negative for arthralgias, gait problem, joint swelling and neck pain  Skin: Negative for pallor and rash  Allergic/Immunologic: Negative for immunocompromised state  Neurological: Negative for syncope, speech difficulty, weakness, light-headedness, numbness and headaches  All other systems reviewed and are negative  Physical Exam      ED Triage Vitals [09/04/19 0118]   Temperature Pulse Respirations Blood Pressure SpO2   (!) 97 1 °F (36 2 °C) (!) 156 18 97/59 95 %      Temp Source Heart Rate Source Patient Position - Orthostatic VS BP Location FiO2 (%)   Temporal Monitor Sitting Left arm --      Pain Score       No Pain               Physical Exam   Constitutional: She is oriented to person, place, and time  She appears well-developed and well-nourished  No distress  HENT:   Head: Normocephalic and atraumatic  Nose: Nose normal    Mouth/Throat: Oropharynx is clear and moist    Eyes: Pupils are equal, round, and reactive to light  Conjunctivae, EOM and lids are normal    Neck: Normal range of motion  Neck supple  Cardiovascular: Normal rate, regular rhythm and normal heart sounds  Exam reveals no gallop and no friction rub  No murmur heard  Pulmonary/Chest: Effort normal and breath sounds normal  No accessory muscle usage  No respiratory distress  She has no wheezes  She has no rales  Abdominal: Soft  She exhibits no distension  There is no tenderness  There is no rebound and no guarding  Neurological: She is alert and oriented to person, place, and time  No cranial nerve deficit or sensory deficit  Skin: Skin is warm and dry  No rash noted  She is not diaphoretic  No erythema  Psychiatric: She has a normal mood and affect  Her speech is normal and behavior is normal  Judgment and thought content normal    Nursing note and vitals reviewed          Assessment and Plan    Jamshid Baeza is a 46 y o  female who presents with rapid HR which resolved  Physical examination otherwise unremarkable  Patient had full work up in the last 48 hours so I will not repeat  MDM    Diagnostic Results    EKG:  normal EKG, normal sinus rhythm    EKG INTERPRETATION  EKG Interpretation    Rate: 95  BPM  Rhythm: sinus  Axis: normal  Intervals: Normal, no blocks, QTc 432ms  T waves: normal  ST segments: nonspecific    Impression: nondiagnostic EKG  EKG interpreted by me  Interpretation by Irma Baeza DO  EKG reviewed and interpreted independently      Labs:    Results for orders placed or performed during the hospital encounter of 09/02/19   CBC and differential   Result Value Ref Range    WBC 15 96 (H) 4 31 - 10 16 Thousand/uL    RBC 4 77 3 81 - 5 12 Million/uL    Hemoglobin 14 1 11 5 - 15 4 g/dL    Hematocrit 44 2 34 8 - 46 1 %    MCV 93 82 - 98 fL    MCH 29 6 26 8 - 34 3 pg    MCHC 31 9 31 4 - 37 4 g/dL    RDW 15 2 (H) 11 6 - 15 1 %    MPV 10 7 8 9 - 12 7 fL    Platelets 573 409 - 589 Thousands/uL    nRBC 0 /100 WBCs    Neutrophils Relative 80 (H) 43 - 75 %    Immat GRANS % 2 0 - 2 %    Lymphocytes Relative 6 (L) 14 - 44 %    Monocytes Relative 9 4 - 12 %    Eosinophils Relative 3 0 - 6 %    Basophils Relative 0 0 - 1 %    Neutrophils Absolute 12 81 (H) 1 85 - 7 62 Thousands/µL    Immature Grans Absolute 0 25 (H) 0 00 - 0 20 Thousand/uL    Lymphocytes Absolute 0 89 0 60 - 4 47 Thousands/µL    Monocytes Absolute 1 46 (H) 0 17 - 1 22 Thousand/µL    Eosinophils Absolute 0 51 0 00 - 0 61 Thousand/µL    Basophils Absolute 0 04 0 00 - 0 10 Thousands/µL   Comprehensive metabolic panel   Result Value Ref Range    Sodium 134 (L) 136 - 145 mmol/L    Potassium 3 9 3 5 - 5 3 mmol/L    Chloride 101 100 - 108 mmol/L    CO2 25 21 - 32 mmol/L    ANION GAP 8 4 - 13 mmol/L    BUN 15 5 - 25 mg/dL    Creatinine 1 06 0 60 - 1 30 mg/dL    Glucose 132 65 - 140 mg/dL    Calcium 8 5 8 3 - 10 1 mg/dL    AST 21 5 - 45 U/L    ALT 19 12 - 78 U/L    Alkaline Phosphatase 123 (H) 46 - 116 U/L    Total Protein 6 2 (L) 6 4 - 8 2 g/dL    Albumin 3 0 (L) 3 5 - 5 0 g/dL    Total Bilirubin 0 60 0 20 - 1 00 mg/dL    eGFR 61 ml/min/1 73sq m   Troponin I   Result Value Ref Range    Troponin I <0 02 <=0 04 ng/mL   TSH, 3rd generation with Free T4 reflex   Result Value Ref Range    TSH 3RD GENERATON 3 081 0 358 - 3 740 uIU/mL   Magnesium   Result Value Ref Range    Magnesium 2 2 1 6 - 2 6 mg/dL   ECG 12 lead   Result Value Ref Range    Ventricular Rate 133 BPM    Atrial Rate 153 BPM    DE Interval  ms    QRSD Interval 84 ms    QT Interval 290 ms    QTC Interval 431 ms    P Axis  degrees    QRS Axis 34 degrees    T Wave Axis 121 degrees       All labs reviewed and utilized in the medical decision making process    Radiology:    No orders to display       All radiology studies independently viewed by me and interpreted by the radiologist     Procedure    Procedures    [facility]      ED Course of Care and Re-Assessments    Patient plans to call her cardiologist in the am for recommendations  She is asymptomatic in ER after spontaneous conversion to sinus  Medications - No data to display        FINAL IMPRESSION    Final diagnoses:   Atrial fibrillation with rapid ventricular response ([facility] [address] [facility] )         DISPOSITION/PLAN    Time reflects when diagnosis was documented in both MDM as applicable and the Disposition within this note     Time User Action Codes Description Comment    9/4/2019  2:32 AM Sharif Houser Add [I48 91] Atrial fibrillation with rapid ventricular response [facility])       ED Disposition     ED Disposition Condition Date/Time Comment    Discharge Stable Wed Sep 4, 2019  2:32 AM Brittney Miles discharge to home/self care              Follow-up Information     Follow up With Specialties Details Why [address], DO Family Medicine Schedule an appointment as soon as possible for a visit  Please also contact your specialists for further evaluation [address]  [address]  [facility] [address] [facility]              PATIENT REFERRED TO:    Corina Hong, DO  [address] [place] [id number] [phone]    Schedule an appointment as soon as possible for a visit   Please also contact your specialists for further evaluation      DISCHARGE MEDICATIONS:    Discharge Medication List as of 9/4/2019  2:32 AM      CONTINUE these medications which have NOT CHANGED    Details   acetaminophen (TYLENOL) 325 mg tablet Take 650 mg by mouth every 4 (four) hours as needed , Historical Med      albuterol (2 5 mg/3 mL) 0 083 % nebulizer solution Take 1 vial (2 5 mg total) by nebulization every 4 (four) hours as needed for wheezing, Starting Wed 7/31/2019, Until Thu 7/30/2020, Normal      albuterol (PROVENTIL HFA,VENTOLIN HFA) 90 mcg/act inhaler Inhale 2 puffs every 4 (four) hours as needed for wheezing, Starting Sun 1/27/2019, Until Mon 1/27/2020, Print      b complex vitamins tablet Take 1 tablet by mouth 2 (two) times a day , Historical Med      benzonatate (TESSALON PERLES) 100 mg capsule Take 1 capsule (100 mg total) by mouth 3 (three) times a day as needed for cough, Starting Thu 5/30/2019, Normal      cetirizine (ZyrTEC) 10 mg tablet Take 10 mg by mouth daily, Historical Med      cholecalciferol (VITAMIN D3) 1,000 units tablet Take 1,000 Units by mouth every morning , Historical Med      diltiazem (TIAZAC) 120 MG 24 hr capsule Take 1 capsule (120 mg total) by mouth daily, Starting Fri 7/26/2019, Normal      fluticasone-vilanterol (BREO ELLIPTA) 100-25 mcg/inh inhaler Inhale 1 puff every morning Rinse mouth after use , Historical Med      Ibrutinib (IMBRUVICA) 560 MG TABS Take 560 mg by mouth daily , Starting Thu 6/27/2019, Historical Med      ipratropium-albuterol (DUO-NEB) 0 5-2 5 mg/3 mL nebulizer solution Take 1 vial (3 mL total) by nebulization every 6 (six) hours, Starting Tue 5/21/2019, Normal      levothyroxine 75 mcg tablet TAKE 1 TABLET BY MOUTH  DAILY, Normal      loratadine (CLARITIN) 10 mg tablet Take 10 mg by mouth as needed , Historical Med      LORazepam (ATIVAN) 0 5 mg tablet Take 0 5 mg by mouth every 8 (eight) hours as needed , Starting Thu 11/8/2018, Historical Med      MAGNESIUM OXIDE PO Take 400 mg by mouth daily , Historical Med      metoprolol tartrate (LOPRESSOR) 100 mg tablet Take 100 mg by mouth 2 (two) times a day, Starting Tue 5/28/2019, Historical Med      Multiple Vitamin (MULTIVITAMIN) tablet Take 2 tablets by mouth 2 (two) times a day , Historical Med      omeprazole (PriLOSEC) 10 mg delayed release capsule Take 10 mg by mouth, Historical Med      ondansetron (ZOFRAN) 8 mg tablet Take 8 mg by mouth every 8 (eight) hours as needed for nausea or vomiting (after chemo) , Starting Fri 11/2/2018, Historical Med      prochlorperazine (COMPAZINE) 10 mg tablet Take 10 mg by mouth every 6 (six) hours as needed, Starting Thu 8/9/2018, Historical Med      rivaroxaban (XARELTO) 20 mg tablet Take 20 mg by mouth daily with dinner  , Starting Tue 1/30/2018, Historical Med      senna-docusate sodium (SENOKOT-S) 8 6-50 mg per tablet Take 1 tablet by mouth 2 (two) times a day as needed for constipation (as needed during chemo tx) , Starting Wed 3/6/2019, Historical Med      Zinc 50 MG CAPS Take 1 capsule by mouth every morning , Historical Med             No discharge procedures on file           Vivian Sauer, [address] [place] [address], DO  09/04/19 9619

## 2019-09-04 NOTE — ED NOTES
Pt lying quietly with lights dimmed - heart rate has consistently remained in the 90's since converting     Luis F Cedeno RN  09/04/19 [id number]

## 2019-09-06 ENCOUNTER — HOSPITAL ENCOUNTER (EMERGENCY)
Facility: HOSPITAL | Age: 49
Discharge: HOME | End: 2019-09-07
Attending: EMERGENCY MEDICINE | Admitting: EMERGENCY MEDICINE
Payer: COMMERCIAL

## 2019-09-06 ENCOUNTER — OFFICE VISIT (OUTPATIENT)
Dept: CARDIOLOGY CLINIC | Facility: CLINIC | Age: 49
End: 2019-09-06
Payer: COMMERCIAL

## 2019-09-06 ENCOUNTER — APPOINTMENT (EMERGENCY)
Dept: RADIOLOGY | Facility: HOSPITAL | Age: 49
End: 2019-09-06
Payer: COMMERCIAL

## 2019-09-06 VITALS
HEART RATE: 100 BPM | HEIGHT: 68 IN | SYSTOLIC BLOOD PRESSURE: 140 MMHG | WEIGHT: 281 LBS | DIASTOLIC BLOOD PRESSURE: 80 MMHG | BODY MASS INDEX: 42.59 KG/M2

## 2019-09-06 DIAGNOSIS — E03.9 ACQUIRED HYPOTHYROIDISM: ICD-10-CM

## 2019-09-06 DIAGNOSIS — R00.2 PALPITATIONS: ICD-10-CM

## 2019-09-06 DIAGNOSIS — R00.2 PALPITATIONS: Primary | ICD-10-CM

## 2019-09-06 DIAGNOSIS — I48.92 ATRIAL FLUTTER, UNSPECIFIED TYPE (HCC): ICD-10-CM

## 2019-09-06 DIAGNOSIS — I48.91 ATRIAL FIBRILLATION WITH RAPID VENTRICULAR RESPONSE (HCC): Primary | ICD-10-CM

## 2019-09-06 DIAGNOSIS — Z86.711 HISTORY OF PULMONARY EMBOLISM: ICD-10-CM

## 2019-09-06 DIAGNOSIS — C83.30 MALIGNANT LYMPHOMA, LARGE CELL, DIFFUSE (HCC): ICD-10-CM

## 2019-09-06 LAB
ALBUMIN SERPL BCP-MCNC: 3 G/DL (ref 3.5–5)
ALP SERPL-CCNC: 114 U/L (ref 46–116)
ALT SERPL W P-5'-P-CCNC: 18 U/L (ref 12–78)
ANION GAP SERPL CALCULATED.3IONS-SCNC: 12 MMOL/L (ref 4–13)
APTT PPP: 32 SECONDS (ref 23–37)
AST SERPL W P-5'-P-CCNC: 19 U/L (ref 5–45)
ATRIAL RATE: 81 BPM
ATRIAL RATE: 95 BPM
BASOPHILS # BLD AUTO: 0.05 THOUSANDS/ΜL (ref 0–0.1)
BASOPHILS NFR BLD AUTO: 1 % (ref 0–1)
BILIRUB SERPL-MCNC: 0.5 MG/DL (ref 0.2–1)
BUN SERPL-MCNC: 4 MG/DL (ref 5–25)
CALCIUM SERPL-MCNC: 8.6 MG/DL (ref 8.3–10.1)
CHLORIDE SERPL-SCNC: 102 MMOL/L (ref 100–108)
CO2 SERPL-SCNC: 23 MMOL/L (ref 21–32)
CREAT SERPL-MCNC: 0.95 MG/DL (ref 0.6–1.3)
EOSINOPHIL # BLD AUTO: 0.25 THOUSAND/ΜL (ref 0–0.61)
EOSINOPHIL NFR BLD AUTO: 3 % (ref 0–6)
ERYTHROCYTE [DISTWIDTH] IN BLOOD BY AUTOMATED COUNT: 15.3 % (ref 11.6–15.1)
GFR SERPL CREATININE-BSD FRML MDRD: 70 ML/MIN/1.73SQ M
GLUCOSE SERPL-MCNC: 301 MG/DL (ref 65–140)
HCT VFR BLD AUTO: 38.7 % (ref 34.8–46.1)
HGB BLD-MCNC: 12.2 G/DL (ref 11.5–15.4)
IMM GRANULOCYTES # BLD AUTO: 0.06 THOUSAND/UL (ref 0–0.2)
IMM GRANULOCYTES NFR BLD AUTO: 1 % (ref 0–2)
INR PPP: 1.22 (ref 0.84–1.19)
LYMPHOCYTES # BLD AUTO: 0.56 THOUSANDS/ΜL (ref 0.6–4.47)
LYMPHOCYTES NFR BLD AUTO: 6 % (ref 14–44)
MCH RBC QN AUTO: 29.5 PG (ref 26.8–34.3)
MCHC RBC AUTO-ENTMCNC: 31.5 G/DL (ref 31.4–37.4)
MCV RBC AUTO: 94 FL (ref 82–98)
MONOCYTES # BLD AUTO: 1.2 THOUSAND/ΜL (ref 0.17–1.22)
MONOCYTES NFR BLD AUTO: 13 % (ref 4–12)
NEUTROPHILS # BLD AUTO: 7.42 THOUSANDS/ΜL (ref 1.85–7.62)
NEUTS SEG NFR BLD AUTO: 76 % (ref 43–75)
NRBC BLD AUTO-RTO: 0 /100 WBCS
NT-PROBNP SERPL-MCNC: 214 PG/ML
P AXIS: 52 DEGREES
P AXIS: 52 DEGREES
PLATELET # BLD AUTO: 144 THOUSANDS/UL (ref 149–390)
PMV BLD AUTO: 10.5 FL (ref 8.9–12.7)
POTASSIUM SERPL-SCNC: 3.1 MMOL/L (ref 3.5–5.3)
PR INTERVAL: 124 MS
PR INTERVAL: 134 MS
PROT SERPL-MCNC: 6.2 G/DL (ref 6.4–8.2)
PROTHROMBIN TIME: 15.5 SECONDS (ref 11.6–14.5)
QRS AXIS: 12 DEGREES
QRS AXIS: 45 DEGREES
QRSD INTERVAL: 90 MS
QRSD INTERVAL: 94 MS
QT INTERVAL: 344 MS
QT INTERVAL: 372 MS
QTC INTERVAL: 432 MS
QTC INTERVAL: 432 MS
RBC # BLD AUTO: 4.13 MILLION/UL (ref 3.81–5.12)
SODIUM SERPL-SCNC: 137 MMOL/L (ref 136–145)
T WAVE AXIS: 4 DEGREES
T WAVE AXIS: 55 DEGREES
TROPONIN I SERPL-MCNC: 0.02 NG/ML
VENTRICULAR RATE: 81 BPM
VENTRICULAR RATE: 95 BPM
WBC # BLD AUTO: 9.54 THOUSAND/UL (ref 4.31–10.16)

## 2019-09-06 PROCEDURE — 99214 OFFICE O/P EST MOD 30 MIN: CPT | Performed by: INTERNAL MEDICINE

## 2019-09-06 PROCEDURE — 99285 EMERGENCY DEPT VISIT HI MDM: CPT

## 2019-09-06 PROCEDURE — 84484 ASSAY OF TROPONIN QUANT: CPT | Performed by: EMERGENCY MEDICINE

## 2019-09-06 PROCEDURE — 85730 THROMBOPLASTIN TIME PARTIAL: CPT | Performed by: EMERGENCY MEDICINE

## 2019-09-06 PROCEDURE — 36415 COLL VENOUS BLD VENIPUNCTURE: CPT | Performed by: EMERGENCY MEDICINE

## 2019-09-06 PROCEDURE — 71045 X-RAY EXAM CHEST 1 VIEW: CPT

## 2019-09-06 PROCEDURE — 96374 THER/PROPH/DIAG INJ IV PUSH: CPT

## 2019-09-06 PROCEDURE — 80053 COMPREHEN METABOLIC PANEL: CPT | Performed by: EMERGENCY MEDICINE

## 2019-09-06 PROCEDURE — 99291 CRITICAL CARE FIRST HOUR: CPT | Performed by: EMERGENCY MEDICINE

## 2019-09-06 PROCEDURE — 83880 ASSAY OF NATRIURETIC PEPTIDE: CPT | Performed by: EMERGENCY MEDICINE

## 2019-09-06 PROCEDURE — 85610 PROTHROMBIN TIME: CPT | Performed by: EMERGENCY MEDICINE

## 2019-09-06 PROCEDURE — 93005 ELECTROCARDIOGRAM TRACING: CPT

## 2019-09-06 PROCEDURE — 93010 ELECTROCARDIOGRAM REPORT: CPT | Performed by: INTERNAL MEDICINE

## 2019-09-06 PROCEDURE — 85025 COMPLETE CBC W/AUTO DIFF WBC: CPT | Performed by: EMERGENCY MEDICINE

## 2019-09-06 RX ORDER — DILTIAZEM HYDROCHLORIDE 5 MG/ML
10 INJECTION INTRAVENOUS ONCE
Status: DISCONTINUED | OUTPATIENT
Start: 2019-09-06 | End: 2019-09-07

## 2019-09-06 RX ORDER — DILTIAZEM HYDROCHLORIDE 120 MG/1
120 CAPSULE, EXTENDED RELEASE ORAL 2 TIMES DAILY
Qty: 60 CAPSULE | Refills: 5 | Status: SHIPPED | OUTPATIENT
Start: 2019-09-06 | End: 2019-09-06 | Stop reason: SDUPTHER

## 2019-09-06 RX ORDER — DILTIAZEM HYDROCHLORIDE 5 MG/ML
INJECTION INTRAVENOUS
Status: COMPLETED
Start: 2019-09-06 | End: 2019-09-06

## 2019-09-06 RX ORDER — ADENOSINE 3 MG/ML
6 INJECTION INTRAVENOUS ONCE
Status: COMPLETED | OUTPATIENT
Start: 2019-09-06 | End: 2019-09-06

## 2019-09-06 RX ORDER — DILTIAZEM HYDROCHLORIDE 120 MG/1
120 CAPSULE, EXTENDED RELEASE ORAL 2 TIMES DAILY
Qty: 180 CAPSULE | Refills: 3 | Status: SHIPPED | OUTPATIENT
Start: 2019-09-06 | End: 2020-09-08

## 2019-09-06 RX ORDER — LEVOTHYROXINE SODIUM 0.07 MG/1
75 TABLET ORAL EVERY MORNING
Qty: 90 TABLET | Refills: 3 | Status: SHIPPED | OUTPATIENT
Start: 2019-09-06 | End: 2019-11-25 | Stop reason: SDUPTHER

## 2019-09-06 RX ORDER — METOPROLOL TARTRATE 100 MG/1
100 TABLET ORAL 2 TIMES DAILY
Qty: 180 TABLET | Refills: 3 | Status: SHIPPED | OUTPATIENT
Start: 2019-09-06 | End: 2020-06-15 | Stop reason: SDUPTHER

## 2019-09-06 RX ORDER — POTASSIUM CHLORIDE 20 MEQ/1
40 TABLET, EXTENDED RELEASE ORAL ONCE
Status: COMPLETED | OUTPATIENT
Start: 2019-09-06 | End: 2019-09-06

## 2019-09-06 RX ADMIN — POTASSIUM CHLORIDE 40 MEQ: 1500 TABLET, EXTENDED RELEASE ORAL at 22:46

## 2019-09-06 RX ADMIN — ADENOSINE 6 MG: 3 INJECTION INTRAVENOUS at 21:06

## 2019-09-06 RX ADMIN — DILTIAZEM HYDROCHLORIDE 10 MG: 5 INJECTION INTRAVENOUS at 21:09

## 2019-09-06 NOTE — PROGRESS NOTES
Subjective:        Patient ID: Gigi Tamayo is a 46 y o  female  Chief Complaint:  Korina Lombardo had another episode of symptomatic two-to-one a flutter on September 2nd went to the ER but spontaneously converted before any medications was necessary  She is going through much stress, enlarging mass in her left hilum, CT questioning left atrial compression and has upcoming oncologic treatment planned  She is under much stress she says  We discussed how adrenal glands and such can affect cardiac rhythms and folks with a substrate for dysrhythmias as she has  Holter monitor back in July revealed only a brief episode or 2 of PAF, 12 beats maximum induration, nothing sustained  She remains on full anticoagulation with novel oral anticoagulant therapy  Recent electrolytes cardiac enzymes all negative, CBC normal as well, TSH normal     Pulse regular by exam today  BP upper normal   No presyncope or syncope  No chest pains  No alarming dyspnea  No edema orthopnea  The following portions of the patient's history were reviewed and updated as appropriate: allergies, current medications, past family history, past medical history, past social history, past surgical history and problem list   Review of Systems   Constitution: Negative for chills, diaphoresis, malaise/fatigue and weight gain  HENT: Negative for nosebleeds and stridor  Eyes: Negative for double vision, vision loss in left eye, vision loss in right eye and visual disturbance  Cardiovascular: Positive for irregular heartbeat and palpitations  Negative for chest pain, claudication, cyanosis, dyspnea on exertion, leg swelling, near-syncope, orthopnea, paroxysmal nocturnal dyspnea and syncope  Respiratory: Negative for cough, shortness of breath, snoring and wheezing  Endocrine: Negative for polydipsia, polyphagia and polyuria  Hematologic/Lymphatic: Negative for bleeding problem  Does not bruise/bleed easily     Skin: Negative for flushing and rash  Musculoskeletal: Negative for falls and myalgias  Gastrointestinal: Negative for abdominal pain, heartburn, hematemesis, hematochezia, melena and nausea  Genitourinary: Negative for hematuria  Neurological: Negative for brief paralysis, dizziness, focal weakness, headaches, light-headedness, loss of balance and vertigo  Psychiatric/Behavioral: Negative for altered mental status and substance abuse  Allergic/Immunologic: Negative for hives  Objective:      /80   Pulse 100   Ht 5' 8" (1 727 m)   Wt 127 kg (281 lb)   BMI 42 73 kg/m²   Physical Exam   Constitutional: She is oriented to person, place, and time  She appears well-developed and well-nourished  HENT:   Head: Normocephalic and atraumatic  Eyes: Pupils are equal, round, and reactive to light  EOM are normal    Neck: Normal range of motion  Neck supple  No JVD present  No thyromegaly present  Cardiovascular: Normal rate, regular rhythm, normal heart sounds and intact distal pulses  Exam reveals no gallop and no friction rub  No murmur heard  Pulmonary/Chest: Effort normal  No stridor  No respiratory distress  She has no wheezes  She has no rales  Markedly decreased breath sounds 2/3 the way up posteriorly on the left Only   Abdominal: Soft  Bowel sounds are normal  She exhibits no mass  There is no tenderness  Musculoskeletal: Normal range of motion  She exhibits no edema  Lymphadenopathy:     She has no cervical adenopathy  Neurological: She is alert and oriented to person, place, and time  Skin: Skin is warm and dry  No rash noted  No pallor  Psychiatric: She has a normal mood and affect  Her behavior is normal  Judgment and thought content normal    Nursing note and vitals reviewed        Lab Review:   Admission on 09/02/2019, Discharged on 09/02/2019   Component Date Value    WBC 09/02/2019 15 96*    RBC 09/02/2019 4 77     Hemoglobin 09/02/2019 14 1     Hematocrit 09/02/2019 44 2  MCV 09/02/2019 93     MCH 09/02/2019 29 6     MCHC 09/02/2019 31 9     RDW 09/02/2019 15 2*    MPV 09/02/2019 10 7     Platelets 45/34/1660 218     nRBC 09/02/2019 0     Neutrophils Relative 09/02/2019 80*    Immat GRANS % 09/02/2019 2     Lymphocytes Relative 09/02/2019 6*    Monocytes Relative 09/02/2019 9     Eosinophils Relative 09/02/2019 3     Basophils Relative 09/02/2019 0     Neutrophils Absolute 09/02/2019 12 81*    Immature Grans Absolute 09/02/2019 0 25*    Lymphocytes Absolute 09/02/2019 0 89     Monocytes Absolute 09/02/2019 1 46*    Eosinophils Absolute 09/02/2019 0 51     Basophils Absolute 09/02/2019 0 04     Sodium 09/02/2019 134*    Potassium 09/02/2019 3 9     Chloride 09/02/2019 101     CO2 09/02/2019 25     ANION GAP 09/02/2019 8     BUN 09/02/2019 15     Creatinine 09/02/2019 1 06     Glucose 09/02/2019 132     Calcium 09/02/2019 8 5     AST 09/02/2019 21     ALT 09/02/2019 19     Alkaline Phosphatase 09/02/2019 123*    Total Protein 09/02/2019 6 2*    Albumin 09/02/2019 3 0*    Total Bilirubin 09/02/2019 0 60     eGFR 09/02/2019 61     Troponin I 09/02/2019 <0 02     TSH 3RD GENERATON 09/02/2019 3 081     Magnesium 09/02/2019 2 2     Ventricular Rate 09/02/2019 133     Atrial Rate 09/02/2019 153     QRSD Interval 09/02/2019 84     QT Interval 09/02/2019 290     QTC Interval 09/02/2019 431     QRS Axis 09/02/2019 34     T Wave [place] 09/02/2019 121    Appointment on 08/22/2019   Component Date Value    WBC 08/22/2019 11 26*    RBC 08/22/2019 3 55*    Hemoglobin 08/22/2019 10 7*    Hematocrit 08/22/2019 33 7*    MCV 08/22/2019 95     MCH 08/22/2019 30 1     MCHC 08/22/2019 31 8     RDW 08/22/2019 14 6     MPV 08/22/2019 12 7     Platelets 55/31/2728 202     nRBC 08/22/2019 0     Neutrophils Relative 08/22/2019 81*    Immat GRANS % 08/22/2019 1     Lymphocytes Relative 08/22/2019 7*    Monocytes Relative 08/22/2019 8     Eosinophils Relative 08/22/2019 2     Basophils Relative 08/22/2019 1     Neutrophils Absolute 08/22/2019 9 13*    Immature Grans Absolute 08/22/2019 0 15     Lymphocytes Absolute 08/22/2019 0 77     Monocytes Absolute 08/22/2019 0 84     Eosinophils Absolute 08/22/2019 0 25     Basophils Absolute 08/22/2019 0 12*    Sodium 08/22/2019 141     Potassium 08/22/2019 3 9     Chloride 08/22/2019 106     CO2 08/22/2019 25     ANION GAP 08/22/2019 10     BUN 08/22/2019 12     Creatinine 08/22/2019 0 81     Glucose 08/22/2019 132     Calcium 08/22/2019 8 8     AST 08/22/2019 15     ALT 08/22/2019 12     Alkaline Phosphatase 08/22/2019 111     Total Protein 08/22/2019 6 5     Albumin 08/22/2019 3 0*    Total Bilirubin 08/22/2019 0 69     eGFR 08/22/2019 85    Admission on 08/06/2019, Discharged on 08/06/2019   Component Date Value    WBC 08/06/2019 12 62*    RBC 08/06/2019 4 21     Hemoglobin 08/06/2019 12 7     Hematocrit 08/06/2019 40 6     MCV 08/06/2019 96     MCH 08/06/2019 30 2     MCHC 08/06/2019 31 3*    RDW 08/06/2019 14 2     MPV 08/06/2019 11 5     Platelets 38/55/8589 213     nRBC 08/06/2019 0     Neutrophils Relative 08/06/2019 81*    Immat GRANS % 08/06/2019 1     Lymphocytes Relative 08/06/2019 7*    Monocytes Relative 08/06/2019 8     Eosinophils Relative 08/06/2019 2     Basophils Relative 08/06/2019 1     Neutrophils Absolute 08/06/2019 10 23*    Immature Grans Absolute 08/06/2019 0 13     Lymphocytes Absolute 08/06/2019 0 92     Monocytes Absolute 08/06/2019 1 00     Eosinophils Absolute 08/06/2019 0 24     Basophils Absolute 08/06/2019 0 10     Sodium 08/06/2019 141     Potassium 08/06/2019 3 9     Chloride 08/06/2019 105     CO2 08/06/2019 25     ANION GAP 08/06/2019 11     BUN 08/06/2019 15     Creatinine 08/06/2019 1 07     Glucose 08/06/2019 157*    Calcium 08/06/2019 9 0     AST 08/06/2019 11     ALT 08/06/2019 10*    Alkaline Phosphatase 08/06/2019 119*  Total Protein 08/06/2019 6 9     Albumin 08/06/2019 3 2*    Total Bilirubin 08/06/2019 0 50     eGFR 08/06/2019 61     Troponin I 08/06/2019 <0 02     Magnesium 08/06/2019 1 9     Lipase 08/06/2019 155     LACTIC ACID 08/06/2019 2 2*    Protime 08/06/2019 13 2     INR 08/06/2019 1 00     PTT 08/06/2019 28     Color, UA 08/06/2019 Yellow     Clarity, UA 08/06/2019 Slightly Cloudy     Specific Gravity, UA 08/06/2019 <=1 005     pH, UA 08/06/2019 6 5     Leukocytes, UA 08/06/2019 Negative     Nitrite, UA 08/06/2019 Negative     Protein, UA 08/06/2019 Negative     Glucose, UA 08/06/2019 Negative     Ketones, UA 08/06/2019 Negative     Urobilinogen, UA 08/06/2019 0 2     Bilirubin, UA 08/06/2019 Negative     Blood, UA 08/06/2019 Trace-Intact*    RBC, UA 08/06/2019 None Seen     WBC, UA 08/06/2019 None Seen     Epithelial Cells 08/06/2019 Occasional     Bacteria, UA 08/06/2019 None Seen     Ventricular Rate 08/06/2019 122     Atrial Rate 08/06/2019 117     QRSD Interval 08/06/2019 88     QT Interval 08/06/2019 308     QTC Interval 08/06/2019 438     QRS Axis 08/06/2019 24     T Wave [place] 08/06/2019 2    Appointment on 07/19/2019   Component Date Value    WBC 07/19/2019 13 15*    RBC 07/19/2019 4 09     Hemoglobin 07/19/2019 12 7     Hematocrit 07/19/2019 39 9     MCV 07/19/2019 98     MCH 07/19/2019 31 1     MCHC 07/19/2019 31 8     RDW 07/19/2019 13 8     MPV 07/19/2019 11 5     Platelets 90/15/5571 215     nRBC 07/19/2019 0     Neutrophils Relative 07/19/2019 71     Immat GRANS % 07/19/2019 2     Lymphocytes Relative 07/19/2019 10*    Monocytes Relative 07/19/2019 12     Eosinophils Relative 07/19/2019 4     Basophils Relative 07/19/2019 1     Neutrophils Absolute 07/19/2019 9 53*    Immature Grans Absolute 07/19/2019 0 20     Lymphocytes Absolute 07/19/2019 1 26     Monocytes Absolute 07/19/2019 1 57*    Eosinophils Absolute 07/19/2019 0 47     Basophils Absolute 07/19/2019 0 12*    Sodium 07/19/2019 137     Potassium 07/19/2019 4 0     Chloride 07/19/2019 101     CO2 07/19/2019 27     ANION GAP 07/19/2019 9     BUN 07/19/2019 14     Creatinine 07/19/2019 0 85     Glucose 07/19/2019 96     Calcium 07/19/2019 8 7     AST 07/19/2019 11     ALT 07/19/2019 12     Alkaline Phosphatase 07/19/2019 114     Total Protein 07/19/2019 6 7     Albumin 07/19/2019 3 4*    Total Bilirubin 07/19/2019 0 50     eGFR 07/19/2019 80      Xr Chest 2 Views    Result Date: 9/2/2019  Narrative: CHEST INDICATION:   Palpitations  Lymphoma  COMPARISON:  August 22, 2019 EXAM PERFORMED/VIEWS:  XR CHEST PA & LATERAL FINDINGS:  Left chest port is noted  Cardiomediastinal silhouette appears unremarkable  There is near complete opacification of the left hemithorax is reidentified, though there is currently a small amount of aeration in the left mid chest which was not present on the previous examination  Left heart border remains obscured  Trachea and mediastinal contents remain in the midline  Right lung is normally aerated  No acute osseous abnormality noted  Impression: Near complete opacification of left hemithorax is reidentified, though there is currently a small amount of aeration in the left mid chest which was not present on the previous examination  Right lung is clear  Workstation performed: [id number]     Xr Chest Pa & Lateral    Result Date: 8/22/2019  Narrative: CHEST INDICATION:   Cough and shortness of breath  COMPARISON:  Chest CT dated 5/26/2019  Radiographs dated 7/23/2019 and 8/6/2019  EXAM PERFORMED/VIEWS:  XR CHEST PA & LATERAL FINDINGS:  Implantable port again demonstrated with access site at the hub still pointing directly medially  Catheter tip is unchanged at the cavoatrial junction  Heart is largely obscured but unchanged  Complete white out of the left lung field  No right-sided consolidation, effusion, or pneumothorax  Bones are unremarkable  Surgical clips at the right neck  Impression: Complete whiteout of the left hemithorax appears even more extensive than on recent prior  Known left perihilar mass may be causing airway obstruction with collapse or this could be worsening worsening postobstructive pneumonia  Lack of significant leftward mediastinal shift could also suggest a component of effusion  The study was marked in EPIC for significant notification  Workstation performed: [id number]     Cta Ed Chest Pe Study    Result Date: 9/2/2019  Narrative: CTA - CHEST WITH IV CONTRAST - PULMONARY ANGIOGRAM INDICATION:   h/o lymphoma left hemithorax worsneing paroxysmal afib check for PE,  h/o mass eval mall  COMPARISON: 5/26/2019  TECHNIQUE: CTA examination of the chest was performed using angiographic technique according to a protocol specifically tailored to evaluate for pulmonary embolism  Axial, sagittal, and coronal 2D reformatted images were created from the source data and  submitted for interpretation  In addition, coronal 3D MIP postprocessing was performed on the acquisition scanner  Radiation dose length product (DLP) for this visit:  514 39 mGy-cm   This examination, like all CT scans performed in the [facility], was performed utilizing techniques to minimize radiation dose exposure, including the use of iterative  reconstruction and automated exposure control  IV Contrast:  85 mL of iohexol (OMNIPAQUE)  FINDINGS: PULMONARY ARTERIAL TREE:  No pulmonary embolus is seen  LUNGS:  Large left perihilar soft tissue mass is again identified  There appears to be increased invasion of the left atrium on the current exam   Some cavitation is present  Direct measurement is challenging secondary to associated pulmonary consolidation  PLEURA:  Small left pleural effusion is present  HEART/GREAT VESSELS:  Unremarkable for patient's age  MEDIASTINUM AND CECILIA:  Unremarkable  CHEST WALL AND LOWER NECK:   Unremarkable   VISUALIZED STRUCTURES IN THE UPPER ABDOMEN:  Unremarkable  OSSEOUS STRUCTURES:  No acute fracture or destructive osseous lesion  Impression: No evidence of pulmonary embolus  Large left perihilar soft tissue mass again seen with interval increase in the left atrial invasion  Small left pleural effusion  Workstation performed: [id number]     CTA - CHEST WITH IV CONTRAST - PULMONARY ANGIOGRAM     INDICATION:   h/o lymphoma left hemithorax worsneing paroxysmal afib check for PE,  h/o mass eval mall      COMPARISON: 5/26/2019      TECHNIQUE: CTA examination of the chest was performed using angiographic technique according to a protocol specifically tailored to evaluate for pulmonary embolism  Axial, sagittal, and coronal 2D reformatted images were created from the source data and   submitted for interpretation  In addition, coronal 3D MIP postprocessing was performed on the acquisition scanner        Radiation dose length product (DLP) for this visit:  514 39 mGy-cm   This examination, like all CT scans performed in the [facility], was performed utilizing techniques to minimize radiation dose exposure, including the use of iterative   reconstruction and automated exposure control      IV Contrast:  85 mL of iohexol (OMNIPAQUE)     FINDINGS:     PULMONARY ARTERIAL TREE:  No pulmonary embolus is seen       LUNGS:  Large left perihilar soft tissue mass is again identified  There appears to be increased invasion of the left atrium on the current exam   Some cavitation is present    Direct measurement is challenging secondary to associated pulmonary   consolidation      PLEURA:  Small left pleural effusion is present      HEART/GREAT VESSELS:  Unremarkable for patient's age      MEDIASTINUM AND CECILIA:  Unremarkable      CHEST WALL AND LOWER NECK:   Unremarkable      VISUALIZED STRUCTURES IN THE UPPER ABDOMEN:  Unremarkable      OSSEOUS STRUCTURES:  No acute fracture or destructive osseous lesion      IMPRESSION:     No evidence of pulmonary embolus      Large left perihilar soft tissue mass again seen with interval increase in the left atrial invasion  Small left pleural effusion            Workstation performed: [id number]    Atrial flutter with rapid ventricular response  Septal infarct , age undetermined  Abnormal ECG  When compared with ECG of 06-AUG-2019 11:05,  Septal infarct is now Present  Non-specific change in ST segment in Inferior leads  Confirmed by Ricco Arroyo ([id number]) on 9/3/2019 12:12:31 PM    PT NAME: Bernadette Velez  : 1970                    AGE: 46 y o  GENDER: female  MRN: [id number]                               PROCEDURE: Holter monitor - 24 hour           INDICATIONS:   Palpitations, Atrial flutter        IMPRESSION:  1  The patient had predominantly sinus rhythm  2  Heart rate varied from 67 bpm to 150 bpm   The patients average heart rate was 83 bpm     3  The patient had a holter monitor tracing done for 23 hours and 59 minutes  4  The patient had rare ventricular ectopic activity, 2 PVCs, no VT  5  The patient had rare supraventricular ectopy, 109 PACs, and 6 atrial runs, longest 12 beats in duration and fastest 171 bpm consistent with PAT, 2-1 atrial flutter less likely  6  The longest R/R interval was 1 1 seconds  7  No other major arrhythmia was noted  8  Patient noted "flutter" with the longest run of PSVT               Assessment:       1  Palpitations  Echo complete with contrast if indicated    diltiazem (TIAZAC) 120 MG 24 hr capsule   2  Atrial flutter, unspecified type ([address] )  Echo complete with contrast if indicated    diltiazem (TIAZAC) 120 MG 24 hr capsule   3  Malignant lymphoma, large cell, diffuse ([address] )     4  History of pulmonary embolism          Plan:       Recurrent paroxysmal atrial flutter, on chronic full anticoagulation in light of a history of non provoked PE and PAF/PA flutter      CT scan concerning for left atrial compression  Will order echocardiogram to further evaluate  Obviously treatment here is primarily oncologic  Since protected by anticoagulation favor escalation of conservative therapies to prevent a flutter with rapid ventricular response  Will increase diltiazem to 120 mg p o  B i d , continue metoprolol 100 mg b i d   Encourage a liberalizing fluid hydration and adding salt to taste in diet  Discussed toxicities of anti rhythmic therapies and why I am not r recommending any of those nor ablation at this time  With respect to the latter, I feel she would be at high risk for ablation complications with her primary oncologic diagnosis and chest and CT findings  I will see her back in a month review the echocardiogram see how she is doing on the higher dose diltiazem  I changed no other medications today

## 2019-09-06 NOTE — PATIENT INSTRUCTIONS

## 2019-09-07 VITALS
TEMPERATURE: 97.7 F | HEIGHT: 68 IN | WEIGHT: 279.98 LBS | SYSTOLIC BLOOD PRESSURE: 103 MMHG | OXYGEN SATURATION: 93 % | RESPIRATION RATE: 18 BRPM | DIASTOLIC BLOOD PRESSURE: 61 MMHG | HEART RATE: 89 BPM | BODY MASS INDEX: 42.43 KG/M2

## 2019-09-07 LAB
ATRIAL RATE: 106 BPM
ATRIAL RATE: 106 BPM
ATRIAL RATE: 150 BPM
ATRIAL RATE: 330 BPM
ATRIAL RATE: 86 BPM
P AXIS: 242 DEGREES
P AXIS: 27 DEGREES
P AXIS: 49 DEGREES
P AXIS: 49 DEGREES
PR INTERVAL: 130 MS
PR INTERVAL: 130 MS
PR INTERVAL: 132 MS
QRS AXIS: 16 DEGREES
QRS AXIS: 21 DEGREES
QRS AXIS: 24 DEGREES
QRS AXIS: 39 DEGREES
QRS AXIS: 40 DEGREES
QRSD INTERVAL: 76 MS
QRSD INTERVAL: 82 MS
QRSD INTERVAL: 86 MS
QRSD INTERVAL: 86 MS
QRSD INTERVAL: 94 MS
QT INTERVAL: 252 MS
QT INTERVAL: 294 MS
QT INTERVAL: 332 MS
QT INTERVAL: 344 MS
QT INTERVAL: 352 MS
QTC INTERVAL: 421 MS
QTC INTERVAL: 432 MS
QTC INTERVAL: 441 MS
QTC INTERVAL: 456 MS
QTC INTERVAL: 487 MS
T WAVE AXIS: -16 DEGREES
T WAVE AXIS: -4 DEGREES
T WAVE AXIS: -7 DEGREES
T WAVE AXIS: 215 DEGREES
T WAVE AXIS: 223 DEGREES
VENTRICULAR RATE: 106 BPM
VENTRICULAR RATE: 106 BPM
VENTRICULAR RATE: 165 BPM
VENTRICULAR RATE: 177 BPM
VENTRICULAR RATE: 86 BPM

## 2019-09-07 PROCEDURE — 93010 ELECTROCARDIOGRAM REPORT: CPT | Performed by: INTERNAL MEDICINE

## 2019-09-07 NOTE — DISCHARGE INSTRUCTIONS
Please follow-up with the primary care provider, if anything changes or worsens, please return emergency department    Please

## 2019-09-09 ENCOUNTER — TELEPHONE (OUTPATIENT)
Dept: FAMILY MEDICINE CLINIC | Facility: CLINIC | Age: 49
End: 2019-09-09

## 2019-09-09 NOTE — TELEPHONE ENCOUNTER
I see patient already followed up with Cardiology on 09/06/2019  There is nothing more I need to add  Follow up with Cardiology on Oncology as scheduled

## 2019-09-09 NOTE — TELEPHONE ENCOUNTER
WAS IN ER Friday WAS TOLD TO DO F/U WITH YOU  DO YOU WANT TO LOOK AT THE RECORDS AND CALL IF YOU NEED ANYTHING

## 2019-09-10 ENCOUNTER — TELEPHONE (OUTPATIENT)
Dept: SLEEP CENTER | Facility: CLINIC | Age: 49
End: 2019-09-10

## 2019-09-10 NOTE — TELEPHONE ENCOUNTER
Left message for patient to call office      No MARGARITO, hypoxia, need consult with Dr Star Beard Subjective       At the end of the visit (30 minutes), they tell me that this is a visit for work comp and that she was injured at work.   Did not provide the  this information either.      Nancy Bejarano is a 57 year old female who presents to clinic today for the following health issues:    HPI     Hip Pain    Onset: x1 month    Description:   Location: left hip and right hip  Character: Dull ache    Intensity: moderate    Progression of Symptoms: same    Accompanying Signs & Symptoms:  Other symptoms: radiation of pain to Upper back and the neck, eblow pain as well    History:   Previous similar pain: no       Precipitating factors:   Trauma or overuse: no     Alleviating factors:  Improved by: rest/inactivity and Asprin    Therapies Tried and outcome: Asprin, little relief.   Pain in multiple joints  Pain in the hips  Pain also in the arms and back  No fever  No rash  No falls or trauma  The pain does not prevent sleep  No family history of rheumatoid arthritis   Had labs for arthralgia in 2018, negative  Mood is OK   Work is physical  Has been seen multiple time in the last few years for complaints related to joint pains      Hypertension Follow-up      Do you check your blood pressure regularly outside of the clinic? Yes 130/79, has not had high readings when monitoring herself     Are you following a low salt diet? Yes    Are your blood pressures ever more than 140 on the top number (systolic) OR more   than 90 on the bottom number (diastolic), for example 140/90? No    Patient Active Problem List   Diagnosis     Hypertension goal BP (blood pressure) < 140/90     Hyperlipidemia LDL goal <130     Back pain     GERD (gastroesophageal reflux disease)     Abdominal pain, unspecified abdominal location     Cataracts, both eyes     Posterior vitreous detachment of both eyes     Left knee pain, unspecified chronicity     Overweight (BMI 25.0-29.9)     History reviewed. No pertinent surgical history.    Social  "History     Tobacco Use     Smoking status: Never Smoker     Smokeless tobacco: Never Used     Tobacco comment: lives in smoke free household.   Substance Use Topics     Alcohol use: No     Family History   Problem Relation Age of Onset     Diabetes No family hx of      Hypertension No family hx of      Breast Cancer No family hx of      Cancer - colorectal No family hx of      Cancer No family hx of      Cerebrovascular Disease No family hx of      Thyroid Disease No family hx of      Glaucoma No family hx of      Macular Degeneration No family hx of          Current Outpatient Medications   Medication Sig Dispense Refill     amLODIPine (NORVASC) 5 MG tablet Take 1 tablet (5 mg) by mouth daily 90 tablet 0     atenolol (TENORMIN) 50 MG tablet Take 1 tablet (50 mg) by mouth daily 90 tablet 1     meloxicam (MOBIC) 7.5 MG tablet Take 1 tablet (7.5 mg) by mouth daily 90 tablet 1     methocarbamol (ROBAXIN) 750 MG tablet Take 1 tablet (750 mg) by mouth 3 times daily as needed for muscle spasms 60 tablet 3     Allergies   Allergen Reactions     Asa [Dihydroxyaluminum Aminoacetate] Other (See Comments)     Epigastric   pain     BP Readings from Last 3 Encounters:   06/14/19 145/85   01/25/19 150/90   03/13/18 130/80    Wt Readings from Last 3 Encounters:   06/14/19 75.8 kg (167 lb)   01/25/19 75.8 kg (167 lb)   03/13/18 74.8 kg (165 lb)              Reviewed and updated as needed this visit by Provider  Tobacco  Allergies  Meds  Problems  Med Hx  Surg Hx  Fam Hx         Review of Systems   ROS COMP: Constitutional, HEENT, cardiovascular, pulmonary, gi and gu systems are negative, except as otherwise noted.      Objective    /85   Pulse 91   Temp 97.9  F (36.6  C) (Oral)   Resp 18   Ht 1.6 m (5' 3\")   Wt 75.8 kg (167 lb)   SpO2 98%   Breastfeeding? No   BMI 29.58 kg/m    Body mass index is 29.58 kg/m .  Physical Exam       GENERAL APPEARANCE: healthy, alert and no distress  EYES: Eyes grossly normal to " inspection and conjunctivae and sclerae normal  HENT: nose and mouth without ulcers or lesions  NECK: no adenopathy and trachea midline and normal to palpation  RESP: lungs clear to auscultation - no rales, rhonchi or wheezes  CV: regular rates and rhythm, normal S1 S2, no S3 or S4 and no murmur, click or rub  ABDOMEN: soft, non-tender and no rebound or guarding   MS: extremities normal- no gross deformities noted and peripheral pulses normal  SKIN: no suspicious lesions or rashes  NEURO: Normal strength and tone, mentation intact and speech normal  PSYCH: mentation appears normal  Joints: no acute inflammation or erythema  Hips: strength and range of motion intact, mild tenderness over the sacro iliac joints bilaterally.     Diagnostic Test Results:  Labs reviewed in Epic  No results found for this or any previous visit (from the past 24 hour(s)).        Assessment & Plan     Nancy was seen today for musculoskeletal problem.    Diagnoses and all orders for this visit:    Polyarthralgia  -     RHEUMATOLOGY REFERRAL  -Evaluation to rule out a Rheumatologic cause for symptoms, ongoing symptoms involving multiple different joints over several years     Hypertension goal BP (blood pressure) < 140/90  -     amLODIPine (NORVASC) 5 MG tablet; Take 1 tablet (5 mg) by mouth daily  -     atenolol (TENORMIN) 50 MG tablet; Take 1 tablet (50 mg) by mouth daily  -     Albumin Random Urine Quantitative with Creat Ratio  -     Basic metabolic panel  -     LDL cholesterol direct  -Does not want to start a new medication as states her Blood pressure is normal when she checks it     Diffuse myofascial pain syndrome  -     meloxicam (MOBIC) 7.5 MG tablet; Take 1 tablet (7.5 mg) by mouth daily    Bilateral hip pain  -     XR Pelvis 1/2 Views; Future  -await results of X rays     Encounter for screening mammogram for breast cancer  -     MA SCREENING DIGITAL BILAT - Future  (s+30); Future    Special screening for malignant neoplasms,  "colon  -     Fecal colorectal cancer screen (FIT); Future         BMI:   Estimated body mass index is 29.58 kg/m  as calculated from the following:    Height as of this encounter: 1.6 m (5' 3\").    Weight as of this encounter: 75.8 kg (167 lb).   Weight management plan: Discussed healthy diet and exercise guidelines        See Patient Instructions    Return in about 1 month (around 7/12/2019), or if symptoms worsen or fail to improve.    Emerita Goldberg MD MPH    [facility]      "

## 2019-09-12 ENCOUNTER — TRANSCRIBE ORDERS (OUTPATIENT)
Dept: ADMINISTRATIVE | Facility: HOSPITAL | Age: 49
End: 2019-09-12

## 2019-09-12 ENCOUNTER — HOSPITAL ENCOUNTER (OUTPATIENT)
Dept: NON INVASIVE DIAGNOSTICS | Facility: CLINIC | Age: 49
Discharge: HOME | End: 2019-09-12
Payer: COMMERCIAL

## 2019-09-12 ENCOUNTER — APPOINTMENT (OUTPATIENT)
Dept: LAB | Facility: HOSPITAL | Age: 49
End: 2019-09-12
Payer: COMMERCIAL

## 2019-09-12 ENCOUNTER — HOSPITAL ENCOUNTER (OUTPATIENT)
Dept: RADIOLOGY | Facility: HOSPITAL | Age: 49
Discharge: HOME | End: 2019-09-12
Payer: COMMERCIAL

## 2019-09-12 DIAGNOSIS — C85.80 LYMPHOSARCOMA, MIXED CELL TYPE (HCC): ICD-10-CM

## 2019-09-12 DIAGNOSIS — R04.2 COUGHING UP BLOOD: ICD-10-CM

## 2019-09-12 DIAGNOSIS — C84.41 PERIPHERAL T CELL LYMPHOMA OF LYMPH NODES OF HEAD, FACE, AND NECK (HCC): ICD-10-CM

## 2019-09-12 DIAGNOSIS — R00.2 PALPITATIONS: ICD-10-CM

## 2019-09-12 DIAGNOSIS — R04.2 COUGHING UP BLOOD: Primary | ICD-10-CM

## 2019-09-12 DIAGNOSIS — I48.92 ATRIAL FLUTTER, UNSPECIFIED TYPE (HCC): ICD-10-CM

## 2019-09-12 LAB
ALBUMIN SERPL BCP-MCNC: 3.7 G/DL (ref 3.5–5.7)
ALP SERPL-CCNC: 94 U/L (ref 40–150)
ALT SERPL W P-5'-P-CCNC: 16 U/L (ref 7–52)
ANION GAP SERPL CALCULATED.3IONS-SCNC: 8 MMOL/L (ref 4–13)
AST SERPL W P-5'-P-CCNC: 19 U/L (ref 13–39)
BASOPHILS # BLD AUTO: 0.1 THOUSANDS/ΜL (ref 0–0.1)
BASOPHILS NFR BLD AUTO: 1 % (ref 0–2)
BILIRUB SERPL-MCNC: 0.7 MG/DL (ref 0.2–1)
BUN SERPL-MCNC: 9 MG/DL (ref 7–25)
CALCIUM SERPL-MCNC: 9.2 MG/DL (ref 8.6–10.5)
CHLORIDE SERPL-SCNC: 101 MMOL/L (ref 98–107)
CO2 SERPL-SCNC: 26 MMOL/L (ref 21–31)
CREAT SERPL-MCNC: 0.73 MG/DL (ref 0.6–1.2)
EOSINOPHIL # BLD AUTO: 0.4 THOUSAND/ΜL (ref 0–0.61)
EOSINOPHIL NFR BLD AUTO: 6 % (ref 0–5)
ERYTHROCYTE [DISTWIDTH] IN BLOOD BY AUTOMATED COUNT: 17.2 % (ref 11.5–14.5)
GFR SERPL CREATININE-BSD FRML MDRD: 96 ML/MIN/1.73SQ M
GLUCOSE SERPL-MCNC: 132 MG/DL (ref 65–99)
HCT VFR BLD AUTO: 37.4 % (ref 42–47)
HGB BLD-MCNC: 12.6 G/DL (ref 12–16)
LDH SERPL-CCNC: 207 U/L (ref 84–246)
LYMPHOCYTES # BLD AUTO: 0.7 THOUSANDS/ΜL (ref 0.6–4.47)
LYMPHOCYTES NFR BLD AUTO: 10 % (ref 21–51)
MCH RBC QN AUTO: 30.2 PG (ref 26–34)
MCHC RBC AUTO-ENTMCNC: 33.6 G/DL (ref 31–37)
MCV RBC AUTO: 90 FL (ref 81–99)
MONOCYTES # BLD AUTO: 1 THOUSAND/ΜL (ref 0.17–1.22)
MONOCYTES NFR BLD AUTO: 15 % (ref 2–12)
NEUTROPHILS # BLD AUTO: 4.8 THOUSANDS/ΜL (ref 1.4–6.5)
NEUTS SEG NFR BLD AUTO: 69 % (ref 42–75)
PHOSPHATE SERPL-MCNC: 3.6 MG/DL (ref 3–5.5)
PLATELET # BLD AUTO: 232 THOUSANDS/UL (ref 149–390)
PMV BLD AUTO: 8.8 FL (ref 8.6–11.7)
POTASSIUM SERPL-SCNC: 3.9 MMOL/L (ref 3.5–5.5)
PROT SERPL-MCNC: 6.3 G/DL (ref 6.4–8.9)
RBC # BLD AUTO: 4.15 MILLION/UL (ref 3.9–5.2)
SODIUM SERPL-SCNC: 135 MMOL/L (ref 134–143)
URATE SERPL-MCNC: 4 MG/DL (ref 2.3–7.6)
WBC # BLD AUTO: 7 THOUSAND/UL (ref 4.8–10.8)

## 2019-09-12 PROCEDURE — 84550 ASSAY OF BLOOD/URIC ACID: CPT

## 2019-09-12 PROCEDURE — 80053 COMPREHEN METABOLIC PANEL: CPT

## 2019-09-12 PROCEDURE — 71046 X-RAY EXAM CHEST 2 VIEWS: CPT

## 2019-09-12 PROCEDURE — 85025 COMPLETE CBC W/AUTO DIFF WBC: CPT

## 2019-09-12 PROCEDURE — 84100 ASSAY OF PHOSPHORUS: CPT

## 2019-09-12 PROCEDURE — 83615 LACTATE (LD) (LDH) ENZYME: CPT

## 2019-09-12 PROCEDURE — 36415 COLL VENOUS BLD VENIPUNCTURE: CPT

## 2019-09-12 PROCEDURE — 93306 TTE W/DOPPLER COMPLETE: CPT

## 2019-09-12 PROCEDURE — 93306 TTE W/DOPPLER COMPLETE: CPT | Performed by: INTERNAL MEDICINE

## 2019-09-21 NOTE — ED PROVIDER NOTES
History  Chief Complaint   Patient presents with    Rapid Heart Rate     Patient went to the dr  this morning and had her medication changed (diltiazem)  Has hx of rapid heart rate and a-flutter  Felt fluttering in her chest about 25minutes ago and came right over  HPI  This is a 78-year-old woman with history of lymphoma receiving active chemotherapy who presents for evaluation of palpitations  Patient has had multiple episodes of rapid AFib recently where she has come into the emergency department  Last 2 times though, the RVR has spontaneously converted to normal sinus when patient received IV blood draw  Patient denies any headache or chest pain this moment time  She has slight increased shortness of breath  Denies lightheadedness abdominal pain nausea vomiting  Patient went to her cardiologist today who increased her doses of rate-controlling medication  Prior to Admission Medications   Prescriptions Last Dose Informant Patient Reported? Taking?    Ibrutinib (IMBRUVICA) 560 MG TABS   Yes No   Sig: Take 560 mg by mouth daily    LORazepam (ATIVAN) 0 5 mg tablet  Self Yes No   Sig: Take 0 5 mg by mouth every 8 (eight) hours as needed    MAGNESIUM OXIDE PO  Self Yes No   Sig: Take 400 mg by mouth daily    Multiple Vitamin (MULTIVITAMIN) tablet  Self Yes No   Sig: Take 2 tablets by mouth 2 (two) times a day    Zinc 50 MG CAPS  Self Yes No   Sig: Take 1 capsule by mouth every morning    acetaminophen (TYLENOL) 325 mg tablet  Self Yes No   Sig: Take 650 mg by mouth every 4 (four) hours as needed    albuterol (2 5 mg/3 mL) 0 083 % nebulizer solution   No No   Sig: Take 1 vial (2 5 mg total) by nebulization every 4 (four) hours as needed for wheezing   albuterol (PROVENTIL HFA,VENTOLIN HFA) 90 mcg/act inhaler  Self No No   Sig: Inhale 2 puffs every 4 (four) hours as needed for wheezing   b complex vitamins tablet  Self Yes No   Sig: Take 1 tablet by mouth 2 (two) times a day    benzonatate (TESSALON PERLES) 100 mg capsule   No No   Sig: Take 1 capsule (100 mg total) by mouth 3 (three) times a day as needed for cough   cetirizine (ZyrTEC) 10 mg tablet   Yes No   Sig: Take 10 mg by mouth daily   cholecalciferol (VITAMIN D3) 1,000 units tablet  Self Yes No   Sig: Take 1,000 Units by mouth every morning    diltiazem (TIAZAC) 120 MG 24 hr capsule   No No   Sig: Take 1 capsule (120 mg total) by mouth 2 (two) times a day   fluticasone-vilanterol (BREO ELLIPTA) 100-25 mcg/inh inhaler   Yes No   Sig: Inhale 1 puff every morning Rinse mouth after use     ipratropium-albuterol (DUO-NEB) 0 5-2 5 mg/3 mL nebulizer solution   No No   Sig: Take 1 vial (3 mL total) by nebulization every 6 (six) hours   levothyroxine 75 mcg tablet   No No   Sig: Take 1 tablet (75 mcg total) by mouth every morning   loratadine (CLARITIN) 10 mg tablet  Self Yes No   Sig: Take 10 mg by mouth as needed    metoprolol tartrate (LOPRESSOR) 100 mg tablet   No No   Sig: Take 1 tablet (100 mg total) by mouth 2 (two) times a day   omeprazole (PriLOSEC) 10 mg delayed release capsule   Yes No   Sig: Take 10 mg by mouth   ondansetron (ZOFRAN) 8 mg tablet  Self Yes No   Sig: Take 8 mg by mouth every 8 (eight) hours as needed for nausea or vomiting (after chemo)    prochlorperazine (COMPAZINE) 10 mg tablet   Yes No   Sig: Take 10 mg by mouth every 6 (six) hours as needed   rivaroxaban (XARELTO) 20 mg tablet   No No   Sig: Take 1 tablet (20 mg total) by mouth daily with dinner   senna-docusate sodium (SENOKOT-S) 8 6-50 mg per tablet  Self Yes No   Sig: Take 1 tablet by mouth 2 (two) times a day as needed for constipation (as needed during chemo tx)       Facility-Administered Medications: None       Past Medical History:   Diagnosis Date    Allergic rhinitis     last assessed 04/06/16    Arthritis     b/l feet    Chronic allergic conjunctivitis     Fluttering heart     takes magnesium for same   EKG OK    Fluttering sensation of heart     "periodically, not often since on magnesium"    Foot pain, left     Full dentures     upper    History of cancer chemotherapy 2016    History of dilation and curettage     Hx of tonsillectomy     Hypothyroid     Irregular heart beat     Lymph nodes enlarged     in chest pushing on her bronchioles necessitating inhalers    Neck mass     R neck mass-excised 3/15/2016,, 4/23/18 noted enlarged lymph node right neck-bx today 4/26/2018    Non Hodgkin's lymphoma ([address] )     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    Obese     Obstructive sleep apnea     Port-A-Cath in place 2016    pt reports 'Has it flushed q 4weeks" right chest wall    Post-menopausal     since chemo 4/2016  no menses    Pulmonary embolism ([address] )     last assessed 10/31/16 attributed to Non-Hodgkins hx    Pulmonary embolism on right ([address] ) 7/12/2018    Last Assessment & Plan:  She will need to hold her Xarelto 2 days prior to her procedure and may resume therapy 1-2 days post procedure (depending on whether she is producing blood tinged sputum or not)      Shortness of breath     due to chest tumor    Tachycardia     awaiting cardiology eval,,,4/23/18 "pt reports saw cardiologist at that time and placed on magnesium and "hardly notices it"    Tinnitus     occas    Wears glasses     Wears partial dentures     /lower    [place] teeth extracted        Past Surgical History:   Procedure Laterality Date    COLONOSCOPY      COLONOSCOPY N/A 7/26/2018    Procedure: COLONOSCOPY with multiple bx;  Surgeon: Mirta Chavez MD;  Location: [address] [place] [address] OR;  Service: Gastroenterology    CYST REMOVAL Left     L  neck    DILATION AND CURETTAGE OF UTERUS      ESOPHAGOGASTRODUODENOSCOPY N/A 7/26/2018    Procedure: ESOPHAGOGASTRODUODENOSCOPY (EGD) with multiple bx;  Surgeon: Mirta Chavez MD;  Location: [address] [place] [address] OR;  Service: Gastroenterology    FACIAL/NECK BIOPSY N/A 4/26/2018    Procedure: OPEN DEEP CERVICAL LYMPH NODE EXCISOION/BIOPSY;  Surgeon: Bryan Ca MD;  Location: [facility] OR;  Service: ENT    LYMPHADENECTOMY      PORTACATH PLACEMENT      [place] BX/REMV,LYMPH NODE,DEEP CERV N/A 3/15/2016    Procedure: DISSECTION [facility] NODE NECK/DEEP NECK MASS ;  Surgeon: Jayjay Beverly DO;  Location: [facility];  Service: ENT    TONSILLECTOMY      TUBAL LIGATION      TUNNELED VENOUS PORT PLACEMENT Right 3/21/2019    Procedure: INSERTION VENOUS PORT (PORT-A-CATH) remove and replace;  Surgeon: Carlos Ortiz MD;  Location: [address] [place], [place] [address]o [place] MA[facility] OR;  Service: General       Family History   Problem Relation Age of Onset    Coronary artery disease Father     Diabetes Brother     Diabetes Family     Heart disease Family      I have reviewed and agree with the history as documented  Social History     Tobacco Use    Smoking status: Former Smoker     Packs/day: 1 50     Years: 11 00     Pack years: 16 50     Last attempt to quit:      Years since quittin 7    Smokeless tobacco: Never Used   Substance Use Topics    Alcohol use: Not Currently    Drug use: No        Review of Systems   Constitutional: Negative  Negative for chills and fever  HENT: Negative  Negative for congestion and sore throat  Eyes: Negative  Negative for discharge and redness  Respiratory: Negative  Negative for chest tightness and shortness of breath  Cardiovascular: Positive for palpitations  Negative for chest pain  Gastrointestinal: Negative  Negative for abdominal pain, nausea and vomiting  Endocrine: Negative  Negative for cold intolerance and polyphagia  Genitourinary: Negative  Negative for difficulty urinating and dysuria  Musculoskeletal: Negative  Negative for arthralgias and back pain  Skin: Negative  Negative for color change and wound  Allergic/Immunologic: Negative  Negative for environmental allergies  Neurological: Negative  Negative for dizziness, weakness and headaches  Hematological: Negative  Psychiatric/Behavioral: Negative  Negative for behavioral problems   The patient is not nervous/anxious  All other systems reviewed and are negative  Physical Exam  Physical Exam   Constitutional: She is oriented to person, place, and time  She appears well-developed and well-nourished  No distress  HENT:   Head: Normocephalic and atraumatic  Right Ear: External ear normal    Left Ear: External ear normal    Mouth/Throat: Oropharynx is clear and moist    Eyes: Pupils are equal, round, and reactive to light  Conjunctivae and EOM are normal  Right eye exhibits no discharge  Left eye exhibits no discharge  No scleral icterus  Neck: Normal range of motion  Neck supple  No tracheal deviation present  No thyromegaly present  Cardiovascular: Intact distal pulses  Exam reveals no gallop and no friction rub  No murmur heard  Patient is in a narrow complex tachycardia to the 160s 170s  Pulmonary/Chest: Effort normal and breath sounds normal  No stridor  No respiratory distress  She has no wheezes  She has no rales  Abdominal: Soft  Bowel sounds are normal  She exhibits no distension  There is no tenderness  There is no rebound and no guarding  Musculoskeletal: Normal range of motion  She exhibits no edema or deformity  Neurological: She is alert and oriented to person, place, and time  No cranial nerve deficit  Skin: Skin is warm and dry  No rash noted  She is not diaphoretic  No erythema  Psychiatric: She has a normal mood and affect  Her behavior is normal  Thought content normal    Nursing note and vitals reviewed        Vital Signs  ED Triage Vitals   Temperature Pulse Respirations Blood Pressure SpO2   09/06/19 2057 09/06/19 2057 09/06/19 2057 09/06/19 2057 09/06/19 2057   97 7 °F (36 5 °C) (!) 168 22 128/64 94 %      Temp Source Heart Rate Source Patient Position - Orthostatic VS BP Location FiO2 (%)   09/06/19 2057 09/06/19 2330 09/06/19 2057 09/06/19 2057 --   Temporal Monitor Sitting Right arm       Pain Score       --                  Vitals:    09/06/19 2230 09/06/19 2300 09/06/19 2330 09/07/19 0000   BP: 101/62 106/56 105/57 103/61   Pulse: 90 90 85 89   Patient Position - Orthostatic VS:   Lying          Visual Acuity      ED Medications  Medications   adenosine (ADENOCARD) injection 6 mg (6 mg Intravenous Given 9/6/19 2106)   diltiazem (CARDIZEM) 25 mg/5 mL injection **ADS Override Pull** (10 mg  Given 9/6/19 2109)   potassium chloride (K-DUR,KLOR-CON) CR tablet 40 mEq (40 mEq Oral Given 9/6/19 2246)       Diagnostic Studies  Results Reviewed     Procedure Component Value Units Date/Time    B-type natriuretic peptide [[id number]]  (Abnormal) Collected:  09/06/19 2116    Lab Status:  Final result Specimen:  Blood from Arm, Left Updated:  09/06/19 2143     NT-proBNP 214 pg/mL     Troponin I [[id number]]  (Normal) Collected:  09/06/19 2116    Lab Status:  Final result Specimen:  Blood from Arm, Left Updated:  09/06/19 2139     Troponin I 0 02 ng/mL     Comprehensive metabolic panel [[id number]]  (Abnormal) Collected:  09/06/19 2116    Lab Status:  Final result Specimen:  Blood from Arm, Left Updated:  09/06/19 2137     Sodium 137 mmol/L      Potassium 3 1 mmol/L      Chloride 102 mmol/L      CO2 23 mmol/L      ANION GAP 12 mmol/L      BUN 4 mg/dL      Creatinine 0 95 mg/dL      Glucose 301 mg/dL      Calcium 8 6 mg/dL      AST 19 U/L      ALT 18 U/L      Alkaline Phosphatase 114 U/L      Total Protein 6 2 g/dL      Albumin 3 0 g/dL      Total Bilirubin 0 50 mg/dL      eGFR 70 ml/min/1 73sq m     Narrative:       [place] guidelines for Chronic Kidney Disease (CKD):     Stage 1 with normal or high GFR (GFR > 90 mL/min/1 73 square meters)    Stage 2 Mild CKD (GFR = 60-89 mL/min/1 73 square meters)    Stage 3A Moderate CKD (GFR = 45-59 mL/min/1 73 square meters)    Stage 3B Moderate CKD (GFR = 30-44 mL/min/1 73 square meters)    Stage 4 Severe CKD (GFR = 15-29 mL/min/1 73 square meters)    Stage 5 End Stage CKD (GFR <15 mL/min/1 73 square meters)  Note: GFR calculation is accurate only with a steady state creatinine    Protime-INR [[id number]]  (Abnormal) Collected:  09/06/19 2116    Lab Status:  Final result Specimen:  Blood from Arm, Left Updated:  09/06/19 2137     Protime 15 5 seconds      INR 1 22    APTT [[id number]]  (Normal) Collected:  09/06/19 2116    Lab Status:  Final result Specimen:  Blood from Arm, Left Updated:  09/06/19 2137     PTT 32 seconds     CBC and differential [[id number]]  (Abnormal) Collected:  09/06/19 2116    Lab Status:  Final result Specimen:  Blood from Arm, Left Updated:  09/06/19 2129     WBC 9 54 Thousand/uL      RBC 4 13 Million/uL      Hemoglobin 12 2 g/dL      Hematocrit 38 7 %      MCV 94 fL      MCH 29 5 pg      MCHC 31 5 g/dL      RDW 15 3 %      MPV 10 5 fL      Platelets 705 Thousands/uL      nRBC 0 /100 WBCs      Neutrophils Relative 76 %      Immat GRANS % 1 %      Lymphocytes Relative 6 %      Monocytes Relative 13 %      Eosinophils Relative 3 %      Basophils Relative 1 %      Neutrophils Absolute 7 42 Thousands/µL      Immature Grans Absolute 0 06 Thousand/uL      Lymphocytes Absolute 0 56 Thousands/µL      Monocytes Absolute 1 20 Thousand/µL      Eosinophils Absolute 0 25 Thousand/µL      Basophils Absolute 0 05 Thousands/µL                  XR chest 1 view portable   ED Interpretation by Leno Rayo MD (09/07 0019)   Patient does have a left lung opacification, much improved previous 4 days in the      Final Result by Samantha Pitt MD (09/07 1307)   Persistent large left perihilar opacity with moderate to large but diminished pleural effusion         Workstation performed: [id number]                    Procedures  CriticalCare Time  Performed by: Leno Rayo MD  Authorized by: Leno Rayo MD     Critical care provider statement:     Critical care time (minutes):  30    Critical care time was exclusive of:  Separately billable procedures and treating other patients    Critical care was necessary to treat or prevent imminent or life-threatening deterioration of the following conditions:  Cardiac failure, circulatory failure and shock    Critical care was time spent personally by me on the following activities:  Blood draw for specimens, development of treatment plan with patient or surrogate, evaluation of patient's response to treatment, examination of patient, ordering and performing treatments and interventions, interpretation of cardiac output measurements and re-evaluation of patient's condition           ED Course  ED Course as of Sep 21 0017   Fri Sep 06, 2019   2341 Symptoms are much improved  Repeat EKG shows normal sinus rhythm at 86 beats per minute  Reassess the patient  She feels like she would like to go home  At no time since presentation to now has she ever had chest pain  Patient saw her cardiologist earlier today who up to both her doses of metoprolol and diltiazem  Patient will contact her cardiologist in the morning  Return precautions were discussed with the patient who verbalized understanding was discharged           initial EKG shows a flutter with 2-1 conduction, rate of 165  HEART Risk Score      Most Recent Value   History  0 Filed at: 09/06/2019 2100   ECG  2 Filed at: 09/06/2019 2100   Age  1 Filed at: 09/06/2019 2100   Risk Factors  1 Filed at: 09/06/2019 2100   Troponin  0 Filed at: 09/06/2019 2100   Heart Score Risk Calculator   History  0 Filed at: 09/06/2019 2100   ECG  2 Filed at: 09/06/2019 2100   Age  1 Filed at: 09/06/2019 2100   Risk Factors  1 Filed at: 09/06/2019 2100   Troponin  0 Filed at: 09/06/2019 2100   HEART Score  4 Filed at: 09/06/2019 2100   HEART Score  4 Filed at: 09/06/2019 2100                            MDM  Number of Diagnoses or Management Options  Atrial fibrillation with rapid ventricular response [facility]):   Diagnosis management comments: 25-year-old woman presents with rapid AFib verses a flutter    Will do a full cardiac evaluation  CBC, CMP, troponin, EKG  Will get a chest x-ray  Will attempt adenosine at that does not work, will moved to [facility]  Disposition  Final diagnoses:   Atrial fibrillation with rapid ventricular response ([address] )     Time reflects when diagnosis was documented in both MDM as applicable and the Disposition within this note     Time User Action Codes Description Comment    9/6/2019 11:58 PM Kimi Braun Add [I48 91] Atrial fibrillation with rapid ventricular response [facility])       ED Disposition     ED Disposition Condition Date/Time Comment    Discharge Stable Fri Sep 6, 2019 11:54 PM Jordana Proffer discharge to home/self care              Follow-up Information     Follow up With Specialties Details Why Contact Info Additional Information    Edilson Sanderson,  Family Medicine Schedule an appointment as soon as possible for a visit in 3 days follow up being seen in the emergency department [address] [facility] [address]  [facility] [address] [facility] Emergency Department Emergency Medicine Go to  As needed, If symptoms worsen [place]  [id number] [phone] [place] ED, [address], [address]          Discharge Medication List as of 9/6/2019 11:59 PM      CONTINUE these medications which have NOT CHANGED    Details   acetaminophen (TYLENOL) 325 mg tablet Take 650 mg by mouth every 4 (four) hours as needed , Historical Med      albuterol (2 5 mg/3 mL) 0 083 % nebulizer solution Take 1 vial (2 5 mg total) by nebulization every 4 (four) hours as needed for wheezing, Starting Wed 7/31/2019, Until Thu 7/30/2020, Normal      albuterol (PROVENTIL HFA,VENTOLIN HFA) 90 mcg/act inhaler Inhale 2 puffs every 4 (four) hours as needed for wheezing, Starting Sun 1/27/2019, Until Mon 1/27/2020, Print      b complex vitamins tablet Take 1 tablet by mouth 2 (two) times a day , Historical Med      benzonatate (TESSALON PERLES) 100 mg capsule Take 1 capsule (100 mg total) by mouth 3 (three) times a day as needed for cough, Starting Thu 5/30/2019, Normal      cetirizine (ZyrTEC) 10 mg tablet Take 10 mg by mouth daily, Historical Med      cholecalciferol (VITAMIN D3) 1,000 units tablet Take 1,000 Units by mouth every morning , Historical Med      diltiazem (TIAZAC) 120 MG 24 hr capsule Take 1 capsule (120 mg total) by mouth 2 (two) times a day, Starting Fri 9/6/2019, Normal      fluticasone-vilanterol (BREO ELLIPTA) 100-25 mcg/inh inhaler Inhale 1 puff every morning Rinse mouth after use , Historical Med      Ibrutinib (IMBRUVICA) 560 MG TABS Take 560 mg by mouth daily , Starting Thu 6/27/2019, Historical Med      ipratropium-albuterol (DUO-NEB) 0 5-2 5 mg/3 mL nebulizer solution Take 1 vial (3 mL total) by nebulization every 6 (six) hours, Starting Tue 5/21/2019, Normal      levothyroxine 75 mcg tablet Take 1 tablet (75 mcg total) by mouth every morning, Starting Fri 9/6/2019, Normal      loratadine (CLARITIN) 10 mg tablet Take 10 mg by mouth as needed , Historical Med      LORazepam (ATIVAN) 0 5 mg tablet Take 0 5 mg by mouth every 8 (eight) hours as needed , Starting Thu 11/8/2018, Historical Med      MAGNESIUM OXIDE PO Take 400 mg by mouth daily , Historical Med      metoprolol tartrate (LOPRESSOR) 100 mg tablet Take 1 tablet (100 mg total) by mouth 2 (two) times a day, Starting Fri 9/6/2019, Normal      Multiple Vitamin (MULTIVITAMIN) tablet Take 2 tablets by mouth 2 (two) times a day , Historical Med      omeprazole (PriLOSEC) 10 mg delayed release capsule Take 10 mg by mouth, Historical Med      ondansetron (ZOFRAN) 8 mg tablet Take 8 mg by mouth every 8 (eight) hours as needed for nausea or vomiting (after chemo) , Starting Fri 11/2/2018, Historical Med      prochlorperazine (COMPAZINE) 10 mg tablet Take 10 mg by mouth every 6 (six) hours as needed, Starting Thu 8/9/2018, Historical Med      rivaroxaban (XARELTO) 20 mg tablet Take 1 tablet (20 mg total) by mouth daily with dinner, Starting Fri 9/6/2019, Normal      senna-docusate sodium (SENOKOT-S) 8 6-50 mg per tablet Take 1 tablet by mouth 2 (two) times a day as needed for constipation (as needed during chemo tx) , Starting Wed 3/6/2019, Historical Med      Zinc 50 MG CAPS Take 1 capsule by mouth every morning , Historical Med           No discharge procedures on file      ED Provider  Electronically Signed by           Markos Melendez MD  09/21/19 1009       Mrakos Melendez MD  09/21/19 [id number]

## 2019-09-24 ENCOUNTER — TRANSCRIBE ORDERS (OUTPATIENT)
Dept: ADMINISTRATIVE | Facility: HOSPITAL | Age: 49
End: 2019-09-24

## 2019-09-24 ENCOUNTER — APPOINTMENT (OUTPATIENT)
Dept: LAB | Facility: MEDICAL CENTER | Age: 49
End: 2019-09-24
Payer: COMMERCIAL

## 2019-09-24 DIAGNOSIS — Z92.21 PERSONAL HISTORY OF CHEMOTHERAPY: ICD-10-CM

## 2019-09-24 DIAGNOSIS — C85.80 LYMPHOSARCOMA, MIXED CELL TYPE (HCC): ICD-10-CM

## 2019-09-24 DIAGNOSIS — C83.30: ICD-10-CM

## 2019-09-24 DIAGNOSIS — C84.41 PERIPHERAL T CELL LYMPHOMA OF LYMPH NODES OF HEAD, FACE, AND NECK (HCC): ICD-10-CM

## 2019-09-24 DIAGNOSIS — C85.80 LYMPHOSARCOMA, MIXED CELL TYPE (HCC): Primary | ICD-10-CM

## 2019-09-24 LAB
ALBUMIN SERPL BCP-MCNC: 3.5 G/DL (ref 3.5–5)
ALP SERPL-CCNC: 134 U/L (ref 46–116)
ALT SERPL W P-5'-P-CCNC: 17 U/L (ref 12–78)
ANION GAP SERPL CALCULATED.3IONS-SCNC: 9 MMOL/L (ref 4–13)
AST SERPL W P-5'-P-CCNC: 26 U/L (ref 5–45)
BASOPHILS # BLD AUTO: 0.08 THOUSANDS/ΜL (ref 0–0.1)
BASOPHILS NFR BLD AUTO: 1 % (ref 0–1)
BILIRUB SERPL-MCNC: 0.47 MG/DL (ref 0.2–1)
BUN SERPL-MCNC: 8 MG/DL (ref 5–25)
CALCIUM SERPL-MCNC: 9.5 MG/DL (ref 8.3–10.1)
CHLORIDE SERPL-SCNC: 107 MMOL/L (ref 100–108)
CO2 SERPL-SCNC: 24 MMOL/L (ref 21–32)
CREAT SERPL-MCNC: 0.77 MG/DL (ref 0.6–1.3)
EOSINOPHIL # BLD AUTO: 0.14 THOUSAND/ΜL (ref 0–0.61)
EOSINOPHIL NFR BLD AUTO: 2 % (ref 0–6)
ERYTHROCYTE [DISTWIDTH] IN BLOOD BY AUTOMATED COUNT: 15.7 % (ref 11.6–15.1)
GFR SERPL CREATININE-BSD FRML MDRD: 90 ML/MIN/1.73SQ M
GLUCOSE P FAST SERPL-MCNC: 174 MG/DL (ref 65–99)
HCT VFR BLD AUTO: 43 % (ref 34.8–46.1)
HGB BLD-MCNC: 13.2 G/DL (ref 11.5–15.4)
IMM GRANULOCYTES # BLD AUTO: 0.04 THOUSAND/UL (ref 0–0.2)
IMM GRANULOCYTES NFR BLD AUTO: 1 % (ref 0–2)
LYMPHOCYTES # BLD AUTO: 0.76 THOUSANDS/ΜL (ref 0.6–4.47)
LYMPHOCYTES NFR BLD AUTO: 11 % (ref 14–44)
MCH RBC QN AUTO: 28.2 PG (ref 26.8–34.3)
MCHC RBC AUTO-ENTMCNC: 30.7 G/DL (ref 31.4–37.4)
MCV RBC AUTO: 92 FL (ref 82–98)
MONOCYTES # BLD AUTO: 0.82 THOUSAND/ΜL (ref 0.17–1.22)
MONOCYTES NFR BLD AUTO: 12 % (ref 4–12)
NEUTROPHILS # BLD AUTO: 5.2 THOUSANDS/ΜL (ref 1.85–7.62)
NEUTS SEG NFR BLD AUTO: 73 % (ref 43–75)
NRBC BLD AUTO-RTO: 0 /100 WBCS
PLATELET # BLD AUTO: 274 THOUSANDS/UL (ref 149–390)
PMV BLD AUTO: 10.4 FL (ref 8.9–12.7)
POTASSIUM SERPL-SCNC: 3.9 MMOL/L (ref 3.5–5.3)
PROT SERPL-MCNC: 6.6 G/DL (ref 6.4–8.2)
RBC # BLD AUTO: 4.68 MILLION/UL (ref 3.81–5.12)
SODIUM SERPL-SCNC: 140 MMOL/L (ref 136–145)
WBC # BLD AUTO: 7.04 THOUSAND/UL (ref 4.31–10.16)

## 2019-09-24 PROCEDURE — 80053 COMPREHEN METABOLIC PANEL: CPT

## 2019-09-24 PROCEDURE — 36415 COLL VENOUS BLD VENIPUNCTURE: CPT

## 2019-09-24 PROCEDURE — 85025 COMPLETE CBC W/AUTO DIFF WBC: CPT

## 2019-09-27 ENCOUNTER — TRANSCRIBE ORDERS (OUTPATIENT)
Dept: ADMINISTRATIVE | Facility: HOSPITAL | Age: 49
End: 2019-09-27

## 2019-09-27 ENCOUNTER — LAB REQUISITION (OUTPATIENT)
Dept: LAB | Facility: HOSPITAL | Age: 49
End: 2019-09-27
Payer: COMMERCIAL

## 2019-09-27 DIAGNOSIS — C84.48 PERIPHERAL T-CELL LYMPHOMA OF LYMPH NODES OF MULTIPLE SITES (HCC): ICD-10-CM

## 2019-09-27 LAB
B PARAP IS1001 DNA NPH QL NAA+NON-PROBE: NOT DETECTED
B PERT.PT PRMT NPH QL NAA+NON-PROBE: NOT DETECTED
C PNEUM DNA NPH QL NAA+NON-PROBE: NOT DETECTED
FLUAV RNA NPH QL NAA+NON-PROBE: NOT DETECTED
FLUBV RNA NPH QL NAA+NON-PROBE: NOT DETECTED
HADV DNA NPH QL NAA+NON-PROBE: NOT DETECTED
HMPV RNA NPH QL NAA+NON-PROBE: NOT DETECTED
HPIV 1+2+3+4 RNA NPH QL NAA+NON-PROBE: NOT DETECTED
HPIV 1+2+3+4 RNA NPH QL NAA+NON-PROBE: NOT DETECTED
M PNEUMO DNA NPH QL NAA+NON-PROBE: NOT DETECTED
RSV RNA NPH QL NAA+NON-PROBE: NOT DETECTED
RV+EV RNA NPH QL NAA+NON-PROBE: NOT DETECTED

## 2019-09-27 PROCEDURE — 87798 DETECT AGENT NOS DNA AMP: CPT | Performed by: NURSE PRACTITIONER

## 2019-09-27 PROCEDURE — 87486 CHLMYD PNEUM DNA AMP PROBE: CPT | Performed by: NURSE PRACTITIONER

## 2019-09-27 PROCEDURE — 87581 M.PNEUMON DNA AMP PROBE: CPT | Performed by: NURSE PRACTITIONER

## 2019-09-27 PROCEDURE — 87633 RESP VIRUS 12-25 TARGETS: CPT | Performed by: NURSE PRACTITIONER

## 2019-10-02 DIAGNOSIS — Z12.39 SCREENING FOR BREAST CANCER: Primary | ICD-10-CM

## 2019-11-14 ENCOUNTER — OFFICE VISIT (OUTPATIENT)
Dept: CARDIOLOGY CLINIC | Facility: CLINIC | Age: 49
End: 2019-11-14
Payer: COMMERCIAL

## 2019-11-14 VITALS
DIASTOLIC BLOOD PRESSURE: 70 MMHG | HEIGHT: 68 IN | HEART RATE: 64 BPM | SYSTOLIC BLOOD PRESSURE: 106 MMHG | BODY MASS INDEX: 40.92 KG/M2 | WEIGHT: 270 LBS

## 2019-11-14 DIAGNOSIS — C83.30 MALIGNANT LYMPHOMA, LARGE CELL, DIFFUSE (HCC): ICD-10-CM

## 2019-11-14 DIAGNOSIS — R91.8 MASS OF LEFT LUNG: ICD-10-CM

## 2019-11-14 DIAGNOSIS — I49.1 PREMATURE ATRIAL CONTRACTION: ICD-10-CM

## 2019-11-14 DIAGNOSIS — I49.3 PREMATURE VENTRICULAR CONTRACTIONS: ICD-10-CM

## 2019-11-14 DIAGNOSIS — Z86.711 HISTORY OF PULMONARY EMBOLISM: ICD-10-CM

## 2019-11-14 DIAGNOSIS — I48.92 ATRIAL FLUTTER WITH RAPID VENTRICULAR RESPONSE (HCC): Primary | ICD-10-CM

## 2019-11-14 PROCEDURE — 99213 OFFICE O/P EST LOW 20 MIN: CPT | Performed by: PHYSICIAN ASSISTANT

## 2019-11-14 RX ORDER — SULFAMETHOXAZOLE AND TRIMETHOPRIM 800; 160 MG/1; MG/1
1 TABLET ORAL 3 TIMES WEEKLY
Refills: 0 | COMMUNITY
Start: 2019-10-14 | End: 2020-02-24

## 2019-11-14 RX ORDER — ACYCLOVIR 800 MG/1
800 TABLET ORAL EVERY 12 HOURS
Refills: 2 | COMMUNITY
Start: 2019-11-10 | End: 2021-06-18

## 2019-11-14 RX ORDER — FLUCONAZOLE 200 MG/1
400 TABLET ORAL DAILY
Refills: 2 | COMMUNITY
Start: 2019-11-10 | End: 2020-02-24

## 2019-11-14 NOTE — ASSESSMENT & PLAN NOTE
Doing well, rate is controlled on diltiazem and metoprolol   Currently in sinus rhythm      On Xarelto for anticoagulation

## 2019-11-14 NOTE — PROGRESS NOTES
[facility]   Outpatient Note  Mirza Mg  1970  [id number]  [facility], [facility] [address] [place] [id number] [phone]    Mirza Mg is a 46 y o  female    Assessment and Plan:     Problem List Items Addressed This Visit        Cardiovascular and Mediastinum    Premature atrial contraction    Premature ventricular contractions    Atrial flutter with rapid ventricular response ([address] ) - Primary     Doing well, rate is controlled on diltiazem and metoprolol   Currently in sinus rhythm  On Xarelto for anticoagulation              Other    History of pulmonary embolism    Malignant lymphoma, large cell, diffuse (HCC)    Mass of left lung           Additional Plan:   No changes in medication or testing ordered today  Return visit will be in 6 months or earlier if there are problems  The patient is encouraged to call in the meantime if there are questions or concerns  Subjective: The patient is seen in routine follow up regarding a history of PAF/flutter, with PACs and PVCs  She is in the study program at [facility] for stem-cell therapy regarding Non-hodgkin's lymphoma B-cell carcinoma  She is doing remarkably well without any complaints at this time  She has some recurrent palpitations, but they are usually short-lived, lasting seconds in duration  Otherwise, she is doing well from a cardiac perspective without complaints of chest pain or exertional dyspnea  There are no complaints of syncope or near syncope  She denies edema orthopnea or PND  The patient denies TIA or CVA symptoms           Social History  Social History     Tobacco Use   Smoking Status Former Smoker    Packs/day: 1 50    Years: 11 00    Pack years: 16 50    Last attempt to quit:  JLGOV Years since quittin 8   Smokeless Tobacco Never Used   ,   Social History     Substance and Sexual Activity   Alcohol Use Not Currently   ,   Social History     Substance and Sexual Activity   Drug Use No     Family History   Problem Relation Age of Onset    Coronary artery disease Father     Diabetes Brother     Diabetes Family     Heart disease Family        Medical and Surgical History  Past Medical History:   Diagnosis Date    Allergic rhinitis     last assessed 04/06/16    Arthritis     b/l feet    Chronic allergic conjunctivitis     Fluttering heart     takes magnesium for same   EKG OK    Fluttering sensation of heart     "periodically, not often since on magnesium"    Foot pain, left     Full dentures     upper    History of cancer chemotherapy 2016    History of dilation and curettage     Hx of tonsillectomy     Hypothyroid     Irregular heart beat     Lymph nodes enlarged     in chest pushing on her bronchioles necessitating inhalers    Neck mass     R neck mass-excised 3/15/2016,, 4/23/18 noted enlarged lymph node right neck-bx today 4/26/2018    Non Hodgkin's lymphoma ([address] )     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    Obese     Obstructive sleep apnea     Port-A-Cath in place 2016    pt reports 'Has it flushed q 4weeks" right chest wall    Post-menopausal     since chemo 4/2016  no menses    Pulmonary embolism ([address] )     last assessed 10/31/16 attributed to Non-Hodgkins hx    Pulmonary embolism on right ([address] ) 7/12/2018    Last Assessment & Plan:  She will need to hold her Xarelto 2 days prior to her procedure and may resume therapy 1-2 days post procedure (depending on whether she is producing blood tinged sputum or not)      Shortness of breath     due to chest tumor    Tachycardia     awaiting cardiology eval,,,4/23/18 "pt reports saw cardiologist at that time and placed on magnesium and "hardly notices it"    Tinnitus     occas    Wears glasses     Wears partial dentures     /lower    [place] teeth extracted      Past Surgical History:   Procedure Laterality Date    COLONOSCOPY      COLONOSCOPY N/A 7/26/2018 Procedure: COLONOSCOPY with multiple bx;  Surgeon: Ron Red MD;  Location: [facility] MAIN OR;  Service: Gastroenterology    CYST REMOVAL Left     L  neck    DILATION AND CURETTAGE OF UTERUS      ESOPHAGOGASTRODUODENOSCOPY N/A 7/26/2018    Procedure: ESOPHAGOGASTRODUODENOSCOPY (EGD) with multiple bx;  Surgeon: Ron Red MD;  Location: [facility] MAIN OR;  Service: Gastroenterology    FACIAL/NECK BIOPSY N/A 4/26/2018    Procedure: OPEN DEEP CERVICAL LYMPH NODE EXCISOION/BIOPSY;  Surgeon: Ismael Rivera MD;  Location: [facility];  Service: ENT    LYMPHADENECTOMY      PORTACATH PLACEMENT      [place] BX/REMV,LYMPH NODE,DEEP CERV N/A 3/15/2016    Procedure: DISSECTION [address] NODE NECK/DEEP NECK MASS ;  Surgeon: Feliberto Louie DO;  Location: [facility];  Service: ENT    TONSILLECTOMY      TUBAL LIGATION      TUNNELED VENOUS PORT PLACEMENT Right 3/21/2019    Procedure: INSERTION VENOUS PORT (PORT-A-CATH) remove and replace;  Surgeon: Antonina Villalba MD;  Location: [facility] MAIN OR;  Service: General         Current Outpatient Medications:     albuterol (2 5 mg/3 mL) 0 083 % nebulizer solution, Take 1 vial (2 5 mg total) by nebulization every 4 (four) hours as needed for wheezing, Disp: 75 mL, Rfl: 5    albuterol (PROVENTIL HFA,VENTOLIN HFA) 90 mcg/act inhaler, Inhale 2 puffs every 4 (four) hours as needed for wheezing, Disp: 1 Inhaler, Rfl: 0    b complex vitamins tablet, Take 1 tablet by mouth 2 (two) times a day , Disp: , Rfl:     benzonatate (TESSALON PERLES) 100 mg capsule, Take 1 capsule (100 mg total) by mouth 3 (three) times a day as needed for cough, Disp: 20 capsule, Rfl: 0    camphor-menthol (SARNA) lotion, Apply 1 application topically, Disp: , Rfl:     cetirizine (ZyrTEC) 10 mg tablet, Take 10 mg by mouth daily, Disp: , Rfl:     cholecalciferol (VITAMIN D3) 1,000 units tablet, Take 1,000 Units by mouth every morning , Disp: , Rfl:     diltiazem (TIAZAC) 120 MG 24 hr capsule, Take 1 capsule (120 mg total) by mouth 2 (two) times a day (Patient taking differently: Take 120 mg by mouth daily ), Disp: 180 capsule, Rfl: 3    fluconazole (DIFLUCAN) 200 mg tablet, Take 400 mg by mouth daily, Disp: , Rfl: 2    fluticasone-vilanterol (BREO ELLIPTA) 100-25 mcg/inh inhaler, Inhale 1 puff every morning Rinse mouth after use , Disp: , Rfl:     ipratropium-albuterol (DUO-NEB) 0 5-2 5 mg/3 mL nebulizer solution, Take 1 vial (3 mL total) by nebulization every 6 (six) hours, Disp: 360 mL, Rfl: 0    levothyroxine 75 mcg tablet, Take 1 tablet (75 mcg total) by mouth every morning, Disp: 90 tablet, Rfl: 3    LORazepam (ATIVAN) 0 5 mg tablet, Take 0 5 mg by mouth every 8 (eight) hours as needed , Disp: , Rfl:     MAGNESIUM OXIDE PO, Take 400 mg by mouth daily , Disp: , Rfl:     metoprolol tartrate (LOPRESSOR) 100 mg tablet, Take 1 tablet (100 mg total) by mouth 2 (two) times a day, Disp: 180 tablet, Rfl: 3    Multiple Vitamin (MULTIVITAMIN) tablet, Take 2 tablets by mouth 2 (two) times a day , Disp: , Rfl:     omeprazole (PriLOSEC) 10 mg delayed release capsule, Take 10 mg by mouth, Disp: , Rfl:     ondansetron (ZOFRAN) 8 mg tablet, Take 8 mg by mouth every 8 (eight) hours as needed for nausea or vomiting (after chemo) , Disp: , Rfl:     prochlorperazine (COMPAZINE) 10 mg tablet, Take 10 mg by mouth every 6 (six) hours as needed, Disp: , Rfl:     rivaroxaban (XARELTO) 20 mg tablet, Take 1 tablet (20 mg total) by mouth daily with dinner, Disp: 90 tablet, Rfl: 3    sulfamethoxazole-trimethoprim (BACTRIM DS) 800-160 mg per tablet, Take 1 tablet by mouth 3 (three) times a week, Disp: , Rfl: 0    acetaminophen (TYLENOL) 325 mg tablet, Take 650 mg by mouth every 4 (four) hours as needed , Disp: , Rfl:     acyclovir (ZOVIRAX) 800 mg tablet, Take 800 mg by mouth every 12 (twelve) hours, Disp: , Rfl: 2    Ibrutinib (IMBRUVICA) 560 MG TABS, Take 560 mg by mouth daily , Disp: , Rfl:     loratadine (CLARITIN) 10 mg tablet, Take 10 mg by mouth as needed , Disp: , Rfl:     senna-docusate sodium (SENOKOT-S) 8 6-50 mg per tablet, Take 1 tablet by mouth 2 (two) times a day as needed for constipation (as needed during chemo tx) , Disp: , Rfl:     Zinc 50 MG CAPS, Take 1 capsule by mouth every morning , Disp: , Rfl:   Allergies   Allergen Reactions    Medical Tape      Redness, soreness on skin       Review of Systems   Constitution: Negative  HENT: Negative  Eyes: Negative  Cardiovascular: Positive for palpitations  Negative for chest pain, claudication, cyanosis, dyspnea on exertion, irregular heartbeat, leg swelling, near-syncope, orthopnea, paroxysmal nocturnal dyspnea and syncope  Respiratory: Negative  Negative for cough, hemoptysis, shortness of breath, sleep disturbances due to breathing, snoring, sputum production and wheezing  Endocrine: Negative  Hematologic/Lymphatic: Negative  Skin: Negative  Musculoskeletal: Negative  Gastrointestinal: Negative  Genitourinary: Negative  Neurological: Negative  Psychiatric/Behavioral: Negative  Allergic/Immunologic: Negative  Objective:   /70   Pulse 64   Ht 5' 8" (1 727 m)   Wt 122 kg (270 lb)   BMI 41 05 kg/m²   Physical Exam   Constitutional: She is oriented to person, place, and time  She appears well-developed and well-nourished  HENT:   Head: Normocephalic and atraumatic  Mouth/Throat: Oropharynx is clear and moist    Eyes: Conjunctivae and EOM are normal  No scleral icterus  Neck: Normal range of motion  Neck supple  No JVD present  No tracheal deviation present  Cardiovascular: Normal rate, regular rhythm, normal heart sounds and intact distal pulses  Exam reveals no gallop and no friction rub  No murmur heard  Pulmonary/Chest: Effort normal and breath sounds normal  No respiratory distress  She has no wheezes  She has no rales  She exhibits no tenderness  Abdominal: Soft   Bowel sounds are normal  She exhibits no distension  There is no tenderness  Aorta not palpable    Musculoskeletal: Normal range of motion  She exhibits no edema or tenderness  Neurological: She is alert and oriented to person, place, and time  Skin: Skin is warm and dry  No rash noted  No erythema  No pallor  Psychiatric: She has a normal mood and affect  Her behavior is normal    Nursing note and vitals reviewed        Lab Review:   Lab Results   Component Value Date    CHOL 184 07/22/2015     Lab Results   Component Value Date    HDL 49 09/07/2017     Lab Results   Component Value Date    LDLCALC 107 (H) 09/07/2017     Lab Results   Component Value Date    TRIG 141 09/07/2017     Results Reviewed     None        Results Reviewed     None        Results Reviewed     None          Recent Cardiovascular Testing:   Multiple echos at [facility]: normal EF at 60 no WMA no valvular disease       ECG Review:   9-6-19: Normal sinus rhythm  Septal infarct (cited on or before 06-SEP-2019)  Abnormal ECG

## 2019-11-25 ENCOUNTER — TRANSCRIBE ORDERS (OUTPATIENT)
Dept: ADMINISTRATIVE | Facility: HOSPITAL | Age: 49
End: 2019-11-25

## 2019-11-25 ENCOUNTER — HOSPITAL ENCOUNTER (OUTPATIENT)
Dept: RADIOLOGY | Facility: HOSPITAL | Age: 49
Discharge: HOME | End: 2019-11-25
Payer: COMMERCIAL

## 2019-11-25 DIAGNOSIS — E03.9 ACQUIRED HYPOTHYROIDISM: ICD-10-CM

## 2019-11-25 DIAGNOSIS — R05.9 COUGH: Primary | ICD-10-CM

## 2019-11-25 DIAGNOSIS — R05.9 COUGH: ICD-10-CM

## 2019-11-25 PROCEDURE — 71046 X-RAY EXAM CHEST 2 VIEWS: CPT

## 2019-11-25 RX ORDER — LEVOTHYROXINE SODIUM 0.07 MG/1
75 TABLET ORAL EVERY MORNING
Qty: 90 TABLET | Refills: 3 | Status: SHIPPED | OUTPATIENT
Start: 2019-11-25 | End: 2020-11-29 | Stop reason: SDUPTHER

## 2019-12-18 ENCOUNTER — APPOINTMENT (OUTPATIENT)
Dept: LAB | Facility: HOSPITAL | Age: 49
End: 2019-12-18
Payer: COMMERCIAL

## 2019-12-18 ENCOUNTER — TRANSCRIBE ORDERS (OUTPATIENT)
Dept: ADMINISTRATIVE | Facility: HOSPITAL | Age: 49
End: 2019-12-18

## 2019-12-18 DIAGNOSIS — C83.38 RETICULOSARCOMA OF LYMPH NODES OF MULTIPLE SITES (HCC): Primary | ICD-10-CM

## 2019-12-18 DIAGNOSIS — C83.38 RETICULOSARCOMA OF LYMPH NODES OF MULTIPLE SITES (HCC): ICD-10-CM

## 2019-12-18 LAB
ALBUMIN SERPL BCP-MCNC: 3.4 G/DL (ref 3.5–5)
ALP SERPL-CCNC: 139 U/L (ref 46–116)
ALT SERPL W P-5'-P-CCNC: 17 U/L (ref 12–78)
ANION GAP SERPL CALCULATED.3IONS-SCNC: 9 MMOL/L (ref 4–13)
AST SERPL W P-5'-P-CCNC: 14 U/L (ref 5–45)
BASOPHILS # BLD AUTO: 0.06 THOUSANDS/ΜL (ref 0–0.1)
BASOPHILS NFR BLD AUTO: 1 % (ref 0–1)
BILIRUB SERPL-MCNC: 0.3 MG/DL (ref 0.2–1)
BUN SERPL-MCNC: 17 MG/DL (ref 5–25)
CALCIUM SERPL-MCNC: 9 MG/DL (ref 8.3–10.1)
CHLORIDE SERPL-SCNC: 105 MMOL/L (ref 100–108)
CO2 SERPL-SCNC: 26 MMOL/L (ref 21–32)
CREAT SERPL-MCNC: 0.72 MG/DL (ref 0.6–1.3)
CRP SERPL QL: 24.2 MG/L
EOSINOPHIL # BLD AUTO: 0.31 THOUSAND/ΜL (ref 0–0.61)
EOSINOPHIL NFR BLD AUTO: 4 % (ref 0–6)
ERYTHROCYTE [DISTWIDTH] IN BLOOD BY AUTOMATED COUNT: 16.4 % (ref 11.6–15.1)
FERRITIN SERPL-MCNC: 164 NG/ML (ref 8–388)
GFR SERPL CREATININE-BSD FRML MDRD: 98 ML/MIN/1.73SQ M
GLUCOSE SERPL-MCNC: 118 MG/DL (ref 65–140)
HCT VFR BLD AUTO: 41.8 % (ref 34.8–46.1)
HGB BLD-MCNC: 13.5 G/DL (ref 11.5–15.4)
IMM GRANULOCYTES # BLD AUTO: 0.04 THOUSAND/UL (ref 0–0.2)
IMM GRANULOCYTES NFR BLD AUTO: 1 % (ref 0–2)
LDH SERPL-CCNC: 160 U/L (ref 81–234)
LYMPHOCYTES # BLD AUTO: 0.47 THOUSANDS/ΜL (ref 0.6–4.47)
LYMPHOCYTES NFR BLD AUTO: 6 % (ref 14–44)
MCH RBC QN AUTO: 31 PG (ref 26.8–34.3)
MCHC RBC AUTO-ENTMCNC: 32.3 G/DL (ref 31.4–37.4)
MCV RBC AUTO: 96 FL (ref 82–98)
MONOCYTES # BLD AUTO: 0.68 THOUSAND/ΜL (ref 0.17–1.22)
MONOCYTES NFR BLD AUTO: 8 % (ref 4–12)
NEUTROPHILS # BLD AUTO: 6.57 THOUSANDS/ΜL (ref 1.85–7.62)
NEUTS SEG NFR BLD AUTO: 80 % (ref 43–75)
NRBC BLD AUTO-RTO: 0 /100 WBCS
PLATELET # BLD AUTO: 271 THOUSANDS/UL (ref 149–390)
PMV BLD AUTO: 9.3 FL (ref 8.9–12.7)
POTASSIUM SERPL-SCNC: 4.1 MMOL/L (ref 3.5–5.3)
PROT SERPL-MCNC: 6.6 G/DL (ref 6.4–8.2)
RBC # BLD AUTO: 4.36 MILLION/UL (ref 3.81–5.12)
SODIUM SERPL-SCNC: 140 MMOL/L (ref 136–145)
WBC # BLD AUTO: 8.13 THOUSAND/UL (ref 4.31–10.16)

## 2019-12-18 PROCEDURE — 83615 LACTATE (LD) (LDH) ENZYME: CPT

## 2019-12-18 PROCEDURE — 86140 C-REACTIVE PROTEIN: CPT

## 2019-12-18 PROCEDURE — 80053 COMPREHEN METABOLIC PANEL: CPT

## 2019-12-18 PROCEDURE — 82728 ASSAY OF FERRITIN: CPT

## 2019-12-18 PROCEDURE — 85025 COMPLETE CBC W/AUTO DIFF WBC: CPT

## 2019-12-18 PROCEDURE — 36415 COLL VENOUS BLD VENIPUNCTURE: CPT

## 2019-12-31 ENCOUNTER — TRANSCRIBE ORDERS (OUTPATIENT)
Dept: RADIOLOGY | Facility: MEDICAL CENTER | Age: 49
End: 2019-12-31

## 2019-12-31 ENCOUNTER — APPOINTMENT (OUTPATIENT)
Dept: RADIOLOGY | Facility: MEDICAL CENTER | Age: 49
End: 2019-12-31
Payer: COMMERCIAL

## 2019-12-31 DIAGNOSIS — M54.50 LOW BACK PAIN, UNSPECIFIED BACK PAIN LATERALITY, UNSPECIFIED CHRONICITY, UNSPECIFIED WHETHER SCIATICA PRESENT: ICD-10-CM

## 2019-12-31 DIAGNOSIS — M54.50 LOW BACK PAIN, UNSPECIFIED BACK PAIN LATERALITY, UNSPECIFIED CHRONICITY, UNSPECIFIED WHETHER SCIATICA PRESENT: Primary | ICD-10-CM

## 2019-12-31 PROCEDURE — 72110 X-RAY EXAM L-2 SPINE 4/>VWS: CPT

## 2020-01-13 ENCOUNTER — CLINICAL SUPPORT (OUTPATIENT)
Dept: FAMILY MEDICINE CLINIC | Facility: CLINIC | Age: 50
End: 2020-01-13
Payer: COMMERCIAL

## 2020-01-13 DIAGNOSIS — Z23 NEED FOR INFLUENZA VACCINATION: Primary | ICD-10-CM

## 2020-01-13 PROCEDURE — 90471 IMMUNIZATION ADMIN: CPT | Performed by: FAMILY MEDICINE

## 2020-01-13 PROCEDURE — 90682 RIV4 VACC RECOMBINANT DNA IM: CPT | Performed by: FAMILY MEDICINE

## 2020-02-05 ENCOUNTER — APPOINTMENT (OUTPATIENT)
Dept: RADIOLOGY | Facility: MEDICAL CENTER | Age: 50
End: 2020-02-05
Payer: COMMERCIAL

## 2020-02-05 ENCOUNTER — TRANSCRIBE ORDERS (OUTPATIENT)
Dept: ADMINISTRATIVE | Facility: HOSPITAL | Age: 50
End: 2020-02-05

## 2020-02-05 ENCOUNTER — APPOINTMENT (OUTPATIENT)
Dept: LAB | Facility: MEDICAL CENTER | Age: 50
End: 2020-02-05
Payer: COMMERCIAL

## 2020-02-05 DIAGNOSIS — C83.38 DIFFUSE LARGE B-CELL LYMPHOMA OF LYMPH NODES OF MULTIPLE REGIONS (HCC): ICD-10-CM

## 2020-02-05 DIAGNOSIS — R06.02 SHORTNESS OF BREATH: ICD-10-CM

## 2020-02-05 DIAGNOSIS — C85.80 MARGINAL ZONE LYMPHOMA (HCC): ICD-10-CM

## 2020-02-05 DIAGNOSIS — C83.30 DIFFUSE LARGE B-CELL LYMPHOMA, UNSPECIFIED BODY REGION (HCC): ICD-10-CM

## 2020-02-05 DIAGNOSIS — R05.9 COUGH: Primary | ICD-10-CM

## 2020-02-05 DIAGNOSIS — C84.41 PERIPHERAL T CELL LYMPHOMA OF LYMPH NODES OF NECK (HCC): ICD-10-CM

## 2020-02-05 DIAGNOSIS — C83.30 DIFFUSE LARGE B-CELL LYMPHOMA, UNSPECIFIED BODY REGION (HCC): Primary | ICD-10-CM

## 2020-02-05 DIAGNOSIS — R05.9 COUGH: ICD-10-CM

## 2020-02-05 LAB
ALBUMIN SERPL BCP-MCNC: 3.3 G/DL (ref 3.5–5)
ALP SERPL-CCNC: 153 U/L (ref 46–116)
ALT SERPL W P-5'-P-CCNC: 13 U/L (ref 12–78)
ANION GAP SERPL CALCULATED.3IONS-SCNC: 5 MMOL/L (ref 4–13)
AST SERPL W P-5'-P-CCNC: 13 U/L (ref 5–45)
BASOPHILS # BLD AUTO: 0.06 THOUSANDS/ΜL (ref 0–0.1)
BASOPHILS NFR BLD AUTO: 1 % (ref 0–1)
BILIRUB SERPL-MCNC: 0.53 MG/DL (ref 0.2–1)
BUN SERPL-MCNC: 13 MG/DL (ref 5–25)
CALCIUM SERPL-MCNC: 8.9 MG/DL (ref 8.3–10.1)
CHLORIDE SERPL-SCNC: 108 MMOL/L (ref 100–108)
CO2 SERPL-SCNC: 28 MMOL/L (ref 21–32)
CREAT SERPL-MCNC: 0.72 MG/DL (ref 0.6–1.3)
CRP SERPL QL: 28.9 MG/L
EOSINOPHIL # BLD AUTO: 0.32 THOUSAND/ΜL (ref 0–0.61)
EOSINOPHIL NFR BLD AUTO: 4 % (ref 0–6)
ERYTHROCYTE [DISTWIDTH] IN BLOOD BY AUTOMATED COUNT: 14.1 % (ref 11.6–15.1)
FERRITIN SERPL-MCNC: 126 NG/ML (ref 8–388)
GFR SERPL CREATININE-BSD FRML MDRD: 97 ML/MIN/1.73SQ M
GLUCOSE P FAST SERPL-MCNC: 122 MG/DL (ref 65–99)
HCT VFR BLD AUTO: 43.7 % (ref 34.8–46.1)
HGB BLD-MCNC: 13.6 G/DL (ref 11.5–15.4)
IMM GRANULOCYTES # BLD AUTO: 0.03 THOUSAND/UL (ref 0–0.2)
IMM GRANULOCYTES NFR BLD AUTO: 0 % (ref 0–2)
LYMPHOCYTES # BLD AUTO: 0.39 THOUSANDS/ΜL (ref 0.6–4.47)
LYMPHOCYTES NFR BLD AUTO: 5 % (ref 14–44)
MCH RBC QN AUTO: 29.8 PG (ref 26.8–34.3)
MCHC RBC AUTO-ENTMCNC: 31.1 G/DL (ref 31.4–37.4)
MCV RBC AUTO: 96 FL (ref 82–98)
MONOCYTES # BLD AUTO: 0.61 THOUSAND/ΜL (ref 0.17–1.22)
MONOCYTES NFR BLD AUTO: 8 % (ref 4–12)
NEUTROPHILS # BLD AUTO: 6.43 THOUSANDS/ΜL (ref 1.85–7.62)
NEUTS SEG NFR BLD AUTO: 82 % (ref 43–75)
NRBC BLD AUTO-RTO: 0 /100 WBCS
PLATELET # BLD AUTO: 295 THOUSANDS/UL (ref 149–390)
PMV BLD AUTO: 10.3 FL (ref 8.9–12.7)
POTASSIUM SERPL-SCNC: 4.2 MMOL/L (ref 3.5–5.3)
PROT SERPL-MCNC: 6.8 G/DL (ref 6.4–8.2)
RBC # BLD AUTO: 4.56 MILLION/UL (ref 3.81–5.12)
SODIUM SERPL-SCNC: 141 MMOL/L (ref 136–145)
WBC # BLD AUTO: 7.84 THOUSAND/UL (ref 4.31–10.16)

## 2020-02-05 PROCEDURE — 83615 LACTATE (LD) (LDH) ENZYME: CPT

## 2020-02-05 PROCEDURE — 80053 COMPREHEN METABOLIC PANEL: CPT

## 2020-02-05 PROCEDURE — 82728 ASSAY OF FERRITIN: CPT

## 2020-02-05 PROCEDURE — 86140 C-REACTIVE PROTEIN: CPT

## 2020-02-05 PROCEDURE — 85025 COMPLETE CBC W/AUTO DIFF WBC: CPT

## 2020-02-05 PROCEDURE — 36415 COLL VENOUS BLD VENIPUNCTURE: CPT

## 2020-02-05 PROCEDURE — 83625 ASSAY OF LDH ENZYMES: CPT

## 2020-02-05 PROCEDURE — 71046 X-RAY EXAM CHEST 2 VIEWS: CPT

## 2020-02-06 LAB
LDH SERPL-CCNC: 147 IU/L (ref 119–226)
LDH1 CFR SERPL ELPH: 22 % (ref 17–32)
LDH2 CFR SERPL ELPH: 33 % (ref 25–40)
LDH3 CFR SERPL ELPH: 23 % (ref 17–27)
LDH4 CFR SERPL ELPH: 10 % (ref 5–13)
LDH5 CFR SERPL ELPH: 12 % (ref 4–20)

## 2020-02-24 ENCOUNTER — APPOINTMENT (OUTPATIENT)
Dept: LAB | Facility: MEDICAL CENTER | Age: 50
End: 2020-02-24
Payer: COMMERCIAL

## 2020-02-24 ENCOUNTER — OFFICE VISIT (OUTPATIENT)
Dept: FAMILY MEDICINE CLINIC | Facility: CLINIC | Age: 50
End: 2020-02-24
Payer: COMMERCIAL

## 2020-02-24 VITALS
RESPIRATION RATE: 16 BRPM | OXYGEN SATURATION: 97 % | SYSTOLIC BLOOD PRESSURE: 106 MMHG | HEIGHT: 68 IN | HEART RATE: 80 BPM | WEIGHT: 293 LBS | DIASTOLIC BLOOD PRESSURE: 60 MMHG | BODY MASS INDEX: 44.41 KG/M2 | TEMPERATURE: 98.7 F

## 2020-02-24 DIAGNOSIS — E03.9 ACQUIRED HYPOTHYROIDISM: ICD-10-CM

## 2020-02-24 DIAGNOSIS — J06.9 UPPER RESPIRATORY TRACT INFECTION, UNSPECIFIED TYPE: Primary | ICD-10-CM

## 2020-02-24 LAB — TSH SERPL DL<=0.05 MIU/L-ACNC: 2.92 UIU/ML (ref 0.36–3.74)

## 2020-02-24 PROCEDURE — 1036F TOBACCO NON-USER: CPT | Performed by: FAMILY MEDICINE

## 2020-02-24 PROCEDURE — 99213 OFFICE O/P EST LOW 20 MIN: CPT | Performed by: FAMILY MEDICINE

## 2020-02-24 PROCEDURE — 36415 COLL VENOUS BLD VENIPUNCTURE: CPT | Performed by: FAMILY MEDICINE

## 2020-02-24 PROCEDURE — 84443 ASSAY THYROID STIM HORMONE: CPT | Performed by: FAMILY MEDICINE

## 2020-02-24 PROCEDURE — 3008F BODY MASS INDEX DOCD: CPT | Performed by: FAMILY MEDICINE

## 2020-02-24 RX ORDER — AZITHROMYCIN 250 MG/1
TABLET, FILM COATED ORAL
Qty: 6 TABLET | Refills: 0 | Status: SHIPPED | OUTPATIENT
Start: 2020-02-24 | End: 2020-02-29

## 2020-02-24 NOTE — PROGRESS NOTES
Assessment/Plan:  Rx put in for a Z-Juan  Get Mucinex over-the-counter  We will check a TSH level on her  She will call us if symptoms continue or increase  Problem List Items Addressed This Visit        Endocrine    Acquired hypothyroidism    Relevant Orders    TSH, 3rd generation with Free T4 reflex      Other Visit Diagnoses     Upper respiratory tract infection, unspecified type    -  Primary    Relevant Medications    azithromycin (ZITHROMAX) 250 mg tablet           Diagnoses and all orders for this visit:    Upper respiratory tract infection, unspecified type  -     azithromycin (ZITHROMAX) 250 mg tablet; Take 2 tablets today then 1 tablet daily x 4 days    Acquired hypothyroidism  -     TSH, 3rd generation with Free T4 reflex        No problem-specific Assessment & Plan notes found for this encounter  Subjective:      Patient ID: Nader Ward is a 44 y o  female  Patient here today stating that on Friday started with a scratchy throat  Yesterday started with a cough  Mildly productive at times  When it is, it has thick white yellow sputum  No nausea vomiting or diarrhea  No fever chills  Patient tried [employer] with no relief  Cough   This is a new problem  The current episode started in the past 7 days  The problem has been unchanged  The problem occurs every few minutes  The cough is productive of sputum  Associated symptoms include nasal congestion, postnasal drip, rhinorrhea and a sore throat  Pertinent negatives include no chills, ear congestion, fever or wheezing  Nothing aggravates the symptoms  She has tried prescription cough suppressant for the symptoms  The treatment provided no relief  Sore Throat    This is a new problem  The current episode started in the past 7 days  Neither side of throat is experiencing more pain than the other  There has been no fever  The pain is mild  Associated symptoms include coughing  She has had no exposure to strep or mono  The following portions of the patient's history were reviewed and updated as appropriate:   She has a past medical history of Allergic rhinitis, Arthritis, Chronic allergic conjunctivitis, Fluttering heart, Fluttering sensation of heart, Foot pain, left, Full dentures, History of cancer chemotherapy (2016), History of dilation and curettage, tonsillectomy, Hypothyroid, Irregular heart beat, Lymph nodes enlarged, Neck mass, Non Hodgkin's lymphoma ([facility] ), Obese, Obstructive sleep apnea, Port-A-Cath in place (2016), Post-menopausal, Pulmonary embolism ([facility] ), Pulmonary embolism on right ([facility] ) (7/12/2018), Shortness of breath, Tachycardia, Tinnitus, Wears glasses, Wears partial dentures, and [place] teeth extracted  ,  does not have any pertinent problems on file  ,   has a past surgical history that includes Cyst Removal (Left); pr bx/remv,lymph node,deep cerv (N/A, 3/15/2016); Lymphadenectomy; Tonsillectomy; Portacath placement; Dilation and curettage of uterus; Tubal ligation; Colonoscopy; FACIAL/NECK BIOPSY (N/A, 4/26/2018); Esophagogastroduodenoscopy (N/A, 7/26/2018); Colonoscopy (N/A, 7/26/2018); and Tunneled venous port placement (Right, 3/21/2019)  ,  family history includes Coronary artery disease in her father; Diabetes in her brother and family; Heart disease in her family  ,   reports that she quit smoking about 24 years ago  She has a 16 50 pack-year smoking history  She has never used smokeless tobacco  She reports that she drank alcohol  She reports that she does not use drugs  ,  is allergic to medical tape     Current Outpatient Medications   Medication Sig Dispense Refill    acetaminophen (TYLENOL) 325 mg tablet Take 650 mg by mouth every 4 (four) hours as needed       acyclovir (ZOVIRAX) 800 mg tablet Take 800 mg by mouth every 12 (twelve) hours  2    albuterol (2 5 mg/3 mL) 0 083 % nebulizer solution Take 1 vial (2 5 mg total) by nebulization every 4 (four) hours as needed for wheezing 75 mL 5    b complex vitamins tablet Take 1 tablet by mouth 2 (two) times a day       benzonatate (TESSALON PERLES) 100 mg capsule Take 1 capsule (100 mg total) by mouth 3 (three) times a day as needed for cough 20 capsule 0    cetirizine (ZyrTEC) 10 mg tablet Take 10 mg by mouth daily      cholecalciferol (VITAMIN D3) 1,000 units tablet Take 1,000 Units by mouth every morning       diltiazem (TIAZAC) 120 MG 24 hr capsule Take 1 capsule (120 mg total) by mouth 2 (two) times a day (Patient taking differently: Take 120 mg by mouth daily ) 180 capsule 3    fluticasone-vilanterol (BREO ELLIPTA) 100-25 mcg/inh inhaler Inhale 1 puff every morning Rinse mouth after use        ipratropium-albuterol (DUO-NEB) 0 5-2 5 mg/3 mL nebulizer solution Take 1 vial (3 mL total) by nebulization every 6 (six) hours 360 mL 0    levothyroxine 75 mcg tablet Take 1 tablet (75 mcg total) by mouth every morning 90 tablet 3    LORazepam (ATIVAN) 0 5 mg tablet Take 0 5 mg by mouth every 8 (eight) hours as needed       MAGNESIUM OXIDE PO Take 400 mg by mouth daily       metoprolol tartrate (LOPRESSOR) 100 mg tablet Take 1 tablet (100 mg total) by mouth 2 (two) times a day 180 tablet 3    Multiple Vitamin (MULTIVITAMIN) tablet Take 2 tablets by mouth 2 (two) times a day       omeprazole (PriLOSEC) 10 mg delayed release capsule Take 10 mg by mouth      rivaroxaban (XARELTO) 20 mg tablet Take 1 tablet (20 mg total) by mouth daily with dinner 90 tablet 3    azithromycin (ZITHROMAX) 250 mg tablet Take 2 tablets today then 1 tablet daily x 4 days 6 tablet 0    ondansetron (ZOFRAN) 8 mg tablet Take 8 mg by mouth every 8 (eight) hours as needed for nausea or vomiting (after chemo)       prochlorperazine (COMPAZINE) 10 mg tablet Take 10 mg by mouth every 6 (six) hours as needed      senna-docusate sodium (SENOKOT-S) 8 6-50 mg per tablet Take 1 tablet by mouth 2 (two) times a day as needed for constipation (as needed during chemo tx)        No current facility-administered medications for this visit  Review of Systems   Constitutional: Negative for chills and fever  HENT: Positive for postnasal drip, rhinorrhea and sore throat  Respiratory: Positive for cough  Negative for wheezing  Cardiovascular: Negative  Gastrointestinal: Negative  Genitourinary: Negative  Objective:  Vitals:    02/24/20 1005   BP: 106/60   Pulse: 80   Resp: 16   Temp: 98 7 °F (37 1 °C)   TempSrc: Temporal   SpO2: 97%   Weight: 134 kg (295 lb 9 6 oz)   Height: 5' 8" (1 727 m)     Body mass index is 44 95 kg/m²  Physical Exam   Constitutional: She is oriented to person, place, and time  She appears well-developed and well-nourished  No distress  HENT:   Head: Normocephalic and atraumatic  Nose: Mucosal edema and rhinorrhea present  Mouth/Throat: No oropharyngeal exudate or posterior oropharyngeal erythema  Clear to white postnasal drip   Eyes: Conjunctivae are normal    Cardiovascular: Normal rate, regular rhythm and normal heart sounds  Exam reveals no gallop and no friction rub  No murmur heard  Pulmonary/Chest: Effort normal and breath sounds normal  No respiratory distress  She has no wheezes  She has no rales  Musculoskeletal: She exhibits no edema  Neurological: She is alert and oriented to person, place, and time  Skin: She is not diaphoretic  Psychiatric: She has a normal mood and affect  Her behavior is normal  Judgment and thought content normal    Vitals reviewed

## 2020-03-06 ENCOUNTER — TRANSCRIBE ORDERS (OUTPATIENT)
Dept: ADMINISTRATIVE | Facility: HOSPITAL | Age: 50
End: 2020-03-06

## 2020-03-06 ENCOUNTER — APPOINTMENT (OUTPATIENT)
Dept: LAB | Facility: HOSPITAL | Age: 50
End: 2020-03-06
Payer: COMMERCIAL

## 2020-03-06 DIAGNOSIS — C83.30 DIFFUSE LARGE B-CELL LYMPHOMA, UNSPECIFIED BODY REGION (HCC): Primary | ICD-10-CM

## 2020-03-06 DIAGNOSIS — C83.30 DIFFUSE LARGE B-CELL LYMPHOMA, UNSPECIFIED BODY REGION (HCC): ICD-10-CM

## 2020-03-06 LAB
ALBUMIN SERPL BCP-MCNC: 3.5 G/DL (ref 3.5–5)
ALP SERPL-CCNC: 162 U/L (ref 46–116)
ALT SERPL W P-5'-P-CCNC: 12 U/L (ref 12–78)
ANION GAP SERPL CALCULATED.3IONS-SCNC: 8 MMOL/L (ref 4–13)
AST SERPL W P-5'-P-CCNC: 12 U/L (ref 5–45)
BASOPHILS # BLD AUTO: 0.07 THOUSANDS/ΜL (ref 0–0.1)
BASOPHILS NFR BLD AUTO: 1 % (ref 0–1)
BILIRUB SERPL-MCNC: 0.2 MG/DL (ref 0.2–1)
BUN SERPL-MCNC: 10 MG/DL (ref 5–25)
CALCIUM SERPL-MCNC: 9.2 MG/DL (ref 8.3–10.1)
CHLORIDE SERPL-SCNC: 106 MMOL/L (ref 100–108)
CO2 SERPL-SCNC: 28 MMOL/L (ref 21–32)
CREAT SERPL-MCNC: 0.84 MG/DL (ref 0.6–1.3)
EOSINOPHIL # BLD AUTO: 0.43 THOUSAND/ΜL (ref 0–0.61)
EOSINOPHIL NFR BLD AUTO: 5 % (ref 0–6)
ERYTHROCYTE [DISTWIDTH] IN BLOOD BY AUTOMATED COUNT: 13.7 % (ref 11.6–15.1)
GFR SERPL CREATININE-BSD FRML MDRD: 81 ML/MIN/1.73SQ M
GLUCOSE SERPL-MCNC: 100 MG/DL (ref 65–140)
HCT VFR BLD AUTO: 44.1 % (ref 34.8–46.1)
HGB BLD-MCNC: 14.1 G/DL (ref 11.5–15.4)
IMM GRANULOCYTES # BLD AUTO: 0.06 THOUSAND/UL (ref 0–0.2)
IMM GRANULOCYTES NFR BLD AUTO: 1 % (ref 0–2)
LDH SERPL-CCNC: 195 U/L (ref 81–234)
LYMPHOCYTES # BLD AUTO: 0.52 THOUSANDS/ΜL (ref 0.6–4.47)
LYMPHOCYTES NFR BLD AUTO: 6 % (ref 14–44)
MCH RBC QN AUTO: 30.1 PG (ref 26.8–34.3)
MCHC RBC AUTO-ENTMCNC: 32 G/DL (ref 31.4–37.4)
MCV RBC AUTO: 94 FL (ref 82–98)
MONOCYTES # BLD AUTO: 0.77 THOUSAND/ΜL (ref 0.17–1.22)
MONOCYTES NFR BLD AUTO: 8 % (ref 4–12)
NEUTROPHILS # BLD AUTO: 7.51 THOUSANDS/ΜL (ref 1.85–7.62)
NEUTS SEG NFR BLD AUTO: 79 % (ref 43–75)
NRBC BLD AUTO-RTO: 0 /100 WBCS
PLATELET # BLD AUTO: 297 THOUSANDS/UL (ref 149–390)
PMV BLD AUTO: 9.8 FL (ref 8.9–12.7)
POTASSIUM SERPL-SCNC: 4.4 MMOL/L (ref 3.5–5.3)
PROT SERPL-MCNC: 6.7 G/DL (ref 6.4–8.2)
RBC # BLD AUTO: 4.69 MILLION/UL (ref 3.81–5.12)
SODIUM SERPL-SCNC: 142 MMOL/L (ref 136–145)
WBC # BLD AUTO: 9.36 THOUSAND/UL (ref 4.31–10.16)

## 2020-03-06 PROCEDURE — 83615 LACTATE (LD) (LDH) ENZYME: CPT

## 2020-03-06 PROCEDURE — 85025 COMPLETE CBC W/AUTO DIFF WBC: CPT

## 2020-03-06 PROCEDURE — 36415 COLL VENOUS BLD VENIPUNCTURE: CPT

## 2020-03-06 PROCEDURE — 80053 COMPREHEN METABOLIC PANEL: CPT

## 2020-04-13 ENCOUNTER — HOSPITAL ENCOUNTER (OUTPATIENT)
Dept: RADIOLOGY | Facility: HOSPITAL | Age: 50
Discharge: HOME | End: 2020-04-13
Payer: COMMERCIAL

## 2020-04-13 ENCOUNTER — TRANSCRIBE ORDERS (OUTPATIENT)
Dept: ADMINISTRATIVE | Facility: HOSPITAL | Age: 50
End: 2020-04-13

## 2020-04-13 ENCOUNTER — APPOINTMENT (OUTPATIENT)
Dept: LAB | Facility: HOSPITAL | Age: 50
End: 2020-04-13
Payer: COMMERCIAL

## 2020-04-13 DIAGNOSIS — R05.9 COUGH: ICD-10-CM

## 2020-04-13 DIAGNOSIS — C83.38 DIFFUSE LARGE B-CELL LYMPHOMA OF LYMPH NODES OF MULTIPLE REGIONS (HCC): Primary | ICD-10-CM

## 2020-04-13 DIAGNOSIS — C83.38 DIFFUSE LARGE B-CELL LYMPHOMA OF LYMPH NODES OF MULTIPLE REGIONS (HCC): ICD-10-CM

## 2020-04-13 LAB
ALBUMIN SERPL BCP-MCNC: 3.5 G/DL (ref 3.5–5)
ALBUMIN SERPL BCP-MCNC: 3.5 G/DL (ref 3.5–5)
ALP SERPL-CCNC: 156 U/L (ref 46–116)
ALP SERPL-CCNC: 156 U/L (ref 46–116)
ALT SERPL W P-5'-P-CCNC: 15 U/L (ref 12–78)
ALT SERPL W P-5'-P-CCNC: 31 U/L (ref 12–78)
ANION GAP SERPL CALCULATED.3IONS-SCNC: 7 MMOL/L (ref 4–13)
ANION GAP SERPL CALCULATED.3IONS-SCNC: 8 MMOL/L (ref 4–13)
AST SERPL W P-5'-P-CCNC: 11 U/L (ref 5–45)
AST SERPL W P-5'-P-CCNC: 13 U/L (ref 5–45)
BASOPHILS # BLD AUTO: 0.07 THOUSANDS/ΜL (ref 0–0.1)
BASOPHILS NFR BLD AUTO: 1 % (ref 0–1)
BILIRUB DIRECT SERPL-MCNC: 0.08 MG/DL (ref 0–0.2)
BILIRUB SERPL-MCNC: 0.2 MG/DL (ref 0.2–1)
BILIRUB SERPL-MCNC: 0.2 MG/DL (ref 0.2–1)
BUN SERPL-MCNC: 15 MG/DL (ref 5–25)
BUN SERPL-MCNC: 15 MG/DL (ref 5–25)
CALCIUM SERPL-MCNC: 9 MG/DL (ref 8.3–10.1)
CALCIUM SERPL-MCNC: 9 MG/DL (ref 8.3–10.1)
CHLORIDE SERPL-SCNC: 104 MMOL/L (ref 100–108)
CHLORIDE SERPL-SCNC: 104 MMOL/L (ref 100–108)
CO2 SERPL-SCNC: 27 MMOL/L (ref 21–32)
CO2 SERPL-SCNC: 28 MMOL/L (ref 21–32)
CREAT SERPL-MCNC: 0.78 MG/DL (ref 0.6–1.3)
CREAT SERPL-MCNC: 0.81 MG/DL (ref 0.6–1.3)
CRP SERPL QL: 25.9 MG/L
EOSINOPHIL # BLD AUTO: 0.4 THOUSAND/ΜL (ref 0–0.61)
EOSINOPHIL NFR BLD AUTO: 4 % (ref 0–6)
ERYTHROCYTE [DISTWIDTH] IN BLOOD BY AUTOMATED COUNT: 14.5 % (ref 11.6–15.1)
GFR SERPL CREATININE-BSD FRML MDRD: 84 ML/MIN/1.73SQ M
GFR SERPL CREATININE-BSD FRML MDRD: 88 ML/MIN/1.73SQ M
GLUCOSE SERPL-MCNC: 97 MG/DL (ref 65–140)
GLUCOSE SERPL-MCNC: 97 MG/DL (ref 65–140)
HCT VFR BLD AUTO: 43 % (ref 34.8–46.1)
HGB BLD-MCNC: 13.9 G/DL (ref 11.5–15.4)
IMM GRANULOCYTES # BLD AUTO: 0.05 THOUSAND/UL (ref 0–0.2)
IMM GRANULOCYTES NFR BLD AUTO: 1 % (ref 0–2)
LDH SERPL-CCNC: 148 U/L (ref 81–234)
LYMPHOCYTES # BLD AUTO: 0.71 THOUSANDS/ΜL (ref 0.6–4.47)
LYMPHOCYTES NFR BLD AUTO: 7 % (ref 14–44)
MAGNESIUM SERPL-MCNC: 2.2 MG/DL (ref 1.6–2.6)
MCH RBC QN AUTO: 30.2 PG (ref 26.8–34.3)
MCHC RBC AUTO-ENTMCNC: 32.3 G/DL (ref 31.4–37.4)
MCV RBC AUTO: 93 FL (ref 82–98)
MONOCYTES # BLD AUTO: 0.89 THOUSAND/ΜL (ref 0.17–1.22)
MONOCYTES NFR BLD AUTO: 9 % (ref 4–12)
NEUTROPHILS # BLD AUTO: 8.05 THOUSANDS/ΜL (ref 1.85–7.62)
NEUTS SEG NFR BLD AUTO: 78 % (ref 43–75)
NRBC BLD AUTO-RTO: 0 /100 WBCS
PHOSPHATE SERPL-MCNC: 3.8 MG/DL (ref 2.7–4.5)
PLATELET # BLD AUTO: 286 THOUSANDS/UL (ref 149–390)
PMV BLD AUTO: 9.7 FL (ref 8.9–12.7)
POTASSIUM SERPL-SCNC: 4 MMOL/L (ref 3.5–5.3)
POTASSIUM SERPL-SCNC: 4 MMOL/L (ref 3.5–5.3)
PROT SERPL-MCNC: 6.8 G/DL (ref 6.4–8.2)
PROT SERPL-MCNC: 6.8 G/DL (ref 6.4–8.2)
RBC # BLD AUTO: 4.61 MILLION/UL (ref 3.81–5.12)
SODIUM SERPL-SCNC: 139 MMOL/L (ref 136–145)
SODIUM SERPL-SCNC: 139 MMOL/L (ref 136–145)
TRIGL SERPL-MCNC: 110 MG/DL
URATE SERPL-MCNC: 3.9 MG/DL (ref 2–6.8)
WBC # BLD AUTO: 10.17 THOUSAND/UL (ref 4.31–10.16)

## 2020-04-13 PROCEDURE — 80048 BASIC METABOLIC PNL TOTAL CA: CPT

## 2020-04-13 PROCEDURE — 82784 ASSAY IGA/IGD/IGG/IGM EACH: CPT

## 2020-04-13 PROCEDURE — 80076 HEPATIC FUNCTION PANEL: CPT

## 2020-04-13 PROCEDURE — 84100 ASSAY OF PHOSPHORUS: CPT

## 2020-04-13 PROCEDURE — 83735 ASSAY OF MAGNESIUM: CPT

## 2020-04-13 PROCEDURE — 85025 COMPLETE CBC W/AUTO DIFF WBC: CPT

## 2020-04-13 PROCEDURE — 86140 C-REACTIVE PROTEIN: CPT

## 2020-04-13 PROCEDURE — 80053 COMPREHEN METABOLIC PANEL: CPT

## 2020-04-13 PROCEDURE — 84478 ASSAY OF TRIGLYCERIDES: CPT

## 2020-04-13 PROCEDURE — 82728 ASSAY OF FERRITIN: CPT

## 2020-04-13 PROCEDURE — 71046 X-RAY EXAM CHEST 2 VIEWS: CPT

## 2020-04-13 PROCEDURE — 84550 ASSAY OF BLOOD/URIC ACID: CPT

## 2020-04-13 PROCEDURE — 36415 COLL VENOUS BLD VENIPUNCTURE: CPT

## 2020-04-13 PROCEDURE — 83615 LACTATE (LD) (LDH) ENZYME: CPT

## 2020-04-14 LAB
FERRITIN SERPL-MCNC: 108 NG/ML (ref 8–388)
IGA SERPL-MCNC: 54 MG/DL (ref 70–400)
IGG SERPL-MCNC: 457 MG/DL (ref 700–1600)
IGM SERPL-MCNC: 24 MG/DL (ref 40–230)

## 2020-06-15 DIAGNOSIS — R00.2 PALPITATIONS: ICD-10-CM

## 2020-06-15 DIAGNOSIS — I48.92 ATRIAL FLUTTER, UNSPECIFIED TYPE (HCC): ICD-10-CM

## 2020-06-15 RX ORDER — METOPROLOL TARTRATE 100 MG/1
100 TABLET ORAL 2 TIMES DAILY
Qty: 180 TABLET | Refills: 3 | Status: SHIPPED | OUTPATIENT
Start: 2020-06-15 | End: 2021-05-20

## 2020-08-06 ENCOUNTER — OFFICE VISIT (OUTPATIENT)
Dept: CARDIOLOGY CLINIC | Facility: CLINIC | Age: 50
End: 2020-08-06
Payer: COMMERCIAL

## 2020-08-06 VITALS
SYSTOLIC BLOOD PRESSURE: 100 MMHG | DIASTOLIC BLOOD PRESSURE: 60 MMHG | HEIGHT: 68 IN | HEART RATE: 66 BPM | BODY MASS INDEX: 44.41 KG/M2 | WEIGHT: 293 LBS

## 2020-08-06 DIAGNOSIS — R06.02 SOB (SHORTNESS OF BREATH): Primary | ICD-10-CM

## 2020-08-06 DIAGNOSIS — C83.30 MALIGNANT LYMPHOMA, LARGE CELL, DIFFUSE (HCC): ICD-10-CM

## 2020-08-06 PROBLEM — I48.3 TYPICAL ATRIAL FLUTTER (HCC): Status: ACTIVE | Noted: 2019-08-06

## 2020-08-06 PROBLEM — I48.4 ATYPICAL ATRIAL FLUTTER (HCC): Status: ACTIVE | Noted: 2019-08-06

## 2020-08-06 PROBLEM — I49.9 ARRHYTHMIA: Status: ACTIVE | Noted: 2019-08-06

## 2020-08-06 PROCEDURE — 99213 OFFICE O/P EST LOW 20 MIN: CPT | Performed by: PHYSICIAN ASSISTANT

## 2020-08-06 PROCEDURE — 3008F BODY MASS INDEX DOCD: CPT | Performed by: PHYSICIAN ASSISTANT

## 2020-08-06 PROCEDURE — 1036F TOBACCO NON-USER: CPT | Performed by: PHYSICIAN ASSISTANT

## 2020-08-06 RX ORDER — LORATADINE 10 MG/1
10 TABLET ORAL DAILY
COMMUNITY

## 2020-08-06 RX ORDER — IPRATROPIUM/ALBUTEROL SULFATE 20-100 MCG
1 MIST INHALER (GRAM) INHALATION 4 TIMES DAILY
COMMUNITY
Start: 2020-08-05 | End: 2021-04-01

## 2020-08-06 NOTE — PROGRESS NOTES
[facility]   Outpatient Note  Margie Carmen  1970  [id number]  [address] [facility] [address] [facility] [id number]  [facility]    Margie Carmen is a 44 y o  female    Assessment and Plan:   Typical atrial flutter ([address] )  Doing well, rate is controlled on diltiazem and metoprolol   Currently in sinus rhythm  On Xarelto for anticoagulation      SOB (shortness of breath)  Chronic and improving with stem-cell therapy  She has not had assessment of her LV function for some time  We will check an echo prior to her next visit  Malignant lymphoma, large cell, diffuse (HCC)  Under research study at [facility] for stem-cell Therapy  She is doing remarkably well  Additional Plan:   No changes in medication or testing ordered today  Return visit will be in 6 months or earlier if there are problems  The patient is encouraged to call in the meantime if there are questions or concerns  Echo will be checked prior to next visit  Subjective: The patient is seen in routine follow up regarding a history of PAF/flutter, with PACs and PVCs  She is in the study program at [facility] for stem-cell therapy regarding Non-hodgkin's lymphoma B-cell carcinoma  She is doing remarkably well  Her only complaint is that of mild SOB with stairs  This has been chronic and has been improving with stem-cell therapy  She is a  and it does not seem to affect her job  She has some recurrent palpitations, but they are usually short-lived, lasting seconds in duration  Otherwise, she is doing well from a cardiac perspective without complaints of chest pain  There are no complaints of syncope or near syncope  She denies edema orthopnea or PND  The patient denies TIA or CVA symptoms           Social History  Social History     Tobacco Use   Smoking Status Former Smoker    Packs/day: 1 50    Years: 11 00    Pack years: 16 50    Last attempt to quit: [address] Years since quittin 6   Smokeless Tobacco Never Used   ,   Social History     Substance and Sexual Activity   Alcohol Use Not Currently   ,   Social History     Substance and Sexual Activity   Drug Use No     Family History   Problem Relation Age of Onset    Coronary artery disease Father     Diabetes Brother     Diabetes Family     Heart disease Family        Medical and Surgical History  Past Medical History:   Diagnosis Date    Allergic rhinitis     last assessed 16    Arthritis     b/l feet    Chronic allergic conjunctivitis     Fluttering heart     takes magnesium for same   EKG OK    Fluttering sensation of heart     "periodically, not often since on magnesium"    Foot pain, left     Full dentures     upper    History of cancer chemotherapy 2016    History of dilation and curettage     Hx of tonsillectomy     Hypothyroid     Irregular heart beat     Lymph nodes enlarged     in chest pushing on her bronchioles necessitating inhalers    Neck mass     R neck mass-excised 3/15/2016,, 18 noted enlarged lymph node right neck-bx today 2018    Non Hodgkin's lymphoma ([address] )     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    Obese     Obstructive sleep apnea     Port-A-Cath in place     pt reports 'Has it flushed q 4weeks" right chest wall    Post-menopausal     since chemo 2016  no menses    Pulmonary embolism ([address] )     last assessed 10/31/16 attributed to Non-Hodgkins hx    Pulmonary embolism on right ([address] ) 2018    Last Assessment & Plan:  She will need to hold her Xarelto 2 days prior to her procedure and may resume therapy 1-2 days post procedure (depending on whether she is producing blood tinged sputum or not)      Shortness of breath     due to chest tumor    Tachycardia     awaiting cardiology landy,,,18 "pt reports saw cardiologist at that time and placed on magnesium and "hardly notices it"    Tinnitus     occas    Wears glasses     Wears partial dentures     /lower    [place] teeth extracted      Past Surgical History:   Procedure Laterality Date    COLONOSCOPY      COLONOSCOPY N/A 7/26/2018    Procedure: COLONOSCOPY with multiple bx;  Surgeon: Sona Sheppard MD;  Location: [facility] MAIN OR;  Service: Gastroenterology    CYST REMOVAL Left     L  neck    DILATION AND CURETTAGE OF UTERUS      ESOPHAGOGASTRODUODENOSCOPY N/A 7/26/2018    Procedure: ESOPHAGOGASTRODUODENOSCOPY (EGD) with multiple bx;  Surgeon: Sona Sheppard MD;  Location: [facility] MAIN OR;  Service: Gastroenterology    FACIAL/NECK BIOPSY N/A 4/26/2018    Procedure: OPEN DEEP CERVICAL LYMPH NODE EXCISOION/BIOPSY;  Surgeon: Josephine Goodpasture, MD;  Location: [facility] Main OR;  Service: ENT    LYMPHADENECTOMY      PORTACATH PLACEMENT      SD BX/REMV,LYMPH NODE,DEEP CERV N/A 3/15/2016    Procedure: DISSECTION [address] NODE NECK/DEEP NECK MASS ;  Surgeon: Kimo Madrigal DO;  Location: [facility];  Service: ENT    TONSILLECTOMY      TUBAL LIGATION      TUNNELED VENOUS PORT PLACEMENT Right 3/21/2019    Procedure: INSERTION VENOUS PORT (PORT-A-CATH) remove and replace;  Surgeon: Xena Richter MD;  Location: [facility] MAIN OR;  Service: General         Current Outpatient Medications:     acetaminophen (TYLENOL) 325 mg tablet, Take 650 mg by mouth every 4 (four) hours as needed , Disp: , Rfl:     acyclovir (ZOVIRAX) 800 mg tablet, Take 800 mg by mouth every 12 (twelve) hours, Disp: , Rfl: 2    b complex vitamins tablet, Take 1 tablet by mouth 2 (two) times a day , Disp: , Rfl:     benzonatate (TESSALON PERLES) 100 mg capsule, Take 1 capsule (100 mg total) by mouth 3 (three) times a day as needed for cough, Disp: 20 capsule, Rfl: 0    cholecalciferol (VITAMIN D3) 1,000 units tablet, Take 1,000 Units by mouth every morning , Disp: , Rfl:     Combivent Respimat inhaler, Inhale 1 puff 4 (four) times a day , Disp: , Rfl:     diltiazem (TIAZAC) 120 MG 24 hr capsule, Take 1 capsule (120 mg total) by mouth 2 (two) times a day (Patient taking differently: Take 120 mg by mouth daily ), Disp: 180 capsule, Rfl: 3    fluticasone-vilanterol (BREO ELLIPTA) 100-25 mcg/inh inhaler, Inhale 1 puff every morning Rinse mouth after use , Disp: , Rfl:     levothyroxine 75 mcg tablet, Take 1 tablet (75 mcg total) by mouth every morning, Disp: 90 tablet, Rfl: 3    loratadine (CLARITIN) 10 mg tablet, Take 10 mg by mouth daily, Disp: , Rfl:     LORazepam (ATIVAN) 0 5 mg tablet, Take 0 5 mg by mouth every 8 (eight) hours as needed , Disp: , Rfl:     MAGNESIUM OXIDE PO, Take 400 mg by mouth daily , Disp: , Rfl:     metoprolol tartrate (LOPRESSOR) 100 mg tablet, Take 1 tablet (100 mg total) by mouth 2 (two) times a day, Disp: 180 tablet, Rfl: 3    Multiple Vitamin (MULTIVITAMIN) tablet, Take 2 tablets by mouth 2 (two) times a day , Disp: , Rfl:     omeprazole (PriLOSEC) 10 mg delayed release capsule, Take 10 mg by mouth, Disp: , Rfl:     ondansetron (ZOFRAN) 8 mg tablet, Take 8 mg by mouth every 8 (eight) hours as needed for nausea or vomiting (after chemo) , Disp: , Rfl:     prochlorperazine (COMPAZINE) 10 mg tablet, Take 10 mg by mouth every 6 (six) hours as needed, Disp: , Rfl:     rivaroxaban (XARELTO) 20 mg tablet, Take 1 tablet (20 mg total) by mouth daily with dinner, Disp: 90 tablet, Rfl: 3    senna-docusate sodium (SENOKOT-S) 8 6-50 mg per tablet, Take 1 tablet by mouth 2 (two) times a day as needed for constipation (as needed during chemo tx) , Disp: , Rfl:     cetirizine (ZyrTEC) 10 mg tablet, Take 10 mg by mouth daily, Disp: , Rfl:     ipratropium-albuterol (DUO-NEB) 0 5-2 5 mg/3 mL nebulizer solution, Take 1 vial (3 mL total) by nebulization every 6 (six) hours (Patient not taking: Reported on 8/6/2020), Disp: 360 mL, Rfl: 0  Allergies   Allergen Reactions    Medical Tape      Redness, soreness on skin       Review of Systems   Constitution: Negative  HENT: Negative      Eyes: Negative  Cardiovascular: Positive for dyspnea on exertion and palpitations  Negative for chest pain, claudication, cyanosis, irregular heartbeat, leg swelling, near-syncope, orthopnea, paroxysmal nocturnal dyspnea and syncope  Respiratory: Negative  Negative for cough, hemoptysis, shortness of breath, sleep disturbances due to breathing, snoring, sputum production and wheezing  Endocrine: Negative  Hematologic/Lymphatic: Negative  Skin: Negative  Musculoskeletal: Negative  Gastrointestinal: Negative  Genitourinary: Negative  Neurological: Negative  Psychiatric/Behavioral: Negative  Allergic/Immunologic: Negative  Objective:   /60   Pulse 66   Ht 5' 8" (1 727 m)   Wt 136 kg (299 lb)   BMI 45 46 kg/m²   Physical Exam   Constitutional: She is oriented to person, place, and time  She appears well-developed and well-nourished  obese   HENT:   Head: Normocephalic and atraumatic  Mouth/Throat: Oropharynx is clear and moist    Eyes: Conjunctivae and EOM are normal  No scleral icterus  Neck: Normal range of motion  Neck supple  No JVD present  No tracheal deviation present  Cardiovascular: Normal rate, regular rhythm, normal heart sounds and intact distal pulses  Exam reveals no gallop and no friction rub  No murmur heard  Pulmonary/Chest: Effort normal and breath sounds normal  No respiratory distress  She has no wheezes  She has no rales  She exhibits no tenderness  Abdominal: Soft  Bowel sounds are normal  She exhibits no distension  There is no abdominal tenderness  Aorta not palpable    Musculoskeletal: Normal range of motion  General: No tenderness or edema  Neurological: She is alert and oriented to person, place, and time  Skin: Skin is warm and dry  No rash noted  No erythema  No pallor  Psychiatric: She has a normal mood and affect  Her behavior is normal    Nursing note and vitals reviewed        Lab Review:   Lab Results   Component Value Date    CHOL 184 07/22/2015     Lab Results   Component Value Date    HDL 49 09/07/2017     Lab Results   Component Value Date    LDLCALC 107 (H) 09/07/2017     Lab Results   Component Value Date    TRIG 110 04/13/2020     Results Reviewed     None        Results Reviewed     None        Results Reviewed     None          Recent Cardiovascular Testing:   Multiple echos at [facility]: normal EF at 60 no WMA no valvular disease       ECG Review:   9-6-19: Normal sinus rhythm  Septal infarct (cited on or before 06-SEP-2019)  Abnormal ECG

## 2020-08-06 NOTE — ASSESSMENT & PLAN NOTE
Chronic and improving with stem-cell therapy  She has not had assessment of her LV function for some time  We will check an echo prior to her next visit

## 2020-08-19 ENCOUNTER — APPOINTMENT (OUTPATIENT)
Dept: LAB | Facility: MEDICAL CENTER | Age: 50
End: 2020-08-19
Payer: COMMERCIAL

## 2020-08-19 ENCOUNTER — TRANSCRIBE ORDERS (OUTPATIENT)
Dept: ADMINISTRATIVE | Facility: HOSPITAL | Age: 50
End: 2020-08-19

## 2020-08-19 DIAGNOSIS — C85.11 B-CELL LYMPHOMA OF LYMPH NODES OF NECK, UNSPECIFIED B-CELL LYMPHOMA TYPE (HCC): ICD-10-CM

## 2020-08-19 DIAGNOSIS — C85.11 B-CELL LYMPHOMA OF LYMPH NODES OF NECK, UNSPECIFIED B-CELL LYMPHOMA TYPE (HCC): Primary | ICD-10-CM

## 2020-08-19 LAB
ALBUMIN SERPL BCP-MCNC: 3.3 G/DL (ref 3.5–5)
ALP SERPL-CCNC: 156 U/L (ref 46–116)
ALT SERPL W P-5'-P-CCNC: 14 U/L (ref 12–78)
ANION GAP SERPL CALCULATED.3IONS-SCNC: 6 MMOL/L (ref 4–13)
AST SERPL W P-5'-P-CCNC: 11 U/L (ref 5–45)
BASOPHILS # BLD AUTO: 0.07 THOUSANDS/ΜL (ref 0–0.1)
BASOPHILS NFR BLD AUTO: 1 % (ref 0–1)
BILIRUB SERPL-MCNC: 0.46 MG/DL (ref 0.2–1)
BUN SERPL-MCNC: 13 MG/DL (ref 5–25)
CALCIUM SERPL-MCNC: 8.7 MG/DL (ref 8.3–10.1)
CHLORIDE SERPL-SCNC: 109 MMOL/L (ref 100–108)
CO2 SERPL-SCNC: 25 MMOL/L (ref 21–32)
CREAT SERPL-MCNC: 0.81 MG/DL (ref 0.6–1.3)
EOSINOPHIL # BLD AUTO: 0.35 THOUSAND/ΜL (ref 0–0.61)
EOSINOPHIL NFR BLD AUTO: 4 % (ref 0–6)
ERYTHROCYTE [DISTWIDTH] IN BLOOD BY AUTOMATED COUNT: 14.1 % (ref 11.6–15.1)
GFR SERPL CREATININE-BSD FRML MDRD: 84 ML/MIN/1.73SQ M
GLUCOSE P FAST SERPL-MCNC: 113 MG/DL (ref 65–99)
HCT VFR BLD AUTO: 47.2 % (ref 34.8–46.1)
HGB BLD-MCNC: 14.6 G/DL (ref 11.5–15.4)
IMM GRANULOCYTES # BLD AUTO: 0.02 THOUSAND/UL (ref 0–0.2)
IMM GRANULOCYTES NFR BLD AUTO: 0 % (ref 0–2)
LDH SERPL-CCNC: 183 U/L (ref 81–234)
LYMPHOCYTES # BLD AUTO: 0.71 THOUSANDS/ΜL (ref 0.6–4.47)
LYMPHOCYTES NFR BLD AUTO: 8 % (ref 14–44)
MCH RBC QN AUTO: 29 PG (ref 26.8–34.3)
MCHC RBC AUTO-ENTMCNC: 30.9 G/DL (ref 31.4–37.4)
MCV RBC AUTO: 94 FL (ref 82–98)
MONOCYTES # BLD AUTO: 0.59 THOUSAND/ΜL (ref 0.17–1.22)
MONOCYTES NFR BLD AUTO: 7 % (ref 4–12)
NEUTROPHILS # BLD AUTO: 7.19 THOUSANDS/ΜL (ref 1.85–7.62)
NEUTS SEG NFR BLD AUTO: 80 % (ref 43–75)
NRBC BLD AUTO-RTO: 0 /100 WBCS
PLATELET # BLD AUTO: 279 THOUSANDS/UL (ref 149–390)
PMV BLD AUTO: 10.6 FL (ref 8.9–12.7)
POTASSIUM SERPL-SCNC: 4.3 MMOL/L (ref 3.5–5.3)
PROT SERPL-MCNC: 6.9 G/DL (ref 6.4–8.2)
RBC # BLD AUTO: 5.04 MILLION/UL (ref 3.81–5.12)
SODIUM SERPL-SCNC: 140 MMOL/L (ref 136–145)
WBC # BLD AUTO: 8.93 THOUSAND/UL (ref 4.31–10.16)

## 2020-08-19 PROCEDURE — 85025 COMPLETE CBC W/AUTO DIFF WBC: CPT

## 2020-08-19 PROCEDURE — 36415 COLL VENOUS BLD VENIPUNCTURE: CPT

## 2020-08-19 PROCEDURE — 83615 LACTATE (LD) (LDH) ENZYME: CPT

## 2020-08-19 PROCEDURE — 80053 COMPREHEN METABOLIC PANEL: CPT

## 2020-09-06 DIAGNOSIS — R00.2 PALPITATIONS: ICD-10-CM

## 2020-09-06 DIAGNOSIS — I48.92 ATRIAL FLUTTER, UNSPECIFIED TYPE (HCC): ICD-10-CM

## 2020-09-08 RX ORDER — DILTIAZEM HYDROCHLORIDE 120 MG/1
120 CAPSULE, EXTENDED RELEASE ORAL DAILY
Qty: 90 CAPSULE | Refills: 3 | Status: SHIPPED | OUTPATIENT
Start: 2020-09-08 | End: 2021-04-01

## 2020-09-18 ENCOUNTER — TRANSCRIBE ORDERS (OUTPATIENT)
Dept: ADMINISTRATIVE | Facility: HOSPITAL | Age: 50
End: 2020-09-18

## 2020-09-18 DIAGNOSIS — C83.83 OTHER NON-FOLLICULAR LYMPHOMA, INTRA-ABDOMINAL LYMPH NODES (HCC): Primary | ICD-10-CM

## 2020-09-25 ENCOUNTER — HOSPITAL ENCOUNTER (OUTPATIENT)
Dept: CT IMAGING | Facility: HOSPITAL | Age: 50
Discharge: HOME | End: 2020-09-25
Payer: COMMERCIAL

## 2020-09-25 DIAGNOSIS — C83.83 OTHER NON-FOLLICULAR LYMPHOMA, INTRA-ABDOMINAL LYMPH NODES (HCC): ICD-10-CM

## 2020-09-25 PROCEDURE — 74177 CT ABD & PELVIS W/CONTRAST: CPT

## 2020-09-25 PROCEDURE — G1004 CDSM NDSC: HCPCS

## 2020-09-25 PROCEDURE — 71260 CT THORAX DX C+: CPT

## 2020-09-25 RX ADMIN — IOHEXOL 100 ML: 350 INJECTION, SOLUTION INTRAVENOUS at 18:25

## 2020-10-07 ENCOUNTER — HOSPITAL ENCOUNTER (OUTPATIENT)
Dept: NON INVASIVE DIAGNOSTICS | Facility: CLINIC | Age: 50
Discharge: HOME | End: 2020-10-07
Payer: COMMERCIAL

## 2020-10-07 DIAGNOSIS — R06.02 SOB (SHORTNESS OF BREATH): ICD-10-CM

## 2020-10-07 PROCEDURE — 93306 TTE W/DOPPLER COMPLETE: CPT | Performed by: INTERNAL MEDICINE

## 2020-10-07 PROCEDURE — 93306 TTE W/DOPPLER COMPLETE: CPT

## 2020-10-07 RX ADMIN — PERFLUTREN 0.4 ML/MIN: 6.52 INJECTION, SUSPENSION INTRAVENOUS at 15:45

## 2020-11-19 ENCOUNTER — LAB (OUTPATIENT)
Dept: LAB | Facility: MEDICAL CENTER | Age: 50
End: 2020-11-19
Payer: COMMERCIAL

## 2020-11-19 ENCOUNTER — TRANSCRIBE ORDERS (OUTPATIENT)
Dept: LAB | Facility: MEDICAL CENTER | Age: 50
End: 2020-11-19

## 2020-11-19 DIAGNOSIS — C84.41 PERIPHERAL T CELL LYMPHOMA OF LYMPH NODES OF HEAD, FACE, AND NECK (HCC): Primary | ICD-10-CM

## 2020-11-19 DIAGNOSIS — C83.32 DIFFUSE LARGE B-CELL LYMPHOMA OF INTRATHORACIC LYMPH NODES (HCC): ICD-10-CM

## 2020-11-19 DIAGNOSIS — C84.41 PERIPHERAL T CELL LYMPHOMA OF LYMPH NODES OF HEAD, FACE, AND NECK (HCC): ICD-10-CM

## 2020-11-19 LAB
ALBUMIN SERPL BCP-MCNC: 3.4 G/DL (ref 3.5–5)
ALP SERPL-CCNC: 140 U/L (ref 46–116)
ALT SERPL W P-5'-P-CCNC: 15 U/L (ref 12–78)
ANION GAP SERPL CALCULATED.3IONS-SCNC: 7 MMOL/L (ref 4–13)
AST SERPL W P-5'-P-CCNC: 10 U/L (ref 5–45)
BASOPHILS # BLD AUTO: 0.09 THOUSANDS/ΜL (ref 0–0.1)
BASOPHILS NFR BLD AUTO: 1 % (ref 0–1)
BILIRUB SERPL-MCNC: 0.54 MG/DL (ref 0.2–1)
BUN SERPL-MCNC: 14 MG/DL (ref 5–25)
CALCIUM ALBUM COR SERPL-MCNC: 9.8 MG/DL (ref 8.3–10.1)
CALCIUM SERPL-MCNC: 9.3 MG/DL (ref 8.3–10.1)
CHLORIDE SERPL-SCNC: 107 MMOL/L (ref 100–108)
CO2 SERPL-SCNC: 25 MMOL/L (ref 21–32)
CREAT SERPL-MCNC: 0.76 MG/DL (ref 0.6–1.3)
EOSINOPHIL # BLD AUTO: 0.43 THOUSAND/ΜL (ref 0–0.61)
EOSINOPHIL NFR BLD AUTO: 5 % (ref 0–6)
ERYTHROCYTE [DISTWIDTH] IN BLOOD BY AUTOMATED COUNT: 14.2 % (ref 11.6–15.1)
GFR SERPL CREATININE-BSD FRML MDRD: 91 ML/MIN/1.73SQ M
GLUCOSE P FAST SERPL-MCNC: 130 MG/DL (ref 65–99)
HCT VFR BLD AUTO: 46.1 % (ref 34.8–46.1)
HGB BLD-MCNC: 16.5 G/DL (ref 11.5–15.4)
IMM GRANULOCYTES # BLD AUTO: 0.05 THOUSAND/UL (ref 0–0.2)
IMM GRANULOCYTES NFR BLD AUTO: 1 % (ref 0–2)
LDH SERPL-CCNC: 146 U/L (ref 81–234)
LYMPHOCYTES # BLD AUTO: 0.88 THOUSANDS/ΜL (ref 0.6–4.47)
LYMPHOCYTES NFR BLD AUTO: 9 % (ref 14–44)
MCH RBC QN AUTO: 32.8 PG (ref 26.8–34.3)
MCHC RBC AUTO-ENTMCNC: 35.8 G/DL (ref 31.4–37.4)
MCV RBC AUTO: 92 FL (ref 82–98)
MONOCYTES # BLD AUTO: 0.64 THOUSAND/ΜL (ref 0.17–1.22)
MONOCYTES NFR BLD AUTO: 7 % (ref 4–12)
NEUTROPHILS # BLD AUTO: 7.53 THOUSANDS/ΜL (ref 1.85–7.62)
NEUTS SEG NFR BLD AUTO: 77 % (ref 43–75)
NRBC BLD AUTO-RTO: 0 /100 WBCS
PLATELET # BLD AUTO: 325 THOUSANDS/UL (ref 149–390)
PMV BLD AUTO: 10.4 FL (ref 8.9–12.7)
POTASSIUM SERPL-SCNC: 4.2 MMOL/L (ref 3.5–5.3)
PROT SERPL-MCNC: 7 G/DL (ref 6.4–8.2)
RBC # BLD AUTO: 5.03 MILLION/UL (ref 3.81–5.12)
SODIUM SERPL-SCNC: 139 MMOL/L (ref 136–145)
WBC # BLD AUTO: 9.62 THOUSAND/UL (ref 4.31–10.16)

## 2020-11-19 PROCEDURE — 85025 COMPLETE CBC W/AUTO DIFF WBC: CPT

## 2020-11-19 PROCEDURE — 80053 COMPREHEN METABOLIC PANEL: CPT

## 2020-11-19 PROCEDURE — 36415 COLL VENOUS BLD VENIPUNCTURE: CPT

## 2020-11-19 PROCEDURE — 83615 LACTATE (LD) (LDH) ENZYME: CPT

## 2020-11-29 DIAGNOSIS — E03.9 ACQUIRED HYPOTHYROIDISM: ICD-10-CM

## 2020-11-29 RX ORDER — LEVOTHYROXINE SODIUM 75 UG/1
TABLET ORAL
Qty: 90 TABLET | Refills: 0 | Status: SHIPPED | OUTPATIENT
Start: 2020-11-29 | End: 2021-03-09 | Stop reason: SDUPTHER

## 2020-12-22 ENCOUNTER — TELEPHONE (OUTPATIENT)
Dept: FAMILY MEDICINE CLINIC | Facility: CLINIC | Age: 50
End: 2020-12-22

## 2020-12-22 DIAGNOSIS — I48.92 ATRIAL FLUTTER, UNSPECIFIED TYPE (HCC): ICD-10-CM

## 2020-12-28 ENCOUNTER — TRANSCRIBE ORDERS (OUTPATIENT)
Dept: ADMINISTRATIVE | Facility: HOSPITAL | Age: 50
End: 2020-12-28

## 2020-12-28 DIAGNOSIS — J98.4 DISORDER OF LUNG PARENCHYMA: Primary | ICD-10-CM

## 2020-12-28 DIAGNOSIS — C85.99 LYMPHOMA INVOLVING LUNG (HCC): ICD-10-CM

## 2021-01-04 ENCOUNTER — HOSPITAL ENCOUNTER (OUTPATIENT)
Dept: CT IMAGING | Facility: HOSPITAL | Age: 51
Discharge: HOME | End: 2021-01-04
Payer: COMMERCIAL

## 2021-01-04 DIAGNOSIS — J98.4 DISORDER OF LUNG PARENCHYMA: ICD-10-CM

## 2021-01-04 DIAGNOSIS — C85.99 LYMPHOMA INVOLVING LUNG (HCC): ICD-10-CM

## 2021-01-04 PROCEDURE — 71250 CT THORAX DX C-: CPT

## 2021-01-04 PROCEDURE — G1004 CDSM NDSC: HCPCS

## 2021-01-09 ENCOUNTER — NURSE TRIAGE (OUTPATIENT)
Dept: OTHER | Facility: OTHER | Age: 51
End: 2021-01-09

## 2021-01-09 NOTE — TELEPHONE ENCOUNTER
Reason for Disposition   [1] COVID-19 infection suspected by caller or triager AND [2] mild symptoms (cough, fever, or others) AND [2] no complications or SOB    Answer Assessment - Initial Assessment Questions  1  COVID-19 DIAGNOSIS: "Who made your Coronavirus (COVID-19) diagnosis?" "Was it confirmed by a positive lab test?" If not diagnosed by a HCP, ask "Are there lots of cases (community spread) where you live?" (See public health department website, if unsure)      Pt  Has not been tested for covid , pt  Is unsure if there are cases where she lives  2  COVID-19 EXPOSURE: "Was there any known exposure to COVID before the symptoms began?" CDC Definition of close contact: within 6 feet (2 meters) for a total of 15 minutes or more over a 24-hour period  Pt  Is unsure if she has been exposed  3  ONSET: "When did the COVID-19 symptoms start?"       Thursday night 1/7/21  4  WORST SYMPTOM: "What is your worst symptom?" (e g , cough, fever, shortness of breath, muscle aches)      cough  5  COUGH: "Do you have a cough?" If so, ask: "How bad is the cough?"        Yes, more frequent, chest congestion, productive, white/clear mucous, thick  6  FEVER: "Do you have a fever?" If so, ask: "What is your temperature, how was it measured, and when did it start?"      Pt  Had the chills and a temp of 100 2 last night, took tylenol around midnight, awoke today and took temp which was 99 5 F at 0730  7  RESPIRATORY STATUS: "Describe your breathing?" (e g , shortness of breath, wheezing, unable to speak)      Pt  Denies sob or wheezing  8  BETTER-SAME-WORSE: "Are you getting better, staying the same or getting worse compared to yesterday?"  If getting worse, ask, "In what way?"      Pt  Feels she feels the same, the burning in her chest has resolved  9   HIGH RISK DISEASE: "Do you have any chronic medical problems?" (e g , asthma, heart or lung disease, weak immune system, etc )      T cell and large B cell Lymphoma, stem cell transplant October 2019   10  PREGNANCY: "Is there any chance you are pregnant?" "When was your last menstrual period?"        no  11  OTHER SYMPTOMS: "Do you have any other symptoms?"  (e g , chills, fatigue, headache, loss of smell or taste, muscle pain, sore throat)        Fever, headache    Protocols used: CORONAVIRUS (COVID-19)  DIAGNOSED OR SUSPECTED-ADULT-OH      Pt  Presents with covid symptoms that started on 1/7/21, called Dr Ryan Christine on call provider for [facility], pt  Ordered to have covid swab on Monday 1/11/21 and to call office and schedule a virtual follow up visit, pt   Instructed to start taking Vitamin D3 2000 mg, Vitamin C 1000 mg, Zinc 25-50 mg, and Melatonin 3 mg HS, pt  Verbalized understanding and will call healthcall with worsening symptoms

## 2021-01-09 NOTE — TELEPHONE ENCOUNTER
Regarding: chest cold, fever  ----- Message from [employer] sent at 1/9/2021  7:54 AM EST -----  "I am having a fever of 100 2, congestion, and a cough worse than normal  I do not believe it's covid because I was not exposed and my boss had a sinus infection  I feel like I have a chest cold"

## 2021-01-11 ENCOUNTER — TELEMEDICINE (OUTPATIENT)
Dept: FAMILY MEDICINE CLINIC | Facility: CLINIC | Age: 51
End: 2021-01-11
Payer: COMMERCIAL

## 2021-01-11 VITALS
WEIGHT: 293 LBS | HEIGHT: 68 IN | OXYGEN SATURATION: 97 % | TEMPERATURE: 97.6 F | BODY MASS INDEX: 44.41 KG/M2 | HEART RATE: 76 BPM

## 2021-01-11 DIAGNOSIS — Z03.818 ENCOUNTER FOR OBSERVATION FOR SUSPECTED EXPOSURE TO OTHER BIOLOGICAL AGENTS RULED OUT: ICD-10-CM

## 2021-01-11 DIAGNOSIS — B34.9 VIRAL INFECTION, UNSPECIFIED: ICD-10-CM

## 2021-01-11 PROCEDURE — 99213 OFFICE O/P EST LOW 20 MIN: CPT | Performed by: FAMILY MEDICINE

## 2021-01-11 PROCEDURE — U0003 INFECTIOUS AGENT DETECTION BY NUCLEIC ACID (DNA OR RNA); SEVERE ACUTE RESPIRATORY SYNDROME CORONAVIRUS 2 (SARS-COV-2) (CORONAVIRUS DISEASE [COVID-19]), AMPLIFIED PROBE TECHNIQUE, MAKING USE OF HIGH THROUGHPUT TECHNOLOGIES AS DESCRIBED BY CMS-2020-01-R: HCPCS | Performed by: FAMILY MEDICINE

## 2021-01-11 PROCEDURE — U0005 INFEC AGEN DETEC AMPLI PROBE: HCPCS | Performed by: FAMILY MEDICINE

## 2021-01-11 NOTE — PROGRESS NOTES
COVID-19 Virtual Visit     Assessment/Plan:    Problem List Items Addressed This Visit     None      Visit Diagnoses     Encounter for observation for suspected exposure to other biological agents ruled out        Relevant Orders    Novel Coronavirus (COVID-19), PCR [facility] - Collected at [facility] or [facility]    Viral infection, unspecified        Relevant Orders    Novel Coronavirus (COVID-19), PCR [facility] - Collected at [place] [address] or [facility]         Disposition:     I recommended self-quarantine for 10 days and to watch for symptoms until 14 days after exposure  If patient were to develop symptoms, they should self isolate and call our office for further guidance  I referred patient to one of our centralized sites for a COVID-19 swab  We will test patient for COVID  She will continue taking the Tylenol p r n     May get Mucinex DM over-the-counter  Push fluids  Get a room humidifier  She will call us if symptoms continue or increase  If her breathing becomes worse, she knows she has to go to the ER  I have spent 10 minutes directly with the patient  Encounter provider Aftab Howell DO    Provider located at [address] [address]    Recent Visits  No visits were found meeting these conditions  Showing recent visits within past 7 days and meeting all other requirements     Today's Visits  Date Type Provider Dept   01/11/21 Telemedicine DO Vasyl Smith   Showing today's visits and meeting all other requirements     Future Appointments  No visits were found meeting these conditions  Showing future appointments within next 150 days and meeting all other requirements      This virtual check-in was done via [employer] and patient was informed that this is a secure, HIPAA-compliant platform  She agrees to proceed      Patient agrees to participate in a virtual check in via telephone or video visit instead of presenting to the office to address urgent/immediate medical needs  Patient is aware this is a billable service  After connecting through [facility], the patient was identified by name and date of birth  Gilbert Jackson was informed that this was a telemedicine visit and that the exam was being conducted confidentially over secure lines  My office door was closed  No one else was in the room  Gilbert Jackson acknowledged consent and understanding of privacy and security of the telemedicine visit  I informed the patient that I have reviewed her record in Epic and presented the opportunity for her to ask any questions regarding the visit today  The patient agreed to participate  Subjective:   Gilbert Jackson is a 44 y o  female who is concerned about COVID-19  Patient's symptoms include fever ( T-max was a 100 4° on Friday night), fatigue, nasal congestion, cough, diarrhea ( yesterday for couple hours had loose stool  None today ), myalgias and headache (  Slight)  Patient denies chills, sore throat, anosmia, loss of taste, shortness of breath, nausea and vomiting  Date of symptom onset: 1/7/2021    Exposure:   Contact with a person who is under investigation (PUI) for or who is positive for COVID-19 within the last 14 days?: Yes    Hospitalized recently for fever and/or lower respiratory symptoms?: No      Currently a healthcare worker that is involved in direct patient care?: No      Works in a special setting where the risk of COVID-19 transmission may be high? (this may include long-term care, correctional and custodial facilities; homeless shelters; assisted-living facilities and group homes ): No      Resident in a special setting where the risk of COVID-19 transmission may be high? (this may include long-term care, correctional and custodial facilities; homeless shelters; assisted-living facilities and group homes ): No       Video visit  Patient states on 01/07/2021 started with a cough    That night, started to feel feverish  T-max was this past Friday at 100 4°  Patient has had some slight burning in the chest, no shortness of breath  Slight headache  Some fatigue and muscle aches  Had some diarrhea yesterday for couple hours, none today  Patient took some Tylenol last night for the aches, which helped  Patient's boss has been having cold-like symptoms  Patient's co-worker tested for COVID, results not back yet  No results found for: Madeleine Butler, [address] [facility], [address] [facility], [place] [address]  Past Medical History:   Diagnosis Date    Allergic rhinitis     last assessed 04/06/16    Arthritis     b/l feet    Chronic allergic conjunctivitis     Fluttering heart     takes magnesium for same   EKG OK    Fluttering sensation of heart     "periodically, not often since on magnesium"    Foot pain, left     Full dentures     upper    History of cancer chemotherapy 2016    History of dilation and curettage     Hx of tonsillectomy     Hypothyroid     Irregular heart beat     Lymph nodes enlarged     in chest pushing on her bronchioles necessitating inhalers    Neck mass     R neck mass-excised 3/15/2016,, 4/23/18 noted enlarged lymph node right neck-bx today 4/26/2018    Non Hodgkin's lymphoma ([address] )     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    Obese     Obstructive sleep apnea     Port-A-Cath in place 2016    pt reports 'Has it flushed q 4weeks" right chest wall    Post-menopausal     since chemo 4/2016  no menses    Pulmonary embolism ([address] )     last assessed 10/31/16 attributed to Non-Hodgkins hx    Pulmonary embolism on right ([address] ) 7/12/2018    Last Assessment & Plan:  She will need to hold her Xarelto 2 days prior to her procedure and may resume therapy 1-2 days post procedure (depending on whether she is producing blood tinged sputum or not)      Shortness of breath     due to chest tumor    Tachycardia     awaiting cardiology eval,,,4/23/18 "pt reports saw cardiologist at that time and placed on magnesium and "hardly notices it"    Tinnitus     occas    Wears glasses     Wears partial dentures     /lower    [place] teeth extracted      Past Surgical History:   Procedure Laterality Date    COLONOSCOPY      COLONOSCOPY N/A 7/26/2018    Procedure: COLONOSCOPY with multiple bx;  Surgeon: Kt Jimenez MD;  Location: [address] [place] [address] MAIN OR;  Service: Gastroenterology    CYST REMOVAL Left     L  neck    DILATION AND CURETTAGE OF UTERUS      ESOPHAGOGASTRODUODENOSCOPY N/A 7/26/2018    Procedure: ESOPHAGOGASTRODUODENOSCOPY (EGD) with multiple bx;  Surgeon: Kt Jimenez MD;  [facility]: [facility] MAIN OR;  Service: Gastroenterology    FACIAL/NECK BIOPSY N/A 4/26/2018    Procedure: OPEN DEEP CERVICAL LYMPH NODE EXCISOION/BIOPSY;  Surgeon: Ovi Velazquez MD;  [facility] Main OR;  Service: ENT    LYMPHADENECTOMY      PORTACATH PLACEMENT      IN BX/REMV,LYMPH NODE,DEEP CERV N/A 3/15/2016    Procedure: DISSECTION [address] NECK/DEEP NECK MASS ;  Surgeon: Deven Diaz DO;  [facility];  Service: ENT    TONSILLECTOMY      TUBAL LIGATION      TUNNELED VENOUS PORT PLACEMENT Right 3/21/2019    Procedure: INSERTION VENOUS PORT (PORT-A-CATH) remove and replace;  Surgeon: Aileen Fu MD;  Location: [address] [place] [address] MAIN OR;  Service: General     Current Outpatient Medications   Medication Sig Dispense Refill    acetaminophen (TYLENOL) 325 mg tablet Take 650 mg by mouth every 4 (four) hours as needed       acyclovir (ZOVIRAX) 800 mg tablet Take 800 mg by mouth every 12 (twelve) hours  2    b complex vitamins tablet Take 1 tablet by mouth 2 (two) times a day       cetirizine (ZyrTEC) 10 mg tablet Take 10 mg by mouth daily      cholecalciferol (VITAMIN D3) 1,000 units tablet Take 1,000 Units by mouth every morning       Combivent Respimat inhaler Inhale 1 puff 4 (four) times a day       diltiazem (TIAZAC) 120 MG 24 hr capsule Take 1 capsule (120 mg total) by mouth daily 90 capsule 3    Euthyrox 75 MCG tablet TAKE 1 TABLET BY MOUTH ONCE DAILY IN THE MORNING 90 tablet 0    fluticasone-vilanterol (BREO ELLIPTA) 100-25 mcg/inh inhaler Inhale 1 puff every morning Rinse mouth after use   loratadine (CLARITIN) 10 mg tablet Take 10 mg by mouth daily      metoprolol tartrate (LOPRESSOR) 100 mg tablet Take 1 tablet (100 mg total) by mouth 2 (two) times a day 180 tablet 3    omeprazole (PriLOSEC) 10 mg delayed release capsule Take 10 mg by mouth      rivaroxaban (Xarelto) 20 mg tablet Take 1 tablet (20 mg total) by mouth daily with dinner 30 tablet 5    ipratropium-albuterol (DUO-NEB) 0 5-2 5 mg/3 mL nebulizer solution Take 1 vial (3 mL total) by nebulization every 6 (six) hours (Patient not taking: Reported on 8/6/2020) 360 mL 0    LORazepam (ATIVAN) 0 5 mg tablet Take 0 5 mg by mouth every 8 (eight) hours as needed       MAGNESIUM OXIDE PO Take 400 mg by mouth daily       Multiple Vitamin (MULTIVITAMIN) tablet Take 2 tablets by mouth 2 (two) times a day       ondansetron (ZOFRAN) 8 mg tablet Take 8 mg by mouth every 8 (eight) hours as needed for nausea or vomiting (after chemo)       prochlorperazine (COMPAZINE) 10 mg tablet Take 10 mg by mouth every 6 (six) hours as needed      senna-docusate sodium (SENOKOT-S) 8 6-50 mg per tablet Take 1 tablet by mouth 2 (two) times a day as needed for constipation (as needed during chemo tx)        No current facility-administered medications for this visit  Allergies   Allergen Reactions    Medical Tape      Redness, soreness on skin       Review of Systems   Constitutional: Positive for fatigue and fever ( T-max was a 100 4° on Friday night)  Negative for chills  HENT: Positive for congestion  Negative for sore throat  Respiratory: Positive for cough  Negative for shortness of breath  Cardiovascular: Negative  Gastrointestinal: Positive for diarrhea ( yesterday for couple hours had loose stool  None today  )  Negative for nausea and vomiting  Genitourinary: Negative  Musculoskeletal: Positive for myalgias  Neurological: Positive for headaches (  Slight)  Objective:    Vitals:    01/11/21 1024   Pulse: 76   Temp: 97 6 °F (36 4 °C)   SpO2: 97%   Weight: 136 kg (299 lb)   Height: 5' 8" (1 727 m)       Physical Exam  VIRTUAL VISIT DISCLAIMER    Lakeisha Esparza acknowledges that she has consented to an online visit or consultation  She understands that the online visit is based solely on information provided by her, and that, in the absence of a face-to-face physical evaluation by the physician, the diagnosis she receives is both limited and provisional in terms of accuracy and completeness  This is not intended to replace a full medical face-to-face evaluation by the physician  Lakeisha Esparza understands and accepts these terms

## 2021-01-12 LAB — SARS-COV-2 RNA SPEC QL NAA+PROBE: DETECTED

## 2021-01-13 ENCOUNTER — TELEMEDICINE (OUTPATIENT)
Dept: FAMILY MEDICINE CLINIC | Facility: CLINIC | Age: 51
End: 2021-01-13
Payer: COMMERCIAL

## 2021-01-13 VITALS — BODY MASS INDEX: 44.41 KG/M2 | HEIGHT: 68 IN | WEIGHT: 293 LBS | TEMPERATURE: 98.6 F

## 2021-01-13 DIAGNOSIS — U07.1 COVID-19 VIRUS INFECTION: Primary | ICD-10-CM

## 2021-01-13 PROCEDURE — 99213 OFFICE O/P EST LOW 20 MIN: CPT | Performed by: FAMILY MEDICINE

## 2021-01-13 PROCEDURE — 1036F TOBACCO NON-USER: CPT | Performed by: FAMILY MEDICINE

## 2021-01-13 PROCEDURE — 3008F BODY MASS INDEX DOCD: CPT | Performed by: FAMILY MEDICINE

## 2021-01-13 NOTE — PROGRESS NOTES
COVID-19 Virtual Visit     Assessment/Plan:    Problem List Items Addressed This Visit     None      Visit Diagnoses     COVID-19 virus infection    -  Primary         Disposition:     I recommended continued isolation until at least 24 hours have passed since recovery defined as resolution of fever without the use of fever-reducing medications AND improvement in COVID symptoms AND 10 days have passed since onset of symptoms (or 10 days have passed since date of first positive viral diagnostic test for asymptomatic patients)  Thankfully patient is doing well  She would be out of quarantine on 01/18/2021 as long as she is fever free  She may return to work on 01/19/2021 as long as she feels up to it  She will call us if symptoms continue or increase  I have spent 10 minutes directly with the patient  Encounter provider Sunil Waters DO    Provider located at [address] [address]    Recent Visits  Date Type Provider Dept   01/11/21 Telemedicine DO Vasyl Haider   Showing recent visits within past 7 days and meeting all other requirements     Today's Visits  Date Type Provider Dept   01/13/21 Telemedicine DO Vasyl Haider   Showing today's visits and meeting all other requirements     Future Appointments  No visits were found meeting these conditions  Showing future appointments within next 150 days and meeting all other requirements      This virtual check-in was done via [employer] and patient was informed that this is a secure, HIPAA-compliant platform  She agrees to proceed  Patient agrees to participate in a virtual check in via telephone or video visit instead of presenting to the office to address urgent/immediate medical needs  Patient is aware this is a billable service  After connecting through [facility], the patient was identified by name and date of birth   Keiko Chairez was informed that this was a telemedicine visit and that the exam was being conducted confidentially over secure lines  Julia Reddy acknowledged consent and understanding of privacy and security of the telemedicine visit  I informed the patient that I have reviewed her record in Epic and presented the opportunity for her to ask any questions regarding the visit today  The patient agreed to participate  Subjective:   Julia Reddy is a 44 y o  female who has been screened for COVID-19  Symptom change since last report: improving  Patient's symptoms include fever (No fever for the last 24 hours  Has not needed Tylenol for 24 hours ) and cough (  Mucinex helps)  Patient denies chills, fatigue, anosmia, loss of taste, shortness of breath, nausea, vomiting and diarrhea  Evelina Pinto has been staying home and has isolated themselves in her home  She is taking care to not share personal items and is cleaning all surfaces that are touched often, like counters, tabletops, and doorknobs using household cleaning sprays or wipes  She is wearing a mask when she leaves her room  Date of symptom onset: 1/7/2021  Date of positive COVID-19 PCR: 1/11/2021     Video visit  Patient tested positive for COVID on 01/11/2021  Patient has not had a fever for the past 24 hours, and has not needed Tylenol  Has a raspy cough off and on  Mucinex helps with that  No shortness of breath  No nausea vomiting or diarrhea      Lab Results   Component Value Date    SARSCOV2 Detected (A) 01/11/2021     Past Medical History:   Diagnosis Date    Allergic rhinitis     last assessed 04/06/16    Arthritis     b/l feet    Chronic allergic conjunctivitis     Fluttering heart     takes magnesium for same   EKG OK    Fluttering sensation of heart     "periodically, not often since on magnesium"    Foot pain, left     Full dentures     upper    History of cancer chemotherapy 2016    History of dilation and curettage     Hx of tonsillectomy     Hypothyroid     Irregular heart beat     Lymph nodes enlarged     in chest pushing on her bronchioles necessitating inhalers    Neck mass     R neck mass-excised 3/15/2016,, 4/23/18 noted enlarged lymph node right neck-bx today 4/26/2018    Non Hodgkin's lymphoma [address] [address]     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    Obese     Obstructive sleep apnea     Port-A-Cath in place 2016    pt reports 'Has it flushed q 4weeks" right chest wall    Post-menopausal     since chemo 4/2016  no menses    Pulmonary embolism ([address] )     last assessed 10/31/16 attributed to Non-Hodgkins hx    Pulmonary embolism on right ([address] ) 7/12/2018    Last Assessment & Plan:  She will need to hold her Xarelto 2 days prior to her procedure and may resume therapy 1-2 days post procedure (depending on whether she is producing blood tinged sputum or not)      Shortness of breath     due to chest tumor    Tachycardia     awaiting cardiology eval,,,4/23/18 "pt reports saw cardiologist at that time and placed on magnesium and "hardly notices it"    Tinnitus     occas    Wears glasses     Wears partial dentures     /lower    [place] teeth extracted      Past Surgical History:   Procedure Laterality Date    COLONOSCOPY      COLONOSCOPY N/A 7/26/2018    Procedure: COLONOSCOPY with multiple bx;  Surgeon: Fabiana Garcia MD;  Location: [facility] MAIN OR;  Service: Gastroenterology    CYST REMOVAL Left     L  neck    DILATION AND CURETTAGE OF UTERUS      ESOPHAGOGASTRODUODENOSCOPY N/A 7/26/2018    Procedure: ESOPHAGOGASTRODUODENOSCOPY (EGD) with multiple bx;  Surgeon: Fabiana Garcia MD;  Location: [facility] MAIN OR;  Service: Gastroenterology    FACIAL/NECK BIOPSY N/A 4/26/2018    Procedure: OPEN DEEP CERVICAL LYMPH NODE EXCISOION/BIOPSY;  Surgeon: Blair Schumacher MD;  [facility];  Service: ENT    LYMPHADENECTOMY      PORTACATH PLACEMENT      [place] BX/REMV,LYMPH NODE,DEEP CERV N/A 3/15/2016    Procedure: DISSECTION [address] NECK/DEEP NECK MASS ; Surgeon: Abi Layne DO;  [facility];  Service: ENT    TONSILLECTOMY      TUBAL LIGATION      TUNNELED VENOUS PORT PLACEMENT Right 3/21/2019    Procedure: INSERTION VENOUS PORT (PORT-A-CATH) remove and replace;  Surgeon: Agusto Rowe MD;  Location: [facility] MAIN OR;  Service: General     Current Outpatient Medications   Medication Sig Dispense Refill    acetaminophen (TYLENOL) 325 mg tablet Take 650 mg by mouth every 4 (four) hours as needed       acyclovir (ZOVIRAX) 800 mg tablet Take 800 mg by mouth every 12 (twelve) hours  2    b complex vitamins tablet Take 1 tablet by mouth 2 (two) times a day       cetirizine (ZyrTEC) 10 mg tablet Take 10 mg by mouth daily      cholecalciferol (VITAMIN D3) 1,000 units tablet Take 1,000 Units by mouth every morning       Combivent Respimat inhaler Inhale 1 puff 4 (four) times a day       diltiazem (TIAZAC) 120 MG 24 hr capsule Take 1 capsule (120 mg total) by mouth daily 90 capsule 3    Euthyrox 75 MCG tablet TAKE 1 TABLET BY MOUTH ONCE DAILY IN THE MORNING 90 tablet 0    fluticasone-vilanterol (BREO ELLIPTA) 100-25 mcg/inh inhaler Inhale 1 puff every morning Rinse mouth after use        loratadine (CLARITIN) 10 mg tablet Take 10 mg by mouth daily      LORazepam (ATIVAN) 0 5 mg tablet Take 0 5 mg by mouth every 8 (eight) hours as needed       MAGNESIUM OXIDE PO Take 400 mg by mouth daily       metoprolol tartrate (LOPRESSOR) 100 mg tablet Take 1 tablet (100 mg total) by mouth 2 (two) times a day 180 tablet 3    Multiple Vitamin (MULTIVITAMIN) tablet Take 2 tablets by mouth 2 (two) times a day       omeprazole (PriLOSEC) 10 mg delayed release capsule Take 10 mg by mouth      ondansetron (ZOFRAN) 8 mg tablet Take 8 mg by mouth every 8 (eight) hours as needed for nausea or vomiting (after chemo)       prochlorperazine (COMPAZINE) 10 mg tablet Take 10 mg by mouth every 6 (six) hours as needed      rivaroxaban (Xarelto) 20 mg tablet Take 1 tablet (20 mg total) by mouth daily with dinner 30 tablet 5    senna-docusate sodium (SENOKOT-S) 8 6-50 mg per tablet Take 1 tablet by mouth 2 (two) times a day as needed for constipation (as needed during chemo tx)       ipratropium-albuterol (DUO-NEB) 0 5-2 5 mg/3 mL nebulizer solution Take 1 vial (3 mL total) by nebulization every 6 (six) hours (Patient not taking: Reported on 1/13/2021) 360 mL 0     No current facility-administered medications for this visit  Allergies   Allergen Reactions    Medical Tape      Redness, soreness on skin       Review of Systems   Constitutional: Positive for fever (No fever for the last 24 hours  Has not needed Tylenol for 24 hours  )  Negative for chills and fatigue  Respiratory: Positive for cough (  Mucinex helps)  Negative for shortness of breath  Cardiovascular: Negative  Gastrointestinal: Negative for diarrhea, nausea and vomiting  Genitourinary: Negative  Objective:    Vitals:    01/13/21 0913   Temp: 98 6 °F (37 °C)   Weight: 136 kg (299 lb)   Height: 5' 8" (1 727 m)       Physical Exam  Vitals signs reviewed  Constitutional:       General: She is not in acute distress  Appearance: Normal appearance  She is not ill-appearing or toxic-appearing  HENT:      Head: Normocephalic and atraumatic  Eyes:      Conjunctiva/sclera: Conjunctivae normal    Pulmonary:      Comments:   Patient talking in full sentences in no distress  No cough observed during exam   Neurological:      General: No focal deficit present  Mental Status: She is alert  Psychiatric:         Mood and Affect: Mood normal          Behavior: Behavior normal          Thought Content: Thought content normal        VIRTUAL VISIT DISCLAIMER    Rosa Gonzales acknowledges that she has consented to an online visit or consultation   She understands that the online visit is based solely on information provided by her, and that, in the absence of a face-to-face physical evaluation by the physician, the diagnosis she receives is both limited and provisional in terms of accuracy and completeness  This is not intended to replace a full medical face-to-face evaluation by the physician  Nader Ward understands and accepts these terms

## 2021-01-29 ENCOUNTER — TRANSCRIBE ORDERS (OUTPATIENT)
Dept: ADMINISTRATIVE | Facility: HOSPITAL | Age: 51
End: 2021-01-29

## 2021-01-29 ENCOUNTER — LAB (OUTPATIENT)
Dept: LAB | Facility: HOSPITAL | Age: 51
End: 2021-01-29
Payer: COMMERCIAL

## 2021-01-29 DIAGNOSIS — C83.38 DIFFUSE LARGE B-CELL LYMPHOMA OF LYMPH NODES OF MULTIPLE REGIONS (HCC): Primary | ICD-10-CM

## 2021-01-29 DIAGNOSIS — C83.38 DIFFUSE LARGE B-CELL LYMPHOMA OF LYMPH NODES OF MULTIPLE REGIONS (HCC): ICD-10-CM

## 2021-01-29 LAB
ALBUMIN SERPL BCP-MCNC: 3.4 G/DL (ref 3.5–5)
ALP SERPL-CCNC: 165 U/L (ref 46–116)
ALT SERPL W P-5'-P-CCNC: 23 U/L (ref 12–78)
ANION GAP SERPL CALCULATED.3IONS-SCNC: 7 MMOL/L (ref 4–13)
AST SERPL W P-5'-P-CCNC: 16 U/L (ref 5–45)
BASOPHILS # BLD AUTO: 0.1 THOUSANDS/ΜL (ref 0–0.1)
BASOPHILS NFR BLD AUTO: 1 % (ref 0–1)
BILIRUB SERPL-MCNC: 0.3 MG/DL (ref 0.2–1)
BUN SERPL-MCNC: 11 MG/DL (ref 5–25)
CALCIUM ALBUM COR SERPL-MCNC: 9.1 MG/DL (ref 8.3–10.1)
CALCIUM SERPL-MCNC: 8.6 MG/DL (ref 8.3–10.1)
CHLORIDE SERPL-SCNC: 105 MMOL/L (ref 100–108)
CO2 SERPL-SCNC: 27 MMOL/L (ref 21–32)
CREAT SERPL-MCNC: 0.86 MG/DL (ref 0.6–1.3)
CRP SERPL QL: 18.2 MG/L
EOSINOPHIL # BLD AUTO: 0.31 THOUSAND/ΜL (ref 0–0.61)
EOSINOPHIL NFR BLD AUTO: 4 % (ref 0–6)
ERYTHROCYTE [DISTWIDTH] IN BLOOD BY AUTOMATED COUNT: 13.8 % (ref 11.6–15.1)
FERRITIN SERPL-MCNC: 138 NG/ML (ref 8–388)
GFR SERPL CREATININE-BSD FRML MDRD: 78 ML/MIN/1.73SQ M
GLUCOSE SERPL-MCNC: 86 MG/DL (ref 65–140)
HCT VFR BLD AUTO: 43.1 % (ref 34.8–46.1)
HGB BLD-MCNC: 13.9 G/DL (ref 11.5–15.4)
IGA SERPL-MCNC: 168 MG/DL (ref 70–400)
IGG SERPL-MCNC: 945 MG/DL (ref 700–1600)
IGM SERPL-MCNC: 61 MG/DL (ref 40–230)
IMM GRANULOCYTES # BLD AUTO: 0.03 THOUSAND/UL (ref 0–0.2)
IMM GRANULOCYTES NFR BLD AUTO: 0 % (ref 0–2)
LYMPHOCYTES # BLD AUTO: 1.09 THOUSANDS/ΜL (ref 0.6–4.47)
LYMPHOCYTES NFR BLD AUTO: 15 % (ref 14–44)
MCH RBC QN AUTO: 30 PG (ref 26.8–34.3)
MCHC RBC AUTO-ENTMCNC: 32.3 G/DL (ref 31.4–37.4)
MCV RBC AUTO: 93 FL (ref 82–98)
MONOCYTES # BLD AUTO: 0.73 THOUSAND/ΜL (ref 0.17–1.22)
MONOCYTES NFR BLD AUTO: 10 % (ref 4–12)
NEUTROPHILS # BLD AUTO: 5.11 THOUSANDS/ΜL (ref 1.85–7.62)
NEUTS SEG NFR BLD AUTO: 70 % (ref 43–75)
NRBC BLD AUTO-RTO: 0 /100 WBCS
PLATELET # BLD AUTO: 217 THOUSANDS/UL (ref 149–390)
PMV BLD AUTO: 9.6 FL (ref 8.9–12.7)
POTASSIUM SERPL-SCNC: 4 MMOL/L (ref 3.5–5.3)
PROT SERPL-MCNC: 6.9 G/DL (ref 6.4–8.2)
RBC # BLD AUTO: 4.63 MILLION/UL (ref 3.81–5.12)
SODIUM SERPL-SCNC: 139 MMOL/L (ref 136–145)
WBC # BLD AUTO: 7.37 THOUSAND/UL (ref 4.31–10.16)

## 2021-01-29 PROCEDURE — 83625 ASSAY OF LDH ENZYMES: CPT

## 2021-01-29 PROCEDURE — 85025 COMPLETE CBC W/AUTO DIFF WBC: CPT

## 2021-01-29 PROCEDURE — 80053 COMPREHEN METABOLIC PANEL: CPT

## 2021-01-29 PROCEDURE — 36415 COLL VENOUS BLD VENIPUNCTURE: CPT

## 2021-01-29 PROCEDURE — 82728 ASSAY OF FERRITIN: CPT

## 2021-01-29 PROCEDURE — 82784 ASSAY IGA/IGD/IGG/IGM EACH: CPT

## 2021-01-29 PROCEDURE — 83615 LACTATE (LD) (LDH) ENZYME: CPT

## 2021-01-29 PROCEDURE — 86140 C-REACTIVE PROTEIN: CPT

## 2021-02-02 LAB
LDH SERPL-CCNC: 183 IU/L (ref 119–226)
LDH1 CFR SERPL ELPH: 16 % (ref 17–32)
LDH2 CFR SERPL ELPH: 37 % (ref 25–40)
LDH3 CFR SERPL ELPH: 24 % (ref 17–27)
LDH4 CFR SERPL ELPH: 11 % (ref 5–13)
LDH5 CFR SERPL ELPH: 12 % (ref 4–20)

## 2021-02-11 ENCOUNTER — OFFICE VISIT (OUTPATIENT)
Dept: FAMILY MEDICINE CLINIC | Facility: CLINIC | Age: 51
End: 2021-02-11
Payer: COMMERCIAL

## 2021-02-11 VITALS
TEMPERATURE: 97.6 F | WEIGHT: 293 LBS | BODY MASS INDEX: 44.41 KG/M2 | DIASTOLIC BLOOD PRESSURE: 70 MMHG | SYSTOLIC BLOOD PRESSURE: 124 MMHG | HEIGHT: 68 IN

## 2021-02-11 DIAGNOSIS — S46.911A MUSCLE STRAIN OF RIGHT SHOULDER, INITIAL ENCOUNTER: Primary | ICD-10-CM

## 2021-02-11 DIAGNOSIS — E03.9 ACQUIRED HYPOTHYROIDISM: ICD-10-CM

## 2021-02-11 DIAGNOSIS — Z13.6 SCREENING FOR CARDIOVASCULAR CONDITION: ICD-10-CM

## 2021-02-11 PROCEDURE — 3725F SCREEN DEPRESSION PERFORMED: CPT | Performed by: FAMILY MEDICINE

## 2021-02-11 PROCEDURE — 3008F BODY MASS INDEX DOCD: CPT | Performed by: SURGERY

## 2021-02-11 PROCEDURE — 99213 OFFICE O/P EST LOW 20 MIN: CPT | Performed by: FAMILY MEDICINE

## 2021-02-11 NOTE — PROGRESS NOTES
Assessment/Plan:   we will start patient physical therapy to help with her shoulder discomfort  Blood work ordered  Call us if symptoms continue or increase  Problem List Items Addressed This Visit        Endocrine    Acquired hypothyroidism    Relevant Orders    TSH, 3rd generation with Free T4 reflex      Other Visit Diagnoses     Muscle strain of right shoulder, initial encounter    -  Primary    Relevant Orders    Ambulatory referral to Physical Therapy    Screening for cardiovascular condition        Relevant Orders    Lipid Panel with Direct LDL reflex           Diagnoses and all orders for this visit:    Muscle strain of right shoulder, initial encounter  -     Cancel: Ambulatory referral to Physical Therapy; Future  -     Ambulatory referral to Physical Therapy; Future    Screening for cardiovascular condition  -     Lipid Panel with Direct LDL reflex    Acquired hypothyroidism  -     TSH, 3rd generation with Free T4 reflex        No problem-specific Assessment & Plan notes found for this encounter  Subjective:      Patient ID: Zander Evans is a 44 y o  female  Patient here today stating for the last week and half for so has had right posterior shoulder discomfort  Hurts her to lift her arm  She denies any known injury  She did get her port flushed a week and half ago  She tried some Biofreeze which does help a little bit    Shoulder Pain   The pain is present in the right shoulder  This is a new problem  The current episode started in the past 7 days  The problem occurs daily  The problem has been unchanged  The pain is moderate  Pertinent negatives include no fever  The symptoms are aggravated by activity  She has tried heat for the symptoms  The treatment provided mild relief         The following portions of the patient's history were reviewed and updated as appropriate:   She has a past medical history of Allergic rhinitis, Arthritis, Chronic allergic conjunctivitis, Fluttering heart, Fluttering sensation of heart, Foot pain, left, Full dentures, History of cancer chemotherapy (2016), History of dilation and curettage, tonsillectomy, Hypothyroid, Irregular heart beat, Lymph nodes enlarged, Neck mass, Non Hodgkin's lymphoma ([facility] [address] ), Obese, Obstructive sleep apnea, Port-A-Cath in place (2016), Post-menopausal, Pulmonary embolism ([facility] [address] ), Pulmonary embolism on right ([facility] [address] ) (7/12/2018), Shortness of breath, Tachycardia, Tinnitus, Wears glasses, Wears partial dentures, and [place] teeth extracted  ,  does not have any pertinent problems on file  ,   has a past surgical history that includes Cyst Removal (Left); pr bx/remv,lymph node,deep cerv (N/A, 3/15/2016); Lymphadenectomy; Tonsillectomy; Portacath placement; Dilation and curettage of uterus; Tubal ligation; Colonoscopy; FACIAL/NECK BIOPSY (N/A, 4/26/2018); Esophagogastroduodenoscopy (N/A, 7/26/2018); Colonoscopy (N/A, 7/26/2018); and Tunneled venous port placement (Right, 3/21/2019)  ,  family history includes Coronary artery disease in her father; Diabetes in her brother and family; Heart disease in her family  ,   reports that she quit smoking about 25 years ago  She has a 16 50 pack-year smoking history  She has never used smokeless tobacco  She reports previous alcohol use  She reports that she does not use drugs  ,  is allergic to medical tape     Current Outpatient Medications   Medication Sig Dispense Refill    acetaminophen (TYLENOL) 325 mg tablet Take 650 mg by mouth every 4 (four) hours as needed       acyclovir (ZOVIRAX) 800 mg tablet Take 800 mg by mouth every 12 (twelve) hours  2    b complex vitamins tablet Take 1 tablet by mouth 2 (two) times a day       cetirizine (ZyrTEC) 10 mg tablet Take 10 mg by mouth daily      cholecalciferol (VITAMIN D3) 1,000 units tablet Take 1,000 Units by mouth every morning       Combivent Respimat inhaler Inhale 1 puff 4 (four) times a day       diltiazem (TIAZAC) 120 MG 24 hr capsule Take 1 capsule (120 mg total) by mouth daily 90 capsule 3    Euthyrox 75 MCG tablet TAKE 1 TABLET BY MOUTH ONCE DAILY IN THE MORNING 90 tablet 0    fluticasone-vilanterol (BREO ELLIPTA) 100-25 mcg/inh inhaler Inhale 1 puff every morning Rinse mouth after use   loratadine (CLARITIN) 10 mg tablet Take 10 mg by mouth daily      LORazepam (ATIVAN) 0 5 mg tablet Take 0 5 mg by mouth every 8 (eight) hours as needed       metoprolol tartrate (LOPRESSOR) 100 mg tablet Take 1 tablet (100 mg total) by mouth 2 (two) times a day 180 tablet 3    Multiple Vitamin (MULTIVITAMIN) tablet Take 2 tablets by mouth 2 (two) times a day       omeprazole (PriLOSEC) 10 mg delayed release capsule Take 10 mg by mouth      rivaroxaban (Xarelto) 20 mg tablet Take 1 tablet (20 mg total) by mouth daily with dinner 30 tablet 5    ipratropium-albuterol (DUO-NEB) 0 5-2 5 mg/3 mL nebulizer solution Take 1 vial (3 mL total) by nebulization every 6 (six) hours (Patient not taking: Reported on 1/13/2021) 360 mL 0    MAGNESIUM OXIDE PO Take 400 mg by mouth daily       ondansetron (ZOFRAN) 8 mg tablet Take 8 mg by mouth every 8 (eight) hours as needed for nausea or vomiting (after chemo)       prochlorperazine (COMPAZINE) 10 mg tablet Take 10 mg by mouth every 6 (six) hours as needed      senna-docusate sodium (SENOKOT-S) 8 6-50 mg per tablet Take 1 tablet by mouth 2 (two) times a day as needed for constipation (as needed during chemo tx)        No current facility-administered medications for this visit  Review of Systems   Constitutional: Negative  Negative for fever  Respiratory: Negative  Cardiovascular: Negative  Gastrointestinal: Negative  Genitourinary: Negative  Objective:  Vitals:    02/11/21 1342   BP: 124/70   Temp: 97 6 °F (36 4 °C)   Weight: (!) 139 kg (307 lb 3 2 oz)   Height: 5' 8" (1 727 m)     Body mass index is 46 71 kg/m²  Physical Exam  Vitals signs reviewed     Constitutional:       General: She is not in acute distress  Appearance: She is well-developed  She is not diaphoretic  HENT:      Head: Normocephalic and atraumatic  Eyes:      Conjunctiva/sclera: Conjunctivae normal    Musculoskeletal:         General: Tenderness ( right posterior shoulder) present  Neurological:      Mental Status: She is alert and oriented to person, place, and time  Psychiatric:         Behavior: Behavior normal          Thought Content:  Thought content normal          Judgment: Judgment normal

## 2021-02-23 ENCOUNTER — LAB (OUTPATIENT)
Dept: LAB | Facility: MEDICAL CENTER | Age: 51
End: 2021-02-23
Payer: COMMERCIAL

## 2021-02-23 LAB
CHOLEST SERPL-MCNC: 190 MG/DL (ref 50–200)
HDLC SERPL-MCNC: 41 MG/DL
LDLC SERPL CALC-MCNC: 124 MG/DL (ref 0–100)
T4 FREE SERPL-MCNC: 1.06 NG/DL (ref 0.76–1.46)
TRIGL SERPL-MCNC: 126 MG/DL
TSH SERPL DL<=0.05 MIU/L-ACNC: 3.9 UIU/ML (ref 0.36–3.74)

## 2021-02-23 PROCEDURE — 84443 ASSAY THYROID STIM HORMONE: CPT | Performed by: FAMILY MEDICINE

## 2021-02-23 PROCEDURE — 84439 ASSAY OF FREE THYROXINE: CPT | Performed by: FAMILY MEDICINE

## 2021-02-23 PROCEDURE — 80061 LIPID PANEL: CPT | Performed by: FAMILY MEDICINE

## 2021-02-23 PROCEDURE — 36415 COLL VENOUS BLD VENIPUNCTURE: CPT | Performed by: FAMILY MEDICINE

## 2021-03-09 DIAGNOSIS — E03.9 ACQUIRED HYPOTHYROIDISM: ICD-10-CM

## 2021-03-09 RX ORDER — LEVOTHYROXINE SODIUM 0.07 MG/1
75 TABLET ORAL EVERY MORNING
Qty: 90 TABLET | Refills: 1 | Status: SHIPPED | OUTPATIENT
Start: 2021-03-09 | End: 2021-10-21 | Stop reason: SDUPTHER

## 2021-03-10 DIAGNOSIS — Z23 ENCOUNTER FOR IMMUNIZATION: ICD-10-CM

## 2021-03-11 ENCOUNTER — APPOINTMENT (OUTPATIENT)
Dept: LAB | Facility: MEDICAL CENTER | Age: 51
End: 2021-03-11
Payer: COMMERCIAL

## 2021-03-11 ENCOUNTER — TRANSCRIBE ORDERS (OUTPATIENT)
Dept: LAB | Facility: MEDICAL CENTER | Age: 51
End: 2021-03-11

## 2021-03-11 DIAGNOSIS — C83.32 DIFFUSE LARGE B-CELL LYMPHOMA OF INTRATHORACIC LYMPH NODES (HCC): Primary | ICD-10-CM

## 2021-03-11 DIAGNOSIS — C83.32 DIFFUSE LARGE B-CELL LYMPHOMA OF INTRATHORACIC LYMPH NODES (HCC): ICD-10-CM

## 2021-03-11 LAB
ALBUMIN SERPL BCP-MCNC: 3.6 G/DL (ref 3.5–5)
ALP SERPL-CCNC: 136 U/L (ref 46–116)
ALT SERPL W P-5'-P-CCNC: 19 U/L (ref 12–78)
ANION GAP SERPL CALCULATED.3IONS-SCNC: 2 MMOL/L (ref 4–13)
AST SERPL W P-5'-P-CCNC: 16 U/L (ref 5–45)
BASOPHILS # BLD AUTO: 0.08 THOUSANDS/ΜL (ref 0–0.1)
BASOPHILS NFR BLD AUTO: 1 % (ref 0–1)
BILIRUB SERPL-MCNC: 0.51 MG/DL (ref 0.2–1)
BUN SERPL-MCNC: 16 MG/DL (ref 5–25)
CALCIUM SERPL-MCNC: 9.2 MG/DL (ref 8.3–10.1)
CHLORIDE SERPL-SCNC: 110 MMOL/L (ref 100–108)
CO2 SERPL-SCNC: 27 MMOL/L (ref 21–32)
CREAT SERPL-MCNC: 0.93 MG/DL (ref 0.6–1.3)
EOSINOPHIL # BLD AUTO: 0.34 THOUSAND/ΜL (ref 0–0.61)
EOSINOPHIL NFR BLD AUTO: 5 % (ref 0–6)
ERYTHROCYTE [DISTWIDTH] IN BLOOD BY AUTOMATED COUNT: 14.7 % (ref 11.6–15.1)
GFR SERPL CREATININE-BSD FRML MDRD: 71 ML/MIN/1.73SQ M
GLUCOSE P FAST SERPL-MCNC: 131 MG/DL (ref 65–99)
HCT VFR BLD AUTO: 46 % (ref 34.8–46.1)
HGB BLD-MCNC: 14.6 G/DL (ref 11.5–15.4)
IMM GRANULOCYTES # BLD AUTO: 0.02 THOUSAND/UL (ref 0–0.2)
IMM GRANULOCYTES NFR BLD AUTO: 0 % (ref 0–2)
LDH SERPL-CCNC: 223 U/L (ref 81–234)
LYMPHOCYTES # BLD AUTO: 1.1 THOUSANDS/ΜL (ref 0.6–4.47)
LYMPHOCYTES NFR BLD AUTO: 16 % (ref 14–44)
MCH RBC QN AUTO: 30.2 PG (ref 26.8–34.3)
MCHC RBC AUTO-ENTMCNC: 31.7 G/DL (ref 31.4–37.4)
MCV RBC AUTO: 95 FL (ref 82–98)
MONOCYTES # BLD AUTO: 0.58 THOUSAND/ΜL (ref 0.17–1.22)
MONOCYTES NFR BLD AUTO: 8 % (ref 4–12)
NEUTROPHILS # BLD AUTO: 4.86 THOUSANDS/ΜL (ref 1.85–7.62)
NEUTS SEG NFR BLD AUTO: 70 % (ref 43–75)
NRBC BLD AUTO-RTO: 0 /100 WBCS
PLATELET # BLD AUTO: 248 THOUSANDS/UL (ref 149–390)
PMV BLD AUTO: 10.6 FL (ref 8.9–12.7)
POTASSIUM SERPL-SCNC: 4.3 MMOL/L (ref 3.5–5.3)
PROT SERPL-MCNC: 7 G/DL (ref 6.4–8.2)
RBC # BLD AUTO: 4.83 MILLION/UL (ref 3.81–5.12)
SODIUM SERPL-SCNC: 139 MMOL/L (ref 136–145)
WBC # BLD AUTO: 6.98 THOUSAND/UL (ref 4.31–10.16)

## 2021-03-11 PROCEDURE — 85025 COMPLETE CBC W/AUTO DIFF WBC: CPT

## 2021-03-11 PROCEDURE — 36415 COLL VENOUS BLD VENIPUNCTURE: CPT

## 2021-03-11 PROCEDURE — 83615 LACTATE (LD) (LDH) ENZYME: CPT

## 2021-03-11 PROCEDURE — 80053 COMPREHEN METABOLIC PANEL: CPT

## 2021-03-12 ENCOUNTER — OFFICE VISIT (OUTPATIENT)
Dept: SURGERY | Facility: CLINIC | Age: 51
End: 2021-03-12
Payer: COMMERCIAL

## 2021-03-12 VITALS — HEART RATE: 68 BPM | BODY MASS INDEX: 46.71 KG/M2 | TEMPERATURE: 97.8 F | RESPIRATION RATE: 20 BRPM | HEIGHT: 68 IN

## 2021-03-12 DIAGNOSIS — Z45.2 ENCOUNTER FOR CARE RELATED TO VASCULAR ACCESS PORT: Primary | ICD-10-CM

## 2021-03-12 PROCEDURE — 99213 OFFICE O/P EST LOW 20 MIN: CPT | Performed by: SURGERY

## 2021-03-24 ENCOUNTER — HOSPITAL ENCOUNTER (OUTPATIENT)
Dept: NON INVASIVE DIAGNOSTICS | Facility: HOSPITAL | Age: 51
Discharge: HOME | End: 2021-03-24
Payer: COMMERCIAL

## 2021-03-24 DIAGNOSIS — Z45.2 ENCOUNTER FOR CARE RELATED TO VASCULAR ACCESS PORT: ICD-10-CM

## 2021-03-24 PROCEDURE — 93971 EXTREMITY STUDY: CPT

## 2021-03-24 PROCEDURE — 93971 EXTREMITY STUDY: CPT | Performed by: SURGERY

## 2021-03-29 ENCOUNTER — OFFICE VISIT (OUTPATIENT)
Dept: SURGERY | Facility: CLINIC | Age: 51
End: 2021-03-29
Payer: COMMERCIAL

## 2021-03-29 DIAGNOSIS — Z45.2 ENCOUNTER FOR CARE RELATED TO VASCULAR ACCESS PORT: Primary | ICD-10-CM

## 2021-03-29 PROCEDURE — 99213 OFFICE O/P EST LOW 20 MIN: CPT | Performed by: SURGERY

## 2021-03-29 PROCEDURE — 1036F TOBACCO NON-USER: CPT | Performed by: SURGERY

## 2021-03-29 NOTE — LETTER
March 29, 2021     Silvinabriana Dove,   [address] [facility]  [address]  [facility] [id number]    Patient: Raul García   YOB: 1970   Date of Visit: 3/29/2021       Dear Dr Giancarlo Sales: Thank you for referring Papi Aldana to me for evaluation  Below are my notes for this consultation  If you have questions, please do not hesitate to call me  I look forward to following your patient along with you  Sincerely,        Jesus Corral MD        CC: No Recipients  Jesus Corral MD  3/29/2021  7:47 PM  Incomplete  Assessment/Plan:    Encounter for care related to vascular access port  Patient returns to the office for follow-up regarding her arm pain and MediPort status post venous duplex to rule out DVT  The results of the venous duplex were negative for upper extremity venous thrombosis  The results of the report were explained to the patient and a copy provided for her permanent record  On physical examination she has a normal examination of the chest and upper extremities  The MediPort site is clean dry and intact  All questions were answered to the satisfaction of the patient  She has been encouraged to follow up with the practice at any point the future with questions or concerns  Diagnoses and all orders for this visit:    Encounter for care related to vascular access port          Subjective:      Patient ID: Raul García is a 44 y o  female  HPI    The following portions of the patient's history were reviewed and updated as appropriate: allergies, current medications, past family history, past medical history, past social history, past surgical history and problem list     Review of Systems   Constitutional: Negative for chills and fever  HENT: Negative for ear pain and sore throat  Eyes: Negative for pain and visual disturbance  Respiratory: Negative for cough and shortness of breath      Cardiovascular: Negative for chest pain and palpitations  Gastrointestinal: Negative for abdominal pain and vomiting  Genitourinary: Negative for dysuria and hematuria  Musculoskeletal: Negative for arthralgias and back pain  Skin: Negative for color change and rash  Neurological: Negative for seizures and syncope  All other systems reviewed and are negative  Objective: There were no vitals taken for this visit  Physical Exam  Constitutional:       Appearance: She is well-developed  HENT:      Head: Normocephalic and atraumatic  Eyes:      Conjunctiva/sclera: Conjunctivae normal       Pupils: Pupils are equal, round, and reactive to light  Neck:      Musculoskeletal: Normal range of motion and neck supple  Cardiovascular:      Rate and Rhythm: Normal rate and regular rhythm  Pulmonary:      Effort: Pulmonary effort is normal       Breath sounds: Normal breath sounds  Abdominal:      General: Bowel sounds are normal       Palpations: Abdomen is soft  Musculoskeletal: Normal range of motion  Skin:     General: Skin is warm and dry  Neurological:      Mental Status: She is alert and oriented to person, place, and time  Psychiatric:         Behavior: Behavior normal          Thought Content:  Thought content normal          Judgment: Judgment normal

## 2021-03-29 NOTE — ASSESSMENT & PLAN NOTE
Patient returns to the office for follow-up regarding her arm pain and MediPort status post venous duplex to rule out DVT  The results of the venous duplex were negative for upper extremity venous thrombosis  The results of the report were explained to the patient and a copy provided for her permanent record  On physical examination she has a normal examination of the chest and upper extremities  The MediPort site is clean dry and intact  All questions were answered to the satisfaction of the patient  She has been encouraged to follow up with the practice at any point the future with questions or concerns

## 2021-03-29 NOTE — PROGRESS NOTES
Assessment/Plan:    Encounter for care related to vascular access port  Patient returns to the office for follow-up regarding her arm pain and MediPort status post venous duplex to rule out DVT  The results of the venous duplex were negative for upper extremity venous thrombosis  The results of the report were explained to the patient and a copy provided for her permanent record  On physical examination she has a normal examination of the chest and upper extremities  The MediPort site is clean dry and intact  All questions were answered to the satisfaction of the patient  She has been encouraged to follow up with the practice at any point the future with questions or concerns  Diagnoses and all orders for this visit:    Encounter for care related to vascular access port          Subjective:      Patient ID: Keiko Chairez is a 44 y o  female  HPI    The following portions of the patient's history were reviewed and updated as appropriate: allergies, current medications, past family history, past medical history, past social history, past surgical history and problem list     Review of Systems   Constitutional: Negative for chills and fever  HENT: Negative for ear pain and sore throat  Eyes: Negative for pain and visual disturbance  Respiratory: Negative for cough and shortness of breath  Cardiovascular: Negative for chest pain and palpitations  Gastrointestinal: Negative for abdominal pain and vomiting  Genitourinary: Negative for dysuria and hematuria  Musculoskeletal: Negative for arthralgias and back pain  Skin: Negative for color change and rash  Neurological: Negative for seizures and syncope  All other systems reviewed and are negative  Objective: There were no vitals taken for this visit  Physical Exam  Constitutional:       Appearance: She is well-developed  HENT:      Head: Normocephalic and atraumatic     Eyes: Conjunctiva/sclera: Conjunctivae normal       Pupils: Pupils are equal, round, and reactive to light  Neck:      Musculoskeletal: Normal range of motion and neck supple  Cardiovascular:      Rate and Rhythm: Normal rate and regular rhythm  Pulmonary:      Effort: Pulmonary effort is normal       Breath sounds: Normal breath sounds  Abdominal:      General: Bowel sounds are normal       Palpations: Abdomen is soft  Musculoskeletal: Normal range of motion  Skin:     General: Skin is warm and dry  Neurological:      Mental Status: She is alert and oriented to person, place, and time  Psychiatric:         Behavior: Behavior normal          Thought Content:  Thought content normal          Judgment: Judgment normal

## 2021-04-01 ENCOUNTER — OFFICE VISIT (OUTPATIENT)
Dept: CARDIOLOGY CLINIC | Facility: CLINIC | Age: 51
End: 2021-04-01
Payer: COMMERCIAL

## 2021-04-01 VITALS
BODY MASS INDEX: 46.98 KG/M2 | WEIGHT: 293 LBS | HEART RATE: 76 BPM | SYSTOLIC BLOOD PRESSURE: 100 MMHG | DIASTOLIC BLOOD PRESSURE: 62 MMHG

## 2021-04-01 DIAGNOSIS — I48.92 ATRIAL FLUTTER, UNSPECIFIED TYPE (HCC): ICD-10-CM

## 2021-04-01 DIAGNOSIS — R00.2 PALPITATIONS: ICD-10-CM

## 2021-04-01 PROCEDURE — 99213 OFFICE O/P EST LOW 20 MIN: CPT | Performed by: INTERNAL MEDICINE

## 2021-04-01 RX ORDER — DILTIAZEM HYDROCHLORIDE 120 MG/1
120 CAPSULE, EXTENDED RELEASE ORAL DAILY
Qty: 90 CAPSULE | Refills: 0
Start: 2021-04-01 | End: 2021-08-09 | Stop reason: SDUPTHER

## 2021-04-01 NOTE — ASSESSMENT & PLAN NOTE
Under research study at [facility] for stem-cell Therapy  She is doing remarkably well

## 2021-04-01 NOTE — PROGRESS NOTES
[facility]   Outpatient Note  Manish Garcias  1970  [id number]  [address] [place] [facility] [address] [facility] [id number]  [facility]    Manish Garcias is a 44 y o  female    Assessment and Plan:   Typical atrial flutter ([facility] [address] )  Doing well, rate is controlled on diltiazem and metoprolol   Currently in sinus rhythm  On Xarelto for anticoagulation      Malignant lymphoma, large cell, diffuse ([facility] [address] )  Under research study at [facility] for stem-cell Therapy  She is doing remarkably well  SOB (shortness of breath)  Seems to have resolved with stem-cell therapy     Additional Plan:   No changes in medication or testing ordered today  Return visit will be in one year or earlier if there are problems  The patient is encouraged to call in the meantime if there are questions or concerns  Echo will be checked prior to next visit  Subjective: The patient is seen in routine follow up regarding a history of PAF/flutter, with PACs and PVCs  She is in the study program at [facility] for stem-cell therapy regarding Non-hodgkin's lymphoma B-cell carcinoma  She is doing remarkably well  SOB seems to have resolved with stem-cell therapy  She is a  and it does not seem to affect her job  She has some recurrent palpitations, but they are usually short-lived, lasting seconds in duration  Otherwise, she is doing well from a cardiac perspective without complaints of chest pain  There are no complaints of syncope or near syncope  She denies edema orthopnea or PND  The patient denies TIA or CVA symptoms           Social History  Social History     Tobacco Use   Smoking Status Former Smoker    Packs/day: 1 50    Years: 11 00    Pack years: 16 50    Types: Cigarettes    Quit date: Aleisha Balderas Years since quittin 2   Smokeless Tobacco Never Used   ,   Social History     Substance and Sexual Activity Alcohol Use Not Currently   ,   Social History     Substance and Sexual Activity   Drug Use No     Family History   Problem Relation Age of Onset    Coronary artery disease Father     Diabetes Brother     Diabetes Family     Heart disease Family        Medical and Surgical History  Past Medical History:   Diagnosis Date    Allergic rhinitis     last assessed 04/06/16    Arthritis     b/l feet    Chronic allergic conjunctivitis     Fluttering heart     takes magnesium for same   EKG OK    Fluttering sensation of heart     "periodically, not often since on magnesium"    Foot pain, left     Full dentures     upper    History of cancer chemotherapy 2016    History of dilation and curettage     Hx of tonsillectomy     Hypothyroid     Irregular heart beat     Lymph nodes enlarged     in chest pushing on her bronchioles necessitating inhalers    Neck mass     R neck mass-excised 3/15/2016,, 4/23/18 noted enlarged lymph node right neck-bx today 4/26/2018    Non Hodgkin's lymphoma ([address] )     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    Obese     Obstructive sleep apnea     Port-A-Cath in place 2016    pt reports 'Has it flushed q 4weeks" right chest wall    Post-menopausal     since chemo 4/2016  no menses    Pulmonary embolism ([address] )     last assessed 10/31/16 attributed to Non-Hodgkins hx    Pulmonary embolism on right ([address] ) 7/12/2018    Last Assessment & Plan:  She will need to hold her Xarelto 2 days prior to her procedure and may resume therapy 1-2 days post procedure (depending on whether she is producing blood tinged sputum or not)      Shortness of breath     due to chest tumor    Tachycardia     awaiting cardiology eval,,,4/23/18 "pt reports saw cardiologist at that time and placed on magnesium and "hardly notices it"    Tinnitus     occas    Wears glasses     Wears partial dentures     /lower    [place] teeth extracted      Past Surgical History:   Procedure Laterality Date    COLONOSCOPY      COLONOSCOPY N/A 7/26/2018    Procedure: COLONOSCOPY with multiple bx;  Surgeon: Hubert Ferrer MD;  Location: [address] MAIN OR;  Service: Gastroenterology    CYST REMOVAL Left     L  neck    DILATION AND CURETTAGE OF UTERUS      ESOPHAGOGASTRODUODENOSCOPY N/A 7/26/2018    Procedure: ESOPHAGOGASTRODUODENOSCOPY (EGD) with multiple bx;  Surgeon: Hubert Ferrer MD;  Location: [address] MAIN OR;  Service: Gastroenterology    FACIAL/NECK BIOPSY N/A 4/26/2018    Procedure: OPEN DEEP CERVICAL LYMPH NODE EXCISOION/BIOPSY;  Surgeon: Anurag Lee MD;  Location: [facility] Main OR;  Service: ENT    LYMPHADENECTOMY      PORTACATH PLACEMENT      CO BX/REMV,LYMPH NODE,DEEP CERV N/A 3/15/2016    Procedure: DISSECTION [address] NODE NECK/DEEP NECK MASS ;  Surgeon: Ruthy Avelar DO;  Location: [facility] OR;  Service: ENT    TONSILLECTOMY      TUBAL LIGATION      TUNNELED VENOUS PORT PLACEMENT Right 3/21/2019    Procedure: INSERTION VENOUS PORT (PORT-A-CATH) remove and replace;  Surgeon: Travis Best MD;  Location: [address] [facility] MAIN OR;  Service: General         Current Outpatient Medications:     acetaminophen (TYLENOL) 325 mg tablet, Take 650 mg by mouth every 4 (four) hours as needed , Disp: , Rfl:     acyclovir (ZOVIRAX) 800 mg tablet, Take 800 mg by mouth every 12 (twelve) hours, Disp: , Rfl: 2    cholecalciferol (VITAMIN D3) 1,000 units tablet, Take 1,000 Units by mouth every morning , Disp: , Rfl:     fluticasone-vilanterol (BREO ELLIPTA) 100-25 mcg/inh inhaler, Inhale 1 puff every morning Rinse mouth after use , Disp: , Rfl:     levothyroxine (Euthyrox) 75 mcg tablet, Take 1 tablet (75 mcg total) by mouth every morning, Disp: 90 tablet, Rfl: 1    loratadine (CLARITIN) 10 mg tablet, Take 10 mg by mouth daily, Disp: , Rfl:     metoprolol tartrate (LOPRESSOR) 100 mg tablet, Take 1 tablet (100 mg total) by mouth 2 (two) times a day, Disp: 180 tablet, Rfl: 3    omeprazole (PriLOSEC) 10 mg delayed release capsule, Take 10 mg by mouth, Disp: , Rfl:     ondansetron (ZOFRAN) 8 mg tablet, Take 8 mg by mouth every 8 (eight) hours as needed for nausea or vomiting (after chemo) , Disp: , Rfl:     prochlorperazine (COMPAZINE) 10 mg tablet, Take 10 mg by mouth every 6 (six) hours as needed, Disp: , Rfl:     rivaroxaban (Xarelto) 20 mg tablet, Take 1 tablet (20 mg total) by mouth daily with dinner, Disp: 30 tablet, Rfl: 5    senna-docusate sodium (SENOKOT-S) 8 6-50 mg per tablet, Take 1 tablet by mouth 2 (two) times a day as needed for constipation (as needed during chemo tx) , Disp: , Rfl:     diltiazem (TIAZAC) 120 MG 24 hr capsule, Take 1 capsule (120 mg total) by mouth daily, Disp: 90 capsule, Rfl: 0  Allergies   Allergen Reactions    Medical Tape      Redness, soreness on skin       Review of Systems   Constitution: Negative  HENT: Negative  Eyes: Negative  Cardiovascular: Positive for palpitations  Negative for chest pain, claudication, cyanosis, dyspnea on exertion, irregular heartbeat, leg swelling, near-syncope, orthopnea, paroxysmal nocturnal dyspnea and syncope  Respiratory: Negative  Negative for cough, hemoptysis, shortness of breath, sleep disturbances due to breathing, snoring, sputum production and wheezing  Endocrine: Negative  Hematologic/Lymphatic: Negative  Skin: Negative  Musculoskeletal: Negative  Gastrointestinal: Negative  Genitourinary: Negative  Neurological: Negative  Psychiatric/Behavioral: Negative  Allergic/Immunologic: Negative  Objective:   /62   Pulse 76   Wt (!) 140 kg (309 lb)   BMI 46 98 kg/m²   Physical Exam   Constitutional: She is oriented to person, place, and time  She appears well-developed and well-nourished  obese   HENT:   Head: Normocephalic and atraumatic  Mouth/Throat: Oropharynx is clear and moist    Eyes: Conjunctivae and EOM are normal  No scleral icterus  Neck: Normal range of motion  Neck supple  No JVD present   No tracheal deviation present  Cardiovascular: Normal rate, regular rhythm, normal heart sounds and intact distal pulses  Exam reveals no gallop and no friction rub  No murmur heard  Pulmonary/Chest: Effort normal and breath sounds normal  No respiratory distress  She has no wheezes  She has no rales  She exhibits no tenderness  Abdominal: Soft  Bowel sounds are normal  She exhibits no distension  There is no abdominal tenderness  Aorta not palpable    Musculoskeletal: Normal range of motion  General: No tenderness or edema  Neurological: She is alert and oriented to person, place, and time  Skin: Skin is warm and dry  No rash noted  No erythema  No pallor  Psychiatric: She has a normal mood and affect  Her behavior is normal    Nursing note and vitals reviewed  Lab Review:   Lab Results   Component Value Date    CHOL 184 07/22/2015     Lab Results   Component Value Date    HDL 41 02/23/2021     Lab Results   Component Value Date    LDLCALC 124 (H) 02/23/2021     Lab Results   Component Value Date    TRIG 126 02/23/2021     Results Reviewed     None        Results Reviewed     None        Results Reviewed     None          Recent Cardiovascular Testing:   Echo 10-7-2020: LVEF 65% No RWMA, no valvular dz       Multiple echos at [facility]: normal EF at 60 no WMA no valvular disease       ECG Review:   9-6-19: Normal sinus rhythm  Septal infarct (cited on or before 06-SEP-2019)  Abnormal ECG

## 2021-04-09 ENCOUNTER — IMMUNIZATIONS (OUTPATIENT)
Dept: FAMILY MEDICINE CLINIC | Facility: HOSPITAL | Age: 51
End: 2021-04-09

## 2021-04-09 DIAGNOSIS — Z23 ENCOUNTER FOR IMMUNIZATION: Primary | ICD-10-CM

## 2021-04-09 PROCEDURE — 0011A SARS-COV-2 / COVID-19 MRNA VACCINE (MODERNA) 100 MCG: CPT

## 2021-04-09 PROCEDURE — 91301 SARS-COV-2 / COVID-19 MRNA VACCINE (MODERNA) 100 MCG: CPT

## 2021-05-13 ENCOUNTER — IMMUNIZATIONS (OUTPATIENT)
Dept: FAMILY MEDICINE CLINIC | Facility: HOSPITAL | Age: 51
End: 2021-05-13

## 2021-05-13 DIAGNOSIS — Z23 ENCOUNTER FOR IMMUNIZATION: Primary | ICD-10-CM

## 2021-05-13 PROCEDURE — 0012A SARS-COV-2 / COVID-19 MRNA VACCINE (MODERNA) 100 MCG: CPT

## 2021-05-13 PROCEDURE — 91301 SARS-COV-2 / COVID-19 MRNA VACCINE (MODERNA) 100 MCG: CPT

## 2021-05-20 DIAGNOSIS — R00.2 PALPITATIONS: ICD-10-CM

## 2021-05-20 DIAGNOSIS — I48.92 ATRIAL FLUTTER, UNSPECIFIED TYPE (HCC): ICD-10-CM

## 2021-05-20 RX ORDER — METOPROLOL TARTRATE 100 MG/1
TABLET ORAL
Qty: 180 TABLET | Refills: 3 | Status: SHIPPED | OUTPATIENT
Start: 2021-05-20 | End: 2022-06-20 | Stop reason: SDUPTHER

## 2021-06-17 ENCOUNTER — OFFICE VISIT (OUTPATIENT)
Dept: FAMILY MEDICINE CLINIC | Facility: CLINIC | Age: 51
End: 2021-06-17
Payer: COMMERCIAL

## 2021-06-17 VITALS
SYSTOLIC BLOOD PRESSURE: 126 MMHG | HEIGHT: 68 IN | WEIGHT: 293 LBS | BODY MASS INDEX: 44.41 KG/M2 | TEMPERATURE: 98 F | DIASTOLIC BLOOD PRESSURE: 70 MMHG

## 2021-06-17 DIAGNOSIS — M54.2 NECK PAIN ON LEFT SIDE: Primary | ICD-10-CM

## 2021-06-17 DIAGNOSIS — Z12.4 SCREENING FOR CERVICAL CANCER: ICD-10-CM

## 2021-06-17 PROCEDURE — 99213 OFFICE O/P EST LOW 20 MIN: CPT | Performed by: FAMILY MEDICINE

## 2021-06-17 RX ORDER — METHOCARBAMOL 500 MG/1
500 TABLET, FILM COATED ORAL 4 TIMES DAILY PRN
Qty: 40 TABLET | Refills: 0 | Status: SHIPPED | OUTPATIENT
Start: 2021-06-17 | End: 2022-02-15 | Stop reason: ALTCHOICE

## 2021-06-17 NOTE — PROGRESS NOTES
Assessment/Plan:   Rx put in for Robaxin to help with her neck pain  Continue to use warmth, cool, Tylenol p r n  Dara Soulier Patient wishes to wait on physical therapy at this time  She will call us if symptoms continue or increase  Problem List Items Addressed This Visit     None      Visit Diagnoses     Neck pain on left side    -  Primary    Relevant Medications    methocarbamol (ROBAXIN) 500 mg tablet    Screening for cervical cancer        Relevant Orders    Ambulatory referral to Gynecology           Diagnoses and all orders for this visit:    Neck pain on left side  -     methocarbamol (ROBAXIN) 500 mg tablet; Take 1 tablet (500 mg total) by mouth 4 (four) times a day as needed for muscle spasms    Screening for cervical cancer  -     Ambulatory referral to Gynecology; Future        No problem-specific Assessment & Plan notes found for this encounter  Subjective:      Patient ID: Tommas Kehr is a 44 y o  female  Patient here today stating about 3 days ago woke up with left-sided neck discomfort  It hurts her to turn towards the left  It is chronically there, though gets worse at times  Starting to go into the left shoulder  She has tried Tylenol, Biofreeze, heat and ice with mild relief  Does not remember any trauma  Neck Pain   This is a new problem  The current episode started in the past 7 days  The problem has been unchanged  The pain is associated with nothing  The pain is present in the left side  The quality of the pain is described as aching  The pain is moderate  The symptoms are aggravated by twisting and position  Stiffness is present all day  Pertinent negatives include no fever  She has tried heat, ice and acetaminophen for the symptoms  The treatment provided mild relief         The following portions of the patient's history were reviewed and updated as appropriate:   She has a past medical history of Allergic rhinitis, Arthritis, Chronic allergic conjunctivitis, Fluttering heart, Fluttering sensation of heart, Foot pain, left, Full dentures, History of cancer chemotherapy (2016), History of dilation and curettage, tonsillectomy, Hypothyroid, Irregular heart beat, Lymph nodes enlarged, Neck mass, Non Hodgkin's lymphoma ([facility] [address] ), Obese, Obstructive sleep apnea, Port-A-Cath in place (2016), Post-menopausal, Pulmonary embolism ([facility] [address] ), Pulmonary embolism on right ([facility] [address] ) (7/12/2018), Shortness of breath, Tachycardia, Tinnitus, Wears glasses, Wears partial dentures, and [place] teeth extracted  ,  does not have any pertinent problems on file  ,   has a past surgical history that includes Cyst Removal (Left); pr bx/remv,lymph node,deep cerv (N/A, 3/15/2016); Lymphadenectomy; Tonsillectomy; Portacath placement; Dilation and curettage of uterus; Tubal ligation; Colonoscopy; FACIAL/NECK BIOPSY (N/A, 4/26/2018); Esophagogastroduodenoscopy (N/A, 7/26/2018); Colonoscopy (N/A, 7/26/2018); and Tunneled venous port placement (Right, 3/21/2019)  ,  family history includes Coronary artery disease in her father; Diabetes in her brother and family; Heart disease in her family  ,   reports that she quit smoking about 25 years ago  Her smoking use included cigarettes  She has a 16 50 pack-year smoking history  She has never used smokeless tobacco  She reports previous alcohol use  She reports that she does not use drugs  ,  is allergic to medical tape     Current Outpatient Medications   Medication Sig Dispense Refill    acetaminophen (TYLENOL) 325 mg tablet Take 650 mg by mouth every 4 (four) hours as needed       cholecalciferol (VITAMIN D3) 1,000 units tablet Take 1,000 Units by mouth every morning       diltiazem (TIAZAC) 120 MG 24 hr capsule Take 1 capsule (120 mg total) by mouth daily 90 capsule 0    fluticasone-vilanterol (BREO ELLIPTA) 100-25 mcg/inh inhaler Inhale 1 puff every morning Rinse mouth after use        levothyroxine (Euthyrox) 75 mcg tablet Take 1 tablet (75 mcg total) by mouth every morning 90 tablet 1    loratadine (CLARITIN) 10 mg tablet Take 10 mg by mouth daily      metoprolol tartrate (LOPRESSOR) 100 mg tablet Take 1 tablet by mouth twice daily 180 tablet 3    omeprazole (PriLOSEC) 10 mg delayed release capsule Take 10 mg by mouth      rivaroxaban (Xarelto) 20 mg tablet Take 1 tablet (20 mg total) by mouth daily with dinner 30 tablet 5    acyclovir (ZOVIRAX) 800 mg tablet Take 800 mg by mouth every 12 (twelve) hours  2    methocarbamol (ROBAXIN) 500 mg tablet Take 1 tablet (500 mg total) by mouth 4 (four) times a day as needed for muscle spasms 40 tablet 0    ondansetron (ZOFRAN) 8 mg tablet Take 8 mg by mouth every 8 (eight) hours as needed for nausea or vomiting (after chemo)       prochlorperazine (COMPAZINE) 10 mg tablet Take 10 mg by mouth every 6 (six) hours as needed      senna-docusate sodium (SENOKOT-S) 8 6-50 mg per tablet Take 1 tablet by mouth 2 (two) times a day as needed for constipation (as needed during chemo tx)        No current facility-administered medications for this visit  Review of Systems   Constitutional: Negative  Negative for fever  Respiratory: Negative  Cardiovascular: Negative  Gastrointestinal: Negative  Genitourinary: Negative  Musculoskeletal: Positive for neck pain  Objective:  Vitals:    06/17/21 1020   BP: 126/70   Temp: 98 °F (36 7 °C)   Weight: (!) 143 kg (314 lb 9 6 oz)   Height: 5' 8" (1 727 m)     Body mass index is 47 83 kg/m²  Physical Exam  Vitals reviewed  Constitutional:       General: She is not in acute distress  Appearance: She is well-developed  She is not diaphoretic  HENT:      Head: Normocephalic and atraumatic  Eyes:      Conjunctiva/sclera: Conjunctivae normal    Musculoskeletal:         General: Tenderness (  Left cervical paraspinal tenderness into the left superior trapezius) present  Neurological:      Mental Status: She is alert and oriented to person, place, and time     Psychiatric: Behavior: Behavior normal          Thought Content:  Thought content normal          Judgment: Judgment normal

## 2021-06-18 ENCOUNTER — OFFICE VISIT (OUTPATIENT)
Dept: FAMILY MEDICINE CLINIC | Facility: CLINIC | Age: 51
End: 2021-06-18
Payer: COMMERCIAL

## 2021-06-18 VITALS
SYSTOLIC BLOOD PRESSURE: 118 MMHG | DIASTOLIC BLOOD PRESSURE: 82 MMHG | HEIGHT: 68 IN | TEMPERATURE: 97.7 F | WEIGHT: 293 LBS | BODY MASS INDEX: 44.41 KG/M2

## 2021-06-18 DIAGNOSIS — B02.9 HERPES ZOSTER WITHOUT COMPLICATION: Primary | ICD-10-CM

## 2021-06-18 DIAGNOSIS — B02.9 HERPES ZOSTER WITHOUT COMPLICATION: ICD-10-CM

## 2021-06-18 PROCEDURE — 1036F TOBACCO NON-USER: CPT | Performed by: FAMILY MEDICINE

## 2021-06-18 PROCEDURE — 3008F BODY MASS INDEX DOCD: CPT | Performed by: FAMILY MEDICINE

## 2021-06-18 PROCEDURE — 99213 OFFICE O/P EST LOW 20 MIN: CPT | Performed by: FAMILY MEDICINE

## 2021-06-18 RX ORDER — ACYCLOVIR 50 MG/G
CREAM TOPICAL EVERY 6 HOURS SCHEDULED
Qty: 15 G | Refills: 1 | Status: SHIPPED | OUTPATIENT
Start: 2021-06-18 | End: 2021-06-18

## 2021-06-18 RX ORDER — ACYCLOVIR 50 MG/G
CREAM TOPICAL
Qty: 15 G | Refills: 1 | OUTPATIENT
Start: 2021-06-18

## 2021-06-18 RX ORDER — VALACYCLOVIR HYDROCHLORIDE 1 G/1
1000 TABLET, FILM COATED ORAL 3 TIMES DAILY
Qty: 21 TABLET | Refills: 0 | Status: SHIPPED | OUTPATIENT
Start: 2021-06-18 | End: 2022-02-15 | Stop reason: ALTCHOICE

## 2021-06-18 RX ORDER — GABAPENTIN 300 MG/1
300 CAPSULE ORAL
Qty: 10 CAPSULE | Refills: 0 | Status: SHIPPED | OUTPATIENT
Start: 2021-06-18 | End: 2021-06-23

## 2021-06-18 NOTE — TELEPHONE ENCOUNTER
She could take the gabapentin twice a day if necessary for the pain  She could try over-the-counter Aloe vera gel with lidocaine in it if she can find it

## 2021-06-18 NOTE — LETTER
June 18, 2021     Patient: Rigo Melendez   YOB: 1970   Date of Visit: 6/18/2021       To Whom it May Concern:    Laurita Soliz is under my professional care  She was seen in my office on 6/18/2021  She may return to work on 6/22/2021  Please excuse from work 6/18 - 6/21/2021 due to shingles  If you have any questions or concerns, please don't hesitate to call           Sincerely,          Yoli Rubio DO        CC: No Recipients

## 2021-06-18 NOTE — TELEPHONE ENCOUNTER
WANTED TO KNOW IF THE PAIN IS REAL BAD CAN SHE TAKE ANOTHER ONE OF THE PAIN MEDS? IS THERE ANYTHING OTC SHE CAN USE, THE CREAM YOU PRESCRIBED IS $1400?

## 2021-06-18 NOTE — PROGRESS NOTES
Assessment/Plan:    Rx for Valtrex and Zovirax cream   Rx for gabapentin to take at night  Call if symptoms continue or increase  Problem List Items Addressed This Visit     None      Visit Diagnoses     Herpes zoster without complication    -  Primary    Relevant Medications    valACYclovir (VALTREX) 1,000 mg tablet    gabapentin (NEURONTIN) 300 mg capsule    acyclovir (ZOVIRAX) 5 % cream           Diagnoses and all orders for this visit:    Herpes zoster without complication  -     valACYclovir (VALTREX) 1,000 mg tablet; Take 1 tablet (1,000 mg total) by mouth 3 (three) times a day for 7 days  -     gabapentin (NEURONTIN) 300 mg capsule; Take 1 capsule (300 mg total) by mouth daily at bedtime  -     acyclovir (ZOVIRAX) 5 % cream; Apply topically every 6 (six) hours        No problem-specific Assessment & Plan notes found for this encounter  Subjective:      Patient ID: Bahrti Ragsdale is a 44 y o  female  Patient here today stating that last night started having a  Painful rash on her left shoulder  No fever chills  The Robaxin that I gave her yesterday did not help her  Rash  This is a new problem  The current episode started yesterday  The affected locations include the left shoulder  The rash is characterized by blistering, redness and pain  She was exposed to nothing  Past treatments include nothing         The following portions of the patient's history were reviewed and updated as appropriate:   She has a past medical history of Allergic rhinitis, Arthritis, Chronic allergic conjunctivitis, Fluttering heart, Fluttering sensation of heart, Foot pain, left, Full dentures, History of cancer chemotherapy (2016), History of dilation and curettage, tonsillectomy, Hypothyroid, Irregular heart beat, Lymph nodes enlarged, Neck mass, Non Hodgkin's lymphoma ([facility] [address] ), Obese, Obstructive sleep apnea, Port-A-Cath in place (2016), Post-menopausal, Pulmonary embolism ([facility] [address] ), Pulmonary embolism on right ([facility] [address] ) (7/12/2018), Shortness of breath, Tachycardia, Tinnitus, Wears glasses, Wears partial dentures, and [place] teeth extracted  ,  does not have any pertinent problems on file  ,   has a past surgical history that includes Cyst Removal (Left); pr bx/remv,lymph node,deep cerv (N/A, 3/15/2016); Lymphadenectomy; Tonsillectomy; Portacath placement; Dilation and curettage of uterus; Tubal ligation; Colonoscopy; FACIAL/NECK BIOPSY (N/A, 4/26/2018); Esophagogastroduodenoscopy (N/A, 7/26/2018); Colonoscopy (N/A, 7/26/2018); and Tunneled venous port placement (Right, 3/21/2019)  ,  family history includes Coronary artery disease in her father; Diabetes in her brother and family; Heart disease in her family  ,   reports that she quit smoking about 25 years ago  Her smoking use included cigarettes  She has a 16 50 pack-year smoking history  She has never used smokeless tobacco  She reports previous alcohol use  She reports that she does not use drugs  ,  is allergic to medical tape     Current Outpatient Medications   Medication Sig Dispense Refill    acetaminophen (TYLENOL) 325 mg tablet Take 650 mg by mouth every 4 (four) hours as needed       cholecalciferol (VITAMIN D3) 1,000 units tablet Take 1,000 Units by mouth every morning       diltiazem (TIAZAC) 120 MG 24 hr capsule Take 1 capsule (120 mg total) by mouth daily 90 capsule 0    fluticasone-vilanterol (BREO ELLIPTA) 100-25 mcg/inh inhaler Inhale 1 puff every morning Rinse mouth after use        levothyroxine (Euthyrox) 75 mcg tablet Take 1 tablet (75 mcg total) by mouth every morning 90 tablet 1    loratadine (CLARITIN) 10 mg tablet Take 10 mg by mouth daily      methocarbamol (ROBAXIN) 500 mg tablet Take 1 tablet (500 mg total) by mouth 4 (four) times a day as needed for muscle spasms 40 tablet 0    metoprolol tartrate (LOPRESSOR) 100 mg tablet Take 1 tablet by mouth twice daily 180 tablet 3    omeprazole (PriLOSEC) 10 mg delayed release capsule Take 10 mg by mouth  ondansetron (ZOFRAN) 8 mg tablet Take 8 mg by mouth every 8 (eight) hours as needed for nausea or vomiting (after chemo)       prochlorperazine (COMPAZINE) 10 mg tablet Take 10 mg by mouth every 6 (six) hours as needed      rivaroxaban (Xarelto) 20 mg tablet Take 1 tablet (20 mg total) by mouth daily with dinner 30 tablet 5    senna-docusate sodium (SENOKOT-S) 8 6-50 mg per tablet Take 1 tablet by mouth 2 (two) times a day as needed for constipation (as needed during chemo tx)       acyclovir (ZOVIRAX) 5 % cream Apply topically every 6 (six) hours 15 g 1    gabapentin (NEURONTIN) 300 mg capsule Take 1 capsule (300 mg total) by mouth daily at bedtime 10 capsule 0    valACYclovir (VALTREX) 1,000 mg tablet Take 1 tablet (1,000 mg total) by mouth 3 (three) times a day for 7 days 21 tablet 0     No current facility-administered medications for this visit  Review of Systems   Constitutional: Negative  Respiratory: Negative  Cardiovascular: Negative  Gastrointestinal: Negative  Genitourinary: Negative  Skin: Positive for rash  Objective:  Vitals:    06/18/21 0829   BP: 118/82   Temp: 97 7 °F (36 5 °C)   Weight: (!) 142 kg (312 lb 9 6 oz)   Height: 5' 8" (1 727 m)     Body mass index is 47 53 kg/m²  Physical Exam  Vitals reviewed  Constitutional:       General: She is not in acute distress  Appearance: Normal appearance  She is well-developed  She is not diaphoretic  HENT:      Head: Normocephalic and atraumatic  Eyes:      Conjunctiva/sclera: Conjunctivae normal    Skin:     Comments:  Vescicular rash on left shoulder   Neurological:      General: No focal deficit present  Mental Status: She is alert and oriented to person, place, and time  Psychiatric:         Mood and Affect: Mood normal          Behavior: Behavior normal          Thought Content:  Thought content normal          Judgment: Judgment normal

## 2021-06-21 DIAGNOSIS — I48.92 ATRIAL FLUTTER, UNSPECIFIED TYPE (HCC): ICD-10-CM

## 2021-06-22 ENCOUNTER — TELEPHONE (OUTPATIENT)
Dept: FAMILY MEDICINE CLINIC | Facility: CLINIC | Age: 51
End: 2021-06-22

## 2021-06-22 NOTE — TELEPHONE ENCOUNTER
SHINGLES ARE STARTING TO ITCH, IS THERE A CREAM THAT SHE CAN USE? CAN SHE ALSO TAKE BENADRYL AT NIGHT?

## 2021-06-22 NOTE — TELEPHONE ENCOUNTER
Can try a any moisturizer on the area to help relieve the itch  Certainly can take Benadryl if necessary

## 2021-06-23 ENCOUNTER — TELEPHONE (OUTPATIENT)
Dept: FAMILY MEDICINE CLINIC | Facility: CLINIC | Age: 51
End: 2021-06-23

## 2021-06-23 DIAGNOSIS — B02.9 HERPES ZOSTER WITHOUT COMPLICATION: Primary | ICD-10-CM

## 2021-06-23 DIAGNOSIS — B02.8 HERPES ZOSTER WITH COMPLICATION: ICD-10-CM

## 2021-06-23 RX ORDER — TRAMADOL HYDROCHLORIDE 50 MG/1
50 TABLET ORAL EVERY 6 HOURS PRN
Qty: 30 TABLET | Refills: 0 | Status: SHIPPED | OUTPATIENT
Start: 2021-06-23 | End: 2022-02-15 | Stop reason: ALTCHOICE

## 2021-06-23 RX ORDER — LIDOCAINE 50 MG/G
1 PATCH TOPICAL DAILY
Qty: 30 PATCH | Refills: 1 | Status: SHIPPED | OUTPATIENT
Start: 2021-06-23 | End: 2022-02-15 | Stop reason: ALTCHOICE

## 2021-06-23 NOTE — TELEPHONE ENCOUNTER
I put in for Lidoderm patches  She could cut the patch to fit at different areas if needed  Have her call us if this does not help

## 2021-06-23 NOTE — TELEPHONE ENCOUNTER
Pt says she is tolerating the Gabapentin but does not think it is helping  Pt has also had tramadol in the past and felt like it helped her

## 2021-06-23 NOTE — TELEPHONE ENCOUNTER
Have her stop the gabapentin  I will put in for tramadol for her to take  Call us if symptoms continue or increase  Controlled Substance Review    [place] PDMP or [place]  reviewed: No red flags were identified; safe to proceed with prescription  Dara Soulier

## 2021-06-23 NOTE — TELEPHONE ENCOUNTER
Pt asking if you can give her something different for the stabbing pains? Kee Moura nothing is working for the shingles and she is not sleeping  Do you want her to come in for another appt?

## 2021-06-25 ENCOUNTER — TELEPHONE (OUTPATIENT)
Dept: FAMILY MEDICINE CLINIC | Facility: CLINIC | Age: 51
End: 2021-06-25

## 2021-06-25 NOTE — TELEPHONE ENCOUNTER
Pt just took last Valacyclovir tablet  Still has rash but it is all scabbed up  Asking if you think she should have another round? Also, asking is she can have a return to work note? She was off all this week but plans of going back on Monday

## 2021-06-30 ENCOUNTER — TELEPHONE (OUTPATIENT)
Dept: GYNECOLOGY | Facility: CLINIC | Age: 51
End: 2021-06-30

## 2021-06-30 NOTE — TELEPHONE ENCOUNTER
ASIA for Pt to schedule a new Pt  appt in [facility]   Letter sent out today

## 2021-07-07 ENCOUNTER — TELEPHONE (OUTPATIENT)
Dept: SURGERY | Facility: CLINIC | Age: 51
End: 2021-07-07

## 2021-07-07 NOTE — TELEPHONE ENCOUNTER
While calling to set up appt for removal of mediport, she had a question about her stopping her blood thinners  I did call her back and lm to call Dr Staci Allen who is the dr that prescribed them to see when he would recommend her to stop them  I also stated to have him write a note so that we are aware as well

## 2021-07-27 ENCOUNTER — TELEPHONE (OUTPATIENT)
Dept: SURGERY | Facility: CLINIC | Age: 51
End: 2021-07-27

## 2021-07-27 ENCOUNTER — TELEPHONE (OUTPATIENT)
Dept: CARDIOLOGY CLINIC | Facility: CLINIC | Age: 51
End: 2021-07-27

## 2021-07-27 NOTE — TELEPHONE ENCOUNTER
Carlene Burns is having her port out by Dr Hedrick Slider and was told to stop Xarelto for 48 hrs  She stated it will only be 44 hrs will that be ok? She asked Dr Emelda Burkitt office and they told her to call here   She is Dr Jocelynn Marcelo patient but needs answer ASAP

## 2021-07-27 NOTE — TELEPHONE ENCOUNTER
Carlene Burns called in that she had taken her Zorelto yesterday so by the time she came to her appt 7/28/2021 it would be just under 48 hours (40)  She stated that according to Dr Naye Johnson he said that it would be fine but she wanted to let us know as well  I did send a msg to Dr Ryley Kaur to make him aware to which he said the same  I called Carlene Burns back to let her know

## 2021-07-28 ENCOUNTER — PROCEDURE VISIT (OUTPATIENT)
Dept: SURGERY | Facility: CLINIC | Age: 51
End: 2021-07-28
Payer: COMMERCIAL

## 2021-07-28 VITALS
RESPIRATION RATE: 18 BRPM | HEIGHT: 68 IN | SYSTOLIC BLOOD PRESSURE: 126 MMHG | WEIGHT: 293 LBS | DIASTOLIC BLOOD PRESSURE: 68 MMHG | TEMPERATURE: 96.4 F | BODY MASS INDEX: 44.41 KG/M2 | HEART RATE: 75 BPM

## 2021-07-28 DIAGNOSIS — C84.48 PERIPHERAL T CELL LYMPHOMA OF LYMPH NODES OF MULTIPLE SITES (HCC): Primary | ICD-10-CM

## 2021-07-28 PROCEDURE — 10120 INC&RMVL FB SUBQ TISS SMPL: CPT | Performed by: SURGERY

## 2021-07-28 PROCEDURE — 99213 OFFICE O/P EST LOW 20 MIN: CPT | Performed by: SURGERY

## 2021-07-28 RX ORDER — VALACYCLOVIR HYDROCHLORIDE 500 MG/1
500 TABLET, FILM COATED ORAL DAILY
COMMUNITY
Start: 2021-07-18

## 2021-07-28 NOTE — PROGRESS NOTES
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 [facility])  Patient returns for MediPort removal   She reports that she no longer needs the device and given recent difficulties with access the decision has been made to remove the MediPort  Today on physical examination the patient is a pleasant well-nourished well-developed female  In no acute distress  Competent and reliable as a historian  She has a MediPort positions in the expected location of the upper right chest       The technical details of MediPort incision under local anesthesia were explained to the patient and informed verbal consent taken to proceed  Subjective:      Patient ID: Robert Montoya is a 44 y o  female  Patient is here today for Mediport removal  Stated that the Mediport has not been flushed or used since January  States that she took her last dose of Xarelto on Monday  No fever or chills  Halina Galloway MA          Review of Systems   Constitutional: Negative for chills and fever  HENT: Negative for ear pain and sore throat  Eyes: Negative for pain and visual disturbance  Respiratory: Negative for cough and shortness of breath  Cardiovascular: Negative for chest pain and palpitations  Gastrointestinal: Negative for abdominal pain and vomiting  Genitourinary: Negative for dysuria and hematuria  Musculoskeletal: Negative for arthralgias and back pain  Skin: Negative for color change and rash  Neurological: Negative for seizures and syncope  All other systems reviewed and are negative  Objective:      /68 (BP Location: Left arm, Patient Position: Sitting, Cuff Size: Standard)   Pulse 75   Temp (!) 96 4 °F (35 8 °C) (Temporal)   Resp 18   Ht 5' 8" (1 727 m)   Wt (!) 144 kg (317 lb)   BMI 48 20 kg/m²            Physical Exam  Constitutional:       Appearance: She is well-developed  HENT:      Head: Normocephalic and atraumatic     Eyes: Conjunctiva/sclera: Conjunctivae normal       Pupils: Pupils are equal, round, and reactive to light  Cardiovascular:      Rate and Rhythm: Normal rate and regular rhythm  Pulmonary:      Effort: Pulmonary effort is normal       Breath sounds: Normal breath sounds  Abdominal:      General: Bowel sounds are normal       Palpations: Abdomen is soft  Musculoskeletal:         General: Normal range of motion  Cervical back: Normal range of motion and neck supple  Skin:     General: Skin is warm and dry  Neurological:      Mental Status: She is alert and oriented to person, place, and time  Psychiatric:         Behavior: Behavior normal          Thought Content: Thought content normal          Judgment: Judgment normal        Universal Protocol:  Consent: Verbal consent obtained  Risks and benefits: risks, benefits and alternatives were discussed  Consent given by: patient  Time out: Immediately prior to procedure a "time out" was called to verify the correct patient, procedure, equipment, support staff and site/side marked as required  Timeout called at: 7/28/2021 12:36 PM   Patient understanding: patient states understanding of the procedure being performed  Patient consent: the patient's understanding of the procedure matches consent given  Site marked: the operative site was marked  Patient identity confirmed: verbally with patient    Foreign body removal    Date/Time: 7/28/2021 12:36 PM  Performed by: Nicole Mckeon MD  Authorized by: Nicole Mckeon MD   Intake: Upper right chest   Anesthesia: local infiltration    Anesthesia:  Local Anesthetic: lidocaine 1% with epinephrine  Anesthetic total: 20 mL    Sedation:  Patient sedated: no  Patient restrained: no  Patient cooperative: yes  Complexity: simple  1 objects recovered    Objects recovered:  right subclavian vein MediPort  Post-procedure assessment: foreign body removed  Patient tolerance: patient tolerated the procedure well with no immediate complications  Comments:  Procedure note: With informed verbal consent under sterile conditions using aseptic technique using 1% lidocaine local anesthesia with epinephrine and bicarbonate the right subclavian vein MediPort was removed intact and without incident  The wound closed primarily with 3 vertical mattress sutures 3 0 nylon

## 2021-07-28 NOTE — LETTER
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 [facility])  Patient returns for MediPort removal   She reports that she no longer needs the device and given recent difficulties with access the decision has been made to remove the MediPort  Today on physical examination the patient is a pleasant well-nourished well-developed female  In no acute distress  Competent and reliable as a historian  She has a MediPort positions in the expected location of the upper right chest       The technical details of MediPort incision under local anesthesia were explained to the patient and informed verbal consent taken to proceed  Subjective:      Patient ID: Teressa Tam is a 44 y o  female  Patient is here today for Mediport removal  Stated that the Mediport has not been flushed or used since January  States that she took her last dose of Xarelto on Monday  No fever or chills  Halina Galloway MA          Review of Systems   Constitutional: Negative for chills and fever  HENT: Negative for ear pain and sore throat  Eyes: Negative for pain and visual disturbance  Respiratory: Negative for cough and shortness of breath  Cardiovascular: Negative for chest pain and palpitations  Gastrointestinal: Negative for abdominal pain and vomiting  Genitourinary: Negative for dysuria and hematuria     Musculoskeletal: Negative for arthralgias and back pain  Skin: Negative for color change and rash  Neurological: Negative for seizures and syncope  All other systems reviewed and are negative  Objective:      /68 (BP Location: Left arm, Patient Position: Sitting, Cuff Size: Standard)   Pulse 75   Temp (!) 96 4 °F (35 8 °C) (Temporal)   Resp 18   Ht 5' 8" (1 727 m)   Wt (!) 144 kg (317 lb)   BMI 48 20 kg/m²            Physical Exam  Constitutional:       Appearance: She is well-developed  HENT:      Head: Normocephalic and atraumatic  Eyes:      Conjunctiva/sclera: Conjunctivae normal       Pupils: Pupils are equal, round, and reactive to light  Cardiovascular:      Rate and Rhythm: Normal rate and regular rhythm  Pulmonary:      Effort: Pulmonary effort is normal       Breath sounds: Normal breath sounds  Abdominal:      General: Bowel sounds are normal       Palpations: Abdomen is soft  Musculoskeletal:         General: Normal range of motion  Cervical back: Normal range of motion and neck supple  Skin:     General: Skin is warm and dry  Neurological:      Mental Status: She is alert and oriented to person, place, and time  Psychiatric:         Behavior: Behavior normal          Thought Content: Thought content normal          Judgment: Judgment normal        Universal Protocol:  Consent: Verbal consent obtained  Risks and benefits: risks, benefits and alternatives were discussed  Consent given by: patient  Time out: Immediately prior to procedure a "time out" was called to verify the correct patient, procedure, equipment, support staff and site/side marked as required    Timeout called at: 7/28/2021 12:36 PM   Patient understanding: patient states understanding of the procedure being performed  Patient consent: the patient's understanding of the procedure matches consent given  Site marked: the operative site was marked  Patient identity confirmed: verbally with patient    Foreign body removal    Date/Time: 7/28/2021 12:36 PM  Performed by: Connie Chavez MD  Authorized by: Connie Chavez MD   Intake: Upper right chest   Anesthesia: local infiltration    Anesthesia:  Local Anesthetic: lidocaine 1% with epinephrine  Anesthetic total: 20 mL    Sedation:  Patient sedated: no  Patient restrained: no  Patient cooperative: yes  Complexity: simple  1 objects recovered  Objects recovered:  right subclavian vein MediPort  Post-procedure assessment: foreign body removed  Patient tolerance: patient tolerated the procedure well with no immediate complications  Comments:  Procedure note: With informed verbal consent under sterile conditions using aseptic technique using 1% lidocaine local anesthesia with epinephrine and bicarbonate the right subclavian vein MediPort was removed intact and without incident  The wound closed primarily with 3 vertical mattress sutures 3 0 nylon

## 2021-07-28 NOTE — ASSESSMENT & PLAN NOTE
Patient returns for MediPort removal   She reports that she no longer needs the device and given recent difficulties with access the decision has been made to remove the MediPort  Today on physical examination the patient is a pleasant well-nourished well-developed female  In no acute distress  Competent and reliable as a historian  She has a MediPort positions in the expected location of the upper right chest       The technical details of MediPort incision under local anesthesia were explained to the patient and informed verbal consent taken to proceed

## 2021-08-05 ENCOUNTER — OFFICE VISIT (OUTPATIENT)
Dept: CARDIOLOGY CLINIC | Facility: CLINIC | Age: 51
End: 2021-08-05
Payer: COMMERCIAL

## 2021-08-05 VITALS
BODY MASS INDEX: 44.41 KG/M2 | SYSTOLIC BLOOD PRESSURE: 120 MMHG | HEIGHT: 68 IN | DIASTOLIC BLOOD PRESSURE: 70 MMHG | WEIGHT: 293 LBS | HEART RATE: 65 BPM

## 2021-08-05 DIAGNOSIS — I48.92 ATRIAL FLUTTER, UNSPECIFIED TYPE (HCC): ICD-10-CM

## 2021-08-05 DIAGNOSIS — I49.1 PREMATURE ATRIAL CONTRACTION: ICD-10-CM

## 2021-08-05 DIAGNOSIS — R00.2 PALPITATIONS: Primary | ICD-10-CM

## 2021-08-05 DIAGNOSIS — I49.3 PREMATURE VENTRICULAR CONTRACTIONS: ICD-10-CM

## 2021-08-05 PROCEDURE — 3008F BODY MASS INDEX DOCD: CPT | Performed by: INTERNAL MEDICINE

## 2021-08-05 PROCEDURE — 93000 ELECTROCARDIOGRAM COMPLETE: CPT | Performed by: INTERNAL MEDICINE

## 2021-08-05 PROCEDURE — 99214 OFFICE O/P EST MOD 30 MIN: CPT | Performed by: INTERNAL MEDICINE

## 2021-08-05 PROCEDURE — 1036F TOBACCO NON-USER: CPT | Performed by: INTERNAL MEDICINE

## 2021-08-05 RX ORDER — GABAPENTIN 300 MG/1
CAPSULE ORAL
COMMUNITY
Start: 2021-08-04

## 2021-08-05 NOTE — PATIENT INSTRUCTIONS
Heart Palpitations   AMBULATORY CARE:   Heart palpitations  are feelings that your heart races, jumps, throbs, or flutters  You may feel extra beats, no beats for a short time, or skipped beats  You may have these feelings in your chest, throat, or neck  They may happen when you are sitting, standing, or lying  Heart palpitations may be frightening, but are usually not caused by a serious problem  Call 911 or have someone else call for any of the following:   · You have any of the following signs of a heart attack:      ? Squeezing, pressure, or pain in your chest    ? You may  also have any of the following:     § Discomfort or pain in your back, neck, jaw, stomach, or arm    § Shortness of breath    § Nausea or vomiting    § Lightheadedness or a sudden cold sweat    · You have any of the following signs of a stroke:      ? Numbness or drooping on one side of your face     ? Weakness in an arm or leg    ? Confusion or difficulty speaking    ? Dizziness, a severe headache, or vision loss    · You faint or lose consciousness  Seek care immediately if:   · Your palpitations happen more often or last longer than usual      · You have palpitations and shortness of breath, nausea, sweating, or dizziness  Contact your healthcare provider if:   · You have questions or concerns about your condition or care  Follow up with your healthcare provider as directed: You may need to follow up with a cardiologist  Tevin Castano may need tests to check for heart problems that cause palpitations  Write down your questions so you remember to ask them during your visits  Treatment for heart palpitations  is usually not needed  Your healthcare provider may stop or change your medicines if they are causing your palpitations  Conditions that cause palpitations, such as an abnormal heartbeat, will be treated  Keep a record:  Write down when your palpitations start and stop, what you were doing when they started, and your symptoms  Keep track of what you ate or drank within a few hours of your palpitations  Include anything that seemed to help your symptoms, such as lying down or holding your breath  This record will help you and your healthcare provider learn what triggers your palpitations  Bring this record with you to your follow up visits  Help prevent heart palpitations:   · Manage stress and anxiety  Find ways to relax such as listening to music, meditating, or doing yoga  Exercise can also help decrease stress and anxiety  Talk to someone you trust about your stress or anxiety  You can also talk to a therapist      · Get plenty of sleep every night  Ask your healthcare provider how much sleep you need each night  · Do not drink caffeine or alcohol  Caffeine and alcohol can make your palpitations worse  Caffeine is found in soda, coffee, tea, chocolate, and drinks that increase your energy  · Do not smoke  Nicotine and other chemicals in cigarettes and cigars may damage your heart and blood vessels  Ask your healthcare provider for information if you currently smoke and need help to quit  E-cigarettes or smokeless tobacco still contain nicotine  Talk to your healthcare provider before you use these products  · Do not use illegal drugs  Talk to your healthcare provider if you use illegal drugs and want help to quit  © Copyright [employer] 2021 Information is for End User's use only and may not be sold, redistributed or otherwise used for commercial purposes  All illustrations and images included in CareNotes® are the copyrighted property of A D A M , Inc  or Lucho Crowley  The above information is an  only  It is not intended as medical advice for individual conditions or treatments  Talk to your doctor, nurse or pharmacist before following any medical regimen to see if it is safe and effective for you

## 2021-08-05 NOTE — PROGRESS NOTES
Subjective:        Patient ID: Shilpi Cleaning is a 44 y o  female  Chief Complaint:  Lady Greer is having some palpitations that wakes her up from sleep, she does not know if it shortness of breath or a brief numbness like feeling of palpitations, she is having difficulty elaborating what she is feeling  She stop the diltiazem for low blood pressure while ago but went back on it due to high blood pressure returning and now she feels little bit better but not quite right  No chest pain no chest tightness no presyncope or syncope no unusual edema orthopnea or dyspnea  No orthopnea  The following portions of the patient's history were reviewed and updated as appropriate: allergies, current medications, past family history, past medical history, past social history, past surgical history and problem list   Review of Systems   Constitutional: Positive for malaise/fatigue and weight gain  Negative for chills and diaphoresis  HENT: Negative for nosebleeds and stridor  Eyes: Negative for double vision, vision loss in left eye, vision loss in right eye and visual disturbance  Cardiovascular: Positive for palpitations  Negative for chest pain, claudication, cyanosis, dyspnea on exertion, irregular heartbeat, leg swelling, near-syncope, orthopnea, paroxysmal nocturnal dyspnea and syncope  Respiratory: Negative for cough, shortness of breath, snoring and wheezing  Endocrine: Negative for polydipsia, polyphagia and polyuria  Hematologic/Lymphatic: Negative for bleeding problem  Does not bruise/bleed easily  Skin: Negative for flushing and rash  Musculoskeletal: Negative for falls and myalgias  Gastrointestinal: Negative for abdominal pain, heartburn, hematemesis, hematochezia, melena and nausea  Genitourinary: Negative for hematuria  Neurological: Negative for brief paralysis, dizziness, focal weakness, headaches, light-headedness, loss of balance and vertigo     Psychiatric/Behavioral: Negative for altered mental status and substance abuse  Allergic/Immunologic: Negative for hives  Objective:      /70   Pulse 65   Ht 5' 8" (1 727 m)   Wt (!) 144 kg (317 lb)   BMI 48 20 kg/m²   Physical Exam  Vitals and nursing note reviewed  Constitutional:       Appearance: She is well-developed  HENT:      Head: Normocephalic and atraumatic  Eyes:      General: No scleral icterus  Pupils: Pupils are equal, round, and reactive to light  Neck:      Thyroid: No thyromegaly  Vascular: No JVD  Cardiovascular:      Rate and Rhythm: Normal rate and regular rhythm  Pulses: Intact distal pulses  Heart sounds: Normal heart sounds  No murmur heard  No friction rub  No gallop  Pulmonary:      Effort: Pulmonary effort is normal  No respiratory distress  Breath sounds: Normal breath sounds  No stridor  No wheezing or rales  Abdominal:      General: Bowel sounds are normal       Palpations: Abdomen is soft  There is no mass  Tenderness: There is no abdominal tenderness  Musculoskeletal:         General: Normal range of motion  Cervical back: Normal range of motion and neck supple  Right lower leg: No edema  Left lower leg: No edema  Lymphadenopathy:      Cervical: No cervical adenopathy  Skin:     General: Skin is warm and dry  Coloration: Skin is not pale  Findings: No rash  Neurological:      Mental Status: She is alert and oriented to person, place, and time  Psychiatric:         Behavior: Behavior normal          Thought Content: Thought content normal          Judgment: Judgment normal          Lab Review:   No visits with results within 2 Month(s) from this visit     Latest known visit with results is:   Appointment on 03/11/2021   Component Date Value    LD 03/11/2021 223     WBC 03/11/2021 6 98     RBC 03/11/2021 4 83     Hemoglobin 03/11/2021 14 6     Hematocrit 03/11/2021 46 0     MCV 03/11/2021 95     MCH 03/11/2021 30 2     MCHC 03/11/2021 31 7     RDW 03/11/2021 14 7     MPV 03/11/2021 10 6     Platelets 48/63/7948 248     nRBC 03/11/2021 0     Neutrophils Relative 03/11/2021 70     Immat GRANS % 03/11/2021 0     Lymphocytes Relative 03/11/2021 16     Monocytes Relative 03/11/2021 8     Eosinophils Relative 03/11/2021 5     Basophils Relative 03/11/2021 1     Neutrophils Absolute 03/11/2021 4 86     Immature Grans Absolute 03/11/2021 0 02     Lymphocytes Absolute 03/11/2021 1 10     Monocytes Absolute 03/11/2021 0 58     Eosinophils Absolute 03/11/2021 0 34     Basophils Absolute 03/11/2021 0 08     Sodium 03/11/2021 139     Potassium 03/11/2021 4 3     Chloride 03/11/2021 110*    CO2 03/11/2021 27     ANION GAP 03/11/2021 2*    BUN 03/11/2021 16     Creatinine 03/11/2021 0 93     Glucose, Fasting 03/11/2021 131*    Calcium 03/11/2021 9 2     AST 03/11/2021 16     ALT 03/11/2021 19     Alkaline Phosphatase 03/11/2021 136*    Total Protein 03/11/2021 7 0     Albumin 03/11/2021 3 6     Total Bilirubin 03/11/2021 0 51     eGFR 03/11/2021 71      No results found  Assessment:       1  Palpitations  POCT ECG    AMB extended holter monitor    Diagnostic Sleep Study   2  Premature atrial contraction  AMB extended holter monitor    Diagnostic Sleep Study   3  Premature ventricular contractions  AMB extended holter monitor    Diagnostic Sleep Study   4  Atrial flutter, unspecified type (HCC)  AMB extended holter monitor    Diagnostic Sleep Study        Plan:       One-week event monitor and formal sleep study ordered  She has many signs and symptoms of sleep apnea including daytime hypersomnolence, excessive snoring, history of atrial dysrhythmia and a home sleep study that suggested sleep apnea and proved significant nocturnal hypoxemia though in the written body report it said quite the opposite, confusing  I went over this report today with patient and my concerns      I will see her back in about a month review the monitor and formulate a plan  Hopefully they can get her in for a sleep study in the near term  I felt her symptoms benign enough to hold off on adjusting any medications though I did tell her to continue with her present dose metoprolol and diltiazem for the time being

## 2021-08-09 DIAGNOSIS — R00.2 PALPITATIONS: ICD-10-CM

## 2021-08-09 DIAGNOSIS — I48.92 ATRIAL FLUTTER, UNSPECIFIED TYPE (HCC): ICD-10-CM

## 2021-08-09 RX ORDER — DILTIAZEM HYDROCHLORIDE 120 MG/1
120 CAPSULE, EXTENDED RELEASE ORAL DAILY
Qty: 90 CAPSULE | Refills: 3 | Status: SHIPPED | OUTPATIENT
Start: 2021-08-09 | End: 2022-02-15 | Stop reason: ALTCHOICE

## 2021-08-12 ENCOUNTER — TELEPHONE (OUTPATIENT)
Dept: SLEEP CENTER | Facility: CLINIC | Age: 51
End: 2021-08-12

## 2021-08-12 NOTE — TELEPHONE ENCOUNTER
----- Message from Sherren Hidalgo, MD sent at 8/6/2021  1:28 PM EDT -----  Approved  ----- Message -----  From: Amanda Jacob  Sent: 8/6/2021   9:36 AM EDT  To: [facility] [place] Provider    This sleep study needs approval      If approved please sign and return to clerical pool  If denied please include reasons why  Also provide alternative testing if warranted  Please sign and return to clerical pool

## 2021-08-13 ENCOUNTER — OFFICE VISIT (OUTPATIENT)
Dept: SURGERY | Facility: CLINIC | Age: 51
End: 2021-08-13

## 2021-08-13 VITALS — TEMPERATURE: 98.4 F

## 2021-08-13 DIAGNOSIS — C84.48 PERIPHERAL T CELL LYMPHOMA OF LYMPH NODES OF MULTIPLE SITES (HCC): Primary | ICD-10-CM

## 2021-08-13 PROCEDURE — 99024 POSTOP FOLLOW-UP VISIT: CPT | Performed by: SURGERY

## 2021-08-13 RX ORDER — DILTIAZEM HYDROCHLORIDE 120 MG/1
120 CAPSULE, COATED, EXTENDED RELEASE ORAL DAILY
COMMUNITY
Start: 2021-08-09 | End: 2022-05-09 | Stop reason: SDUPTHER

## 2021-08-13 NOTE — LETTER
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 [facility])    Patient returns to the office for suture removal status post right chest MediPort removal       Today she denies all complaints referable to the wound  On physical examination she is well nourished well-appearing female  She is pleasant competent reliable as a historian  The right chest wound is clean dry and intact  The sutures were removed  Dressing applied  Patient is well status post MediPort removal   She has been encouraged to follow up with the practice at any point future with questions or concerns  Subjective:      Patient ID: Shade Davalos is a 44 y o  female  Patient is here today for suture removal, s/p removal of Mediport 77/28/2021  Sutures were removed and the excision site looks  No redness, discharge, fever or chills  Halina Galloway MA          Review of Systems   Constitutional: Negative for chills and fever  HENT: Negative for ear pain and sore throat  Eyes: Negative for pain and visual disturbance  Respiratory: Negative for cough and shortness of breath  Cardiovascular: Negative for chest pain and palpitations  Gastrointestinal: Negative for abdominal pain and vomiting  Genitourinary: Negative for dysuria and hematuria  Musculoskeletal: Negative for arthralgias and back pain  Skin: Negative for color change and rash  Neurological: Negative for seizures and syncope  All other systems reviewed and are negative  Objective:      Temp 98 4 °F (36 9 °C) (Temporal)          Physical Exam  Constitutional:       Appearance: She is well-developed  HENT:      Head: Normocephalic and atraumatic  Eyes:      Conjunctiva/sclera: Conjunctivae normal       Pupils: Pupils are equal, round, and reactive to light  Cardiovascular:      Rate and Rhythm: Normal rate and regular rhythm  Pulmonary:      Effort: Pulmonary effort is normal       Breath sounds: Normal breath sounds  Abdominal:      General: Bowel sounds are normal       Palpations: Abdomen is soft  Musculoskeletal:         General: Normal range of motion  Cervical back: Normal range of motion and neck supple  Skin:     General: Skin is warm and dry  Comments: Wound clean dry intact   Neurological:      Mental Status: She is alert and oriented to person, place, and time  Psychiatric:         Behavior: Behavior normal          Thought Content:  Thought content normal          Judgment: Judgment normal

## 2021-08-13 NOTE — PROGRESS NOTES
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 [facility])    Patient returns to the office for suture removal status post right chest MediPort removal       Today she denies all complaints referable to the wound  On physical examination she is well nourished well-appearing female  She is pleasant competent reliable as a historian  The right chest wound is clean dry and intact  The sutures were removed  Dressing applied  Patient is well status post MediPort removal   She has been encouraged to follow up with the practice at any point future with questions or concerns  Subjective:      Patient ID: Wendie Arreaga is a 44 y o  female  Patient is here today for suture removal, s/p removal of Mediport 77/28/2021  Sutures were removed and the excision site looks  No redness, discharge, fever or chills  Halina Galloway MA          Review of Systems   Constitutional: Negative for chills and fever  HENT: Negative for ear pain and sore throat  Eyes: Negative for pain and visual disturbance  Respiratory: Negative for cough and shortness of breath  Cardiovascular: Negative for chest pain and palpitations  Gastrointestinal: Negative for abdominal pain and vomiting  Genitourinary: Negative for dysuria and hematuria  Musculoskeletal: Negative for arthralgias and back pain  Skin: Negative for color change and rash  Neurological: Negative for seizures and syncope  All other systems reviewed and are negative  Objective:      Temp 98 4 °F (36 9 °C) (Temporal)          Physical Exam  Constitutional:       Appearance: She is well-developed  HENT:      Head: Normocephalic and atraumatic  Eyes:      Conjunctiva/sclera: Conjunctivae normal       Pupils: Pupils are equal, round, and reactive to light  Cardiovascular:      Rate and Rhythm: Normal rate and regular rhythm     Pulmonary:      Effort: Pulmonary effort is normal       Breath sounds: Normal breath sounds  Abdominal:      General: Bowel sounds are normal       Palpations: Abdomen is soft  Musculoskeletal:         General: Normal range of motion  Cervical back: Normal range of motion and neck supple  Skin:     General: Skin is warm and dry  Comments: Wound clean dry intact   Neurological:      Mental Status: She is alert and oriented to person, place, and time  Psychiatric:         Behavior: Behavior normal          Thought Content:  Thought content normal          Judgment: Judgment normal

## 2021-08-13 NOTE — ASSESSMENT & PLAN NOTE
Patient returns to the office for suture removal status post right chest MediPort removal       Today she denies all complaints referable to the wound  On physical examination she is well nourished well-appearing female  She is pleasant competent reliable as a historian  The right chest wound is clean dry and intact  The sutures were removed  Dressing applied  Patient is well status post MediPort removal   She has been encouraged to follow up with the practice at any point future with questions or concerns

## 2021-08-16 NOTE — PROGRESS NOTES
Assessment/Plan:    Patient given the option of urogyn referral, she will consider and call our office if she would like to proceed  She is aware of wait  Recommended monthly SBE, annual CBE and annual screening mammo  ASCCP guidelines reviewed and pap with cotesting noted to be up to date; this low risk patient was advised she meets criteria to d/c pap screening at age 70  Pap done today  Colonoscopy noted to be up to date  The patient denies STI risk factors and declines testing at this time  Reviewed diet/activity recommendations Calcium 6775-6555 mg and Vit D 600-1000 IU daily  Patient encouraged to limit carbs and simple sugars  Discussed postmenopausal considerations and symptoms to report  Kegel exercises as instructed  RTO in one year for routine annual gyn exam or sooner PRN  Diagnoses and all orders for this visit:    Encounter for gynecological examination (general) (routine) without abnormal findings    Screening mammogram, encounter for  -     Mammo screening bilateral w 3d & cad; Future    Encounter for Papanicolaou smear for cervical cancer screening  -     Liquid-based pap, screening    Class 3 severe obesity due to excess calories with serious comorbidity and body mass index (BMI) of 50 0 to 59 9 in adult [facility])        Subjective:      Patient ID: Luz Marina Sheppard is a 44 y o  female  This patient presents for routine annual gyn exam   SANDY with almost every cough, sneeze  She denies  bleeding or spotting, VM sx, pelvic pain, dyspareunia, breast concerns, abnormal discharge, bowel dysfunction, depression/anx  , sexually active and is monogamous  Pap - patient believes it may have been 2-3 years ago  Mammography up to date and normal, 1/18/21  Colonoscopy up to date, 7/26/18          The following portions of the patient's history were reviewed and updated as appropriate: allergies, current medications, past family history, past medical history, past social history, past surgical history and problem list     Review of Systems   Constitutional: Negative  Respiratory: Negative  Cardiovascular: Negative  Gastrointestinal: Negative  Endocrine: Negative  Genitourinary: Negative for dyspareunia, dysuria, frequency, pelvic pain, urgency, vaginal bleeding, vaginal discharge and vaginal pain  Musculoskeletal: Negative  Skin: Negative  Neurological: Negative  Psychiatric/Behavioral: Negative  Objective:      /60   Pulse 78   Ht 5' 7" (1 702 m)   Wt (!) 145 kg (320 lb)   BMI 50 12 kg/m²          Physical Exam  Vitals and nursing note reviewed  Exam conducted with a chaperone present  Constitutional:       Appearance: Normal appearance  She is well-developed  HENT:      Head: Normocephalic and atraumatic  Neck:      Thyroid: No thyroid mass or thyromegaly  Cardiovascular:      Rate and Rhythm: Normal rate and regular rhythm  Heart sounds: Normal heart sounds  Pulmonary:      Effort: Pulmonary effort is normal       Breath sounds: Normal breath sounds  Chest:      Breasts: Breasts are symmetrical          Right: No inverted nipple, mass, nipple discharge, skin change or tenderness  Left: No inverted nipple, mass, nipple discharge, skin change or tenderness  Abdominal:      General: Bowel sounds are normal       Palpations: Abdomen is soft  Tenderness: There is no abdominal tenderness  Hernia: There is no hernia in the left inguinal area or right inguinal area  Genitourinary:     General: Normal vulva  Exam position: Supine  Pubic Area: No rash  Labia:         Right: No rash, tenderness, lesion or injury  Left: No rash, tenderness, lesion or injury  Urethra: No prolapse, urethral pain, urethral swelling or urethral lesion  Vagina: Normal  No signs of injury and foreign body  No vaginal discharge, erythema, tenderness, bleeding, lesions or prolapsed vaginal walls        Cervix: No cervical motion tenderness, discharge, friability, lesion, erythema, cervical bleeding or eversion  Uterus: Not deviated, not enlarged, not fixed, not tender and no uterine prolapse  Adnexa:         Right: No mass, tenderness or fullness  Left: No mass, tenderness or fullness  Rectum: No external hemorrhoid  Comments: Urethra normal without lesions  No bladder tenderness  Exam limited by body habitus  Musculoskeletal:         General: Normal range of motion  Cervical back: Normal range of motion and neck supple  Lymphadenopathy:      Lower Body: No right inguinal adenopathy  No left inguinal adenopathy  Skin:     General: Skin is warm and dry  Neurological:      Mental Status: She is alert and oriented to person, place, and time  Psychiatric:         Speech: Speech normal          Behavior: Behavior normal  Behavior is cooperative

## 2021-08-17 ENCOUNTER — OFFICE VISIT (OUTPATIENT)
Dept: GYNECOLOGY | Facility: CLINIC | Age: 51
End: 2021-08-17
Payer: COMMERCIAL

## 2021-08-17 VITALS
DIASTOLIC BLOOD PRESSURE: 60 MMHG | HEIGHT: 67 IN | BODY MASS INDEX: 45.99 KG/M2 | SYSTOLIC BLOOD PRESSURE: 110 MMHG | HEART RATE: 78 BPM | WEIGHT: 293 LBS

## 2021-08-17 DIAGNOSIS — Z12.31 SCREENING MAMMOGRAM, ENCOUNTER FOR: ICD-10-CM

## 2021-08-17 DIAGNOSIS — Z01.419 ENCOUNTER FOR GYNECOLOGICAL EXAMINATION (GENERAL) (ROUTINE) WITHOUT ABNORMAL FINDINGS: Primary | ICD-10-CM

## 2021-08-17 DIAGNOSIS — E66.01 CLASS 3 SEVERE OBESITY DUE TO EXCESS CALORIES WITH SERIOUS COMORBIDITY AND BODY MASS INDEX (BMI) OF 50.0 TO 59.9 IN ADULT (HCC): ICD-10-CM

## 2021-08-17 DIAGNOSIS — Z12.4 ENCOUNTER FOR PAPANICOLAOU SMEAR FOR CERVICAL CANCER SCREENING: ICD-10-CM

## 2021-08-17 PROCEDURE — G0476 HPV COMBO ASSAY CA SCREEN: HCPCS | Performed by: OBSTETRICS & GYNECOLOGY

## 2021-08-17 PROCEDURE — G0124 SCREEN C/V THIN LAYER BY MD: HCPCS | Performed by: PATHOLOGY

## 2021-08-17 PROCEDURE — G0145 SCR C/V CYTO,THINLAYER,RESCR: HCPCS | Performed by: PATHOLOGY

## 2021-08-17 PROCEDURE — S0610 ANNUAL GYNECOLOGICAL EXAMINA: HCPCS | Performed by: OBSTETRICS & GYNECOLOGY

## 2021-08-19 LAB
HPV HR 12 DNA CVX QL NAA+PROBE: POSITIVE
HPV16 DNA CVX QL NAA+PROBE: NEGATIVE
HPV18 DNA CVX QL NAA+PROBE: NEGATIVE

## 2021-08-23 LAB
LAB AP GYN PRIMARY INTERPRETATION: ABNORMAL
Lab: ABNORMAL
PATH INTERP SPEC-IMP: ABNORMAL

## 2021-08-24 ENCOUNTER — CLINICAL SUPPORT (OUTPATIENT)
Dept: CARDIOLOGY CLINIC | Facility: CLINIC | Age: 51
End: 2021-08-24
Payer: COMMERCIAL

## 2021-08-24 ENCOUNTER — TELEPHONE (OUTPATIENT)
Dept: GYNECOLOGY | Facility: CLINIC | Age: 51
End: 2021-08-24

## 2021-08-24 DIAGNOSIS — I48.92 ATRIAL FLUTTER, UNSPECIFIED TYPE (HCC): ICD-10-CM

## 2021-08-24 DIAGNOSIS — R00.2 PALPITATIONS: ICD-10-CM

## 2021-08-24 DIAGNOSIS — I49.3 PREMATURE VENTRICULAR CONTRACTIONS: ICD-10-CM

## 2021-08-24 DIAGNOSIS — I49.1 PREMATURE ATRIAL CONTRACTION: ICD-10-CM

## 2021-08-24 PROCEDURE — 1036F TOBACCO NON-USER: CPT | Performed by: INTERNAL MEDICINE

## 2021-08-24 PROCEDURE — 99203 OFFICE O/P NEW LOW 30 MIN: CPT | Performed by: INTERNAL MEDICINE

## 2021-09-01 ENCOUNTER — PROCEDURE VISIT (OUTPATIENT)
Dept: GYNECOLOGY | Facility: CLINIC | Age: 51
End: 2021-09-01
Payer: COMMERCIAL

## 2021-09-01 VITALS
BODY MASS INDEX: 45.99 KG/M2 | DIASTOLIC BLOOD PRESSURE: 74 MMHG | WEIGHT: 293 LBS | HEART RATE: 63 BPM | SYSTOLIC BLOOD PRESSURE: 118 MMHG | HEIGHT: 67 IN

## 2021-09-01 DIAGNOSIS — R87.610 ASCUS WITH POSITIVE HIGH RISK HPV CERVICAL: ICD-10-CM

## 2021-09-01 DIAGNOSIS — R87.619 ENDOMETRIAL CELLS ON CERVICAL PAP SMEAR INCONSISTENT WITH LAST MENSTRUAL PERIOD: Primary | ICD-10-CM

## 2021-09-01 DIAGNOSIS — R87.810 ASCUS WITH POSITIVE HIGH RISK HPV CERVICAL: ICD-10-CM

## 2021-09-01 DIAGNOSIS — R87.619 ENDOMETRIAL CELLS ON CERVICAL PAP SMEAR INCONSISTENT WITH LAST MENSTRUAL PERIOD: ICD-10-CM

## 2021-09-01 PROCEDURE — 57452 EXAM OF CERVIX W/SCOPE: CPT | Performed by: OBSTETRICS & GYNECOLOGY

## 2021-09-01 PROCEDURE — 88305 TISSUE EXAM BY PATHOLOGIST: CPT | Performed by: PATHOLOGY

## 2021-09-01 PROCEDURE — 3008F BODY MASS INDEX DOCD: CPT | Performed by: INTERNAL MEDICINE

## 2021-09-01 PROCEDURE — 58110 BX DONE W/COLPOSCOPY ADD-ON: CPT | Performed by: OBSTETRICS & GYNECOLOGY

## 2021-09-01 NOTE — PROGRESS NOTES
Endometrial biopsy    Date/Time: 9/1/2021 10:15 AM  Performed by: Martha Claudio  Authorized by: AZUL Burkett   Universal Protocol:  Consent: Verbal consent obtained  Written consent obtained  Risks and benefits: risks, benefits and alternatives were discussed (bleeding, infection, injury to surrounding structures, uterine perforation, pain, insufficient sampling)  Consent given by: patient  Time out: Immediately prior to procedure a "time out" was called to verify the correct patient, procedure, equipment, support staff and site/side marked as required  Timeout called at: 9/1/2021 10:15 AM   Patient understanding: patient states understanding of the procedure being performed  Patient consent: the patient's understanding of the procedure matches consent given  Procedure consent: procedure consent matches procedure scheduled  Relevant documents: relevant documents present and verified  Test results: test results available and properly labeled  Required items: required blood products, implants, devices, and special equipment available  Patient identity confirmed: verbally with patient      Indication:     Indications comment:  Endometrial cells on pap  Pre-procedure:     Premeds:  Acetaminophen  Procedure:     Procedure: endometrial biopsy with Pipelle      A bivalve speculum was placed in the vagina: yes      Cervix cleaned and prepped: yes (with betadine X2)      Uterus sounded: yes (9)      Uterus sound depth (cm):  9    Specimen collected: specimen collected and sent to pathology      Specimen collected comment:  In 2 passes    Patient tolerated procedure well with no complications: yes    Findings:     Uterus size (weeks): unable to palpate secondary to body habitus  Cervix: normal    Comments:     Procedure comments:  Sample obtained on 2 passes, patient aware that TVU may be needed to assess thickness on endometrial lining  Will notify patient of results     Colposcopy    Date/Time: 9/1/2021 10:25 AM  Performed by: Miranda Severin, CRNP  Authorized by: Miranda Severin, CRNP     Consent:     Consent obtained:  Verbal and written    Consent given by:  Patient    Procedural risks discussed:  Bleeding, damage to other organs, death, failure rate, infection, possible continued pain and repeat procedure    Patient questions answered: yes      Patient agrees, verbalizes understanding, and wants to proceed: yes      Educational handouts given: yes      Instructions and paperwork completed: yes    Pre-procedure:     Pre-procedure timeout performed: yes      Premeds:  Acetaminophen    Prepped with: acetic acid    Indication:     Indication:  ASC-US (+HPV, -16, -18  Patient discloses today that she was diagnosed with genital warts at age 23)  Procedure:     Procedure: Colposcopy only      Under satisfactory analgesia the patient was prepped and draped in the dorsal lithotomy position: yes      Florence speculum was placed in the vagina: yes      Under colposcopic examination the transition zone was seen in entirety: yes      Biopsy(s): no    Post-procedure:     Findings comment:  Pink epithelum    Impression: Low grade cervical dysplasia      Patient tolerance of procedure: Tolerated well, no immediate complications  Comments:       Will repap in 6 months

## 2021-09-08 ENCOUNTER — TELEPHONE (OUTPATIENT)
Dept: GYNECOLOGY | Facility: CLINIC | Age: 51
End: 2021-09-08

## 2021-09-08 NOTE — TELEPHONE ENCOUNTER
LM on VM to notify patient of benign EMB results  TVU ordered and patient given central scheduling number to schedule

## 2021-09-09 ENCOUNTER — HOSPITAL ENCOUNTER (OUTPATIENT)
Dept: ULTRASOUND IMAGING | Facility: HOSPITAL | Age: 51
Discharge: HOME | End: 2021-09-09
Payer: COMMERCIAL

## 2021-09-09 ENCOUNTER — OFFICE VISIT (OUTPATIENT)
Dept: CARDIOLOGY CLINIC | Facility: CLINIC | Age: 51
End: 2021-09-09
Payer: COMMERCIAL

## 2021-09-09 VITALS
DIASTOLIC BLOOD PRESSURE: 70 MMHG | SYSTOLIC BLOOD PRESSURE: 118 MMHG | HEART RATE: 67 BPM | BODY MASS INDEX: 45.99 KG/M2 | HEIGHT: 67 IN | WEIGHT: 293 LBS

## 2021-09-09 DIAGNOSIS — C83.38 DIFFUSE LARGE B-CELL LYMPHOMA, LYMPH NODES OF MULTIPLE SITES (HCC): ICD-10-CM

## 2021-09-09 DIAGNOSIS — I47.1 PSVT (PAROXYSMAL SUPRAVENTRICULAR TACHYCARDIA) (HCC): Primary | ICD-10-CM

## 2021-09-09 DIAGNOSIS — C84.48: ICD-10-CM

## 2021-09-09 PROCEDURE — 1036F TOBACCO NON-USER: CPT | Performed by: INTERNAL MEDICINE

## 2021-09-09 PROCEDURE — 3008F BODY MASS INDEX DOCD: CPT | Performed by: INTERNAL MEDICINE

## 2021-09-09 PROCEDURE — 76536 US EXAM OF HEAD AND NECK: CPT

## 2021-09-09 PROCEDURE — 99213 OFFICE O/P EST LOW 20 MIN: CPT | Performed by: INTERNAL MEDICINE

## 2021-09-09 NOTE — PATIENT INSTRUCTIONS
Supraventricular Tachycardia   AMBULATORY CARE:   Supraventricular tachycardia (SVT)  is a condition that causes your heart to beat much faster than it should  SVT is a type of abnormal heart rhythm, called an arrhythmia, that starts in the upper part of your heart  It may last from a few seconds or hours to several days  Common symptoms include the following:   · A pounding, racing, or fluttering heartbeat    · Fatigue, weakness, or shortness of breath    · Feeling lightheaded, dizzy, or faint  · Pain, pressure, or tightness in your chest, neck, jaw, arms, or upper back    · Nausea    · Feeling anxious, scared, or worried that something bad may happen    Call your local emergency number (911 in the [address] [facility]) or have someone call if:   · You have any of the following signs of a heart attack:      ? Squeezing, pressure, or pain in your chest    ? You may  also have any of the following:     § Discomfort or pain in your back, neck, jaw, stomach, or arm    § Shortness of breath    § Nausea or vomiting    § Lightheadedness or a sudden cold sweat      Seek care immediately if:   · You are dizzy, lightheaded, or feel faint  · You have sudden numbness or weakness in your arms or legs  Call your doctor or cardiologist if:   · You have new or worsening symptoms  · You have swelling in your ankles or feet  · You have questions or concerns about your condition or care  Treatment  may depend on what is causing SVT and your symptoms  You may not need treatment for SVT  Instead you may need medicine or other procedures to control your heart rate  Check your heart rate (pulse) as directed: Your healthcare provider will show you how to check your pulse, and how often to check it  Write down how fast your pulse is and if it feels regular or like it is skipping beats  Also write down the activity you were doing if your heart rate is above 100  Bring the information with you to your follow-up appointment       How to manage or prevent SVT:   · Perform vagal maneuvers as directed when you have symptoms of SVT  Lie down flat and bear down like you are having a bowel movement  Do this for 10 to 30 seconds  · Do not drink caffeine or alcohol  These can increase your risk for SVT  · Keep a record of your symptoms  Write down what you ate or what you were doing before an episode of SVT  Also write down anything you did to make the SVT stop  Bring your record to follow up visits with your healthcare provider  · Eat heart-healthy foods  These include fruits, vegetables, whole-grain breads, low-fat dairy products, beans, lean meats, and fish  Replace butter and margarine with heart-healthy oils such as olive oil and canola oil  · Exercise regularly and maintain a healthy weight  Ask about the best exercise plan for you  Ask your healthcare provider what a healthy weight is for you  Ask him or her to help you create a safe weight loss plan if you are overweight  · Do not smoke  Nicotine and other chemicals in cigarettes and cigars can cause heart and lung damage  Ask your healthcare provider for information if you currently smoke and need help to quit  E-cigarettes or smokeless tobacco still contain nicotine  Talk to your healthcare provider before you use these products  · Manage other health conditions  Take medicine as directed and follow your treatment plan  Your healthcare provider may need to change a medicine you are taking if it is causing your SVT  Do not  stop taking any medicine unless directed by your provider  Follow up with your doctor or cardiologist as directed:  Write down your questions so you remember to ask them during your visits  © Copyright [employer] 2021 Information is for End User's use only and may not be sold, redistributed or otherwise used for commercial purposes   All illustrations and images included in CareNotes® are the copyrighted property of [employer]  or [address]  The above information is an  only  It is not intended as medical advice for individual conditions or treatments  Talk to your doctor, nurse or pharmacist before following any medical regimen to see if it is safe and effective for you

## 2021-09-09 NOTE — PROGRESS NOTES
Subjective:        Patient ID: Gorge Snyder is a 44 y o  female  Chief Complaint:  Suzi Goldsmith is here to follow-up her event monitor which revealed only a few brief, 15 beats her last, relatively slow usually 110-120 beats per minute, runs of atrial tachycardia  Nothing sustained or in a reentrant, no AFib, no VT  No alarming bradycardia, minimum heart rate 50 beats per minute nocturnally  She is not having any more than prior documented symptoms  The following portions of the patient's history were reviewed and updated as appropriate: allergies, current medications, past family history, past medical history, past social history, past surgical history and problem list   Review of Systems   Constitutional: Negative for chills, diaphoresis, malaise/fatigue and weight gain  HENT: Negative for nosebleeds and stridor  Eyes: Negative for double vision, vision loss in left eye, vision loss in right eye and visual disturbance  Cardiovascular: Positive for palpitations  Negative for chest pain, claudication, cyanosis, dyspnea on exertion, irregular heartbeat, leg swelling, near-syncope, orthopnea, paroxysmal nocturnal dyspnea and syncope  Respiratory: Negative for cough, shortness of breath, snoring and wheezing  Endocrine: Negative for polydipsia, polyphagia and polyuria  Hematologic/Lymphatic: Negative for bleeding problem  Does not bruise/bleed easily  Skin: Negative for flushing and rash  Musculoskeletal: Negative for falls and myalgias  Gastrointestinal: Negative for abdominal pain, heartburn, hematemesis, hematochezia, melena and nausea  Genitourinary: Negative for hematuria  Neurological: Negative for brief paralysis, dizziness, focal weakness, headaches, light-headedness, loss of balance and vertigo  Psychiatric/Behavioral: Negative for altered mental status and substance abuse  Allergic/Immunologic: Negative for hives            Objective:      /70   Pulse 67   Ht 5' 7" (1 702 m)   Wt (!) 146 kg (322 lb)   BMI 50 43 kg/m²   Physical Exam  Vitals and nursing note reviewed  Constitutional:       Appearance: She is well-developed  HENT:      Head: Normocephalic and atraumatic  Eyes:      Pupils: Pupils are equal, round, and reactive to light  Neck:      Thyroid: No thyromegaly  Vascular: No JVD  Cardiovascular:      Rate and Rhythm: Normal rate and regular rhythm  Pulses: Intact distal pulses  Heart sounds: Normal heart sounds  No murmur heard  No friction rub  No gallop  Pulmonary:      Effort: Pulmonary effort is normal  No respiratory distress  Breath sounds: Normal breath sounds  No stridor  No wheezing or rales  Abdominal:      General: Bowel sounds are normal       Palpations: Abdomen is soft  There is no mass  Tenderness: There is no abdominal tenderness  Musculoskeletal:         General: Normal range of motion  Cervical back: Normal range of motion and neck supple  Lymphadenopathy:      Cervical: No cervical adenopathy  Skin:     General: Skin is warm and dry  Coloration: Skin is not pale  Findings: No rash  Neurological:      Mental Status: She is alert and oriented to person, place, and time  Psychiatric:         Behavior: Behavior normal          Thought Content:  Thought content normal          Judgment: Judgment normal          Lab Review:   Orders Only on 09/01/2021   Component Date Value    Case Report 09/01/2021                      Value:Surgical Pathology Report                         Case: [id number]                                   Authorizing Provider:  AZUL Burkett    Collected:           09/01/2021 1130              Ordering Location:     [facility]        Received:            09/01/2021 [address] [facility]                                                    Pathologist:           Karina Chung MD Specimen:    Endometrium, Endo Bx   Final Diagnosis 09/01/2021                      Value: This result contains rich text formatting which cannot be displayed here   Additional Information 09/01/2021                      Value: This result contains rich text formatting which cannot be displayed here  Una Cotton Gross Description 09/01/2021                      Value: This result contains rich text formatting which cannot be displayed here  Office Visit on 08/17/2021   Component Date Value    Case Report 08/17/2021                      Value:Gynecologic Cytology Report                       Case: Piot Whiting Provider:  AZUL Champagne    Collected:           08/17/2021 [id number]              Ordering Location:     [facility]       Received:            08/17/2021 [address] [place] [address]                                     [facility]                                                    First Screen:          TATIANA Moss                                                         Rescreen:              Micheline Odonnell                                                                  Pathologist:           Rayray Alvarez MD                                                        Specimen:    LIQUID-BASED PAP, SCREENING, Cervix                                                        Primary Interpretation 08/17/2021 Epithelial cell abnormality     Interpretation 08/17/2021 Atypical squamous cells of undetermined significance  Endometrial cells present in a woman age 39 or above  See Note   Specimen Adequacy 08/17/2021 Satisfactory for evaluation  Absence of endocervical/transformation zone component   Note 08/17/2021                      Value: This result contains rich text formatting which cannot be displayed here      Additional Information 08/17/2021 Value:This result contains rich text formatting which cannot be displayed here   HPV Other HR 08/17/2021 Positive*    HPV16 08/17/2021 Negative     HPV18 08/17/2021 Negative      No results found  Assessment:       1  PSVT (paroxysmal supraventricular tachycardia) ([facility])          Plan:       Brief nonsustained SVT without associated malignant symptoms, I have recommended that she continue present dose diltiazem and metoprolol  I told her I feel the risk outweighs the benefit of escalating her meds at this time, as I suspect she would become symptomatic from her medications, that is would likely develop more excess fatigue which she says she already has so does not want to get any more tired  We will await the results of her sleep study which is going to be done at the end of October, I will see her shortly thereafter in the office and formulate a long-term plan  She will call sooner if her symptoms/palpitations progress in any manner in the meantime

## 2021-09-21 ENCOUNTER — TELEPHONE (OUTPATIENT)
Dept: FAMILY MEDICINE CLINIC | Facility: CLINIC | Age: 51
End: 2021-09-21

## 2021-09-28 ENCOUNTER — TELEPHONE (OUTPATIENT)
Dept: FAMILY MEDICINE CLINIC | Facility: CLINIC | Age: 51
End: 2021-09-28

## 2021-10-21 DIAGNOSIS — E03.9 ACQUIRED HYPOTHYROIDISM: ICD-10-CM

## 2021-10-21 RX ORDER — LEVOTHYROXINE SODIUM 0.07 MG/1
75 TABLET ORAL EVERY MORNING
Qty: 90 TABLET | Refills: 1 | Status: SHIPPED | OUTPATIENT
Start: 2021-10-21 | End: 2022-02-16 | Stop reason: SDUPTHER

## 2021-10-22 ENCOUNTER — TELEPHONE (OUTPATIENT)
Dept: GYNECOLOGY | Facility: CLINIC | Age: 51
End: 2021-10-22

## 2021-10-27 ENCOUNTER — ULTRASOUND (OUTPATIENT)
Dept: GYNECOLOGY | Facility: CLINIC | Age: 51
End: 2021-10-27
Payer: COMMERCIAL

## 2021-10-27 ENCOUNTER — OFFICE VISIT (OUTPATIENT)
Dept: GYNECOLOGY | Facility: CLINIC | Age: 51
End: 2021-10-27
Payer: COMMERCIAL

## 2021-10-27 DIAGNOSIS — R87.619 ABNORMAL CERVICAL PAPANICOLAOU SMEAR, UNSPECIFIED ABNORMAL PAP FINDING: Primary | ICD-10-CM

## 2021-10-27 DIAGNOSIS — R87.619 ABNORMAL GLANDULAR PAPANICOLAOU SMEAR OF CERVIX: Primary | ICD-10-CM

## 2021-10-27 PROCEDURE — 58340 CATHETER FOR HYSTEROGRAPHY: CPT | Performed by: OBSTETRICS & GYNECOLOGY

## 2021-10-27 PROCEDURE — 76830 TRANSVAGINAL US NON-OB: CPT | Performed by: OBSTETRICS & GYNECOLOGY

## 2021-10-27 PROCEDURE — 99212 OFFICE O/P EST SF 10 MIN: CPT | Performed by: OBSTETRICS & GYNECOLOGY

## 2021-10-27 PROCEDURE — 76831 ECHO EXAM UTERUS: CPT | Performed by: OBSTETRICS & GYNECOLOGY

## 2021-10-28 ENCOUNTER — HOSPITAL ENCOUNTER (OUTPATIENT)
Dept: SLEEP CENTER | Facility: HOSPITAL | Age: 51
Discharge: HOME | End: 2021-10-28
Attending: INTERNAL MEDICINE
Payer: COMMERCIAL

## 2021-10-28 DIAGNOSIS — I49.3 PREMATURE VENTRICULAR CONTRACTIONS: ICD-10-CM

## 2021-10-28 DIAGNOSIS — R00.2 PALPITATIONS: ICD-10-CM

## 2021-10-28 DIAGNOSIS — I49.1 PREMATURE ATRIAL CONTRACTION: ICD-10-CM

## 2021-10-28 DIAGNOSIS — I48.92 ATRIAL FLUTTER, UNSPECIFIED TYPE (HCC): ICD-10-CM

## 2021-10-28 PROCEDURE — 95810 POLYSOM 6/> YRS 4/> PARAM: CPT

## 2021-11-03 ENCOUNTER — APPOINTMENT (OUTPATIENT)
Dept: LAB | Facility: MEDICAL CENTER | Age: 51
End: 2021-11-03
Payer: COMMERCIAL

## 2021-11-03 ENCOUNTER — TELEPHONE (OUTPATIENT)
Dept: SLEEP CENTER | Facility: CLINIC | Age: 51
End: 2021-11-03

## 2021-11-03 DIAGNOSIS — C83.32 RETICULOSARCOMA OF INTRATHORACIC LYMPH NODES (HCC): ICD-10-CM

## 2021-11-03 LAB
ALBUMIN SERPL BCP-MCNC: 3.6 G/DL (ref 3.5–5)
ALP SERPL-CCNC: 128 U/L (ref 46–116)
ALT SERPL W P-5'-P-CCNC: 16 U/L (ref 12–78)
ANION GAP SERPL CALCULATED.3IONS-SCNC: 4 MMOL/L (ref 4–13)
AST SERPL W P-5'-P-CCNC: 17 U/L (ref 5–45)
BASOPHILS # BLD AUTO: 0.1 THOUSANDS/ΜL (ref 0–0.1)
BASOPHILS NFR BLD AUTO: 1 % (ref 0–1)
BILIRUB SERPL-MCNC: 0.43 MG/DL (ref 0.2–1)
BUN SERPL-MCNC: 14 MG/DL (ref 5–25)
CALCIUM SERPL-MCNC: 8.8 MG/DL (ref 8.3–10.1)
CHLORIDE SERPL-SCNC: 106 MMOL/L (ref 100–108)
CO2 SERPL-SCNC: 26 MMOL/L (ref 21–32)
CREAT SERPL-MCNC: 0.94 MG/DL (ref 0.6–1.3)
EOSINOPHIL # BLD AUTO: 0.25 THOUSAND/ΜL (ref 0–0.61)
EOSINOPHIL NFR BLD AUTO: 3 % (ref 0–6)
ERYTHROCYTE [DISTWIDTH] IN BLOOD BY AUTOMATED COUNT: 13.2 % (ref 11.6–15.1)
GFR SERPL CREATININE-BSD FRML MDRD: 70 ML/MIN/1.73SQ M
GLUCOSE P FAST SERPL-MCNC: 126 MG/DL (ref 65–99)
HCT VFR BLD AUTO: 45.2 % (ref 34.8–46.1)
HGB BLD-MCNC: 14.6 G/DL (ref 11.5–15.4)
IMM GRANULOCYTES # BLD AUTO: 0.08 THOUSAND/UL (ref 0–0.2)
IMM GRANULOCYTES NFR BLD AUTO: 1 % (ref 0–2)
LYMPHOCYTES # BLD AUTO: 1.26 THOUSANDS/ΜL (ref 0.6–4.47)
LYMPHOCYTES NFR BLD AUTO: 13 % (ref 14–44)
MCH RBC QN AUTO: 32.9 PG (ref 26.8–34.3)
MCHC RBC AUTO-ENTMCNC: 32.3 G/DL (ref 31.4–37.4)
MCV RBC AUTO: 102 FL (ref 82–98)
MONOCYTES # BLD AUTO: 0.58 THOUSAND/ΜL (ref 0.17–1.22)
MONOCYTES NFR BLD AUTO: 6 % (ref 4–12)
NEUTROPHILS # BLD AUTO: 7.19 THOUSANDS/ΜL (ref 1.85–7.62)
NEUTS SEG NFR BLD AUTO: 76 % (ref 43–75)
NRBC BLD AUTO-RTO: 0 /100 WBCS
PLATELET # BLD AUTO: 257 THOUSANDS/UL (ref 149–390)
PMV BLD AUTO: 10.7 FL (ref 8.9–12.7)
POTASSIUM SERPL-SCNC: 4.3 MMOL/L (ref 3.5–5.3)
PROT SERPL-MCNC: 7.5 G/DL (ref 6.4–8.2)
RBC # BLD AUTO: 4.44 MILLION/UL (ref 3.81–5.12)
SODIUM SERPL-SCNC: 136 MMOL/L (ref 136–145)
WBC # BLD AUTO: 9.46 THOUSAND/UL (ref 4.31–10.16)

## 2021-11-03 PROCEDURE — 83625 ASSAY OF LDH ENZYMES: CPT

## 2021-11-03 PROCEDURE — 36415 COLL VENOUS BLD VENIPUNCTURE: CPT

## 2021-11-03 PROCEDURE — 80053 COMPREHEN METABOLIC PANEL: CPT

## 2021-11-03 PROCEDURE — 85025 COMPLETE CBC W/AUTO DIFF WBC: CPT

## 2021-11-03 PROCEDURE — 83615 LACTATE (LD) (LDH) ENZYME: CPT

## 2021-11-05 LAB
LDH SERPL-CCNC: 188 IU/L (ref 119–226)
LDH1 CFR SERPL ELPH: 18 % (ref 17–32)
LDH2 CFR SERPL ELPH: 29 % (ref 25–40)
LDH3 CFR SERPL ELPH: 20 % (ref 17–27)
LDH4 CFR SERPL ELPH: 11 % (ref 5–13)
LDH5 CFR SERPL ELPH: 22 % (ref 4–20)

## 2021-11-18 NOTE — ASSESSMENT & PLAN NOTE
BODØ appears stable with regards to her port after placement in 03/2019  The port is functioning without obvious soft tissue infection  Case reviewed with Dr Fernando Carrillo by phone  We will obtain RUQ venous duplex to rule out DVT  Patient already on therapeutic anticoagulation  Additionally advised short 2-3 day course of NSAIDs for inflammation  Bleeding risk reviewed with the patient  Will arrange for follow-up recheck in 1 week  Questions answered, agreeable to the plan 
aching

## 2021-12-08 ENCOUNTER — IMMUNIZATIONS (OUTPATIENT)
Dept: FAMILY MEDICINE CLINIC | Facility: HOSPITAL | Age: 51
End: 2021-12-08

## 2021-12-08 DIAGNOSIS — Z23 ENCOUNTER FOR IMMUNIZATION: Primary | ICD-10-CM

## 2021-12-08 PROCEDURE — 0064A COVID-19 MODERNA VACC 0.25 ML BOOSTER: CPT

## 2021-12-08 PROCEDURE — 91306 COVID-19 MODERNA VACC 0.25 ML BOOSTER: CPT

## 2022-01-06 ENCOUNTER — OFFICE VISIT (OUTPATIENT)
Dept: CARDIOLOGY CLINIC | Facility: CLINIC | Age: 52
End: 2022-01-06
Payer: COMMERCIAL

## 2022-01-06 VITALS
DIASTOLIC BLOOD PRESSURE: 78 MMHG | HEIGHT: 67 IN | HEART RATE: 68 BPM | BODY MASS INDEX: 45.99 KG/M2 | WEIGHT: 293 LBS | SYSTOLIC BLOOD PRESSURE: 130 MMHG

## 2022-01-06 DIAGNOSIS — G47.33 OSA (OBSTRUCTIVE SLEEP APNEA): Primary | ICD-10-CM

## 2022-01-06 DIAGNOSIS — I48.92 PAROXYSMAL ATRIAL FLUTTER (HCC): ICD-10-CM

## 2022-01-06 DIAGNOSIS — Z86.711 HISTORY OF PULMONARY EMBOLISM: ICD-10-CM

## 2022-01-06 PROCEDURE — 1036F TOBACCO NON-USER: CPT | Performed by: INTERNAL MEDICINE

## 2022-01-06 PROCEDURE — 99214 OFFICE O/P EST MOD 30 MIN: CPT | Performed by: INTERNAL MEDICINE

## 2022-01-06 PROCEDURE — 3008F BODY MASS INDEX DOCD: CPT | Performed by: INTERNAL MEDICINE

## 2022-01-06 NOTE — PROGRESS NOTES
Subjective:        Patient ID: Leonardo Garcia is a 46 y o  female  Chief Complaint:  Luciano Mom is here for routine follow-up, awaiting CPAP titration study  Denies any palpitations, no falls bruising or bleeding issues, no melena or hematochezia, no chest pain shortness breath presyncope syncope TIA or claudication like symptoms  The following portions of the patient's history were reviewed and updated as appropriate: allergies, current medications, past family history, past medical history, past social history, past surgical history and problem list   Review of Systems   Constitutional: Negative for chills, diaphoresis, malaise/fatigue and weight gain  HENT: Negative for nosebleeds and stridor  Eyes: Negative for double vision, vision loss in left eye, vision loss in right eye and visual disturbance  Cardiovascular: Negative for chest pain, claudication, cyanosis, dyspnea on exertion, irregular heartbeat, leg swelling, near-syncope, orthopnea, palpitations, paroxysmal nocturnal dyspnea and syncope  Respiratory: Negative for cough, shortness of breath, snoring and wheezing  Endocrine: Negative for polydipsia, polyphagia and polyuria  Hematologic/Lymphatic: Negative for bleeding problem  Does not bruise/bleed easily  Skin: Negative for flushing and rash  Musculoskeletal: Negative for falls and myalgias  Gastrointestinal: Negative for abdominal pain, heartburn, hematemesis, hematochezia, melena and nausea  Genitourinary: Negative for hematuria  Neurological: Negative for brief paralysis, dizziness, focal weakness, headaches, light-headedness, loss of balance and vertigo  Psychiatric/Behavioral: Negative for altered mental status and substance abuse  Allergic/Immunologic: Negative for hives  Objective:      /78   Pulse 68   Ht 5' 7" (1 702 m)   Wt (!) 147 kg (324 lb)   BMI 50 75 kg/m²   Physical Exam  Vitals and nursing note reviewed     Constitutional: Appearance: She is well-developed  She is not ill-appearing or diaphoretic  HENT:      Head: Normocephalic and atraumatic  Eyes:      General: No scleral icterus  Pupils: Pupils are equal, round, and reactive to light  Neck:      Thyroid: No thyromegaly  Vascular: No carotid bruit or JVD  Cardiovascular:      Rate and Rhythm: Normal rate and regular rhythm  Pulses: Intact distal pulses  Heart sounds: Normal heart sounds  No murmur heard  No friction rub  No gallop  Pulmonary:      Effort: Pulmonary effort is normal  No respiratory distress  Breath sounds: Normal breath sounds  No stridor  No wheezing or rales  Abdominal:      General: Bowel sounds are normal       Palpations: Abdomen is soft  There is no mass  Tenderness: There is no abdominal tenderness  Musculoskeletal:         General: Normal range of motion  Cervical back: Normal range of motion and neck supple  Right lower leg: No edema  Left lower leg: No edema  Lymphadenopathy:      Cervical: No cervical adenopathy  Skin:     General: Skin is warm and dry  Coloration: Skin is not pale  Findings: No rash  Neurological:      Mental Status: She is alert and oriented to person, place, and time  Psychiatric:         Behavior: Behavior normal          Thought Content: Thought content normal          Judgment: Judgment normal          Lab Review:   No visits with results within 2 Month(s) from this visit     Latest known visit with results is:   Appointment on 11/03/2021   Component Date Value    WBC 11/03/2021 9 46     RBC 11/03/2021 4 44     Hemoglobin 11/03/2021 14 6     Hematocrit 11/03/2021 45 2     MCV 11/03/2021 102*    MCH 11/03/2021 32 9     MCHC 11/03/2021 32 3     RDW 11/03/2021 13 2     MPV 11/03/2021 10 7     Platelets 70/01/6381 257     nRBC 11/03/2021 0     Neutrophils Relative 11/03/2021 76*    Immat GRANS % 11/03/2021 1     Lymphocytes Relative 11/03/2021 13*  Monocytes Relative 11/03/2021 6     Eosinophils Relative 11/03/2021 3     Basophils Relative 11/03/2021 1     Neutrophils Absolute 11/03/2021 7 19     Immature Grans Absolute 11/03/2021 0 08     Lymphocytes Absolute 11/03/2021 1 26     Monocytes Absolute 11/03/2021 0 58     Eosinophils Absolute 11/03/2021 0 25     Basophils Absolute 11/03/2021 0 10     Sodium 11/03/2021 136     Potassium 11/03/2021 4 3     Chloride 11/03/2021 106     CO2 11/03/2021 26     ANION GAP 11/03/2021 4     BUN 11/03/2021 14     Creatinine 11/03/2021 0 94     Glucose, Fasting 11/03/2021 126*    Calcium 11/03/2021 8 8     AST 11/03/2021 17     ALT 11/03/2021 16     Alkaline Phosphatase 11/03/2021 128*    Total Protein 11/03/2021 7 5     Albumin 11/03/2021 3 6     Total Bilirubin 11/03/2021 0 43     eGFR 11/03/2021 70     LDH 11/03/2021 188     LD-1 11/03/2021 18     LD-2 11/03/2021 29     LD-3 11/03/2021 20     LD-4 11/03/2021 11     LD-5 11/03/2021 22*     No results found  Assessment:       1  MARGARITO (obstructive sleep apnea)     2  Paroxysmal atrial flutter (HCC)     3  History of pulmonary embolism          Plan:       I think Laura Regan is doing well, no evidence for recurrent atrial flutter, she remains anticoagulated, primarily due to underlying oncologic diagnosis and history of non provoked PE but also due to paroxysmal atrial flutter  Encouraged her to follow through with sleep apnea CPAP set up  Advised she continue with her present cardiac regimen  We will see her back in 6 months, asked her to call sooner with any concerning potential cardiac symptoms in the meantime

## 2022-01-06 NOTE — PATIENT INSTRUCTIONS
Chronic Hypertension, Ambulatory Care   GENERAL INFORMATION:   Chronic hypertension  is a long-term condition in which your blood pressure (BP) is higher than normal  Your BP is the force of your blood moving against the walls of your arteries  Hypertension is a BP of 140/90 or higher  Common symptoms include the following:   · Headache     · Blurred vision    · Chest pain     · Dizziness or weakness     · Trouble breathing     · Nosebleeds  Seek immediate care for the following symptoms:   · Severe headache or vision loss    · Weakness in an arm or leg    · Confusion or difficulty speaking    · Discomfort in your chest that feels like squeezing, pressure, fullness, or pain    · Suddenly feeling lightheaded or trouble breathing    · Pain or discomfort in your back, neck, jaw, stomach, or arm  Treatment for chronic hypertension  may include medicine to lower your BP  You may also need to make lifestyle changes  Take your medicine exactly as directed  Manage chronic hypertension:   · Take your BP at home  Sit and rest for 5 minutes before you take your BP  Extend your arm and support it on a flat surface  Your arm should be at the same level as your heart  Follow the directions that came with your BP monitor  If possible, take at least 2 BP readings each time  Take your BP at least twice a day at the same times each day, such as morning and evening  Keep a log of your BP readings and bring it to your follow-up visits  · Eat less sodium (salt)  Do not add sodium to your food  Limit foods that are high in sodium, such as canned foods, potato chips, and cold cuts  Your healthcare provider may suggest that you follow the [address] [place] [address] Street  The plan is low in sodium, unhealthy fats, and total fat  It is high in potassium, calcium, and fiber  · Exercise regularly  Exercise at least 30 minutes per day, on most days of the week  This will help decrease your BP   Ask your healthcare provider about the best exercise plan for you  · Limit alcohol  Women should limit alcohol to 1 drink a day  Men should limit alcohol to 2 drinks a day  A drink of alcohol is 12 ounces of beer, 5 ounces of wine, or 1½ ounces of liquor  · Do not smoke  If you smoke, it is never too late to quit  Smoking can increase your BP  Smoking also worsens other health conditions you may have that can increase your risk for hypertension  Ask your healthcare provider for information if you need help quitting  Follow up with your healthcare provider as directed: You will need to return to have your BP checked and to have other lab tests done  Write down your questions so you remember to ask them during your visits  CARE AGREEMENT:   You have the right to help plan your care  Learn about your health condition and how it may be treated  Discuss treatment options with your caregivers to decide what care you want to receive  You always have the right to refuse treatment  The above information is an  only  It is not intended as medical advice for individual conditions or treatments  Talk to your doctor, nurse or pharmacist before following any medical regimen to see if it is safe and effective for you  © 2014 [address] is for End User's use only and may not be sold, redistributed or otherwise used for commercial purposes  All illustrations and images included in CareNotes® are the copyrighted property of A D A M , Inc  or Jm Arredondo

## 2022-02-15 ENCOUNTER — OFFICE VISIT (OUTPATIENT)
Dept: FAMILY MEDICINE CLINIC | Facility: CLINIC | Age: 52
End: 2022-02-15
Payer: COMMERCIAL

## 2022-02-15 ENCOUNTER — APPOINTMENT (OUTPATIENT)
Dept: LAB | Facility: MEDICAL CENTER | Age: 52
End: 2022-02-15
Payer: COMMERCIAL

## 2022-02-15 VITALS
BODY MASS INDEX: 45.99 KG/M2 | DIASTOLIC BLOOD PRESSURE: 82 MMHG | OXYGEN SATURATION: 93 % | TEMPERATURE: 97.4 F | HEART RATE: 62 BPM | WEIGHT: 293 LBS | HEIGHT: 67 IN | SYSTOLIC BLOOD PRESSURE: 128 MMHG

## 2022-02-15 DIAGNOSIS — E03.9 ACQUIRED HYPOTHYROIDISM: ICD-10-CM

## 2022-02-15 DIAGNOSIS — Z12.31 ENCOUNTER FOR SCREENING MAMMOGRAM FOR MALIGNANT NEOPLASM OF BREAST: ICD-10-CM

## 2022-02-15 DIAGNOSIS — G47.33 OSA (OBSTRUCTIVE SLEEP APNEA): ICD-10-CM

## 2022-02-15 DIAGNOSIS — C84.48 PERIPHERAL T CELL LYMPHOMA OF LYMPH NODES OF MULTIPLE SITES (HCC): ICD-10-CM

## 2022-02-15 DIAGNOSIS — I48.3 TYPICAL ATRIAL FLUTTER (HCC): ICD-10-CM

## 2022-02-15 DIAGNOSIS — Z00.00 ANNUAL PHYSICAL EXAM: Primary | ICD-10-CM

## 2022-02-15 DIAGNOSIS — Z00.00 ANNUAL PHYSICAL EXAM: ICD-10-CM

## 2022-02-15 DIAGNOSIS — E66.01 MORBID OBESITY WITH BMI OF 50.0-59.9, ADULT (HCC): ICD-10-CM

## 2022-02-15 DIAGNOSIS — R73.01 ELEVATED FASTING GLUCOSE: ICD-10-CM

## 2022-02-15 LAB
ALBUMIN SERPL BCP-MCNC: 3.9 G/DL (ref 3.5–5)
ALP SERPL-CCNC: 132 U/L (ref 46–116)
ALT SERPL W P-5'-P-CCNC: 19 U/L (ref 12–78)
ANION GAP SERPL CALCULATED.3IONS-SCNC: 4 MMOL/L (ref 4–13)
AST SERPL W P-5'-P-CCNC: 23 U/L (ref 5–45)
BASOPHILS # BLD AUTO: 0.1 THOUSANDS/ΜL (ref 0–0.1)
BASOPHILS NFR BLD AUTO: 1 % (ref 0–1)
BILIRUB SERPL-MCNC: 0.49 MG/DL (ref 0.2–1)
BUN SERPL-MCNC: 19 MG/DL (ref 5–25)
CALCIUM SERPL-MCNC: 9.5 MG/DL (ref 8.3–10.1)
CHLORIDE SERPL-SCNC: 104 MMOL/L (ref 100–108)
CHOLEST SERPL-MCNC: 204 MG/DL
CO2 SERPL-SCNC: 28 MMOL/L (ref 21–32)
CREAT SERPL-MCNC: 0.98 MG/DL (ref 0.6–1.3)
EOSINOPHIL # BLD AUTO: 0.29 THOUSAND/ΜL (ref 0–0.61)
EOSINOPHIL NFR BLD AUTO: 3 % (ref 0–6)
ERYTHROCYTE [DISTWIDTH] IN BLOOD BY AUTOMATED COUNT: 14.3 % (ref 11.6–15.1)
GFR SERPL CREATININE-BSD FRML MDRD: 66 ML/MIN/1.73SQ M
GLUCOSE P FAST SERPL-MCNC: 145 MG/DL (ref 65–99)
HCT VFR BLD AUTO: 45.9 % (ref 34.8–46.1)
HDLC SERPL-MCNC: 37 MG/DL
HGB BLD-MCNC: 15 G/DL (ref 11.5–15.4)
IMM GRANULOCYTES # BLD AUTO: 0.06 THOUSAND/UL (ref 0–0.2)
IMM GRANULOCYTES NFR BLD AUTO: 1 % (ref 0–2)
LDLC SERPL CALC-MCNC: 116 MG/DL (ref 0–100)
LYMPHOCYTES # BLD AUTO: 1.41 THOUSANDS/ΜL (ref 0.6–4.47)
LYMPHOCYTES NFR BLD AUTO: 13 % (ref 14–44)
MCH RBC QN AUTO: 32.6 PG (ref 26.8–34.3)
MCHC RBC AUTO-ENTMCNC: 32.7 G/DL (ref 31.4–37.4)
MCV RBC AUTO: 100 FL (ref 82–98)
MONOCYTES # BLD AUTO: 0.59 THOUSAND/ΜL (ref 0.17–1.22)
MONOCYTES NFR BLD AUTO: 6 % (ref 4–12)
NEUTROPHILS # BLD AUTO: 8.11 THOUSANDS/ΜL (ref 1.85–7.62)
NEUTS SEG NFR BLD AUTO: 76 % (ref 43–75)
NONHDLC SERPL-MCNC: 167 MG/DL
NRBC BLD AUTO-RTO: 0 /100 WBCS
PLATELET # BLD AUTO: 271 THOUSANDS/UL (ref 149–390)
PMV BLD AUTO: 10.5 FL (ref 8.9–12.7)
POTASSIUM SERPL-SCNC: 4.4 MMOL/L (ref 3.5–5.3)
PROT SERPL-MCNC: 8 G/DL (ref 6.4–8.2)
RBC # BLD AUTO: 4.6 MILLION/UL (ref 3.81–5.12)
SODIUM SERPL-SCNC: 136 MMOL/L (ref 136–145)
T4 FREE SERPL-MCNC: 0.55 NG/DL (ref 0.76–1.46)
TRIGL SERPL-MCNC: 253 MG/DL
TSH SERPL DL<=0.05 MIU/L-ACNC: 92.6 UIU/ML (ref 0.36–3.74)
WBC # BLD AUTO: 10.56 THOUSAND/UL (ref 4.31–10.16)

## 2022-02-15 PROCEDURE — 84443 ASSAY THYROID STIM HORMONE: CPT

## 2022-02-15 PROCEDURE — 85025 COMPLETE CBC W/AUTO DIFF WBC: CPT

## 2022-02-15 PROCEDURE — 3725F SCREEN DEPRESSION PERFORMED: CPT | Performed by: PHYSICIAN ASSISTANT

## 2022-02-15 PROCEDURE — 83036 HEMOGLOBIN GLYCOSYLATED A1C: CPT

## 2022-02-15 PROCEDURE — 80053 COMPREHEN METABOLIC PANEL: CPT

## 2022-02-15 PROCEDURE — 80061 LIPID PANEL: CPT

## 2022-02-15 PROCEDURE — 84439 ASSAY OF FREE THYROXINE: CPT

## 2022-02-15 PROCEDURE — 1036F TOBACCO NON-USER: CPT | Performed by: PHYSICIAN ASSISTANT

## 2022-02-15 PROCEDURE — 99396 PREV VISIT EST AGE 40-64: CPT | Performed by: PHYSICIAN ASSISTANT

## 2022-02-15 PROCEDURE — 3008F BODY MASS INDEX DOCD: CPT | Performed by: PHYSICIAN ASSISTANT

## 2022-02-15 PROCEDURE — 36415 COLL VENOUS BLD VENIPUNCTURE: CPT

## 2022-02-15 NOTE — PATIENT INSTRUCTIONS

## 2022-02-15 NOTE — PROGRESS NOTES
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 [facility])      Other Visit Diagnoses     Annual physical exam    -  Primary    Relevant Orders    CBC and differential    Comprehensive metabolic panel    Lipid panel    Encounter for screening mammogram for malignant neoplasm of breast        Relevant Orders    Mammo screening bilateral w 3d & cad    Morbid obesity with BMI of 50 0-59 9, adult [facility])        Relevant Orders    Ambulatory Referral to Weight Management        Routine labs ordered  Referral placed to weight management  She will continue to follow with specialists as scheduled  Mammogram ordered    Immunizations and preventive care screenings were discussed with patient today  Appropriate education was printed on patient's after visit summary  Counseling:  Alcohol/drug use: discussed moderation in alcohol intake, the recommendations for healthy alcohol use, and avoidance of illicit drug use  Dental Health: discussed importance of regular tooth brushing, flossing, and dental visits  Injury prevention: discussed safety/seat belts, safety helmets, smoke detectors, carbon dioxide detectors, and smoking near bedding or upholstery  Sexual health: discussed sexually transmitted diseases, partner selection, use of condoms, avoidance of unintended pregnancy, and contraceptive alternatives  · Exercise: the importance of regular exercise/physical activity was discussed   Recommend exercise 3-5 times per week for at least 30 minutes  Return in about 1 year (around 2/15/2023) for Annual physical      Chief Complaint:     Chief Complaint   Patient presents with    Annual Exam     Yearly well visit       History of Present Illness:     Adult Annual Physical   Patient here for a comprehensive physical exam  The patient reports no problems  She is following regularly with her GYN, Cardiologist, and Oncologist  She is having difficulty losing weight  She is due for routine labs  She is up to date with her vaccinations  She is up to date with colon cancer screenings  She is due for a mammogram      Diet and Physical Activity  · Diet/Nutrition: poor diet  · Exercise: no formal exercise  Depression Screening  PHQ-2/9 Depression Screening    Little interest or pleasure in doing things: 0 - not at all  Feeling down, depressed, or hopeless: 0 - not at all  PHQ-2 Score: 0  PHQ-2 Interpretation: Negative depression screen       General Health  · Sleep: sleep apnea, waiting on CPAP  · Hearing: normal - bilateral   · Vision: goes for regular eye exams, most recent eye exam <1 year ago and wears glasses  · Dental: wears dentures, no issues  /GYN Health  · Follows regularly with GYN     Review of Systems:     Review of Systems   Constitutional: Negative for chills, diaphoresis, fatigue and fever  HENT: Negative for congestion, ear pain, postnasal drip, rhinorrhea, sneezing, sore throat and trouble swallowing  Eyes: Negative for pain and visual disturbance  Respiratory: Negative for apnea, cough, shortness of breath and wheezing  Cardiovascular: Negative for chest pain and palpitations  Gastrointestinal: Negative for abdominal pain, constipation, diarrhea, nausea and vomiting  Genitourinary: Negative for dysuria and hematuria  Musculoskeletal: Negative for arthralgias, gait problem and myalgias  Neurological: Negative for dizziness, syncope, weakness, light-headedness, numbness and headaches  Psychiatric/Behavioral: Negative for suicidal ideas  The patient is not nervous/anxious  Past Medical History:     Past Medical History:   Diagnosis Date    Allergic rhinitis     last assessed 04/06/16    Arthritis     b/l feet    Chronic allergic conjunctivitis     Fluttering heart     takes magnesium for same   EKG OK    Fluttering sensation of heart     "periodically, not often since on magnesium"    Foot pain, left     Full dentures     upper    History of cancer chemotherapy 2016    History of dilation and curettage     Hx of tonsillectomy     Hypothyroid     Irregular heart beat     Lymph nodes enlarged     in chest pushing on her bronchioles necessitating inhalers    Neck mass     R neck mass-excised 3/15/2016,, 4/23/18 noted enlarged lymph node right neck-bx today 4/26/2018    Non Hodgkin's lymphoma ([address] )     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    Obese     Obstructive sleep apnea     Port-A-Cath in place 2016    pt reports 'Has it flushed q 4weeks" right chest wall    Post-menopausal     since chemo 4/2016  no menses    Pulmonary embolism ([address] )     last assessed 10/31/16 attributed to Non-Hodgkins hx    Pulmonary embolism on right ([address] ) 7/12/2018    Last Assessment & Plan:  She will need to hold her Xarelto 2 days prior to her procedure and may resume therapy 1-2 days post procedure (depending on whether she is producing blood tinged sputum or not)      Shortness of breath     due to chest tumor    Tachycardia     awaiting cardiology eval,,,4/23/18 "pt reports saw cardiologist at that time and placed on magnesium and "hardly notices it"    Tinnitus     occas    Wears glasses     Wears partial dentures     /lower    Zirconia teeth extracted       Past Surgical History:     Past Surgical History:   Procedure Laterality Date    COLONOSCOPY      COLONOSCOPY N/A 7/26/2018    Procedure: COLONOSCOPY with multiple bx;  Surgeon: Mindy Qiu MD;  Location: [address] [place] [address];  Service: Gastroenterology    CYST REMOVAL Left     L  neck    DILATION AND CURETTAGE OF UTERUS      ESOPHAGOGASTRODUODENOSCOPY N/A 2018    Procedure: ESOPHAGOGASTRODUODENOSCOPY (EGD) with multiple bx;  Surgeon: Sona Sheppard MD;  Location[address] MAIN OR;  Service: Gastroenterology    FACIAL/NECK BIOPSY N/A 2018    Procedure: OPEN DEEP CERVICAL LYMPH NODE EXCISOION/BIOPSY;  Surgeon: Josephine Goodpasture, MD;  Location[facility];  Service: ENT    LYMPHADENECTOMY      PORTACATH PLACEMENT      [place] BX/REMV,LYMPH NODE,DEEP CERV N/A 3/15/2016    Procedure: DISSECTION [address] NECK/DEEP NECK MASS ;  Surgeon: Kimo Madrigal DO;  Location[facility];  Service: ENT    TONSILLECTOMY      TUBAL LIGATION      TUNNELED VENOUS PORT PLACEMENT Right 3/21/2019    Procedure: INSERTION VENOUS PORT (PORT-A-CATH) remove and replace;  Surgeon: Xena Richter MD;  Location: [address] MAIN OR;  Service: General      Social History:     Social History     Socioeconomic History    Marital status: /Civil Union     Spouse name: None    Number of children: None    Years of education: None    Highest education level: None   Occupational History    None   Tobacco Use    Smoking status: Former Smoker     Packs/day: 1 50     Years: 11 00     Pack years: 16 50     Types: Cigarettes     Quit date:      Years since quittin 1    Smokeless tobacco: Never Used   Vaping Use    Vaping Use: Never used   Substance and Sexual Activity    Alcohol use: Yes     Comment: rare    Drug use: Never    Sexual activity: Not Currently     Birth control/protection: Post-menopausal   Other Topics Concern    None   Social History Narrative    None     Social Determinants of Health     Financial Resource Strain: Not on file   Food Insecurity: Not on file   Transportation Needs: Not on file   Physical Activity: Not on file   Stress: Not on file   Social Connections: Not on file   Intimate Partner Violence: Not on file   Housing Stability: Not on file      Family History:     Family History   Problem Relation Age of Onset    Coronary artery disease Father     Diabetes Brother     Diabetes Family     Heart disease Family       Current Medications:     Current Outpatient Medications   Medication Sig Dispense Refill    acetaminophen (TYLENOL) 325 mg tablet Take 650 mg by mouth every 4 (four) hours as needed       cholecalciferol (VITAMIN D3) 1,000 units tablet Take 1,000 Units by mouth every morning       diltiazem (CARDIZEM CD) 120 mg 24 hr capsule Take 120 mg by mouth daily      fluticasone-vilanterol (BREO ELLIPTA) 100-25 mcg/inh inhaler Inhale 1 puff every morning Rinse mouth after use   gabapentin (NEURONTIN) 300 mg capsule       levothyroxine (Euthyrox) 75 mcg tablet Take 1 tablet (75 mcg total) by mouth every morning 90 tablet 1    loratadine (CLARITIN) 10 mg tablet Take 10 mg by mouth daily      metoprolol tartrate (LOPRESSOR) 100 mg tablet Take 1 tablet by mouth twice daily 180 tablet 3    omeprazole (PriLOSEC) 10 mg delayed release capsule Take 10 mg by mouth      rivaroxaban (Xarelto) 20 mg tablet Take 1 tablet (20 mg total) by mouth daily with dinner 90 tablet 3    valACYclovir (VALTREX) 500 mg tablet Take 500 mg by mouth daily       No current facility-administered medications for this visit  Allergies: Allergies   Allergen Reactions    Medical Tape      Redness, soreness on skin      Physical Exam:     /82   Pulse 62   Temp (!) 97 4 °F (36 3 °C)   Ht 5' 7" (1 702 m)   Wt (!) 148 kg (326 lb 6 4 oz)   SpO2 93%   BMI 51 12 kg/m²     Physical Exam  Vitals and nursing note reviewed  Constitutional:       General: She is not in acute distress  Appearance: She is well-developed  She is obese  She is not diaphoretic  HENT:      Head: Normocephalic and atraumatic        Right Ear: Hearing, tympanic membrane, ear canal and external ear normal       Left Ear: Hearing, tympanic membrane, ear canal and external ear normal       Nose: Nose normal  No mucosal edema or rhinorrhea  Mouth/Throat:      Pharynx: No oropharyngeal exudate or posterior oropharyngeal erythema  Cardiovascular:      Rate and Rhythm: Normal rate and regular rhythm  Heart sounds: Normal heart sounds  No murmur heard  No friction rub  No gallop  Pulmonary:      Effort: Pulmonary effort is normal  No respiratory distress  Breath sounds: Normal breath sounds  No wheezing or rales  Abdominal:      General: Bowel sounds are normal  There is no distension  Palpations: Abdomen is soft  Tenderness: There is no abdominal tenderness  There is no guarding  Musculoskeletal:         General: Normal range of motion  Cervical back: Normal range of motion and neck supple  Lymphadenopathy:      Cervical: No cervical adenopathy  Skin:     General: Skin is warm and dry  Findings: No rash  Neurological:      Mental Status: She is alert and oriented to person, place, and time  Psychiatric:         Behavior: Behavior normal          Thought Content: Thought content normal          Judgment: Judgment normal           Terri Flowers PA-C  [facility]     BMI Counseling: Body mass index is 51 12 kg/m²  The BMI is above normal  Nutrition recommendations include decreasing overall calorie intake and 3-5 servings of fruits/vegetables daily  Exercise recommendations include exercising 3-5 times per week

## 2022-02-16 DIAGNOSIS — E03.9 ACQUIRED HYPOTHYROIDISM: ICD-10-CM

## 2022-02-16 DIAGNOSIS — R73.01 ELEVATED FASTING GLUCOSE: Primary | ICD-10-CM

## 2022-02-16 DIAGNOSIS — E11.9 TYPE 2 DIABETES MELLITUS WITHOUT COMPLICATION, WITHOUT LONG-TERM CURRENT USE OF INSULIN (HCC): Primary | ICD-10-CM

## 2022-02-16 DIAGNOSIS — E78.2 MIXED HYPERLIPIDEMIA: ICD-10-CM

## 2022-02-16 LAB
EST. AVERAGE GLUCOSE BLD GHB EST-MCNC: 148 MG/DL
HBA1C MFR BLD: 6.8 %

## 2022-02-16 PROCEDURE — 3044F HG A1C LEVEL LT 7.0%: CPT | Performed by: PHYSICIAN ASSISTANT

## 2022-02-16 RX ORDER — LEVOTHYROXINE SODIUM 112 UG/1
112 TABLET ORAL EVERY MORNING
Qty: 90 TABLET | Refills: 0 | Status: SHIPPED | OUTPATIENT
Start: 2022-02-16 | End: 2022-05-02 | Stop reason: SDUPTHER

## 2022-03-24 ENCOUNTER — OFFICE VISIT (OUTPATIENT)
Dept: SLEEP CENTER | Facility: CLINIC | Age: 52
End: 2022-03-24
Payer: COMMERCIAL

## 2022-03-24 VITALS
BODY MASS INDEX: 44.41 KG/M2 | TEMPERATURE: 97.7 F | WEIGHT: 293 LBS | OXYGEN SATURATION: 97 % | HEART RATE: 67 BPM | SYSTOLIC BLOOD PRESSURE: 98 MMHG | DIASTOLIC BLOOD PRESSURE: 62 MMHG | HEIGHT: 68 IN

## 2022-03-24 DIAGNOSIS — R53.83 FATIGUE, UNSPECIFIED TYPE: ICD-10-CM

## 2022-03-24 DIAGNOSIS — I48.3 TYPICAL ATRIAL FLUTTER (HCC): ICD-10-CM

## 2022-03-24 DIAGNOSIS — C83.30 MALIGNANT LYMPHOMA, LARGE CELL, DIFFUSE (HCC): ICD-10-CM

## 2022-03-24 DIAGNOSIS — R06.02 SOB (SHORTNESS OF BREATH): ICD-10-CM

## 2022-03-24 DIAGNOSIS — G47.33 OSA (OBSTRUCTIVE SLEEP APNEA): Primary | ICD-10-CM

## 2022-03-24 DIAGNOSIS — G47.34 SLEEP RELATED HYPOXIA: ICD-10-CM

## 2022-03-24 DIAGNOSIS — Z86.711 HISTORY OF PULMONARY EMBOLISM: ICD-10-CM

## 2022-03-24 DIAGNOSIS — R00.2 PALPITATIONS: ICD-10-CM

## 2022-03-24 DIAGNOSIS — E66.01 MORBID OBESITY (HCC): ICD-10-CM

## 2022-03-24 PROCEDURE — 1036F TOBACCO NON-USER: CPT | Performed by: INTERNAL MEDICINE

## 2022-03-24 PROCEDURE — 99204 OFFICE O/P NEW MOD 45 MIN: CPT | Performed by: INTERNAL MEDICINE

## 2022-03-24 PROCEDURE — 3008F BODY MASS INDEX DOCD: CPT | Performed by: INTERNAL MEDICINE

## 2022-03-24 NOTE — PATIENT INSTRUCTIONS

## 2022-03-24 NOTE — PROGRESS NOTES
Consultation - [facility] [facility]  46 y o  female  :1970  NKP:[id number]  DOS:3/24/2022    Physician Requesting Consult: Shashank John DO             Reason for Consult : At your kind request I saw Eliu Breen for initial sleep evaluation today  Patient recently had a diagnostic sleep study and is here to review results / treatment options  The study demonstrated   obstructive sleep apnea: AHI 7 /hour , considerably higher during REM at 38 per hour  Moderate intensity  snoring  was noted and there were 3 respiratory effort-related arousals  Minimum oxygen saturation 83 %  and 26 1 minutes of total sleep time was spent with saturations less than 90%  PFSH, Problem List, Medications & Allergies were reviewed in EMR  Cedric Espino  has a past medical history of Allergic rhinitis, Arthritis, Chronic allergic conjunctivitis, Fluttering heart, Fluttering sensation of heart, Foot pain, left, Full dentures, History of cancer chemotherapy (), History of dilation and curettage, tonsillectomy, Hypothyroid, Irregular heart beat, Lymph nodes enlarged, Neck mass, Non Hodgkin's lymphoma ([address] ), Obese, Obstructive sleep apnea, Port-A-Cath in place (), Post-menopausal, Pulmonary embolism ([address] ), Pulmonary embolism on right ([address] ) (2018), Shortness of breath, Tachycardia, Tinnitus, Wears glasses, Wears partial dentures, and [place] teeth extracted  She has a current medication list which includes the following prescription(s): acetaminophen, cholecalciferol, diltiazem, fluticasone-vilanterol, gabapentin, levothyroxine, loratadine, metoprolol tartrate, omeprazole, rivaroxaban, and valacyclovir  HPI:  Her study was undertaken because of her cardiac arrhythmia   also reports snoring of several years duration but has not noted any breathing difficulties during sleep  She is not aware of snoring or modifying factors  Other Complaints: none   Restless Leg Syndrome: reports no suggestive symptoms  Parasomnia: no features reported    Sleep Routine (on average):   Typical Bedtime:  8:00 p m  Gets OOB:  5:30 a m  TIB:9 5 hrs  Sleep latency:<  30 minutes Sleep Interruptions:2-3/nite because of nocturia and is able to fall back asleep  Awakens: with aid of an alarm  Upon awakening: is not always refreshed   BODØ reports constant fatigue,  feels like napping but avoids   She rated herself at Total score: 9 /24 on the Wyatt Sleepiness Scale  Habits:  reports that she quit smoking about 26 years ago  Her smoking use included cigarettes  She has a 16 50 pack-year smoking history  She has never used smokeless tobacco  ;   E-Cigarette/Vaping    E-Cigarette Use Never User     ;  reports no history of drug use ;  reports current alcohol use  ; Caffeine use:none ; Exercise routine: regular    Family History: Negative for sleep disturbance  ROS - reviewed and as attached  Significant for approximately 50 lb weight gain since she had stem-cell transplant in 2019  She reported no nasal symptoms and feels allergies are controlled on Claritin  She has shortness of breath, wheeze and episodic palpitations  She has acid reflux  She is on gabapentin for 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  EXAM:  BP 98/62 (BP Location: Left arm, Cuff Size: Large)   Pulse 67   Temp 97 7 °F (36 5 °C) (Temporal)   Ht 5' 8" (1 727 m)   Wt (!) 144 kg (317 lb)   SpO2 97%   BMI 48 20 kg/m²    General: Well groomed female, well appearing, in no apparent distress  Neurological: Alert and orientated;  cooperative; Cranial nerves intact;    Psychiatric: Speech:clear and coherent;  Normal mood, affect & thought   Skin: warm and dry; Color& Hydration good; no facial rashes or lesions   HEENT:  Craniofacial anatomy: narrow facies Sinuses: non- tender   TMJ: Normal    Eyes: EOM's intact;  conjunctiva/corneas clear   Ears: Externallyappear normal     Nasal Airway: narrow nares Septum:intact; Mucosa: normal; Turbinates: normal; Rhinorrhea: None   Mouth: Lips: normal posture; Dentition: dentures - upper & lower  Mucosa:moist  ; Hard Palate:narrow   Oropharryx: crowded and AP narrowing Tongue: Mallampati:Class IV, Mobile and MacroglossiaSoft Palate:  redundant  Tonsils: absent  Neck:is thick and excess fatty tissue; Neck Circumference: 19 5 "; Supple; no abnormal masses; Thyroid:normal  Trachea:central     Lymph: No Cervical or Submandibular Lymhadenopathy  Heart: S1,S2 normal; RRR; no gallop; no murmurs   Lungs: Respiratory Effort:normal  Air entry good bilaterally  No wheezes  No rales  Abdomen: Obese, Soft & non-tender    Extremities: No pedal edema  No clubbing or cyanosis  Musculoskeletal:  Motor normal; Gait:normal        Last CMP in February of this year demonstrated marginally elevated CO2 at 28 millimoles per L and elevated fasting glucose    IMPRESSION: Primary/Secondary Sleep Diagnoses (to Medical or Psych conditions) & Comorbidities   1  MARGARITO (obstructive sleep apnea)  CPAP Study   2  Sleep related hypoxia  CPAP Study   3  SOB (shortness of breath)     4  Fatigue, unspecified type     5  Palpitations     6  Typical atrial flutter ([address] )     7  Morbid obesity ([address] )     8  History of pulmonary embolism     9  Malignant lymphoma, large cell, diffuse (HCC)          PLAN:   1  I reviewed results of the Sleep study with the patient  2  With respect to above conditions, I counseled on pathophysiology, diagnosis, treatment options, risks and benefits; inter-relationship and effects on symptoms and comorbidities; risks of no treatment; costs and insurance aspects  3  Patient elected positive airway pressure therapy and is to be scheduled for a titration study  4  I also advised on weight reduction  5  Follow-up to be scheduled after the study to review results and to initiate therapy           Sincerely,     Authenticated electronically by Stef Ospina MD   on 03/24/22   Board Certified Specialist     Portions of the record may have been created with voice recognition software  Occasional wrong word or "sound a like" substitutions may have occurred due to the inherent limitations of voice recognition software  There may also be notations and random deletions of words or characters from malfunctioning software  Read the chart carefully and recognize, using context, where substitutions/deletions have occurred

## 2022-03-24 NOTE — PROGRESS NOTES
Review of Systems      Genitourinary need to urinate more than twice a night   Cardiology palpitations/fluttering feeling in the chest   Gastrointestinal frequent heartburn/acid reflux   Neurology awaken with headache, need to move extremities and numbness/tingling of an extremity   Constitutional weight change   Integumentary none   Psychiatry none   Musculoskeletal joint pain   Pulmonary wheezing   ENT throat clearing   Endocrine none   Hematological none

## 2022-04-08 ENCOUNTER — HOSPITAL ENCOUNTER (OUTPATIENT)
Dept: CT IMAGING | Facility: HOSPITAL | Age: 52
Discharge: HOME | End: 2022-04-08
Payer: COMMERCIAL

## 2022-04-08 DIAGNOSIS — C83.38 RETICULOSARCOMA OF LYMPH NODES OF MULTIPLE SITES (HCC): ICD-10-CM

## 2022-04-08 PROCEDURE — 71260 CT THORAX DX C+: CPT

## 2022-04-08 PROCEDURE — 70491 CT SOFT TISSUE NECK W/DYE: CPT

## 2022-04-08 PROCEDURE — 74177 CT ABD & PELVIS W/CONTRAST: CPT

## 2022-04-08 PROCEDURE — G1004 CDSM NDSC: HCPCS

## 2022-04-08 RX ADMIN — IOHEXOL 100 ML: 350 INJECTION, SOLUTION INTRAVENOUS at 14:52

## 2022-04-26 ENCOUNTER — CONSULT (OUTPATIENT)
Dept: BARIATRICS | Facility: CLINIC | Age: 52
End: 2022-04-26
Payer: COMMERCIAL

## 2022-04-26 VITALS
OXYGEN SATURATION: 96 % | RESPIRATION RATE: 20 BRPM | WEIGHT: 293 LBS | SYSTOLIC BLOOD PRESSURE: 116 MMHG | HEART RATE: 79 BPM | HEIGHT: 68 IN | BODY MASS INDEX: 44.41 KG/M2 | DIASTOLIC BLOOD PRESSURE: 68 MMHG | TEMPERATURE: 98.1 F

## 2022-04-26 DIAGNOSIS — G47.33 OSA (OBSTRUCTIVE SLEEP APNEA): ICD-10-CM

## 2022-04-26 DIAGNOSIS — E03.9 ACQUIRED HYPOTHYROIDISM: ICD-10-CM

## 2022-04-26 DIAGNOSIS — E11.9 TYPE 2 DIABETES MELLITUS WITHOUT COMPLICATION, WITHOUT LONG-TERM CURRENT USE OF INSULIN (HCC): ICD-10-CM

## 2022-04-26 DIAGNOSIS — Z86.711 HISTORY OF PULMONARY EMBOLISM: ICD-10-CM

## 2022-04-26 DIAGNOSIS — E66.01 OBESITY, CLASS III, BMI 40-49.9 (MORBID OBESITY) (HCC): Primary | ICD-10-CM

## 2022-04-26 PROBLEM — E66.813 OBESITY, CLASS III, BMI 40-49.9 (MORBID OBESITY): Status: ACTIVE | Noted: 2022-04-26

## 2022-04-26 PROCEDURE — 3078F DIAST BP <80 MM HG: CPT | Performed by: PHYSICIAN ASSISTANT

## 2022-04-26 PROCEDURE — 3074F SYST BP LT 130 MM HG: CPT | Performed by: PHYSICIAN ASSISTANT

## 2022-04-26 PROCEDURE — 1036F TOBACCO NON-USER: CPT | Performed by: PHYSICIAN ASSISTANT

## 2022-04-26 PROCEDURE — 99204 OFFICE O/P NEW MOD 45 MIN: CPT | Performed by: PHYSICIAN ASSISTANT

## 2022-04-26 PROCEDURE — 3008F BODY MASS INDEX DOCD: CPT | Performed by: PHYSICIAN ASSISTANT

## 2022-04-26 RX ORDER — ZINC GLUCONATE 50 MG
50 TABLET ORAL DAILY
COMMUNITY

## 2022-04-26 NOTE — ASSESSMENT & PLAN NOTE
Lab Results   Component Value Date    HGBA1C 6 8 (H) 02/15/2022   -recently diagnosed  -prefers to work on reducing blood sugar through dietary/lifestlye changes  -can consider metformin/GLP-1

## 2022-04-26 NOTE — PATIENT INSTRUCTIONS
Goals: Food log (ie ) www myfitnesspal com,sparkpeople  com,loseit com,calorieking  com,etc    No sugary beverages  At least 64oz of water daily  Increase physical activity by 10 minutes daily   Gradually increase physical activity to a goal of 5 days per week for 30 minutes of MODERATE intensity PLUS 2 days per week of FULL BODY resistance training  7254-2167 calories per day  5-10 servings of fruits and vegetables per day  90 grams of protein per day

## 2022-04-26 NOTE — PROGRESS NOTES
Assessment/Plan:    Obesity, Class III, BMI 40-49 9 (morbid obesity) ([facility] )  -Discussed options of HealthyCORE-Intensive Lifestyle Intervention Program, Very Low Calorie Diet-VLCD, Conservative Program, Alberto-En-Y Gastric Bypass and Vertical Sleeve Gastrectomy and the role of weight loss medications   -Not a candidate for sympathomimetics  -Initial weight loss goal of 5-10% weight loss for improved health  -Screening labs: reviewed CMP, Lipid panel, TSH  HgbA1c  -Patient is interested in pursuing conservative program  -Calorie goals, sample menu, portion size guidelines, and food logging reviewed with the patient  Type 2 diabetes mellitus without complication, without long-term current use of insulin ([facility])    Lab Results   Component Value Date    HGBA1C 6 8 (H) 02/15/2022   -recently diagnosed  -prefers to work on reducing blood sugar through dietary/lifestlye changes  -can consider metformin/GLP-1    MARGARITO (obstructive sleep apnea)  -awaiting CPAP machine  -follows with sleep med  -may improve with weight loss    Acquired hypothyroidism  -TSH recently significantly elevated  -On levothyroxine  -being monitored by PCP    History of pulmonary embolism  -On Xarelto    Goals:    Food log (ie ) www myfitnesspal com,sparkpeople  com,loseit com,calorieking  com,etc    No sugary beverages  At least 64oz of water daily  Increase physical activity by 10 minutes daily  Gradually increase physical activity to a goal of 5 days per week for 30 minutes of MODERATE intensity PLUS 2 days per week of FULL BODY resistance training  [phone] calories per day  5-10 servings of fruits and vegetables per day  90 grams of protein per day    Follow up in approximately 1 month with Non-Surgical Physician/Advanced Practitioner      Diagnoses and all orders for this visit:    Obesity, Class III, BMI 40-49 9 (morbid obesity) ([facility])    BMI 45 0-49 9, adult ([facility] )  -     Ambulatory Referral to Weight Management    Type 2 diabetes mellitus without complication, without long-term current use of insulin (HCC)    MARGARITO (obstructive sleep apnea)    Acquired hypothyroidism    History of pulmonary embolism    Other orders  -     zinc gluconate 50 mg tablet; Take 50 mg by mouth daily          Subjective:   Chief Complaint   Patient presents with    Consult       Patient ID: Dagoberto Hyman  is a 46 y o  female with excess weight/obesity here to pursue weight managment  Past Medical History:   Diagnosis Date    Allergic rhinitis     last assessed 04/06/16    Arthritis     b/l feet    Chronic allergic conjunctivitis     Fluttering heart     takes magnesium for same   EKG OK    Fluttering sensation of heart     "periodically, not often since on magnesium"    Foot pain, left     Full dentures     upper    History of cancer chemotherapy 2016    History of dilation and curettage     Hx of tonsillectomy     Hypothyroid     Irregular heart beat     Lymph nodes enlarged     in chest pushing on her bronchioles necessitating inhalers    Neck mass     R neck mass-excised 3/15/2016,, 4/23/18 noted enlarged lymph node right neck-bx today 4/26/2018    Non Hodgkin's lymphoma ([address] )     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    Obese     Obstructive sleep apnea     Port-A-Cath in place 2016    pt reports 'Has it flushed q 4weeks" right chest wall    Post-menopausal     since chemo 4/2016  no menses    Pulmonary embolism ([address] )     last assessed 10/31/16 attributed to Non-Hodgkins hx    Pulmonary embolism on right ([address] ) 7/12/2018    Last Assessment & Plan:  She will need to hold her Xarelto 2 days prior to her procedure and may resume therapy 1-2 days post procedure (depending on whether she is producing blood tinged sputum or not)      Shortness of breath     due to chest tumor    Tachycardia     awaiting cardiology landy,,,4/23/18 "pt reports saw cardiologist at that time and placed on magnesium and "hardly notices it"    Tinnitus     occas    Wears glasses     Wears partial dentures     /lower    [place] teeth extracted          HPI:  Obesity/Excess Weight:  Severity: Severe  Onset:  Since childhood    Modifiers: slimfast, meal replacements, self created diets  Contributing factors: Poor Food Choices and grabbing convenient foods  Associated symptoms: fatigue and doesnt't want to socialize    Goals: Below 200 lbs  Highest: 326 lbs    Hydration: water over 2 liters, iced decaf coffee mixed with premiere protein shake, seltzer water, 8oz diet coke  Exercise: started walking 30 min daily in the past 2 weeks  Occupation: works at [employer]  Sleep: 6-7hours  ETOH: rarely    Reports being on Gabapentin for post herpetic neuralgia - informed of weight gain SE possibility    Colonoscopy: completed 2017; 10 year recall    The following portions of the patient's history were reviewed and updated as appropriate: allergies, current medications, past family history, past medical history, past social history, past surgical history and problem list     Review of Systems   Constitutional: Negative for chills and fever  HENT: Negative for sore throat  Respiratory: Negative for cough and shortness of breath  Cardiovascular: Negative for chest pain and palpitations  Gastrointestinal: Negative for abdominal pain (gets bloated/gassy), nausea and vomiting  Genitourinary: Negative for dysuria  Musculoskeletal: Positive for arthralgias  Skin: Negative for rash  Neurological: Negative for dizziness and headaches  Psychiatric/Behavioral: Negative for dysphoric mood  Objective:    /68   Pulse 79   Temp 98 1 °F (36 7 °C)   Resp 20   Ht 5' 7 5" (1 715 m)   Wt (!) 142 kg (313 lb)   SpO2 96%   BMI 48 30 kg/m²     Physical Exam  Vitals and nursing note reviewed  Constitutional   General appearance: Abnormal   well developed and morbidly obese  Eyes No conjunctival pallor     Ears, Nose, Mouth, and Throat Oral mucosa moist    Pulmonary   Respiratory effort: No increased work of breathing or signs of respiratory distress  Auscultation of lungs: Clear to auscultation, equal breath sounds bilaterally, no wheezes, no rales, no rhonci  Cardiovascular   Auscultation of heart: Normal rate and rhythm, normal S1 and S2, without murmurs  Examination of extremities for edema and/or varicosities: Normal   no edema  Abdomen   Abdomen: Abnormal   The abdomen was obese  Bowel sounds were normal  The abdomen was soft and nontender     Musculoskeletal   Gait and station: Normal     Psychiatric   Orientation to person, place and time: Normal     Affect: appropriate

## 2022-04-26 NOTE — ASSESSMENT & PLAN NOTE
-Discussed options of HealthyCORE-Intensive Lifestyle Intervention Program, Very Low Calorie Diet-VLCD, Conservative Program, Alberto-En-Y Gastric Bypass and Vertical Sleeve Gastrectomy and the role of weight loss medications   -Not a candidate for sympathomimetics  -Initial weight loss goal of 5-10% weight loss for improved health  -Screening labs: reviewed CMP, Lipid panel, TSH  HgbA1c  -Patient is interested in pursuing conservative program  -Calorie goals, sample menu, portion size guidelines, and food logging reviewed with the patient

## 2022-04-29 ENCOUNTER — APPOINTMENT (OUTPATIENT)
Dept: LAB | Facility: MEDICAL CENTER | Age: 52
End: 2022-04-29
Payer: COMMERCIAL

## 2022-04-29 DIAGNOSIS — E11.9 TYPE 2 DIABETES MELLITUS WITHOUT COMPLICATION, WITHOUT LONG-TERM CURRENT USE OF INSULIN (HCC): ICD-10-CM

## 2022-04-29 DIAGNOSIS — E78.2 MIXED HYPERLIPIDEMIA: ICD-10-CM

## 2022-04-29 DIAGNOSIS — E03.9 ACQUIRED HYPOTHYROIDISM: ICD-10-CM

## 2022-04-29 LAB
ALBUMIN SERPL BCP-MCNC: 3.5 G/DL (ref 3.5–5)
ALP SERPL-CCNC: 116 U/L (ref 46–116)
ALT SERPL W P-5'-P-CCNC: 23 U/L (ref 12–78)
ANION GAP SERPL CALCULATED.3IONS-SCNC: 5 MMOL/L (ref 4–13)
AST SERPL W P-5'-P-CCNC: 25 U/L (ref 5–45)
BILIRUB SERPL-MCNC: 0.36 MG/DL (ref 0.2–1)
BUN SERPL-MCNC: 17 MG/DL (ref 5–25)
CALCIUM SERPL-MCNC: 9.5 MG/DL (ref 8.3–10.1)
CHLORIDE SERPL-SCNC: 107 MMOL/L (ref 100–108)
CHOLEST SERPL-MCNC: 192 MG/DL
CO2 SERPL-SCNC: 26 MMOL/L (ref 21–32)
CREAT SERPL-MCNC: 0.94 MG/DL (ref 0.6–1.3)
EST. AVERAGE GLUCOSE BLD GHB EST-MCNC: 140 MG/DL
GFR SERPL CREATININE-BSD FRML MDRD: 69 ML/MIN/1.73SQ M
GLUCOSE P FAST SERPL-MCNC: 135 MG/DL (ref 65–99)
HBA1C MFR BLD: 6.5 %
HDLC SERPL-MCNC: 31 MG/DL
LDLC SERPL CALC-MCNC: 130 MG/DL (ref 0–100)
NONHDLC SERPL-MCNC: 161 MG/DL
POTASSIUM SERPL-SCNC: 3.9 MMOL/L (ref 3.5–5.3)
PROT SERPL-MCNC: 7.3 G/DL (ref 6.4–8.2)
SODIUM SERPL-SCNC: 138 MMOL/L (ref 136–145)
T4 FREE SERPL-MCNC: 0.94 NG/DL (ref 0.76–1.46)
TRIGL SERPL-MCNC: 155 MG/DL
TSH SERPL DL<=0.05 MIU/L-ACNC: 33.7 UIU/ML (ref 0.45–4.5)

## 2022-04-29 PROCEDURE — 80053 COMPREHEN METABOLIC PANEL: CPT

## 2022-04-29 PROCEDURE — 84439 ASSAY OF FREE THYROXINE: CPT

## 2022-04-29 PROCEDURE — 80061 LIPID PANEL: CPT

## 2022-04-29 PROCEDURE — 84443 ASSAY THYROID STIM HORMONE: CPT

## 2022-04-29 PROCEDURE — 83036 HEMOGLOBIN GLYCOSYLATED A1C: CPT

## 2022-04-29 PROCEDURE — 3044F HG A1C LEVEL LT 7.0%: CPT | Performed by: PHYSICIAN ASSISTANT

## 2022-05-09 DIAGNOSIS — I48.92 PAROXYSMAL ATRIAL FLUTTER (HCC): Primary | ICD-10-CM

## 2022-05-09 RX ORDER — DILTIAZEM HYDROCHLORIDE 120 MG/1
120 CAPSULE, COATED, EXTENDED RELEASE ORAL DAILY
Qty: 90 CAPSULE | Refills: 3 | Status: SHIPPED | OUTPATIENT
Start: 2022-05-09

## 2022-06-07 ENCOUNTER — OFFICE VISIT (OUTPATIENT)
Dept: BARIATRICS | Facility: CLINIC | Age: 52
End: 2022-06-07
Payer: COMMERCIAL

## 2022-06-07 VITALS
DIASTOLIC BLOOD PRESSURE: 60 MMHG | BODY MASS INDEX: 44.41 KG/M2 | HEART RATE: 79 BPM | SYSTOLIC BLOOD PRESSURE: 110 MMHG | WEIGHT: 293 LBS | TEMPERATURE: 98.6 F | HEIGHT: 68 IN

## 2022-06-07 DIAGNOSIS — E66.01 OBESITY, CLASS III, BMI 40-49.9 (MORBID OBESITY) (HCC): Primary | ICD-10-CM

## 2022-06-07 DIAGNOSIS — G47.33 OSA (OBSTRUCTIVE SLEEP APNEA): ICD-10-CM

## 2022-06-07 DIAGNOSIS — E11.9 TYPE 2 DIABETES MELLITUS WITHOUT COMPLICATION, WITHOUT LONG-TERM CURRENT USE OF INSULIN (HCC): ICD-10-CM

## 2022-06-07 DIAGNOSIS — E03.9 ACQUIRED HYPOTHYROIDISM: ICD-10-CM

## 2022-06-07 PROCEDURE — 3078F DIAST BP <80 MM HG: CPT | Performed by: PHYSICIAN ASSISTANT

## 2022-06-07 PROCEDURE — 3008F BODY MASS INDEX DOCD: CPT | Performed by: PHYSICIAN ASSISTANT

## 2022-06-07 PROCEDURE — 3074F SYST BP LT 130 MM HG: CPT | Performed by: PHYSICIAN ASSISTANT

## 2022-06-07 PROCEDURE — 99214 OFFICE O/P EST MOD 30 MIN: CPT | Performed by: PHYSICIAN ASSISTANT

## 2022-06-07 PROCEDURE — 1036F TOBACCO NON-USER: CPT | Performed by: PHYSICIAN ASSISTANT

## 2022-06-07 NOTE — ASSESSMENT & PLAN NOTE
Lab Results   Component Value Date    HGBA1C 6 5 (H) 04/29/2022   -recently diagnosed  -prefers to work on reducing blood sugar through dietary/lifestlye changes  -can consider metformin/GLP-1

## 2022-06-07 NOTE — PROGRESS NOTES
Assessment/Plan:    Obesity, Class III, BMI 40-49 9 (morbid obesity) ([facility] )  -Patient is pursuing Conservative Program  -Initial weight loss goal of 5-10% weight loss for improved health  -Screening labs: up to date  -logging on MyFitness Pal  Not measuring portions  Encouraged her to do this  -dietary recall reviewed, suggestions provided  -will work on incorporating more fiber, fruits and veggies    Initial: 313 lbs  Current: 304 8 lbs  Change: -8 2 lbs  Goal: below 200 lbs    Type 2 diabetes mellitus without complication, without long-term current use of insulin ([facility])      Lab Results   Component Value Date    HGBA1C 6 5 (H) 04/29/2022   -recently diagnosed  -prefers to work on reducing blood sugar through dietary/lifestlye changes  -can consider metformin/GLP-1    MARGARITO (obstructive sleep apnea)  -awaiting CPAP machine  -follows with sleep med  -may improve with weight loss    Acquired hypothyroidism  -awaiting CPAP machine  -follows with sleep med  -may improve with weight loss    Goals:    Food log (ie ) www myfitnesspal com,sparkpeople  com,loseit com,calorieking  com,etc    No sugary beverages  At least 64oz of water daily  Increase physical activity by 10 minutes daily  Gradually increase physical activity to a goal of 5 days per week for 30 minutes of MODERATE intensity PLUS 2 days per week of FULL BODY resistance training  1281-3917 calories per day  5-10 servings of fruits and vegetables per day  Protein portion: 5 oz   Add nonstarchy veggie to lunch and dinner  Add protein to afternoon snack - 100 marcin pack nuts, cheese stick, greek yogurt    Follow up in approximately 1 month with Non-Surgical Physician/Advanced Practitioner       Diagnoses and all orders for this visit:    Obesity, Class III, BMI 40-49 9 (morbid obesity) ([facility])    Type 2 diabetes mellitus without complication, without long-term current use of insulin ([facility])    MARGARITO (obstructive sleep apnea)    Acquired hypothyroidism          Subjective:   No chief complaint on file  Patient ID: Mirza Mg  is a 46 y o  female with excess weight/obesity here to pursue weight managment  Patient is pursuing Conservative Program      HPI Patient presents for MWM follow up    Food logging: using My Fitness pal around 1600 calories, doesn't log on weekend but still tries to be mindful  Increased appetite/cravings: Sweet cravings  Fruit/Vegetable servings: minimal - 1 or none  Exercise: walking 2 miles daily  Hydration: 2 liters of water, 8oz diet coke with dinner, seltzer water, iced decaf coffee + premiere protein + mocha flavoring    B: premiere shake mixed with coffee  S: serving of veggie straws  L: atkins bar or grilled chicken  S: 2 SF candies  D:  Smoked sausage with mashed potato OR 2 hotdogs + mac and cheese  S: SF Buffalo bar or fudge pop     Colonoscopy: completed 2017, 10 year recall    The following portions of the patient's history were reviewed and updated as appropriate: allergies, current medications, past family history, past medical history, past social history, past surgical history and problem list     Review of Systems   Respiratory: Negative  Cardiovascular: Negative  Objective:    /60   Pulse 79   Temp 98 6 °F (37 °C)   Ht 5' 8" (1 727 m)   Wt (!) 138 kg (304 lb 12 8 oz)   BMI 46 34 kg/m²      Physical Exam  Vitals and nursing note reviewed  Constitutional:       General: She is not in acute distress  Appearance: She is obese  She is not toxic-appearing  HENT:      Head: Normocephalic and atraumatic  Eyes:      General: No scleral icterus  Pulmonary:      Effort: Pulmonary effort is normal  No respiratory distress  Musculoskeletal:         General: Normal range of motion  Skin:     General: Skin is dry  Neurological:      Mental Status: She is alert and oriented to person, place, and time  Mental status is at baseline     Psychiatric:         Mood and Affect: Mood normal          Behavior: Behavior normal          Thought Content:  Thought content normal

## 2022-06-07 NOTE — PATIENT INSTRUCTIONS
Goals: Food log (ie ) www myfitnesspal com,sparkpeople  com,loseit com,calorieking  com,etc    No sugary beverages  At least 64oz of water daily  Increase physical activity by 10 minutes daily   Gradually increase physical activity to a goal of 5 days per week for 30 minutes of MODERATE intensity PLUS 2 days per week of FULL BODY resistance training  9673-6542 calories per day  5-10 servings of fruits and vegetables per day  Protein portion: 5 oz   Add nonstarchy veggie to lunch and dinner  Add protein to afternoon snack - 100 marcin pack nuts, cheese stick, greek yogurt

## 2022-06-07 NOTE — ASSESSMENT & PLAN NOTE
-Patient is pursuing Conservative Program  -Initial weight loss goal of 5-10% weight loss for improved health  -Screening labs: up to date  -logging on [employer]  Not measuring portions   Encouraged her to do this  -dietary recall reviewed, suggestions provided  -will work on incorporating more fiber, fruits and veggies    Initial: 313 lbs  Current: 304 8 lbs  Change: -8 2 lbs  Goal: below 200 lbs

## 2022-06-19 ENCOUNTER — PATIENT MESSAGE (OUTPATIENT)
Dept: CARDIOLOGY CLINIC | Facility: CLINIC | Age: 52
End: 2022-06-19

## 2022-06-19 DIAGNOSIS — R00.2 PALPITATIONS: ICD-10-CM

## 2022-06-19 DIAGNOSIS — I48.92 ATRIAL FLUTTER, UNSPECIFIED TYPE (HCC): ICD-10-CM

## 2022-06-20 RX ORDER — METOPROLOL TARTRATE 100 MG/1
100 TABLET ORAL 2 TIMES DAILY
Qty: 180 TABLET | Refills: 3 | Status: SHIPPED | OUTPATIENT
Start: 2022-06-20

## 2022-06-20 NOTE — TELEPHONE ENCOUNTER
From: Dagoberto Hyman  To:  Chinedu Robles DO  Sent: 6/19/2022 9:59 AM EDT  Subject: Refills for metopeolol and xarelto    I need refills for both metoporol 100mg tabs 3 months supply please, and also I need a refill for Xarelto 20mg tab 3 months supply on that also please  Thank you   BODØ

## 2022-06-28 ENCOUNTER — APPOINTMENT (OUTPATIENT)
Dept: LAB | Facility: MEDICAL CENTER | Age: 52
End: 2022-06-28
Payer: COMMERCIAL

## 2022-06-28 DIAGNOSIS — E03.9 ACQUIRED HYPOTHYROIDISM: ICD-10-CM

## 2022-06-28 LAB
T4 FREE SERPL-MCNC: 1.14 NG/DL (ref 0.76–1.46)
TSH SERPL DL<=0.05 MIU/L-ACNC: 32.6 UIU/ML (ref 0.45–4.5)

## 2022-06-28 PROCEDURE — 84439 ASSAY OF FREE THYROXINE: CPT

## 2022-06-28 PROCEDURE — 84443 ASSAY THYROID STIM HORMONE: CPT

## 2022-06-28 PROCEDURE — 36415 COLL VENOUS BLD VENIPUNCTURE: CPT

## 2022-06-28 RX ORDER — LEVOTHYROXINE SODIUM 137 UG/1
137 TABLET ORAL EVERY MORNING
Qty: 30 TABLET | Refills: 1 | Status: SHIPPED | OUTPATIENT
Start: 2022-06-28 | End: 2022-07-26 | Stop reason: SDUPTHER

## 2022-07-08 ENCOUNTER — HOSPITAL ENCOUNTER (OUTPATIENT)
Dept: ULTRASOUND IMAGING | Facility: HOSPITAL | Age: 52
Discharge: HOME | End: 2022-07-08
Payer: COMMERCIAL

## 2022-07-08 DIAGNOSIS — C85.80 LYMPHOSARCOMA, MIXED CELL TYPE (HCC): ICD-10-CM

## 2022-07-08 PROCEDURE — 76536 US EXAM OF HEAD AND NECK: CPT

## 2022-07-25 ENCOUNTER — LAB (OUTPATIENT)
Dept: LAB | Facility: MEDICAL CENTER | Age: 52
End: 2022-07-25
Payer: COMMERCIAL

## 2022-07-25 DIAGNOSIS — E03.9 ACQUIRED HYPOTHYROIDISM: ICD-10-CM

## 2022-07-25 LAB
T4 FREE SERPL-MCNC: 1.36 NG/DL (ref 0.76–1.46)
TSH SERPL DL<=0.05 MIU/L-ACNC: 18 UIU/ML (ref 0.45–4.5)

## 2022-07-25 PROCEDURE — 84439 ASSAY OF FREE THYROXINE: CPT

## 2022-07-25 PROCEDURE — 36415 COLL VENOUS BLD VENIPUNCTURE: CPT

## 2022-07-25 PROCEDURE — 84443 ASSAY THYROID STIM HORMONE: CPT

## 2022-07-26 DIAGNOSIS — E03.9 ACQUIRED HYPOTHYROIDISM: ICD-10-CM

## 2022-07-26 RX ORDER — LEVOTHYROXINE SODIUM 0.15 MG/1
137 TABLET ORAL EVERY MORNING
Qty: 90 TABLET | Refills: 0 | Status: SHIPPED | OUTPATIENT
Start: 2022-07-26 | End: 2022-10-25 | Stop reason: SDUPTHER

## 2022-08-19 ENCOUNTER — HOSPITAL ENCOUNTER (OUTPATIENT)
Dept: SLEEP CENTER | Facility: HOSPITAL | Age: 52
Discharge: HOME | End: 2022-08-19
Attending: INTERNAL MEDICINE
Payer: COMMERCIAL

## 2022-08-19 DIAGNOSIS — G47.34 SLEEP RELATED HYPOXIA: ICD-10-CM

## 2022-08-19 DIAGNOSIS — G47.33 OSA (OBSTRUCTIVE SLEEP APNEA): ICD-10-CM

## 2022-08-19 PROCEDURE — 95811 POLYSOM 6/>YRS CPAP 4/> PARM: CPT

## 2022-08-20 NOTE — PROGRESS NOTES
Sleep Study Documentation    Pre-Sleep Study       Sleep testing procedure explained to patient:YES    Patient napped prior to study:NO    Caffeine:Dayshift worker after 12PM   Caffeine use:YES- soda  12 ounces    Alcohol:Dayshift workers after 5PM: Alcohol use:NO    Typical day for patient:YES       Study Documentation    Sleep Study Indications: The patient is here for a CPAP titration  She had a diagnostic study done that resulted in an AHI of7 0 consisting of hypopneas  Sleep Study: Treatment   Optimal PAP pressure: 9cm, however, patient was in REM and was only observed approximately 20 minutes on the pressure, she woke up at 5 due to having to leave for work  Leak:Small  Snore:Eliminated  REM Obtained:yes  Supplemental O2: no    Minimum SaO2 89  Baseline SaO2 93  PAP mask tried (list all)ResMed AirFit N20, ResMed AirFit P30i, ResMed AirFit F20  PAP mask choice (final)ResMed AirFit F20 size large  PAP mask type:full face  PAP pressure at which snoring was eliminated 6cm  Mode of Therapy:CPAP  ETCO2:No  CPAP changed to BiPAP:No    Mode of Therapy:CPAP    EKG abnormalities: no     EEG abnormalities: no    Sleep Study Recorded < 2 hours: N/A    Sleep Study Recorded > 2 hours but incomplete study: N/A    Sleep Study Recorded 6 hours but no sleep obtained: NO    Patient classification: employed       Post-Sleep Study    Medication used at bedtime or during sleep study:NO    Patient reports time it took to fall asleep:20 to 30 minutes    Patient reports waking up during study:3 or more times  Patient reports returning to sleep in 10 to 30 minutes  Patient reports sleeping 2 to 4 hours without dreaming  Patient reports sleep during study:typical    Patient rated sleepiness: Not sleepy or tired    PAP treatment:yes: Post PAP treatment patient reports feeling unsure if a change is noted and  would wear PAP mask at home

## 2022-08-22 DIAGNOSIS — G47.33 OSA (OBSTRUCTIVE SLEEP APNEA): Primary | ICD-10-CM

## 2022-08-26 ENCOUNTER — TELEPHONE (OUTPATIENT)
Dept: SLEEP CENTER | Facility: CLINIC | Age: 52
End: 2022-08-26

## 2022-08-26 LAB

## 2022-08-26 NOTE — TELEPHONE ENCOUNTER
Spoke with the patient advised that sleep study is resulted and Dr Alex Green ordered APAP  DME set up and compliance appointments scheduled  Patient placed on wait list for compliance Pt needs compliance between 10/22/2022 12/21/2022 [facility] Dr Alex Green       RX and clinicals for DME set up sent to [facility]

## 2022-08-29 ENCOUNTER — OFFICE VISIT (OUTPATIENT)
Dept: CARDIOLOGY CLINIC | Facility: CLINIC | Age: 52
End: 2022-08-29
Payer: COMMERCIAL

## 2022-08-29 VITALS
BODY MASS INDEX: 44.25 KG/M2 | HEART RATE: 58 BPM | WEIGHT: 292 LBS | HEIGHT: 68 IN | DIASTOLIC BLOOD PRESSURE: 68 MMHG | SYSTOLIC BLOOD PRESSURE: 108 MMHG

## 2022-08-29 DIAGNOSIS — I48.92 PAROXYSMAL ATRIAL FLUTTER (HCC): ICD-10-CM

## 2022-08-29 DIAGNOSIS — Z86.711 HISTORY OF PULMONARY EMBOLISM: ICD-10-CM

## 2022-08-29 DIAGNOSIS — I47.1 ATRIAL TACHYCARDIA (HCC): ICD-10-CM

## 2022-08-29 DIAGNOSIS — I49.3 PREMATURE VENTRICULAR CONTRACTIONS: Primary | ICD-10-CM

## 2022-08-29 PROCEDURE — 99214 OFFICE O/P EST MOD 30 MIN: CPT | Performed by: INTERNAL MEDICINE

## 2022-08-29 PROCEDURE — 93000 ELECTROCARDIOGRAM COMPLETE: CPT | Performed by: INTERNAL MEDICINE

## 2022-08-29 NOTE — PROGRESS NOTES
Subjective:        Patient ID: Omid Adrian is a 46 y o  female  Chief Complaint:  Colton De Anda feels well offers no complaints today, intentionally trying to keep losing some weight which I applauded her for, denies any palpitations chest pain or shortness of breath  As you know she had an unprovoked PE  Has underlying lymphoma  The following portions of the patient's history were reviewed and updated as appropriate: allergies, current medications, past family history, past medical history, past social history, past surgical history and problem list   Review of Systems   Constitutional: Negative for chills, diaphoresis, malaise/fatigue and weight gain  HENT: Negative for nosebleeds and stridor  Eyes: Negative for double vision, vision loss in left eye, vision loss in right eye and visual disturbance  Cardiovascular: Negative for chest pain, claudication, cyanosis, dyspnea on exertion, irregular heartbeat, leg swelling, near-syncope, orthopnea, palpitations, paroxysmal nocturnal dyspnea and syncope  Respiratory: Negative for cough, shortness of breath, snoring and wheezing  Endocrine: Negative for polydipsia, polyphagia and polyuria  Hematologic/Lymphatic: Negative for bleeding problem  Does not bruise/bleed easily  Skin: Negative for flushing and rash  Musculoskeletal: Negative for falls and myalgias  Gastrointestinal: Negative for abdominal pain, heartburn, hematemesis, hematochezia, melena and nausea  Genitourinary: Negative for hematuria  Neurological: Negative for brief paralysis, dizziness, focal weakness, headaches, light-headedness, loss of balance and vertigo  Psychiatric/Behavioral: Negative for altered mental status and substance abuse  Allergic/Immunologic: Negative for hives  Objective:      /68   Pulse 58   Ht 5' 8" (1 727 m)   Wt 132 kg (292 lb)   BMI 44 40 kg/m²   Physical Exam  Vitals and nursing note reviewed     Constitutional: Appearance: She is well-developed  She is not diaphoretic  HENT:      Head: Normocephalic and atraumatic  Eyes:      General: No scleral icterus  Pupils: Pupils are equal, round, and reactive to light  Neck:      Thyroid: No thyromegaly  Vascular: No carotid bruit or JVD  Cardiovascular:      Rate and Rhythm: Normal rate and regular rhythm  Pulses: Intact distal pulses  Heart sounds: Normal heart sounds  No murmur heard  No friction rub  No gallop  Pulmonary:      Effort: Pulmonary effort is normal  No respiratory distress  Breath sounds: Normal breath sounds  No stridor  No wheezing or rales  Abdominal:      General: Bowel sounds are normal       Palpations: Abdomen is soft  There is no mass  Tenderness: There is no abdominal tenderness  Musculoskeletal:         General: Normal range of motion  Cervical back: Normal range of motion and neck supple  Right lower leg: No edema  Left lower leg: No edema  Lymphadenopathy:      Cervical: No cervical adenopathy  Skin:     General: Skin is warm and dry  Coloration: Skin is not pale  Findings: No rash  Neurological:      Mental Status: She is alert and oriented to person, place, and time  Psychiatric:         Behavior: Behavior normal          Thought Content:  Thought content normal          Judgment: Judgment normal          Lab Review:   Telephone on 08/26/2022   Component Date Value    Supplier Name 08/26/2022 [employer]     Supplier Phone Number 08/26/2022 [phone]     Order Status 08/26/2022 Pending Authorization     Delivery Note 08/26/2022 Eliel Dennis Delivery Request Date 08/26/2022 09/22/2022     Item Description 08/26/2022 CPAP Machine, Resmed S10 Auto-CPAP     Item Description 08/26/2022 PAP Mask, Full Face, Fit Upon Setup, N/A, 1 per 3 months     Item Description 08/26/2022 PAP Mask Interface Cushion, Full Face, 1 per 1 month     Item Description 08/26/2022 PAP Headgear, 1 per 6 months     Item Description 08/26/2022 PAP Humidifier, Heated     Item Description 08/26/2022 Disposable PAP Filter, 2 per 1 month     Item Description 08/26/2022 Non-Disposable PAP Filter, 1 per 6 months     Item Description 08/26/2022 PAP Machine Tubing, Heated, 1 per 3 months     Item Description 08/26/2022 PAP Monitoring Modem     Item Description 08/26/2022 Humidifier Water Chamber, 1 per 6 months     Item Description 08/26/2022 PAP Chinstrap, 1 per 6 months    Lab on 07/25/2022   Component Date Value    TSH 3RD GENERATON 07/25/2022 18 000 (A)    Free T4 07/25/2022 1 36      CPAP Study    Result Date: 8/22/2022  Narrative: Treatment Report Patient Name: Irish Ferro Patient Details: Female, 46 years  Patient #: [id number] YOB: 1970 Referring Physician: Dr Cricket Jones Height: 68 0 in Interpreting Physician: Dr Cricket Jones Weight: 304 0 lbs Study Date: 8/19/2022 B  M I : 46 2 STUDY FORMAT: The patient was admitted to the sleep laboratory at the [place] [address]  and oriented to procedures and equipment  Data was obtained using the M.dot sleep monitoring system; Parameters monitored: EEG (frontal, central, occipital) EOG, EMG (chin and leg), ECG, body position, abdominal and thoracic respiratory effort, snoring, pulse oximetry, and airflow  The sleep study was scored following the rules established by the American Academy of Sleep Medicine (AASM)  Hypopnea: event lasting ? 10 seconds with a ?30% reduction in airflow amplitude and a ?4% decrease in SpO2  PATIENT HISTORY:  Nocturnal polysomnography was undertaken to  titrate positive airway pressure in this patient with previously confirmed sleep disordered breathing - RDI of 7 5 per hour  Minimum oxygen saturation was 83% and 26 1 minutes of time asleep spent with saturations below 90%   SLEEP: Patient slept for 311 5 minutes out of 420 0 minutes in bed representing a sleep efficiency of 74 2%  Sleep architecture showed a sleep latency of 20 0 minutes and a REM sleep latency of 171 5minutes  There was 3 2% Stage N1 noted  There was 66 1% Stage N2 noted  There was 14 8%  Stage N3 noted  There was 15 9% Stage REM noted  The patient had 87 arousals for an average of 16 8arousals per hour of sleep  TREATMENT: Positive airway pressure was initiated at 5cm H2O pressure and titrated up to 9cm H2O pressure using the ResMed AirFit F20, size large, interface  She appeared to do well at 7 cm H2O pressure, but developed respiratory events during stage REM in the last hour of the study  At the final pressure, she continued to have respiratory events for an index of 9 5 per hour  Minimum oxygen saturation maintained above 90%  Patient was  observed  during stage REM but not while supine  OXIMETRY: The baseline oxygen saturation with the patient awake was 94 1%  The mean oxygen saturation throughout the study was 93 7%  The amount of sleep time below 90% was 0 6 minutes  The lowest oxygen saturation was 88 0%  BODY POSITION: The patient slept 0 0% of the evening in the supine position, 62 1% on left side, 37 9% on right side, and 0 0% in the prone position  ADDITIONAL DATA: EMG, EEG and ECG were unremarkable  In her post study questionnaire, she estimated sleep latency at 20-30 minutes and total sleep time at 2-4 hours  Impression:    1  Previously confirmed sleep disordered breathing that is partially remediated with positive airway pressure at the above setting  2  Sleep maintenance insomnia Based on the results of this study, continued use of positive airway pressure is recommended  Since she was not observed during REM/supine, this may be initiated using auto titrating Pap 7-12 cm H2O  Board Certified Sleep Physician         Assessment:       1  Premature ventricular contractions  POCT ECG   2   History of pulmonary embolism 3  Paroxysmal atrial flutter ([facility] [address] )     4  [facility] )          Plan:       BODØ has no signs or symptoms reminiscent of angina heart failure nor electrical instability  Her PE was unprovoked, has a history of lymphoma, high risk for recurrence with obesity, with paroxysmal atrial flutter and intermediate stroke risk score will continue with full anticoagulation feel benefit outweighs risk  No overt falls or bleeding issues  No evidence for recurrent SVT or atrial tachycardia  No evidence for recurrent atrial flutter, she examines in sinus rhythm  Blood pressure well controlled, gave her some warning signs and symptoms of low blood pressure, she will call should any arise, a possibility of ongoing weight loss continues  She understands  I will see her back in 6 months, asked her to call me sooner with any concerning potential cardiac symptoms in the meantime

## 2022-08-29 NOTE — PATIENT INSTRUCTIONS
Cholesterol and Your Health   AMBULATORY CARE:   Cholesterol  is a waxy, fat-like substance  Your body uses cholesterol to make hormones and new cells, and to protect nerves  Cholesterol is made by your body  It also comes from certain foods you eat, such as meat and dairy products  Your healthcare provider can help you set goals for your cholesterol levels  He or she can help you create a plan to meet your goals  Cholesterol level goals: Your cholesterol level goals depend on your risk for heart disease, your age, and your other health conditions  The following are general guidelines: Total cholesterol  includes low-density lipoprotein (LDL), high-density lipoprotein (HDL), and triglyceride levels  The total cholesterol level should be lower than 200 mg/dL and is best at about 150 mg/dL  LDL cholesterol  is called bad cholesterol  because it forms plaque in your arteries  As plaque builds up, your arteries become narrow, and less blood flows through  When plaque decreases blood flow to your heart, you may have chest pain  If plaque completely blocks an artery that brings blood to your heart, you may have a heart attack  Plaque can break off and form blood clots  Blood clots may block arteries in your brain and cause a stroke  The level should be less than 130 mg/dL and is best at about 100 mg/dL  HDL cholesterol  is called good cholesterol  because it helps remove LDL cholesterol from your arteries  It does this by attaching to LDL cholesterol and carrying it to your liver  Your liver breaks down LDL cholesterol so your body can get rid of it  High levels of HDL cholesterol can help prevent a heart attack and stroke  Low levels of HDL cholesterol can increase your risk for heart disease, heart attack, and stroke  The level should be 60 mg/dL or higher  Triglycerides  are a type of fat that store energy from foods you eat  High levels of triglycerides also cause plaque buildup   This can increase your risk for a heart attack or stroke  If your triglyceride level is high, your LDL cholesterol level may also be high  The level should be less than 150 mg/dL  Any of the following can increase your risk for high cholesterol:   Smoking cigarettes    Being overweight or obese, or not getting enough exercise    Drinking large amounts of alcohol    A medical condition such as hypertension (high blood pressure) or diabetes    Certain genes passed from your parents to you    Age older than 65 years    What you need to know about having your cholesterol levels checked: Adults 21to 39years of age should have their cholesterol levels checked every 4 to 6 years  Adults 45 years or older should have their cholesterol checked every 1 to 2 years  You may need your cholesterol checked more often, or at a younger age, if you have risk factors for heart disease  You may also need to have your cholesterol checked more often if you have other health conditions, such as diabetes  Blood tests are used to check cholesterol levels  Blood tests measure your levels of triglycerides, LDL cholesterol, and HDL cholesterol  How healthy fats affect your cholesterol levels:  Healthy fats, also called unsaturated fats, help lower LDL cholesterol and triglyceride levels  Healthy fats include the following:  Monounsaturated fats  are found in foods such as olive oil, canola oil, avocado, nuts, and olives  Polyunsaturated fats,  such as omega 3 fats, are found in fish, such as salmon, trout, and tuna  They can also be found in plant foods such as flaxseed, walnuts, and soybeans  How unhealthy fats affect your cholesterol levels:  Unhealthy fats increase LDL cholesterol and triglyceride levels  They are found in foods high in cholesterol, saturated fat, and trans fat:  Cholesterol  is found in eggs, dairy, and meat  Saturated fat  is found in butter, cheese, ice cream, whole milk, and coconut oil   Saturated fat is also found in meat, such as sausage, hot dogs, and bologna  Trans fat  is found in liquid oils and is used in fried and baked foods  Foods that contain trans fats include chips, crackers, muffins, sweet rolls, microwave popcorn, and cookies  Treatment  for high cholesterol will also decrease your risk of heart disease, heart attack, and stroke  Treatment may include any of the following:  Lifestyle changes  may include food, exercise, weight loss, and quitting smoking  You may also need to decrease the amount of alcohol you drink  Your healthcare provider will want you to start with lifestyle changes  Other treatment may be added if lifestyle changes are not enough  Your healthcare provider may recommend you work with a team to manage hyperlipidemia  The team may include medical experts such as a dietitian, an exercise or physical therapist, and a behavior therapist  Your family members may be included in helping you create lifestyle changes  Medicines  may be given to lower your LDL cholesterol, triglyceride levels, or total cholesterol level  You may need medicines to lower your cholesterol if any of the following is true:    You have a history of stroke, TIA, unstable angina, or a heart attack  Your LDL cholesterol level is 190 mg/dL or higher  You are age 36 to 76 years, have diabetes or heart disease risk factors, and your LDL cholesterol is 70 mg/dL or higher  Supplements  include fish oil, red yeast rice, and garlic  Fish oil may help lower your triglyceride and LDL cholesterol levels  It may also increase your HDL cholesterol level  Red yeast rice may help decrease your total cholesterol level and LDL cholesterol level  Garlic may help lower your total cholesterol level  Do not take any supplements without talking to your healthcare provider  Food changes you can make to lower your cholesterol levels:  A dietitian can help you create a healthy eating plan   He or she can show you how to read food labels and choose foods low in saturated fat, trans fats, and cholesterol  Decrease the total amount of fat you eat  Choose lean meats, fat-free or 1% fat milk, and low-fat dairy products, such as yogurt and cheese  Try to limit or avoid red meats  Limit or do not eat fried foods or baked goods, such as cookies  Replace unhealthy fats with healthy fats  Cook foods in olive oil or canola oil  Choose soft margarines that are low in saturated fat and trans fat  Seeds, nuts, and avocados are other examples of healthy fats  Eat foods with omega-3 fats  Examples include salmon, tuna, mackerel, walnuts, and flaxseed  Eat fish 2 times per week  Pregnant women should not eat fish that have high levels of mercury, such as shark, swordfish, and molly mackerel  Increase the amount of high-fiber foods you eat  High-fiber foods can help lower your LDL cholesterol  Aim to get between 20 and 30 grams of fiber each day  Fruits and vegetables are high in fiber  Eat at least 5 servings each day  Other high-fiber foods are whole-grain or whole-wheat breads, pastas, or cereals, and brown rice  Eat 3 ounces of whole-grain foods each day  Increase fiber slowly  You may have abdominal discomfort, bloating, and gas if you add fiber to your diet too quickly  Eat healthy protein foods  Examples include low-fat dairy products, skinless chicken and turkey, fish, and nuts  Limit foods and drinks that are high in sugar  Your dietitian or healthcare provider can help you create daily limits for high-sugar foods and drinks  The limit may be lower if you have diabetes or another health condition  Limits can also help you lose weight if needed  Lifestyle changes you can make to lower your cholesterol levels:   Maintain a healthy weight  Ask your healthcare provider what a healthy weight is for you  Ask him or her to help you create a weight loss plan if needed   Weight loss can decrease your total cholesterol and triglyceride levels  Weight loss may also help keep your blood pressure at a healthy level  Be physically active throughout the day  Physical activity, such as exercise, can help lower your total cholesterol level and maintain a healthy weight  Physical activity can also help increase your HDL cholesterol level  Work with your healthcare provider to create an program that is right for you  Get at least 30 to 40 minutes of moderate physical activity most days of the week  Examples of exercise include brisk walking, swimming, or biking  Also include strength training at least 2 times each week  Your healthcare providers can help you create a physical activity plan  Do not smoke  Nicotine and other chemicals in cigarettes and cigars can raise your cholesterol levels  Ask your healthcare provider for information if you currently smoke and need help to quit  E-cigarettes or smokeless tobacco still contain nicotine  Talk to your healthcare provider before you use these products  Limit or do not drink alcohol  Alcohol can increase your triglyceride levels  Ask your healthcare provider before you drink alcohol  Ask how much is okay for you to drink in 24 hours or 1 week  Follow up with your doctor as directed:  Write down your questions so you remember to ask them during your visits  © Copyright [facility] 2022 Information is for End User's use only and may not be sold, redistributed or otherwise used for commercial purposes  All illustrations and images included in CareNotes® are the copyrighted property of [employer]  or [facility]  The above information is an  only  It is not intended as medical advice for individual conditions or treatments  Talk to your doctor, nurse or pharmacist before following any medical regimen to see if it is safe and effective for you

## 2022-08-30 ENCOUNTER — OFFICE VISIT (OUTPATIENT)
Dept: BARIATRICS | Facility: CLINIC | Age: 52
End: 2022-08-30
Payer: COMMERCIAL

## 2022-08-30 VITALS
BODY MASS INDEX: 44.31 KG/M2 | OXYGEN SATURATION: 98 % | DIASTOLIC BLOOD PRESSURE: 70 MMHG | RESPIRATION RATE: 20 BRPM | SYSTOLIC BLOOD PRESSURE: 122 MMHG | TEMPERATURE: 97.8 F | WEIGHT: 292.4 LBS | HEART RATE: 77 BPM | HEIGHT: 68 IN

## 2022-08-30 DIAGNOSIS — E66.01 OBESITY, CLASS III, BMI 40-49.9 (MORBID OBESITY) (HCC): Primary | ICD-10-CM

## 2022-08-30 DIAGNOSIS — E03.9 ACQUIRED HYPOTHYROIDISM: ICD-10-CM

## 2022-08-30 DIAGNOSIS — E11.9 TYPE 2 DIABETES MELLITUS WITHOUT COMPLICATION, WITHOUT LONG-TERM CURRENT USE OF INSULIN (HCC): ICD-10-CM

## 2022-08-30 DIAGNOSIS — G47.33 OSA (OBSTRUCTIVE SLEEP APNEA): ICD-10-CM

## 2022-08-30 LAB

## 2022-08-30 PROCEDURE — 99214 OFFICE O/P EST MOD 30 MIN: CPT | Performed by: PHYSICIAN ASSISTANT

## 2022-08-30 RX ORDER — MULTIVIT WITH MINERALS/LUTEIN
2000 TABLET ORAL DAILY
COMMUNITY

## 2022-08-30 NOTE — PATIENT INSTRUCTIONS
Goals: Food log (ie ) www myfitnesspal com,sparkpeople  com,loseit com,calorieking  com,etc    No sugary beverages  At least 64oz of water daily  Increase physical activity by 10 minutes daily  Gradually increase physical activity to a goal of 5 days per week for 30 minutes of MODERATE intensity PLUS 2 days per week of FULL BODY resistance training  8210-0528 calories per day  5-10 servings of fruits and vegetables per day  Log 3 full days  10-15 grams of protein between Lunch and dinner! Keep up the great work!

## 2022-08-30 NOTE — ASSESSMENT & PLAN NOTE
-TSH recently significantly elevated, improving  -On levothyroxine, dose increased  -being monitored by PCP

## 2022-08-30 NOTE — PROGRESS NOTES
Assessment/Plan:    Obesity, Class III, BMI 40-49 9 (morbid obesity) ([address] )  -Patient is pursuing Conservative Program  -Initial weight loss goal of 5-10% weight loss for improved health - met  -Screening labs: up to date  -logging on My[employer]itness Pal  Not measuring portions  Encouraged her to do this  -dietary recall reviewed, suggestions provided  -will work on increasing protein in snack between lunch/dinner  -remains consistent with cardiovascular exericse    Initial: 313 lbs  Current: 292 4 lbs  Change: -20 6 lbs  Goal: below 200 lbs    Type 2 diabetes mellitus without complication, without long-term current use of insulin (HCC)      Lab Results   Component Value Date    HGBA1C 6 5 (H) 04/29/2022   -recently diagnosed  -prefers to work on reducing blood sugar through dietary/lifestlye changes  -can consider metformin/GLP-1    MARGARITO (obstructive sleep apnea)  -awaiting CPAP machine  -follows with sleep med  -may improve with weight loss    Acquired hypothyroidism  -TSH recently significantly elevated, improving  -On levothyroxine, dose increased  -being monitored by PCP    Goals:    Food log (ie ) www myfitnesspal com,sparkpeople  com,loseit com,calorieking  com,etc    No sugary beverages  At least 64oz of water daily  Increase physical activity by 10 minutes daily  Gradually increase physical activity to a goal of 5 days per week for 30 minutes of MODERATE intensity PLUS 2 days per week of FULL BODY resistance training  9856-0512 calories per day  5-10 servings of fruits and vegetables per day  Log 3 full days  10-15 grams of protein between Lunch and dinner! Keep up the great work! Follow up in approximately 2 months with Non-Surgical Physician/Advanced Practitioner       Diagnoses and all orders for this visit:    Obesity, Class III, BMI 40-49 9 (morbid obesity) (HCC)    Type 2 diabetes mellitus without complication, without long-term current use of insulin (HCC)    MARGARITO (obstructive sleep apnea)    Acquired hypothyroidism    Other orders  -     Ascorbic Acid (vitamin C) 1000 MG tablet; Take 2,000 mg by mouth daily          Subjective:   Chief Complaint   Patient presents with    Follow-up        Patient ID: Holly Davis  is a 46 y o  female with excess weight/obesity here to pursue weight managment  Patient is pursuing Conservative Program      HPI  Patient presents for MWM follow up  Reports she does really well during the day but struggles sometimes when preparing dinner with snacking and grazing    Hydration: water 3 liters, 8oz diet soda  Logging: On [employer] - doesn't log dinner meal  Exercise:  Walking 2 miles per day (40-45 min)    B: premiere shake mixed with coffee  S: banana or apple + 1 TBSP PB Or 1 serving of baked chips  L:  Chicken apple sausage OR egg/pratt/cheese  S: fruit + estimates 1 TB PB  D: pulled pork + cheese ON low carb wrap   S: carb smart ice cream     The following portions of the patient's history were reviewed and updated as appropriate: allergies, current medications, past family history, past medical history, past social history, past surgical history and problem list     Review of Systems   Respiratory: Negative  Cardiovascular: Negative  Psychiatric/Behavioral: Negative for dysphoric mood  Objective:    /70   Pulse 77   Temp 97 8 °F (36 6 °C)   Resp 20   Ht 5' 7 5" (1 715 m)   Wt 133 kg (292 lb 6 4 oz)   SpO2 98%   BMI 45 12 kg/m²      Physical Exam  Vitals and nursing note reviewed  Constitutional:       General: She is not in acute distress  Appearance: She is not toxic-appearing  HENT:      Head: Normocephalic and atraumatic  Mouth/Throat:      Mouth: Mucous membranes are moist    Eyes:      General: No scleral icterus  Pulmonary:      Effort: Pulmonary effort is normal  No respiratory distress  Abdominal:      Comments: Obese, protuberant   Skin:     General: Skin is dry     Neurological:      Mental Status: She is alert and oriented to person, place, and time     Psychiatric:         Mood and Affect: Mood normal          Behavior: Behavior normal

## 2022-08-30 NOTE — ASSESSMENT & PLAN NOTE
-Patient is pursuing Conservative Program  -Initial weight loss goal of 5-10% weight loss for improved health - met  -Screening labs: up to date  -logging on [employer] Pal  Not measuring portions   Encouraged her to do this  -dietary recall reviewed, suggestions provided  -will work on increasing protein in snack between lunch/dinner  -remains consistent with cardiovascular exericse    Initial: 313 lbs  Current: 292 4 lbs  Change: -20 6 lbs  Goal: below 200 lbs

## 2022-09-01 NOTE — PROGRESS NOTES
Assessment/Plan:    Recommended monthly SBE, annual CBE and annual screening mammo  ASCCP guidelines reviewed and pap with cotesting repeated today  Colonoscopy noted to be up to date  The patient denies STI risk factors and declines testing at this time  Reviewed diet/activity recommendations Calcium 1738-3207 mg and Vit D 600-1000 IU daily  Discussed postmenopausal considerations and symptoms to report  Kegel exercises as instructed and will continue to monitor SANDY sx  RTO in one year for routine annual gyn exam or sooner PRN  Diagnoses and all orders for this visit:    Encounter for gynecological examination (general) (routine) without abnormal findings        Subjective:      Patient ID: Hali Troncoso is a 46 y o  female        This patient presents for routine annual gyn exam   SANDY with almost every cough, sneeze, urogyn referral given at last visit  Today, she states it's not that bad and will wait to proceed with tx  She had benign EMB for endometrial cells noted on pap  She also had benign colpo for normal pap with +HPV, -16, -18  She denies  bleeding or spotting, VM sx, pelvic pain, dyspareunia, breast concerns, abnormal discharge, bowel dysfunction, depression/anx  , sexually active and is monogamous  Pap, 8/17/21  Mammography up to date and normal, 1/18/21, ordered  Colonoscopy up to date, 7/26/18  The following portions of the patient's history were reviewed and updated as appropriate: allergies, current medications, past family history, past medical history, past social history, past surgical history and problem list     Review of Systems   Constitutional: Negative  Respiratory: Negative  Cardiovascular: Negative  Gastrointestinal: Negative  Endocrine: Negative  Genitourinary: Negative for dyspareunia, dysuria, frequency, pelvic pain, urgency, vaginal bleeding, vaginal discharge and vaginal pain  Musculoskeletal: Negative  Skin: Negative  Neurological: Negative  Psychiatric/Behavioral: Negative  Objective:      BP 98/60   Pulse 74   Ht 5' 7" (1 702 m)   Wt 132 kg (291 lb 3 2 oz)   BMI 45 61 kg/m²          Physical Exam  Vitals and nursing note reviewed  Exam conducted with a chaperone present  Constitutional:       Appearance: Normal appearance  She is well-developed  HENT:      Head: Normocephalic and atraumatic  Neck:      Thyroid: No thyroid mass or thyromegaly  Cardiovascular:      Rate and Rhythm: Normal rate and regular rhythm  Heart sounds: Normal heart sounds  Pulmonary:      Effort: Pulmonary effort is normal       Breath sounds: Normal breath sounds  Chest:   Breasts: Breasts are symmetrical       Right: No inverted nipple, mass, nipple discharge, skin change or tenderness  Left: No inverted nipple, mass, nipple discharge, skin change or tenderness  Abdominal:      General: Bowel sounds are normal       Palpations: Abdomen is soft  Tenderness: There is no abdominal tenderness  Hernia: There is no hernia in the left inguinal area or right inguinal area  Genitourinary:     General: Normal vulva  Exam position: Supine  Pubic Area: No rash  Labia:         Right: No rash, tenderness, lesion or injury  Left: No rash, tenderness, lesion or injury  Urethra: No prolapse, urethral pain, urethral swelling or urethral lesion  Vagina: Normal  No signs of injury and foreign body  No vaginal discharge, erythema, tenderness, bleeding, lesions or prolapsed vaginal walls  Cervix: No cervical motion tenderness, discharge, friability, lesion, erythema, cervical bleeding or eversion  Uterus: Not deviated, not enlarged, not fixed, not tender and no uterine prolapse  Adnexa:         Right: No mass, tenderness or fullness  Left: No mass, tenderness or fullness  Rectum: No external hemorrhoid        Comments: Urethra normal without lesions  No bladder tenderness  Musculoskeletal:         General: Normal range of motion  Cervical back: Normal range of motion and neck supple  Lymphadenopathy:      Lower Body: No right inguinal adenopathy  No left inguinal adenopathy  Skin:     General: Skin is warm and dry  Neurological:      Mental Status: She is alert and oriented to person, place, and time  Psychiatric:         Speech: Speech normal          Behavior: Behavior normal  Behavior is cooperative

## 2022-09-06 ENCOUNTER — ANNUAL EXAM (OUTPATIENT)
Dept: GYNECOLOGY | Facility: CLINIC | Age: 52
End: 2022-09-06
Payer: COMMERCIAL

## 2022-09-06 VITALS
WEIGHT: 291.2 LBS | SYSTOLIC BLOOD PRESSURE: 98 MMHG | DIASTOLIC BLOOD PRESSURE: 60 MMHG | HEART RATE: 74 BPM | HEIGHT: 67 IN | BODY MASS INDEX: 45.71 KG/M2

## 2022-09-06 DIAGNOSIS — Z12.4 ENCOUNTER FOR PAPANICOLAOU SMEAR FOR CERVICAL CANCER SCREENING: ICD-10-CM

## 2022-09-06 DIAGNOSIS — Z01.419 ENCOUNTER FOR GYNECOLOGICAL EXAMINATION (GENERAL) (ROUTINE) WITHOUT ABNORMAL FINDINGS: Primary | ICD-10-CM

## 2022-09-06 PROCEDURE — G0145 SCR C/V CYTO,THINLAYER,RESCR: HCPCS | Performed by: PATHOLOGY

## 2022-09-06 PROCEDURE — G0476 HPV COMBO ASSAY CA SCREEN: HCPCS | Performed by: OBSTETRICS & GYNECOLOGY

## 2022-09-06 PROCEDURE — S0612 ANNUAL GYNECOLOGICAL EXAMINA: HCPCS | Performed by: OBSTETRICS & GYNECOLOGY

## 2022-09-07 LAB
HPV HR 12 DNA CVX QL NAA+PROBE: NEGATIVE
HPV16 DNA CVX QL NAA+PROBE: NEGATIVE
HPV18 DNA CVX QL NAA+PROBE: NEGATIVE

## 2022-09-13 LAB
LAB AP GYN PRIMARY INTERPRETATION: ABNORMAL
Lab: ABNORMAL
PATH INTERP SPEC-IMP: ABNORMAL

## 2022-09-16 LAB
DME PARACHUTE DELIVERY DATE EXPECTED: NORMAL
DME PARACHUTE DELIVERY DATE REQUESTED: NORMAL
DME PARACHUTE DELIVERY NOTE: NORMAL
DME PARACHUTE ITEM DESCRIPTION: NORMAL
DME PARACHUTE ORDER STATUS: NORMAL
DME PARACHUTE SUPPLIER NAME: NORMAL
DME PARACHUTE SUPPLIER PHONE: NORMAL

## 2022-09-22 ENCOUNTER — TELEPHONE (OUTPATIENT)
Dept: SLEEP CENTER | Facility: CLINIC | Age: 52
End: 2022-09-22

## 2022-09-22 LAB

## 2022-09-22 NOTE — TELEPHONE ENCOUNTER
Enma G3, set @ 7-12cm auto, heated humidifier, heated tubing, F30 small FFM   Set up @  [facility], per script Dr Marycarmen Nayak

## 2022-10-14 ENCOUNTER — TELEPHONE (OUTPATIENT)
Dept: GYNECOLOGY | Facility: CLINIC | Age: 52
End: 2022-10-14

## 2022-10-25 ENCOUNTER — APPOINTMENT (OUTPATIENT)
Dept: LAB | Facility: MEDICAL CENTER | Age: 52
End: 2022-10-25
Payer: COMMERCIAL

## 2022-10-25 DIAGNOSIS — E03.9 ACQUIRED HYPOTHYROIDISM: ICD-10-CM

## 2022-10-25 LAB
T4 FREE SERPL-MCNC: 1.25 NG/DL (ref 0.76–1.46)
TSH SERPL DL<=0.05 MIU/L-ACNC: 7.9 UIU/ML (ref 0.45–4.5)

## 2022-10-25 PROCEDURE — 84439 ASSAY OF FREE THYROXINE: CPT

## 2022-10-25 PROCEDURE — 36415 COLL VENOUS BLD VENIPUNCTURE: CPT

## 2022-10-25 PROCEDURE — 84443 ASSAY THYROID STIM HORMONE: CPT

## 2022-10-26 ENCOUNTER — TELEPHONE (OUTPATIENT)
Dept: GYNECOLOGY | Facility: CLINIC | Age: 52
End: 2022-10-26

## 2022-10-26 DIAGNOSIS — E03.9 ACQUIRED HYPOTHYROIDISM: ICD-10-CM

## 2022-10-26 DIAGNOSIS — E03.9 ACQUIRED HYPOTHYROIDISM: Primary | ICD-10-CM

## 2022-10-26 RX ORDER — LEVOTHYROXINE SODIUM 175 UG/1
175 TABLET ORAL DAILY
Qty: 90 TABLET | Refills: 0 | Status: SHIPPED | OUTPATIENT
Start: 2022-10-26

## 2022-11-28 ENCOUNTER — PROCEDURE VISIT (OUTPATIENT)
Dept: GYNECOLOGY | Facility: CLINIC | Age: 52
End: 2022-11-28

## 2022-11-28 VITALS
DIASTOLIC BLOOD PRESSURE: 70 MMHG | BODY MASS INDEX: 45.04 KG/M2 | WEIGHT: 287.6 LBS | SYSTOLIC BLOOD PRESSURE: 118 MMHG | HEART RATE: 60 BPM

## 2022-11-28 DIAGNOSIS — R87.610 ASCUS OF CERVIX WITH NEGATIVE HIGH RISK HPV: Primary | ICD-10-CM

## 2022-11-28 NOTE — PROGRESS NOTES
Colposcopy     Date/Time 11/28/2022 10:16 AM     Universal Protocol   Procedure performed by:  Consent: Verbal consent obtained  Written consent obtained  Risks and benefits: risks, benefits and alternatives were discussed (infection, bleeding, pain, injury to surrounding structures, insufficient sampling)  Consent given by: patient  Time out: Immediately prior to procedure a "time out" was called to verify the correct patient, procedure, equipment, support staff and site/side marked as required  Timeout called at: 11/28/2022 10:16 AM   Patient understanding: patient states understanding of the procedure being performed  Patient consent: the patient's understanding of the procedure matches consent given  Procedure consent: procedure consent matches procedure scheduled  Test results: test results available and properly labeled  Required items: required blood products, implants, devices, and special equipment available  Patient identity confirmed: verbally with patient       Performed by  AZUL Morales     Authorized by AZUL Morales        Pre-procedure details     Pre-procedure timeout performed: yes      Prepped with: acetic acid       Indication    ASC-US (8/17/21 ASCUS, +HPV, -16, -18, 9/6/22 ASCUS -HPV)     Procedure Details   Procedure: Colposcopy only      Under satisfactory analgesia the patient was prepped and draped in the dorsal lithotomy position: yes      Hitchita speculum was placed in the vagina: yes      Under colposcopic examination the transition zone was seen in entirety: yes      Biopsy(s): no       Post-procedure      Findings comment:  Pink epithelium    Impression: Low grade cervical dysplasia      Patient tolerance of procedure:   Tolerated well, no immediate complications     Comments       No biopsies necessary  Pt monogamous with

## 2023-01-05 ENCOUNTER — OFFICE VISIT (OUTPATIENT)
Dept: SLEEP CENTER | Facility: CLINIC | Age: 53
End: 2023-01-05

## 2023-01-05 VITALS
HEART RATE: 77 BPM | DIASTOLIC BLOOD PRESSURE: 70 MMHG | HEIGHT: 67 IN | WEIGHT: 286.2 LBS | BODY MASS INDEX: 44.92 KG/M2 | SYSTOLIC BLOOD PRESSURE: 118 MMHG | OXYGEN SATURATION: 94 % | TEMPERATURE: 97.6 F

## 2023-01-05 DIAGNOSIS — I48.3 TYPICAL ATRIAL FLUTTER (HCC): ICD-10-CM

## 2023-01-05 DIAGNOSIS — E66.01 MORBID OBESITY (HCC): ICD-10-CM

## 2023-01-05 DIAGNOSIS — R00.2 PALPITATIONS: ICD-10-CM

## 2023-01-05 DIAGNOSIS — G47.34 SLEEP RELATED HYPOXIA: ICD-10-CM

## 2023-01-05 DIAGNOSIS — G47.33 OSA (OBSTRUCTIVE SLEEP APNEA): Primary | ICD-10-CM

## 2023-01-05 DIAGNOSIS — Z86.711 HISTORY OF PULMONARY EMBOLISM: ICD-10-CM

## 2023-01-05 RX ORDER — OMEPRAZOLE 20 MG/1
20 CAPSULE, DELAYED RELEASE ORAL EVERY MORNING
COMMUNITY
Start: 2022-11-28

## 2023-01-05 NOTE — PROGRESS NOTES
Follow-Up Note - [facility]  45 y o  female  :1970  VKZ:[id number]  DOS:2023    CC: I saw this patient for follow-up in clinic today for Sleep disordered breathing, Coexisting Sleep and Medical Problems  A sleep study to titrate Positive airway pressure (PAP) therapy was undertaken  Auto titrating PAP was initiated and patient is here to review results and to adjust therapy  The study demonstrated sleep disordered breathing that is partially remediated with positive airway pressure at 7 cm H2O  Since she was not observed during REM/supine, auto titrating Pap 7-12 cm H2O was recommended  The prior diagnostic study demonstrated: AHI 7 /hour , considerably higher during REM at 38 per hour  Moderate intensity  snoring  was noted and there were 3 respiratory effort-related arousals  Minimum oxygen saturation 83 %  and 26 1 minutes of total sleep time was spent with saturations less than 90%  PFSH, Problem List, Medications & Allergies were reviewed in EMR  Interval changes: None reported  She  has a past medical history of Allergic rhinitis, Arthritis, Chronic allergic conjunctivitis, Fluttering heart, Fluttering sensation of heart, Foot pain, left, Full dentures, History of cancer chemotherapy (), History of dilation and curettage, tonsillectomy, Hypothyroid, Irregular heart beat, Lymph nodes enlarged, Neck mass, Non Hodgkin's lymphoma ([address] ), Obese, Obstructive sleep apnea, Paroxysmal atrial flutter ([address] ), Port-A-Cath in place (), Post-menopausal, Pulmonary embolism ([address] ), Pulmonary embolism on right ([address] ) (2018), Shortness of breath, Tachycardia, Tinnitus, Wears glasses, Wears partial dentures, and [place] teeth extracted      She has a current medication list which includes the following prescription(s): vitamin c, cholecalciferol, cinnamon, diltiazem, fluticasone-vilanterol, levothyroxine, loratadine, metoprolol tartrate, omeprazole, probiotic product, rivaroxaban, zinc gluconate, and acetaminophen  PHYSIOLOGICAL DATA REVIEW AND INTERPRETATION: Using PAP > 4 hours/night 95%  Estimated SHIRLEY 0 1/hour with pressure of 9 1cm [email or web address] percentile; Patient has not been using ozone based devices to sanitize the machine  SUBJECTIVE: Regarding use of PAP, Jessy Peters reports:   · some adverse effects: dry mouth/throat; has not noticed any fibres or foreign material in air line  · She is benefiting from use: sleeping better   Sleep Routine: Jessy Peters reports getting 6-7 hrs sleep; she has no difficulty initiating or maintaining sleep   She arises needing an alarm and feels more refreshed since on Rx  Jessy Peters reports significantly improved excessive daytime sleepiness,  She rated [herself] at Total score: 3 /24 on the [place] Sleepiness Scale  Habits:[ reports that she quit smoking about 27 years ago  Her smoking use included cigarettes  She has a 16 50 pack-year smoking history  She has never used smokeless tobacco ], [ reports current alcohol use ], [ reports no history of drug use ], Caffeine use:none; Exercise routine: regular  ROS: reviewed & significant for some intentional weight reduction  She reported no nasal or respiratory symptoms  She continues to report palpitations but feels this than before  EXAM: /70 (BP Location: Left arm, Cuff Size: Large)   Pulse 77   Temp 97 6 °F (36 4 °C) (Temporal)   Ht 5' 7" (1 702 m)   Wt 130 kg (286 lb 3 2 oz)   SpO2 94%   BMI 44 83 kg/m²     Wt Readings from Last 3 Encounters:   01/05/23 130 kg (286 lb 3 2 oz)   11/28/22 130 kg (287 lb 9 6 oz)   09/06/22 132 kg (291 lb 3 2 oz)      Patient is well groomed; well appearing  CNS: Alert, orientated, clear & coherent speech  Psych: cooperative and in no distress  Mental state: Appears normal   H&N: EOMI; NC/AT: No facial pressure marks, no rashes      Skin/Extrem: col & hydration normal; no edema  Resp: Respiratory effort is normal  Physical findings otherwise essentially unchanged from previous  IMPRESSION: Problem List Items & Comorbidities Addressed this Visit    1  MARGARITO (obstructive sleep apnea)  PAP DME Resupply/Reorder      2  Sleep related hypoxia        3  Typical atrial flutter (HCC)        4  Palpitations        5  Morbid obesity ([address] )        6  History of pulmonary embolism          PLAN:  1  I reviewed results of prior studies and physiologic data with the patient  2  I discussed treatment options with risks and benefits  3  Treatment with  PAP is medically necessary and [place] is agreable to continue use  4  Care of equipment, methods to improve comfort using PAP and importance of compliance with therapy were discussed  5  Pressure setting: continue 7-12 cmH2O     6  Rx provided to replace supplies and Care coordinated with DME provider  7  Encouraged to persist with strategies for weight reduction  8  Also advised allowing sufficient opportunity for sleep  9  Follow-up is advised in 1 year or sooner if needed to monitor progress, compliance and to adjust therapy  Thank you for allowing me to participate in the care of this patient  Sincerely,     Authenticated electronically on 81/25/48   Board Certified Specialist     Portions of the record may have been created with voice recognition software  Occasional wrong word or "sound a like" substitutions may have occurred due to the inherent limitations of voice recognition software  There may also be notations and random deletions of words or characters from malfunctioning software  Read the chart carefully and recognize, using context, where substitutions/deletions have occurred

## 2023-01-05 NOTE — PATIENT INSTRUCTIONS

## 2023-01-05 NOTE — PROGRESS NOTES
Review of Systems      Genitourinary none   Cardiology palpitations/fluttering feeling in the chest   Gastrointestinal none   Neurology numbness/tingling of an extremity   Constitutional none   Integumentary none   Psychiatry none   Musculoskeletal joint pain and legs twitching/jerking   Pulmonary none   ENT none   Endocrine none   Hematological none

## 2023-01-06 ENCOUNTER — TELEPHONE (OUTPATIENT)
Dept: SLEEP CENTER | Facility: CLINIC | Age: 53
End: 2023-01-06

## 2023-01-09 LAB

## 2023-01-13 ENCOUNTER — APPOINTMENT (OUTPATIENT)
Dept: LAB | Facility: MEDICAL CENTER | Age: 53
End: 2023-01-13

## 2023-01-13 DIAGNOSIS — E03.9 ACQUIRED HYPOTHYROIDISM: ICD-10-CM

## 2023-01-13 LAB — TSH SERPL DL<=0.05 MIU/L-ACNC: 9.97 UIU/ML (ref 0.45–4.5)

## 2023-01-16 ENCOUNTER — TELEPHONE (OUTPATIENT)
Dept: FAMILY MEDICINE CLINIC | Facility: CLINIC | Age: 53
End: 2023-01-16

## 2023-01-16 DIAGNOSIS — E78.2 MIXED HYPERLIPIDEMIA: ICD-10-CM

## 2023-01-16 DIAGNOSIS — E03.9 ACQUIRED HYPOTHYROIDISM: Primary | ICD-10-CM

## 2023-01-16 DIAGNOSIS — E03.9 ACQUIRED HYPOTHYROIDISM: ICD-10-CM

## 2023-01-16 DIAGNOSIS — E11.9 TYPE 2 DIABETES MELLITUS WITHOUT COMPLICATION, WITHOUT LONG-TERM CURRENT USE OF INSULIN (HCC): ICD-10-CM

## 2023-01-16 DIAGNOSIS — C85.80 OTHER SPECIFIED TYPES OF NON-HODGKIN LYMPHOMA, UNSPECIFIED SITE (HCC): ICD-10-CM

## 2023-01-16 RX ORDER — LEVOTHYROXINE SODIUM 0.2 MG/1
200 TABLET ORAL DAILY
Qty: 90 TABLET | Refills: 0 | Status: SHIPPED | OUTPATIENT
Start: 2023-01-16

## 2023-02-03 ENCOUNTER — APPOINTMENT (OUTPATIENT)
Dept: LAB | Facility: HOSPITAL | Age: 53
End: 2023-02-03

## 2023-02-03 DIAGNOSIS — E03.9 ACQUIRED HYPOTHYROIDISM: Primary | ICD-10-CM

## 2023-02-03 DIAGNOSIS — E03.9 ACQUIRED HYPOTHYROIDISM: ICD-10-CM

## 2023-02-03 LAB
ALBUMIN SERPL BCP-MCNC: 4 G/DL (ref 3.5–5)
ALP SERPL-CCNC: 123 U/L (ref 34–104)
ALT SERPL W P-5'-P-CCNC: 9 U/L (ref 7–52)
ANION GAP SERPL CALCULATED.3IONS-SCNC: 8 MMOL/L (ref 4–13)
AST SERPL W P-5'-P-CCNC: 15 U/L (ref 13–39)
BASOPHILS # BLD AUTO: 0.07 THOUSANDS/ÂΜL (ref 0–0.1)
BASOPHILS NFR BLD AUTO: 1 % (ref 0–1)
BILIRUB SERPL-MCNC: 0.46 MG/DL (ref 0.2–1)
BUN SERPL-MCNC: 17 MG/DL (ref 5–25)
CALCIUM SERPL-MCNC: 9.3 MG/DL (ref 8.4–10.2)
CHLORIDE SERPL-SCNC: 103 MMOL/L (ref 96–108)
CHOLEST SERPL-MCNC: 171 MG/DL
CO2 SERPL-SCNC: 26 MMOL/L (ref 21–32)
CREAT SERPL-MCNC: 0.74 MG/DL (ref 0.6–1.3)
CREAT UR-MCNC: 67.7 MG/DL
EOSINOPHIL # BLD AUTO: 0.24 THOUSAND/ÂΜL (ref 0–0.61)
EOSINOPHIL NFR BLD AUTO: 3 % (ref 0–6)
ERYTHROCYTE [DISTWIDTH] IN BLOOD BY AUTOMATED COUNT: 14 % (ref 11.6–15.1)
EST. AVERAGE GLUCOSE BLD GHB EST-MCNC: 134 MG/DL
GFR SERPL CREATININE-BSD FRML MDRD: 92 ML/MIN/1.73SQ M
GLUCOSE P FAST SERPL-MCNC: 131 MG/DL (ref 65–99)
HBA1C MFR BLD: 6.3 %
HCT VFR BLD AUTO: 45 % (ref 34.8–46.1)
HDLC SERPL-MCNC: 40 MG/DL
HGB BLD-MCNC: 14.6 G/DL (ref 11.5–15.4)
IMM GRANULOCYTES # BLD AUTO: 0.02 THOUSAND/UL (ref 0–0.2)
IMM GRANULOCYTES NFR BLD AUTO: 0 % (ref 0–2)
LDLC SERPL CALC-MCNC: 110 MG/DL (ref 0–100)
LYMPHOCYTES # BLD AUTO: 1.43 THOUSANDS/ÂΜL (ref 0.6–4.47)
LYMPHOCYTES NFR BLD AUTO: 17 % (ref 14–44)
MCH RBC QN AUTO: 30.2 PG (ref 26.8–34.3)
MCHC RBC AUTO-ENTMCNC: 32.4 G/DL (ref 31.4–37.4)
MCV RBC AUTO: 93 FL (ref 82–98)
MICROALBUMIN UR-MCNC: <5 MG/L (ref 0–20)
MICROALBUMIN/CREAT 24H UR: <7 MG/G CREATININE (ref 0–30)
MONOCYTES # BLD AUTO: 0.55 THOUSAND/ÂΜL (ref 0.17–1.22)
MONOCYTES NFR BLD AUTO: 7 % (ref 4–12)
NEUTROPHILS # BLD AUTO: 6.18 THOUSANDS/ÂΜL (ref 1.85–7.62)
NEUTS SEG NFR BLD AUTO: 72 % (ref 43–75)
NRBC BLD AUTO-RTO: 0 /100 WBCS
PLATELET # BLD AUTO: 237 THOUSANDS/UL (ref 149–390)
PMV BLD AUTO: 9.8 FL (ref 8.9–12.7)
POTASSIUM SERPL-SCNC: 4.2 MMOL/L (ref 3.5–5.3)
PROT SERPL-MCNC: 6.6 G/DL (ref 6.4–8.4)
RBC # BLD AUTO: 4.84 MILLION/UL (ref 3.81–5.12)
SODIUM SERPL-SCNC: 137 MMOL/L (ref 135–147)
T4 FREE SERPL-MCNC: 1.09 NG/DL (ref 0.76–1.46)
TRIGL SERPL-MCNC: 105 MG/DL
TSH SERPL DL<=0.05 MIU/L-ACNC: 15.79 UIU/ML (ref 0.45–4.5)
WBC # BLD AUTO: 8.49 THOUSAND/UL (ref 4.31–10.16)

## 2023-02-03 RX ORDER — LEVOTHYROXINE SODIUM 0.05 MG/1
50 TABLET ORAL
Qty: 30 TABLET | Refills: 5 | Status: SHIPPED | OUTPATIENT
Start: 2023-02-03

## 2023-02-13 ENCOUNTER — OFFICE VISIT (OUTPATIENT)
Dept: FAMILY MEDICINE CLINIC | Facility: CLINIC | Age: 53
End: 2023-02-13

## 2023-02-13 VITALS
OXYGEN SATURATION: 92 % | DIASTOLIC BLOOD PRESSURE: 74 MMHG | HEIGHT: 67 IN | HEART RATE: 72 BPM | TEMPERATURE: 98.3 F | WEIGHT: 291 LBS | BODY MASS INDEX: 45.67 KG/M2 | SYSTOLIC BLOOD PRESSURE: 128 MMHG

## 2023-02-13 DIAGNOSIS — E78.2 MIXED HYPERLIPIDEMIA: ICD-10-CM

## 2023-02-13 DIAGNOSIS — C85.80 LYMPHOSARCOMA, MIXED CELL TYPE (HCC): ICD-10-CM

## 2023-02-13 DIAGNOSIS — Z00.00 ANNUAL PHYSICAL EXAM: Primary | ICD-10-CM

## 2023-02-13 PROBLEM — B02.29 POSTHERPETIC NEURALGIA: Status: ACTIVE | Noted: 2022-01-12

## 2023-02-13 PROBLEM — M79.675 PAIN OF TOE OF LEFT FOOT: Status: RESOLVED | Noted: 2018-07-06 | Resolved: 2023-02-13

## 2023-02-13 RX ORDER — ATORVASTATIN CALCIUM 20 MG/1
20 TABLET, FILM COATED ORAL DAILY
Qty: 30 TABLET | Refills: 5 | Status: SHIPPED | OUTPATIENT
Start: 2023-02-13

## 2023-02-13 NOTE — PATIENT INSTRUCTIONS

## 2023-02-13 NOTE — PROGRESS NOTES
ADULT ANNUAL [address] PRIMARY CARE    NAME: Katherine Ulloa  AGE: 45 y o  SEX: female  : 1970     DATE: 2023     Assessment and Plan:   Reviewed patient's blood work  LDL cholesterol still 110  We will start her on atorvastatin 20 mg daily  She will recheck her TSH in 6 weeks  Follow-up with her other specialist as scheduled  Follow-up here in 6 months or as needed  Problem List Items Addressed This Visit        Other    Mixed hyperlipidemia    Relevant Medications    atorvastatin (LIPITOR) 20 mg tablet   Other Visit Diagnoses     Annual physical exam    -  Primary    Lymphosarcoma, 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[address] )              Immunizations and preventive care screenings were discussed with patient today  Appropriate education was printed on patient's after visit summary  Counseling:  Dental Health: discussed importance of regular tooth brushing, flossing, and dental visits  Exercise: the importance of regular exercise/physical activity was discussed  Recommend exercise 3-5 times per week for at least 30 minutes  BMI Counseling: Body mass index is 45 58 kg/m²  The BMI is above normal  Nutrition recommendations include decreasing portion sizes, encouraging healthy choices of fruits and vegetables, decreasing fast food intake, consuming healthier snacks, limiting drinks that contain sugar, moderation in carbohydrate intake, increasing intake of lean protein, reducing intake of saturated and trans fat and reducing intake of cholesterol  Exercise recommendations include exercising 3-5 times per week  No pharmacotherapy was ordered  Rationale for BMI follow-up plan is due to patient being overweight or obese  Depression Screening and Follow-up Plan: Patient was screened for depression during today's encounter  They screened negative with a PHQ-2 score of 0  Return in about 6 months (around 2023) for Recheck       Chief Complaint: Chief Complaint   Patient presents with   • Annual Exam     Doing good      History of Present Illness:     Adult Annual Physical   Patient here for a comprehensive physical exam  The patient reports no problems  Diet and Physical Activity  Diet/Nutrition: well balanced diet  Exercise: walking  Depression Screening  PHQ-2/9 Depression Screening    Little interest or pleasure in doing things: 0 - not at all  Feeling down, depressed, or hopeless: 0 - not at all  PHQ-2 Score: 0  PHQ-2 Interpretation: Negative depression screen       General Health  Sleep: sleeps well  Hearing: normal - bilateral   Vision: wears glasses  Dental: regular dental visits  /GYN Health  Patient is: perimenopausal       Review of Systems:     Review of Systems   Constitutional: Negative  Respiratory: Negative  Cardiovascular: Negative  Gastrointestinal: Negative  Genitourinary: Negative         Past Medical History:     Past Medical History:   Diagnosis Date   • Allergic rhinitis     last assessed 04/06/16   • Arthritis     b/l feet   • Chronic allergic conjunctivitis    • Fluttering heart     takes magnesium for same   EKG OK   • Fluttering sensation of heart     "periodically, not often since on magnesium"   • Foot pain, left    • Full dentures     upper   • History of cancer chemotherapy 2016   • History of dilation and curettage    • Hx of tonsillectomy    • Hypothyroid    • Irregular heart beat    • Lymph nodes enlarged     in chest pushing on her bronchioles necessitating inhalers   • Neck mass     R neck mass-excised 3/15/2016,, 4/23/18 noted enlarged lymph node right neck-bx today 4/26/2018   • Non Hodgkin's lymphoma ([address] )     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   • Obese    • Obstructive sleep apnea    • Paroxysmal atrial flutter ([address] )    • Port-A-Cath in place 2016    pt reports 'Has it flushed q 4weeks" right chest wall   • Post-menopausal     since chemo 4/2016  no menses   • Pulmonary embolism ([address] ) last assessed 10/31/16 attributed to Non-Hodgkins hx   • Pulmonary embolism on right [facility]) 07/12/2018    Last Assessment & Plan:  She will need to hold her Xarelto 2 days prior to her procedure and may resume therapy 1-2 days post procedure (depending on whether she is producing blood tinged sputum or not)     • Shortness of breath     due to chest tumor   • Tachycardia     awaiting cardiology eval,,,4/23/18 "pt reports saw cardiologist at that time and placed on magnesium and "hardly notices it"   • Tinnitus     occas   • Wears glasses    • Wears partial dentures     /lower   • [place] teeth extracted       Past Surgical History:     Past Surgical History:   Procedure Laterality Date   • COLONOSCOPY     • COLONOSCOPY N/A 7/26/2018    Procedure: COLONOSCOPY with multiple bx;  Surgeon: Neema Marquez MD;  Location: [facility] MAIN OR;  Service: Gastroenterology   • CYST REMOVAL Left     L  neck   • DILATION AND CURETTAGE OF UTERUS     • ESOPHAGOGASTRODUODENOSCOPY N/A 7/26/2018    Procedure: ESOPHAGOGASTRODUODENOSCOPY (EGD) with multiple bx;  Surgeon: Neema Marquez MD;  Location: [facility] MAIN OR;  Service: Gastroenterology   • FACIAL/NECK BIOPSY N/A 4/26/2018    Procedure: OPEN DEEP CERVICAL LYMPH NODE EXCISOION/BIOPSY;  Surgeon: Carol Thorpe MD;  Location: [facility] Main OR;  Service: ENT   • LYMPHADENECTOMY     • PORTACATH PLACEMENT     • WA BX/EXC LYMPH NODE OPEN DEEP CERVICAL NODE N/A 3/15/2016    Procedure: DISSECTION [address] NECK/DEEP NECK MASS ;  Surgeon: Felix Staton DO;  Location: [facility];  Service: ENT   • TONSILLECTOMY     • TUBAL LIGATION     • TUNNELED VENOUS PORT PLACEMENT Right 3/21/2019    Procedure: INSERTION VENOUS PORT (PORT-A-CATH) remove and replace;  Surgeon: Zaid Balderas MD;  Location: [facility] MAIN OR;  Service: General      Social History:     Social History     Socioeconomic History   • Marital status: /Civil Union     Spouse name: None   • Number of children: None   • Years of education: None   • Highest education level: None   Occupational History   • None   Tobacco Use   • Smoking status: Former     Packs/day: 1 50     Years: 11 00     Pack years: 16 50     Types: Cigarettes     Quit date:      Years since quittin 1   • Smokeless tobacco: Never   Vaping Use   • Vaping Use: Never used   Substance and Sexual Activity   • Alcohol use: Yes     Comment: rare   • Drug use: Never   • Sexual activity: Yes     Birth control/protection: Post-menopausal   Other Topics Concern   • None   Social History Narrative   • None     Social Determinants of Health     Financial Resource Strain: Not on file   Food Insecurity: Not on file   Transportation Needs: Not on file   Physical Activity: Not on file   Stress: Not on file   Social Connections: Not on file   Intimate Partner Violence: Not on file   Housing Stability: Not on file      Family History:     Family History   Problem Relation Age of Onset   • Coronary artery disease Father    • Diabetes Brother    • Diabetes Family    • Heart disease Family    • Cancer Maternal Uncle         throat   • Diabetes Brother    • Stroke Neg Hx       Current Medications:     Current Outpatient Medications   Medication Sig Dispense Refill   • Ascorbic Acid (vitamin C) 1000 MG tablet Take 2,000 mg by mouth daily     • atorvastatin (LIPITOR) 20 mg tablet Take 1 tablet (20 mg total) by mouth daily 30 tablet 5   • cholecalciferol (VITAMIN D3) 1,000 units tablet Take 1,000 Units by mouth every morning      • CINNAMON PO Take by mouth     • diltiazem (CARDIZEM CD) 120 mg 24 hr capsule Take 1 capsule (120 mg total) by mouth daily 90 capsule 3   • fluticasone-vilanterol (BREO ELLIPTA) 100-25 mcg/inh inhaler Inhale 1 puff every morning Rinse mouth after use       • levothyroxine (Euthyrox) 200 mcg tablet Take 1 tablet (200 mcg total) by mouth daily 90 tablet 0   • levothyroxine (Synthroid) 50 mcg tablet Take 1 tablet (50 mcg total) by mouth daily in the early morning , Take with 200 mcg dose 30 tablet 5   • loratadine (CLARITIN) 10 mg tablet Take 10 mg by mouth daily     • metoprolol tartrate (LOPRESSOR) 100 mg tablet Take 1 tablet (100 mg total) by mouth 2 (two) times a day 180 tablet 3   • omeprazole (PriLOSEC) 20 mg delayed release capsule Take 20 mg by mouth every morning     • Probiotic Product (PROBIOTIC PO) Take by mouth     • rivaroxaban (Xarelto) 20 mg tablet Take 1 tablet (20 mg total) by mouth daily with dinner 90 tablet 3   • zinc gluconate 50 mg tablet Take 50 mg by mouth daily     • acetaminophen (TYLENOL) 325 mg tablet Take 650 mg by mouth every 4 (four) hours as needed  (Patient not taking: Reported on 4/26/2022)       No current facility-administered medications for this visit  Allergies: Allergies   Allergen Reactions   • Medical Tape      Redness, soreness on skin      Physical Exam:     /74   Pulse 72   Temp 98 3 °F (36 8 °C)   Ht 5' 7" (1 702 m)   Wt 132 kg (291 lb)   SpO2 92%   BMI 45 58 kg/m²     Physical Exam  Vitals reviewed  Constitutional:       General: She is not in acute distress  Appearance: Normal appearance  She is well-developed  She is not ill-appearing or diaphoretic  HENT:      Head: Normocephalic and atraumatic  Eyes:      Conjunctiva/sclera: Conjunctivae normal    Cardiovascular:      Rate and Rhythm: Normal rate and regular rhythm  Pulses: no weak pulses          Dorsalis pedis pulses are 2+ on the right side and 2+ on the left side  Posterior tibial pulses are 2+ on the right side and 2+ on the left side  Heart sounds: Normal heart sounds  No murmur heard  No friction rub  No gallop  Pulmonary:      Effort: Pulmonary effort is normal  No respiratory distress  Breath sounds: Normal breath sounds  No wheezing, rhonchi or rales  Musculoskeletal:      Right lower leg: No edema  Left lower leg: No edema     Feet:      Right foot:      Skin integrity: No ulcer, skin breakdown, erythema, warmth, callus or dry skin  Left foot:      Skin integrity: No ulcer, skin breakdown, erythema, warmth, callus or dry skin  Neurological:      General: No focal deficit present  Mental Status: She is alert and oriented to person, place, and time  Psychiatric:         Mood and Affect: Mood normal          Behavior: Behavior normal          Thought Content: Thought content normal          Judgment: Judgment normal         Patient's shoes and socks removed  Right Foot/Ankle   Right Foot Inspection  Skin Exam: skin normal and skin intact  No dry skin, no warmth, no callus, no erythema, no maceration, no abnormal color, no pre-ulcer, no ulcer and no callus  Sensory   Monofilament testing: intact    Vascular  The right DP pulse is 2+  The right PT pulse is 2+  Left Foot/Ankle  Left Foot Inspection  Skin Exam: skin normal and skin intact  No dry skin, no warmth, no erythema, no maceration, normal color, no pre-ulcer, no ulcer and no callus  Sensory   Monofilament testing: intact    Vascular  The left DP pulse is 2+  The left PT pulse is 2+       Assign Risk Category  No deformity present  No loss of protective sensation  No weak pulses  Risk: 0    Mansi [facility], DO  [address] [facility]

## 2023-02-27 ENCOUNTER — OFFICE VISIT (OUTPATIENT)
Dept: CARDIOLOGY CLINIC | Facility: CLINIC | Age: 53
End: 2023-02-27

## 2023-02-27 VITALS
SYSTOLIC BLOOD PRESSURE: 102 MMHG | DIASTOLIC BLOOD PRESSURE: 60 MMHG | HEART RATE: 65 BPM | BODY MASS INDEX: 45.04 KG/M2 | WEIGHT: 287 LBS | HEIGHT: 67 IN

## 2023-02-27 DIAGNOSIS — I47.1 PAROXYSMAL ATRIAL TACHYCARDIA (HCC): ICD-10-CM

## 2023-02-27 DIAGNOSIS — I48.92 PAROXYSMAL ATRIAL FLUTTER (HCC): ICD-10-CM

## 2023-02-27 DIAGNOSIS — I49.3 PREMATURE VENTRICULAR CONTRACTIONS: Primary | ICD-10-CM

## 2023-02-27 NOTE — PROGRESS NOTES
Subjective:        Patient ID: Nii Yeager is a 45 y o  female  Chief Complaint:  Louisjason Bourne here for routine follow-up, offers no cardiopulmonary complaints of concern, rare skipped beat but nothing more than that for second or 2  Feels well  No hospitalizations or change in meds recently  The following portions of the patient's history were reviewed and updated as appropriate: allergies, current medications, past family history, past medical history, past social history, past surgical history and problem list   Review of Systems   Constitutional: Negative for chills, diaphoresis, malaise/fatigue and weight gain  HENT: Negative for nosebleeds and stridor  Eyes: Negative for double vision, vision loss in left eye, vision loss in right eye and visual disturbance  Cardiovascular: Negative for chest pain, claudication, cyanosis, dyspnea on exertion, irregular heartbeat, leg swelling, near-syncope, orthopnea, palpitations, paroxysmal nocturnal dyspnea and syncope  Respiratory: Negative for cough, shortness of breath, snoring and wheezing  Endocrine: Negative for polydipsia, polyphagia and polyuria  Hematologic/Lymphatic: Negative for bleeding problem  Does not bruise/bleed easily  Skin: Negative for flushing and rash  Musculoskeletal: Negative for falls and myalgias  Gastrointestinal: Negative for abdominal pain, heartburn, hematemesis, hematochezia, melena and nausea  Genitourinary: Negative for hematuria  Neurological: Negative for brief paralysis, dizziness, focal weakness, headaches, light-headedness, loss of balance and vertigo  Psychiatric/Behavioral: Negative for altered mental status and substance abuse  Allergic/Immunologic: Negative for hives  Objective:      /60   Pulse 65   Ht 5' 7" (1 702 m)   Wt 130 kg (287 lb)   BMI 44 95 kg/m²   Physical Exam  Vitals and nursing note reviewed  Constitutional:       Appearance: She is well-developed   She is not diaphoretic  HENT:      Head: Normocephalic and atraumatic  Eyes:      General: No scleral icterus  Pupils: Pupils are equal, round, and reactive to light  Neck:      Thyroid: No thyromegaly  Vascular: No carotid bruit or JVD  Cardiovascular:      Rate and Rhythm: Normal rate and regular rhythm  Pulses: Intact distal pulses  Heart sounds: Normal heart sounds  No murmur heard  No friction rub  No gallop  Pulmonary:      Effort: Pulmonary effort is normal  No respiratory distress  Breath sounds: Normal breath sounds  No stridor  No wheezing or rales  Abdominal:      General: Bowel sounds are normal       Palpations: Abdomen is soft  There is no mass  Tenderness: There is no abdominal tenderness  Musculoskeletal:         General: Normal range of motion  Cervical back: Normal range of motion and neck supple  Right lower leg: No edema  Left lower leg: No edema  Lymphadenopathy:      Cervical: No cervical adenopathy  Skin:     General: Skin is warm and dry  Coloration: Skin is not pale  Findings: No rash  Neurological:      Mental Status: She is alert and oriented to person, place, and time  Psychiatric:         Mood and Affect: Mood normal          Behavior: Behavior normal          Thought Content:  Thought content normal          Judgment: Judgment normal          Lab Review:   Telephone on 01/16/2023   Component Date Value   • WBC 02/03/2023 8 49    • RBC 02/03/2023 4 84    • Hemoglobin 02/03/2023 14 6    • Hematocrit 02/03/2023 45 0    • MCV 02/03/2023 93    • MCH 02/03/2023 30 2    • MCHC 02/03/2023 32 4    • RDW 02/03/2023 14 0    • MPV 02/03/2023 9 8    • Platelets 17/05/3588 237    • nRBC 02/03/2023 0    • Neutrophils Relative 02/03/2023 72    • Immat GRANS % 02/03/2023 0    • Lymphocytes Relative 02/03/2023 17    • Monocytes Relative 02/03/2023 7    • Eosinophils Relative 02/03/2023 3    • Basophils Relative 02/03/2023 1    • Neutrophils Absolute 02/03/2023 6 18    • Immature Grans Absolute 02/03/2023 0 02    • Lymphocytes Absolute 02/03/2023 1 43    • Monocytes Absolute 02/03/2023 0 55    • Eosinophils Absolute 02/03/2023 0 24    • Basophils Absolute 02/03/2023 0 07    • Sodium 02/03/2023 137    • Potassium 02/03/2023 4 2    • Chloride 02/03/2023 103    • CO2 02/03/2023 26    • ANION GAP 02/03/2023 8    • BUN 02/03/2023 17    • Creatinine 02/03/2023 0 74    • Glucose, Fasting 02/03/2023 131 (H)    • Calcium 02/03/2023 9 3    • AST 02/03/2023 15    • ALT 02/03/2023 9    • Alkaline Phosphatase 02/03/2023 123 (H)    • Total Protein 02/03/2023 6 6    • Albumin 02/03/2023 4 0    • Total Bilirubin 02/03/2023 0 46    • eGFR 02/03/2023 92    • Hemoglobin A1C 02/03/2023 6 3 (H)    • EAG 02/03/2023 134    • Cholesterol 02/03/2023 171    • Triglycerides 02/03/2023 105    • HDL, Direct 02/03/2023 40 (L)    • LDL Calculated 02/03/2023 110 (H)    • Creatinine, Ur 02/03/2023 67 7    • Microalbum  ,U,Random 02/03/2023 <5 0    • Microalb Creat Ratio 02/03/2023 <7    • TSH 3RD GENERATON 02/03/2023 15 795 (H)    • Free T4 02/03/2023 1 09    Appointment on 01/13/2023   Component Date Value   • TSH 3RD GENERATON 01/13/2023 9 970 (H)    Telephone on 01/06/2023   Component Date Value   • Supplier Name 01/06/2023 [employer] [facility]    • Supplier Phone Number 01/06/2023 [phone]    • Order Status 01/06/2023 Completed    • Delivery Request Date 01/06/2023 01/06/2023    • Date Delivered  01/06/2023 01/06/2023    • Item Description 01/06/2023 PAP Accessory    • Item Description 01/06/2023 PAP Mask, Full Face, Fit Upon Setup, N/A, 1 per 3 months    • Item Description 01/06/2023 [id number] [address] Morton , 1 per 6 months    • Item Description 01/06/2023 PAP Headgear, 1 per 6 months    • Item Description 01/06/2023 PAP Chinstrap, 1 per 6 months    • Item Description 01/06/2023 PAP Humidifier, Heated    • Item Description 01/06/2023 Heated PAP Tubing, 1 per 3 months    • Item Description 01/06/2023 Disposable PAP Filter, 2 per 1 month    • Item Description 01/06/2023 Non-Disposable PAP Filter, 1 per 6 months    • Item Description 01/06/2023 PAP Mask Interface Cushion, Full Face, 1 per 1 month      No results found  Assessment:       1  Premature ventricular contractions  CANCELED: POCT ECG      2  Paroxysmal atrial flutter (HCC)        3  Paroxysmal atrial tachycardia ([address] )             Plan:       Kp Booker has no signs or symptoms reminiscent of angina heart failure nor malignant dysrhythmia  No evidence for recurrent a flutter or atrial tachycardia, likely symptomatic PVCs  Recommend she continue with present dose diltiazem 120 mg a day and metoprolol tartrate 100 mg p o  twice daily  I told her I agree with your choice of atorvastatin for lipid management  Due to unprovoked PE and paroxysmal atrial flutter feel benefit outweighs risk of ongoing Xarelto therapy at 20 mg a day  Falls or bleeding issues  I will see her back in 6 months, asked her to call sooner with any major elective surgery being scheduled or concerning potential cardiac symptoms prior

## 2023-02-27 NOTE — PATIENT INSTRUCTIONS
Cholesterol and Your Health   AMBULATORY CARE:   Cholesterol  is a waxy, fat-like substance  Your body uses cholesterol to make hormones and new cells, and to protect nerves  Cholesterol is made by your body  It also comes from certain foods you eat, such as meat and dairy products  Your healthcare provider can help you set goals for your cholesterol levels  He or she can help you create a plan to meet your goals  Cholesterol level goals: Your cholesterol level goals depend on your risk for heart disease, your age, and your other health conditions  The following are general guidelines: Total cholesterol  includes low-density lipoprotein (LDL), high-density lipoprotein (HDL), and triglyceride levels  The total cholesterol level should be lower than 200 mg/dL and is best at about 150 mg/dL  LDL cholesterol  is called bad cholesterol  because it forms plaque in your arteries  As plaque builds up, your arteries become narrow, and less blood flows through  When plaque decreases blood flow to your heart, you may have chest pain  If plaque completely blocks an artery that brings blood to your heart, you may have a heart attack  Plaque can break off and form blood clots  Blood clots may block arteries in your brain and cause a stroke  The level should be less than 130 mg/dL and is best at about 100 mg/dL  HDL cholesterol  is called good cholesterol  because it helps remove LDL cholesterol from your arteries  It does this by attaching to LDL cholesterol and carrying it to your liver  Your liver breaks down LDL cholesterol so your body can get rid of it  High levels of HDL cholesterol can help prevent a heart attack and stroke  Low levels of HDL cholesterol can increase your risk for heart disease, heart attack, and stroke  The level should be 60 mg/dL or higher  Triglycerides  are a type of fat that store energy from foods you eat  High levels of triglycerides also cause plaque buildup   This can increase your risk for a heart attack or stroke  If your triglyceride level is high, your LDL cholesterol level may also be high  The level should be less than 150 mg/dL  Any of the following can increase your risk for high cholesterol:   Smoking cigarettes    Being overweight or obese, or not getting enough exercise    Drinking large amounts of alcohol    A medical condition such as hypertension (high blood pressure) or diabetes    Certain genes passed from your parents to you    Age older than 65 years    What you need to know about having your cholesterol levels checked: Adults 21to 39years of age should have their cholesterol levels checked every 4 to 6 years  Adults 45 years or older should have their cholesterol checked every 1 to 2 years  You may need your cholesterol checked more often, or at a younger age, if you have risk factors for heart disease  You may also need to have your cholesterol checked more often if you have other health conditions, such as diabetes  Blood tests are used to check cholesterol levels  Blood tests measure your levels of triglycerides, LDL cholesterol, and HDL cholesterol  How healthy fats affect your cholesterol levels:  Healthy fats, also called unsaturated fats, help lower LDL cholesterol and triglyceride levels  Healthy fats include the following:  Monounsaturated fats  are found in foods such as olive oil, canola oil, avocado, nuts, and olives  Polyunsaturated fats,  such as omega 3 fats, are found in fish, such as salmon, trout, and tuna  They can also be found in plant foods such as flaxseed, walnuts, and soybeans  How unhealthy fats affect your cholesterol levels:  Unhealthy fats increase LDL cholesterol and triglyceride levels  They are found in foods high in cholesterol, saturated fat, and trans fat:  Cholesterol  is found in eggs, dairy, and meat  Saturated fat  is found in butter, cheese, ice cream, whole milk, and coconut oil   Saturated fat is also found in meat, such as sausage, hot dogs, and bologna  Trans fat  is found in liquid oils and is used in fried and baked foods  Foods that contain trans fats include chips, crackers, muffins, sweet rolls, microwave popcorn, and cookies  Treatment  for high cholesterol will also decrease your risk of heart disease, heart attack, and stroke  Treatment may include any of the following:  Lifestyle changes  may include food, exercise, weight loss, and quitting smoking  You may also need to decrease the amount of alcohol you drink  Your healthcare provider will want you to start with lifestyle changes  Other treatment may be added if lifestyle changes are not enough  Your healthcare provider may recommend you work with a team to manage hyperlipidemia  The team may include medical experts such as a dietitian, an exercise or physical therapist, and a behavior therapist  Your family members may be included in helping you create lifestyle changes  Medicines  may be given to lower your LDL cholesterol, triglyceride levels, or total cholesterol level  You may need medicines to lower your cholesterol if any of the following is true:    You have a history of stroke, TIA, unstable angina, or a heart attack  Your LDL cholesterol level is 190 mg/dL or higher  You are age 36 to 76 years, have diabetes or heart disease risk factors, and your LDL cholesterol is 70 mg/dL or higher  Supplements  include fish oil, red yeast rice, and garlic  Fish oil may help lower your triglyceride and LDL cholesterol levels  It may also increase your HDL cholesterol level  Red yeast rice may help decrease your total cholesterol level and LDL cholesterol level  Garlic may help lower your total cholesterol level  Do not take any supplements without talking to your healthcare provider  Food changes you can make to lower your cholesterol levels:  A dietitian can help you create a healthy eating plan   He or she can show you how to read food labels and choose foods low in saturated fat, trans fats, and cholesterol  Decrease the total amount of fat you eat  Choose lean meats, fat-free or 1% fat milk, and low-fat dairy products, such as yogurt and cheese  Try to limit or avoid red meats  Limit or do not eat fried foods or baked goods, such as cookies  Replace unhealthy fats with healthy fats  Cook foods in olive oil or canola oil  Choose soft margarines that are low in saturated fat and trans fat  Seeds, nuts, and avocados are other examples of healthy fats  Eat foods with omega-3 fats  Examples include salmon, tuna, mackerel, walnuts, and flaxseed  Eat fish 2 times per week  Pregnant women should not eat fish that have high levels of mercury, such as shark, swordfish, and molly mackerel  Increase the amount of high-fiber foods you eat  High-fiber foods can help lower your LDL cholesterol  Aim to get between 20 and 30 grams of fiber each day  Fruits and vegetables are high in fiber  Eat at least 5 servings each day  Other high-fiber foods are whole-grain or whole-wheat breads, pastas, or cereals, and brown rice  Eat 3 ounces of whole-grain foods each day  Increase fiber slowly  You may have abdominal discomfort, bloating, and gas if you add fiber to your diet too quickly  Eat healthy protein foods  Examples include low-fat dairy products, skinless chicken and turkey, fish, and nuts  Limit foods and drinks that are high in sugar  Your dietitian or healthcare provider can help you create daily limits for high-sugar foods and drinks  The limit may be lower if you have diabetes or another health condition  Limits can also help you lose weight if needed  Lifestyle changes you can make to lower your cholesterol levels:   Maintain a healthy weight  Ask your healthcare provider what a healthy weight is for you  Ask him or her to help you create a weight loss plan if needed   Weight loss can decrease your total cholesterol and triglyceride levels  Weight loss may also help keep your blood pressure at a healthy level  Be physically active throughout the day  Physical activity, such as exercise, can help lower your total cholesterol level and maintain a healthy weight  Physical activity can also help increase your HDL cholesterol level  Work with your healthcare provider to create an program that is right for you  Get at least 30 to 40 minutes of moderate physical activity most days of the week  Examples of exercise include brisk walking, swimming, or biking  Also include strength training at least 2 times each week  Your healthcare providers can help you create a physical activity plan  Do not smoke  Nicotine and other chemicals in cigarettes and cigars can raise your cholesterol levels  Ask your healthcare provider for information if you currently smoke and need help to quit  E-cigarettes or smokeless tobacco still contain nicotine  Talk to your healthcare provider before you use these products  Limit or do not drink alcohol  Alcohol can increase your triglyceride levels  Ask your healthcare provider before you drink alcohol  Ask how much is okay for you to drink in 24 hours or 1 week  Follow up with your doctor as directed:  Write down your questions so you remember to ask them during your visits  © Copyright [facility] 2022 Information is for End User's use only and may not be sold, redistributed or otherwise used for commercial purposes  The above information is an  only  It is not intended as medical advice for individual conditions or treatments  Talk to your doctor, nurse or pharmacist before following any medical regimen to see if it is safe and effective for you

## 2023-04-03 ENCOUNTER — APPOINTMENT (OUTPATIENT)
Dept: LAB | Facility: MEDICAL CENTER | Age: 53
End: 2023-04-03

## 2023-04-03 DIAGNOSIS — E03.9 ACQUIRED HYPOTHYROIDISM: ICD-10-CM

## 2023-04-03 LAB
T4 FREE SERPL-MCNC: 1.65 NG/DL (ref 0.76–1.46)
TSH SERPL DL<=0.05 MIU/L-ACNC: 0.07 UIU/ML (ref 0.45–4.5)

## 2023-04-04 DIAGNOSIS — E03.9 ACQUIRED HYPOTHYROIDISM: ICD-10-CM

## 2023-04-04 RX ORDER — LEVOTHYROXINE SODIUM 0.03 MG/1
25 TABLET ORAL
Qty: 30 TABLET
Start: 2023-04-04

## 2023-05-23 ENCOUNTER — APPOINTMENT (OUTPATIENT)
Dept: LAB | Facility: HOSPITAL | Age: 53
End: 2023-05-23

## 2023-05-23 DIAGNOSIS — R39.9 UTI SYMPTOMS: ICD-10-CM

## 2023-05-23 LAB
BACTERIA UR QL AUTO: ABNORMAL /HPF
BILIRUB UR QL STRIP: NEGATIVE
CLARITY UR: ABNORMAL
COLOR UR: YELLOW
GLUCOSE UR STRIP-MCNC: NEGATIVE MG/DL
HGB UR QL STRIP.AUTO: ABNORMAL
KETONES UR STRIP-MCNC: NEGATIVE MG/DL
LEUKOCYTE ESTERASE UR QL STRIP: ABNORMAL
NITRITE UR QL STRIP: NEGATIVE
NON-SQ EPI CELLS URNS QL MICRO: ABNORMAL /HPF
PH UR STRIP.AUTO: 6 [PH]
PROT UR STRIP-MCNC: NEGATIVE MG/DL
RBC #/AREA URNS AUTO: ABNORMAL /HPF
SP GR UR STRIP.AUTO: <=1.005 (ref 1–1.03)
UROBILINOGEN UR QL STRIP.AUTO: 0.2 E.U./DL
WBC #/AREA URNS AUTO: ABNORMAL /HPF

## 2023-05-24 ENCOUNTER — TELEPHONE (OUTPATIENT)
Dept: GYNECOLOGY | Facility: CLINIC | Age: 53
End: 2023-05-24

## 2023-05-26 LAB
BACTERIA UR CULT: ABNORMAL
BACTERIA UR CULT: ABNORMAL

## 2023-05-31 ENCOUNTER — TELEPHONE (OUTPATIENT)
Dept: GYNECOLOGY | Facility: CLINIC | Age: 53
End: 2023-05-31

## 2023-05-31 NOTE — TELEPHONE ENCOUNTER
Patient returned call and states she took whatever you gave her the last time    Please call her at your convenience

## 2023-05-31 NOTE — TELEPHONE ENCOUNTER
Spoke to pt who states she only took Bactrim and did not  Cipro  Advised that bacteria was resistant to Bactrim and she needs to complete Cipro  She stated she would start today

## 2023-06-20 DIAGNOSIS — I48.92 ATRIAL FLUTTER, UNSPECIFIED TYPE (HCC): ICD-10-CM

## 2023-06-27 ENCOUNTER — HOSPITAL ENCOUNTER (OUTPATIENT)
Dept: CT IMAGING | Facility: HOSPITAL | Age: 53
Discharge: HOME | End: 2023-06-27
Payer: COMMERCIAL

## 2023-06-27 DIAGNOSIS — C83.38 DIFFUSE LARGE B-CELL LYMPHOMA, LYMPH NODES OF MULTIPLE SITES (HCC): ICD-10-CM

## 2023-06-27 PROCEDURE — 70491 CT SOFT TISSUE NECK W/DYE: CPT

## 2023-06-27 PROCEDURE — G1004 CDSM NDSC: HCPCS

## 2023-06-27 RX ADMIN — IOHEXOL 85 ML: 350 INJECTION, SOLUTION INTRAVENOUS at 15:27

## 2023-07-17 DIAGNOSIS — I48.92 ATRIAL FLUTTER, UNSPECIFIED TYPE (HCC): ICD-10-CM

## 2023-07-17 DIAGNOSIS — R00.2 PALPITATIONS: ICD-10-CM

## 2023-07-17 RX ORDER — METOPROLOL TARTRATE 100 MG/1
100 TABLET ORAL 2 TIMES DAILY
Qty: 180 TABLET | Refills: 3 | Status: SHIPPED | OUTPATIENT
Start: 2023-07-17

## 2023-07-20 DIAGNOSIS — E78.2 MIXED HYPERLIPIDEMIA: ICD-10-CM

## 2023-07-20 RX ORDER — ATORVASTATIN CALCIUM 20 MG/1
20 TABLET, FILM COATED ORAL DAILY
Qty: 30 TABLET | Refills: 0 | Status: SHIPPED | OUTPATIENT
Start: 2023-07-20

## 2023-08-09 ENCOUNTER — CONSULT (OUTPATIENT)
Dept: CARDIOLOGY CLINIC | Facility: CLINIC | Age: 53
End: 2023-08-09
Payer: COMMERCIAL

## 2023-08-09 VITALS
WEIGHT: 278 LBS | OXYGEN SATURATION: 96 % | RESPIRATION RATE: 16 BRPM | HEIGHT: 67 IN | SYSTOLIC BLOOD PRESSURE: 118 MMHG | HEART RATE: 64 BPM | BODY MASS INDEX: 43.63 KG/M2 | DIASTOLIC BLOOD PRESSURE: 62 MMHG

## 2023-08-09 DIAGNOSIS — Z86.711 HISTORY OF PULMONARY EMBOLISM: ICD-10-CM

## 2023-08-09 DIAGNOSIS — I49.3 PREMATURE VENTRICULAR CONTRACTIONS: ICD-10-CM

## 2023-08-09 DIAGNOSIS — G47.33 OSA (OBSTRUCTIVE SLEEP APNEA): ICD-10-CM

## 2023-08-09 DIAGNOSIS — I48.92 PAROXYSMAL ATRIAL FLUTTER (HCC): ICD-10-CM

## 2023-08-09 DIAGNOSIS — I47.1 PAROXYSMAL ATRIAL TACHYCARDIA (HCC): ICD-10-CM

## 2023-08-09 DIAGNOSIS — Z01.810 PREPROCEDURAL CARDIOVASCULAR EXAMINATION: Primary | ICD-10-CM

## 2023-08-09 PROCEDURE — 93000 ELECTROCARDIOGRAM COMPLETE: CPT | Performed by: INTERNAL MEDICINE

## 2023-08-09 PROCEDURE — 99215 OFFICE O/P EST HI 40 MIN: CPT | Performed by: INTERNAL MEDICINE

## 2023-08-09 NOTE — PROGRESS NOTES
Subjective:        Patient ID: Lisa Sharif is a 45 y.o. female. Chief Complaint:  Corrinne Singh is here for surgical clearance due to lymphadenopathy and anticoagulant perioperative management advice. She offers no cardiopulmonary complaints of concern on extensive ROS questioning specifically no chest pains, no sustained palpitations no presyncope or syncope, no dyspnea, no edema. EKG stable. Normal sinus rhythm 60 bpm    The following portions of the patient's history were reviewed and updated as appropriate: allergies, current medications, past family history, past medical history, past social history, past surgical history and problem list.  Review of Systems   Constitutional: Negative for chills, diaphoresis, malaise/fatigue and weight gain. HENT: Negative for nosebleeds and stridor. Eyes: Negative for double vision, vision loss in left eye, vision loss in right eye and visual disturbance. Cardiovascular: Negative for chest pain, claudication, cyanosis, dyspnea on exertion, irregular heartbeat, leg swelling, near-syncope, orthopnea, palpitations, paroxysmal nocturnal dyspnea and syncope. Respiratory: Negative for cough, shortness of breath, snoring and wheezing. Endocrine: Negative for polydipsia, polyphagia and polyuria. Hematologic/Lymphatic: Negative for bleeding problem. Does not bruise/bleed easily. Skin: Negative for flushing and rash. Musculoskeletal: Negative for falls and myalgias. Gastrointestinal: Negative for abdominal pain, heartburn, hematemesis, hematochezia, melena and nausea. Genitourinary: Negative for hematuria. Neurological: Negative for brief paralysis, dizziness, focal weakness, headaches, light-headedness, loss of balance and vertigo. Psychiatric/Behavioral: Negative for altered mental status and substance abuse. Allergic/Immunologic: Negative for hives.           Objective:      /62 (BP Location: Left arm, Patient Position: Sitting, Cuff Size: Large)   Pulse 64   Resp 16   Ht 5' 7" (1.702 m)   Wt 126 kg (278 lb)   SpO2 96%   BMI 43.54 kg/m²   Physical Exam  Vitals and nursing note reviewed. Constitutional:       General: She is not in acute distress. Appearance: Normal appearance. She is well-developed. She is not ill-appearing, toxic-appearing or diaphoretic. HENT:      Head: Normocephalic and atraumatic. Eyes:      General: No scleral icterus. Pupils: Pupils are equal, round, and reactive to light. Neck:      Thyroid: No thyromegaly. Vascular: No carotid bruit or JVD. Cardiovascular:      Rate and Rhythm: Normal rate and regular rhythm. Pulses: Intact distal pulses. Heart sounds: Normal heart sounds. No murmur heard. No friction rub. No gallop. Pulmonary:      Effort: Pulmonary effort is normal. No respiratory distress. Breath sounds: Normal breath sounds. No stridor. No wheezing or rales. Abdominal:      General: Bowel sounds are normal.      Palpations: Abdomen is soft. There is no mass. Tenderness: There is no abdominal tenderness. Musculoskeletal:      Cervical back: Normal range of motion and neck supple. Left lower leg: No edema. Lymphadenopathy:      Cervical: No cervical adenopathy. Skin:     General: Skin is warm and dry. Coloration: Skin is not pale. Findings: No rash. Neurological:      Mental Status: She is alert and oriented to person, place, and time. Psychiatric:         Mood and Affect: Mood normal.         Behavior: Behavior normal.         Thought Content: Thought content normal.         Judgment: Judgment normal.         Lab Review:   No visits with results within 2 Month(s) from this visit.    Latest known visit with results is:   Appointment on 05/23/2023   Component Date Value   • Color,  05/23/2023 Yellow    • Clarity,  05/23/2023 Slightly Cloudy    • Specific Gravity,  05/23/2023 <=1.005    • pH,  05/23/2023 6.0    • Leukocytes,  05/23/2023 Large (A)    • Nitrite, UA 05/23/2023 Negative    • Protein, UA 05/23/2023 Negative    • Glucose, UA 05/23/2023 Negative    • Ketones, UA 05/23/2023 Negative    • Urobilinogen, UA 05/23/2023 0.2    • Bilirubin, UA 05/23/2023 Negative    • Occult Blood, UA 05/23/2023 Small (A)    • RBC, UA 05/23/2023 4-10 (A)    • WBC, UA 05/23/2023 Innumerable (A)    • Epithelial Cells 05/23/2023 Occasional    • Bacteria, UA 05/23/2023 Occasional    • Urine Culture 05/23/2023 >100,000 cfu/ml Escherichia coli (A)    • Urine Culture 05/23/2023 50,000-59,000 cfu/ml      No results found. Assessment:       1. Preprocedural cardiovascular examination        2. Paroxysmal atrial flutter (HCC)  POCT ECG      3. Premature ventricular contractions  POCT ECG      4. Paroxysmal atrial tachycardia (HCC)  POCT ECG      5. History of pulmonary embolism        6. MARGARITO (obstructive sleep apnea)             Plan:       Joslyn Starkey has no signs nor symptoms reminiscent of angina pectoris, heart failure neurological instability. Normotensive, euvolemic, EKG stable, and her cardiac auscultatory examination is unremarkable. I do not feel any further preoperative risk stratifying testing is necessary. Low risk to proceed without further redo. Advised she take her metoprolol with a few sips of water on the morning of surgery preoperatively. Since she takes her NOAC therapy in the mornings, I advised she not take her NOAC on the day of surgery, nor on the day before surgery, and to resume it on postop day #1 as long as there is no active bleeding. I will keep her on a 6-month scheduled follow-up visit, reviewed all meds today, feel her regimen is optimal, made no changes overall.

## 2023-08-14 DIAGNOSIS — E78.2 MIXED HYPERLIPIDEMIA: ICD-10-CM

## 2023-08-14 RX ORDER — ATORVASTATIN CALCIUM 20 MG/1
20 TABLET, FILM COATED ORAL DAILY
Qty: 30 TABLET | Refills: 0 | Status: SHIPPED | OUTPATIENT
Start: 2023-08-14

## 2023-08-31 ENCOUNTER — OFFICE VISIT (OUTPATIENT)
Dept: FAMILY MEDICINE CLINIC | Facility: CLINIC | Age: 53
End: 2023-08-31
Payer: COMMERCIAL

## 2023-08-31 VITALS
OXYGEN SATURATION: 95 % | HEIGHT: 67 IN | TEMPERATURE: 98.6 F | BODY MASS INDEX: 45.8 KG/M2 | DIASTOLIC BLOOD PRESSURE: 68 MMHG | WEIGHT: 291.8 LBS | HEART RATE: 78 BPM | SYSTOLIC BLOOD PRESSURE: 128 MMHG

## 2023-08-31 DIAGNOSIS — Z12.31 ENCOUNTER FOR SCREENING MAMMOGRAM FOR MALIGNANT NEOPLASM OF BREAST: ICD-10-CM

## 2023-08-31 DIAGNOSIS — E11.9 TYPE 2 DIABETES MELLITUS WITHOUT COMPLICATION, WITHOUT LONG-TERM CURRENT USE OF INSULIN (HCC): Primary | ICD-10-CM

## 2023-08-31 DIAGNOSIS — E03.9 ACQUIRED HYPOTHYROIDISM: ICD-10-CM

## 2023-08-31 DIAGNOSIS — E66.01 MORBID OBESITY WITH BMI OF 45.0-49.9, ADULT (HCC): ICD-10-CM

## 2023-08-31 DIAGNOSIS — E78.2 MIXED HYPERLIPIDEMIA: ICD-10-CM

## 2023-08-31 PROBLEM — M25.552 PAIN OF LEFT HIP JOINT: Status: RESOLVED | Noted: 2018-07-06 | Resolved: 2023-08-31

## 2023-08-31 LAB
LEFT EYE DIABETIC RETINOPATHY: NORMAL
LEFT EYE IMAGE QUALITY: NORMAL
LEFT EYE MACULAR EDEMA: NORMAL
LEFT EYE OTHER RETINOPATHY: NORMAL
RIGHT EYE DIABETIC RETINOPATHY: NORMAL
RIGHT EYE IMAGE QUALITY: NORMAL
RIGHT EYE MACULAR EDEMA: NORMAL
RIGHT EYE OTHER RETINOPATHY: NORMAL
SEVERITY (EYE EXAM): NORMAL
SL AMB POCT HEMOGLOBIN AIC: 7.1 (ref ?–6.5)

## 2023-08-31 PROCEDURE — 99214 OFFICE O/P EST MOD 30 MIN: CPT | Performed by: FAMILY MEDICINE

## 2023-08-31 PROCEDURE — 83036 HEMOGLOBIN GLYCOSYLATED A1C: CPT | Performed by: FAMILY MEDICINE

## 2023-08-31 NOTE — PROGRESS NOTES
Name: Armando Wells      : 1970      MRN: [id number]  Encounter Provider: Sea Canchola DO  Encounter Date: 2023   Encounter department: [facility]    Assessment & Plan   Hemoglobin A1c 7.1. Continue same medications. Continue to watch diet and try to exercise more. We will see her back in 4 months. 1. Type 2 diabetes mellitus without complication, without long-term current use of insulin (HCC)  -     POCT hemoglobin A1c  -     IRIS Diabetic eye exam  -     CBC and differential  -     Comprehensive metabolic panel  -     Lipid Panel with Direct LDL reflex  -     TSH, 3rd generation with Free T4 reflex    2. Encounter for screening mammogram for malignant neoplasm of breast  -     Mammo screening bilateral w 3d & cad; Future; Expected date: 2023    3. Acquired hypothyroidism  -     TSH, 3rd generation with Free T4 reflex    4. Mixed hyperlipidemia  -     Comprehensive metabolic panel  -     Lipid Panel with Direct LDL reflex    5. Morbid obesity with BMI of 45.0-49.9, adult ([facility])        Depression Screening and Follow-up Plan: Patient was screened for depression during today's encounter. They screened negative with a PHQ-2 score of 0. Subjective     Patient here today for follow-up. Patient denies any chest pain or shortness of breath. Feeling well. Admits that she is not exercising like she used to. She is going to get a lymph node removed from her neck tomorrow to make sure it is benign    Review of Systems   Constitutional: Negative. Respiratory: Negative. Cardiovascular: Negative. Gastrointestinal: Negative. Genitourinary: Negative.         Past Medical History:   Diagnosis Date   • Allergic rhinitis     last assessed 16   • Arthritis     b/l feet   • Cancer ([facility]) 2016   • Chronic allergic conjunctivitis    • Fluttering heart     takes magnesium for same   EKG OK   • Fluttering sensation of heart     "periodically, not often since on magnesium"   • Foot pain, left    • Full dentures     upper   • Genital warts 1986    Treated   • History of cancer chemotherapy 2016   • History of dilation and curettage    • Hx of tonsillectomy    • Hypothyroid    • Irregular heart beat    • Lymph nodes enlarged     in chest pushing on her bronchioles necessitating inhalers   • Neck mass     R neck mass-excised 3/15/2016,, 4/23/18 noted enlarged lymph node right neck-bx today 4/26/2018   • Non Hodgkin's lymphoma ([address])     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   • Obese    • Obstructive sleep apnea    • Paroxysmal atrial flutter ([address])    • Port-A-Cath in place 2016    pt reports 'Has it flushed q 4weeks" right chest wall   • Post-menopausal     since chemo 4/2016  no menses   • Pulmonary embolism ([address])     last assessed 10/31/16 attributed to Non-Hodgkins hx   • Pulmonary embolism on right ([address]) 07/12/2018    Last Assessment & Plan:  She will need to hold her Xarelto 2 days prior to her procedure and may resume therapy 1-2 days post procedure (depending on whether she is producing blood tinged sputum or not).    • Shortness of breath     due to chest tumor   • Tachycardia     awaiting cardiology eval,,,4/23/18 "pt reports saw cardiologist at that time and placed on magnesium and "hardly notices it"   • Tinnitus     occas   • Wears glasses    • Wears partial dentures     /lower   • [place] teeth extracted      Past Surgical History:   Procedure Laterality Date   • COLONOSCOPY     • COLONOSCOPY N/A 7/26/2018    Procedure: COLONOSCOPY with multiple bx;  Surgeon: Mikayla Arroyo MD;  Location: [facility] MAIN OR;  Service: Gastroenterology   • CYST REMOVAL Left     L  neck   • DILATION AND CURETTAGE OF UTERUS     • ESOPHAGOGASTRODUODENOSCOPY N/A 7/26/2018    Procedure: ESOPHAGOGASTRODUODENOSCOPY (EGD) with multiple bx;  Surgeon: Mikayla Arroyo MD;  Location: [facility] MAIN OR;  Service: Gastroenterology   • FACIAL/NECK BIOPSY N/A 4/26/2018    Procedure: OPEN DEEP CERVICAL LYMPH NODE EXCISOION/BIOPSY;  Surgeon: Maria Isabel Olguin MD;  Location: [facility];  Service: ENT   • LYMPHADENECTOMY     • PORTACATH PLACEMENT     • SC BX/EXC LYMPH NODE OPEN DEEP CERVICAL NODE N/A 3/15/2016    Procedure: DISSECTION [facility] NODE NECK/DEEP NECK MASS ;  Surgeon: Olamide Lozano DO;  Location: [facility];  Service: ENT   • TONSILLECTOMY     • TUBAL LIGATION     • TUNNELED VENOUS PORT PLACEMENT Right 3/21/2019    Procedure: INSERTION VENOUS PORT (PORT-A-CATH) remove and replace;  Surgeon: Timothy Maxwell MD;  Location: [facility] MAIN OR;  Service: General     Family History   Problem Relation Age of Onset   • Coronary artery disease Father    • Diabetes Brother    • Diabetes Family    • Heart disease Family    • Cancer Maternal Uncle         throat   • Diabetes Brother            • Cancer Maternal Grandmother    • Stroke Neg Hx      Social History     Socioeconomic History   • Marital status: /Civil Union     Spouse name: None   • Number of children: None   • Years of education: None   • Highest education level: None   Occupational History   • None   Tobacco Use   • Smoking status: Former     Packs/day: 1.50     Years: 11.00     Total pack years: 16.50     Types: Cigarettes     Quit date: 1996     Years since quittin.6   • Smokeless tobacco: Never   Vaping Use   • Vaping Use: Never used   Substance and Sexual Activity   • Alcohol use: Yes     Comment: rare   • Drug use: Never   • Sexual activity: Yes     Partners: Male     Birth control/protection: Post-menopausal     Comment: Tubal   Other Topics Concern   • None   Social History Narrative   • None     Social Determinants of Health     Financial Resource Strain: Not on file   Food Insecurity: Not on file   Transportation Needs: Not on file   Physical Activity: Not on file   Stress: Not on file   Social Connections: Not on file   Intimate Partner Violence: Not on file   Housing Stability: Not on file     Current Outpatient Medications on File Prior to Visit   Medication Sig   • acetaminophen (TYLENOL) 325 mg tablet Take 650 mg by mouth every 4 (four) hours as needed   • Ascorbic Acid (vitamin C) 1000 MG tablet Take 2,000 mg by mouth daily   • atorvastatin (LIPITOR) 20 mg tablet Take 1 tablet by mouth once daily   • cholecalciferol (VITAMIN D3) 1,000 units tablet Take 1,000 Units by mouth every morning    • CINNAMON PO Take by mouth   • diltiazem (CARDIZEM CD) 120 mg 24 hr capsule TAKE ONE CAPSULE BY MOUTH EVERY DAY   • fluticasone-vilanterol (BREO ELLIPTA) 100-25 mcg/inh inhaler Inhale 1 puff every morning Rinse mouth after use.    • levothyroxine (Euthyrox) 200 mcg tablet Take 1 tablet (200 mcg total) by mouth daily   • levothyroxine (Synthroid) 25 mcg tablet Take 1 tablet (25 mcg total) by mouth daily in the early morning , Take with 200 mcg dose   • loratadine (CLARITIN) 10 mg tablet Take 10 mg by mouth daily   • metoprolol tartrate (LOPRESSOR) 100 mg tablet Take 1 tablet (100 mg total) by mouth 2 (two) times a day   • omeprazole (PriLOSEC) 20 mg delayed release capsule Take 20 mg by mouth every morning   • Probiotic Product (PROBIOTIC PO) Take by mouth   • rivaroxaban (Xarelto) 20 mg tablet Take 1 tablet (20 mg total) by mouth daily with dinner   • zinc gluconate 50 mg tablet Take 50 mg by mouth daily     Allergies   Allergen Reactions   • Medical Tape      Redness, soreness on skin     Immunization History   Administered Date(s) Administered   • COVID-19 MODERNA VACC 0.25 ML IM BOOSTER 12/08/2021   • COVID-19 MODERNA VACC 0.5 ML IM 04/09/2021, 05/13/2021   • INFLUENZA 01/08/2020, 01/08/2022, 12/17/2022   • Influenza, injectable, quadrivalent, preservative free 0.5 mL 11/04/2020   • Influenza, recombinant, quadrivalent,injectable, preservative free 01/13/2020   • Tdap 01/21/2023   • Zoster Vaccine Recombinant 08/12/2021, 01/30/2022   • influenza, trivalent, adjuvanted 01/08/2020       Objective     /68   Pulse 78   Temp 98.6 °F (37 °C)   Ht 5' 7" (1.702 m)   Wt 132 kg (291 lb 12.8 oz)   SpO2 95%   BMI 45.70 kg/m²     Physical Exam  Vitals reviewed. Constitutional:       General: She is not in acute distress. Appearance: Normal appearance. She is well-developed. She is not ill-appearing, toxic-appearing or diaphoretic. HENT:      Head: Normocephalic and atraumatic. Eyes:      Conjunctiva/sclera: Conjunctivae normal.   Cardiovascular:      Rate and Rhythm: Normal rate and regular rhythm. Heart sounds: Normal heart sounds. No murmur heard. No friction rub. No gallop. Pulmonary:      Effort: Pulmonary effort is normal. No respiratory distress. Breath sounds: Normal breath sounds. No wheezing, rhonchi or rales. Musculoskeletal:      Right lower leg: No edema. Left lower leg: No edema. Neurological:      General: No focal deficit present. Mental Status: She is alert and oriented to person, place, and time. Psychiatric:         Mood and Affect: Mood normal.         Behavior: Behavior normal.         Thought Content: Thought content normal.         Judgment: Judgment normal.       Lloyd Shannon DO  BMI Counseling: Body mass index is 45.7 kg/m². The BMI is above normal. Nutrition recommendations include reducing portion sizes, decreasing overall calorie intake, 3-5 servings of fruits/vegetables daily, reducing fast food intake, consuming healthier snacks, decreasing soda and/or juice intake, moderation in carbohydrate intake, increasing intake of lean protein, reducing intake of saturated fat and trans fat and reducing intake of cholesterol. Exercise recommendations include exercising 3-5 times per week.

## 2023-08-31 NOTE — PATIENT INSTRUCTIONS
Heart Healthy Diet   AMBULATORY CARE:   A heart healthy diet  is an eating plan low in unhealthy fats and sodium (salt). The plan is high in healthy fats and fiber. A heart healthy diet helps improve your cholesterol levels and lowers your risk for heart disease and stroke. A dietitian will teach you how to read and understand food labels. Heart healthy diet guidelines to follow:   • Choose foods that contain healthy fats:      ? Unsaturated fats  include monounsaturated and polyunsaturated fats. Unsaturated fat is found in foods such as soybean, canola, olive, corn, and safflower oils. It is also found in soft tub margarine that is made with liquid vegetable oil. ? Omega-3 fat  is found in certain fish, such as salmon, tuna, and trout, and in walnuts and flaxseed. Eat fish high in omega-3 fats at least 2 times a week. • Limit or do not have unhealthy fats:      ? Cholesterol  is found in animal foods, such as eggs and lobster, and in dairy products made from whole milk. Limit cholesterol to less than 200 mg each day. ? Saturated fat  is found in meats, such as pratt and hamburger. It is also found in chicken or turkey skin, whole milk, and butter. Limit saturated fat to less than 7% of your total daily calories. ? Trans fat  is found in packaged foods, such as potato chips and cookies. It is also in hard margarine, some fried foods, and shortening. Do not eat foods that contain trans fats. • Get 20 to 30 grams of fiber each day. Fruits, vegetables, whole-grain foods, and legumes (cooked beans) are good sources of fiber. • Limit sodium as directed. You may be told to limit sodium, such as to 2,000 mg or less each day. Choose low-sodium or no-salt-added foods. Add little or no salt to food you prepare. Use herbs and spices in place of salt.        Include the following in your heart healthy plan:  Ask your dietitian or healthcare provider how many servings to have each day from the following food groups:  • Grains:      ? Whole-wheat breads, cereals, and pastas, and brown rice    ? Low-fat, low-sodium crackers and chips    • Vegetables:      ? Broccoli, green beans, green peas, and spinach    ? Collards, kale, and lima beans    ? Carrots, sweet potatoes, tomatoes, and peppers    ? Canned vegetables with no salt added    • Fruits:      ? Bananas, peaches, pears, and pineapple    ? Grapes, raisins, and dates    ? Oranges, tangerines, grapefruit, orange juice, and grapefruit juice    ? Apricots, mangoes, melons, and papaya    ? Raspberries and strawberries    ? Canned fruit with no added sugar    • Low-fat dairy:      ? Nonfat (skim) milk, 1% milk, and low-fat almond, cashew, or soy milks fortified with calcium    ? Low-fat cheese, regular or frozen yogurt, and cottage cheese    • Meats and proteins:      ? Lean cuts of beef and pork (loin, leg, round), skinless chicken and turkey    ? Legumes, soy products, egg whites, or nuts    Limit or do not include the following in your heart healthy plan:   • Foods and liquids that contain unhealthy fats and oils:      ? Whole or 2% milk, cream cheese, sour cream, or cheese    ? High-fat cuts of beef (T-bone steaks, ribs), chicken or turkey with skin, and organ meats such as liver    ? Butter, stick margarine, shortening, and cooking oils such as coconut or palm oil    • Foods and liquids high in sodium:      ? Packaged foods, such as frozen dinners, cookies, macaroni and cheese, and cereals with more than 300 mg of sodium per serving    ? Vegetables with added sodium, such as instant potatoes, vegetables with added sauces, or regular canned vegetables    ? Cured or smoked meats, such as hot dogs, pratt, and sausage    ? High-sodium ketchup, barbecue sauce, salad dressing, pickles, olives, soy sauce, or miso    • Foods and liquids high in sugar:      ? Candy, cake, cookies, pies, or doughnuts    ?  Soft drinks (soda), sports drinks, or sweetened tea    ? Canned or dry mixes for cakes, soups, sauces, or gravies    Other healthy heart guidelines:   • Do not smoke. Nicotine and other chemicals in cigarettes and cigars can cause lung and heart damage. Ask your healthcare provider for information if you currently smoke and need help to quit. E-cigarettes or smokeless tobacco still contain nicotine. Talk to your provider before you use these products. • Limit or do not drink alcohol as directed. Alcohol can damage your heart and raise your blood pressure. Your healthcare provider may give you specific daily and weekly limits. The general recommended limit is 1 drink a day for women 21 or older and for men 72 or older. Do not have more than 3 drinks within 24 hours or 7 within a week. The recommended limit is 2 drinks a day for men 24to 59years of age. Do not have more than 4 drinks within 24 hours or 14 within a week. A drink of alcohol is 12 ounces of beer, 5 ounces of wine, or 1½ ounces of liquor. • Maintain a healthy weight. Extra body weight makes your heart work harder. Ask your provider what a healthy weight is for you. He or she can help you create a safe weight loss plan, if needed. • Exercise regularly. Exercise can help you maintain a healthy weight and improve your blood pressure and cholesterol levels. Regular exercise can also decrease your risk for heart problems. Ask your provider about the best exercise plan for you. Do not start an exercise program without asking your provider. Follow up with your doctor or cardiologist as directed:  Write down your questions so you remember to ask them during your visits. © Copyright [employer] 2022 Information is for End User's use only and may not be sold, redistributed or otherwise used for commercial purposes. The above information is an  only. It is not intended as medical advice for individual conditions or treatments.  Talk to your doctor, nurse or pharmacist before following any medical regimen to see if it is safe and effective for you. Type 2 Diabetes Management for Adults   AMBULATORY CARE:   Type 2 diabetes  is a disease that affects how your body uses glucose (sugar). Either your body cannot make enough insulin, or it cannot use the insulin correctly. It is important to keep diabetes controlled to prevent damage to your heart, blood vessels, and other organs. Management will help you feel well and enjoy your daily activities. Your diabetes care team providers can help you make a plan to fit diabetes care into your schedule. Your plan can change over time to fit your needs and your family's needs. Have someone call your local emergency number (911 in the [address]) if:   • You cannot be woken. • You have signs of diabetic ketoacidosis:     ? confusion, fatigue    ? vomiting    ? rapid heartbeat    ? fruity smelling breath    ? extreme thirst    ? dry mouth and skin    • You have any of the following signs of a heart attack:      ? Squeezing, pressure, or pain in your chest    ? You may  also have any of the following:     - Discomfort or pain in your back, neck, jaw, stomach, or arm    - Shortness of breath    - Nausea or vomiting    - Lightheadedness or a sudden cold sweat    • You have any of the following signs of a stroke:      ? Numbness or drooping on one side of your face     ? Weakness in an arm or leg    ? Confusion or difficulty speaking    ? Dizziness, a severe headache, or vision loss    Call your doctor or diabetes care team provider if:   • You have a sore or wound that will not heal.    • You have a change in the amount you urinate. • Your blood sugar levels are higher than your target goals. • You often have lower blood sugar levels than your target goals. • Your skin is red, dry, warm, or swollen. • You have trouble coping with diabetes, or you feel anxious or depressed.     • You have questions or concerns about your condition or care.    What you need to know about high blood sugar levels:  High blood sugar levels may not cause any symptoms. You may feel more thirsty or urinate more often than usual. Over time, high blood sugar levels can damage your nerves, blood vessels, tissues, and organs. The following can increase your blood sugar levels:  • Large meals or large amounts of carbohydrates at one time    • Less physical activity    • Stress    • Illness    • A lower dose of diabetes medicine or insulin, or a late dose    What you need to know about low blood sugar levels:  Symptoms include feeling shaky, dizzy, irritable, or confused. You can prevent symptoms by keeping your blood sugar levels from going too low. • Treat a low blood sugar level right away:      ? Drink 4 ounces of juice or have 1 tube of glucose gel. ? Check your blood sugar level again 10 to 15 minutes later. ? When the level goes back to normal, eat a meal or snack to prevent another decrease. • Keep glucose gel, raisins, or hard candy with you at all times to treat a low blood sugar level. • Your blood sugar level can get too low if you take diabetes medicine or insulin and do not eat enough food. • If you use insulin, check your blood sugar level before you exercise. ? If your blood sugar level is below 100 mg/dL, eat 4 crackers or 2 ounces of raisins, or drink 4 ounces of juice. ? Check your level every 30 minutes if you exercise longer than 1 hour. ? You may need a snack during or after exercise. What you can do to manage your blood sugar levels:   • Check your blood sugar levels as directed and as needed. Several items are available to use to check your levels. You may need to check by testing a drop of blood in a glucose monitor. You may instead be given a continuous glucose monitoring (CGM) device. The device is worn at all times. The CGM checks your blood sugar level every 5 minutes.  It sends results to an electronic device such as a smart phone. A CGM can be used with or without an insulin pump. You and your diabetes care team providers will decide on the best method for you. The goal for blood sugar levels before meals  is between 80 and 130 mg/dL and 2 hours after eating  is lower than 180 mg/dL. • Make healthy food choices. Work with a dietitian to develop a meal plan that works for you and your schedule. A dietitian can help you learn how to eat the right amount of carbohydrates during your meals and snacks. Carbohydrates can raise your blood sugar level if you eat too many at one time. Examples of foods that contain carbohydrates are breads, cereals, rice, pasta, and sweets. • Eat high-fiber foods as directed. Fiber helps improve blood sugar levels. Fiber also lowers your risk for heart disease and other problems diabetes can cause. Examples of high-fiber foods include vegetables, whole-grain bread, and beans such as hilton beans. Your dietitian can tell you how much fiber to have each day. • Get regular physical activity. Physical activity can help you get to your target blood sugar level goal and manage your weight. Get at least 150 minutes of moderate to vigorous aerobic physical activity each week. Do not miss more than 2 days in a row. Do not sit longer than 30 minutes at a time. Your healthcare provider can help you create an activity plan. The plan can include the best activities for you and can help you build your strength and endurance. • Maintain a healthy weight. Ask your team what a healthy weight is for you. A healthy weight can help you control diabetes and prevent heart disease. Ask your team to help you create a weight loss plan, if needed. Weight loss can help make a difference in managing diabetes. Your team will help you set a weight-loss goal, such as 10 to 15 pounds, or 5% of your extra weight. Together you and your team can set manageable weight loss goals.     • Take your diabetes medicine or insulin as directed. You may need diabetes medicine, insulin, or both to help control your blood sugar levels. Your healthcare provider will teach you how and when to take your diabetes medicine or insulin. You will also be taught about side effects oral diabetes medicine can cause. Insulin may be injected or given through a pump or pen. You and your providers will decide on the best method for you:    ? An insulin pump  is an implanted device that gives your insulin 24 hours a day. An insulin pump prevents the need for multiple insulin injections in a day. ? An insulin pen  is a device prefilled with the right amount of insulin. ? You and your family members will be taught how to draw up and give insulin  if this is the best method for you. Your providers will also teach you how to dispose of needles and syringes. ? You will learn how much insulin you need  and when to give it. You will be taught when not to give insulin. You will also be taught what to do if your blood sugar level drops too low. This may happen if you take insulin and do not eat the right amount of carbohydrates. More ways to manage type 2 diabetes:   • Wear medical alert identification. Wear medical alert jewelry or carry a card that says you have diabetes. Ask your provider where to get these items. • Do not smoke. Nicotine and other chemicals in cigarettes and cigars can cause lung and blood vessel damage. It also makes it more difficult to manage your diabetes. Ask your provider for information if you currently smoke and need help to quit. Do not use e-cigarettes or smokeless tobacco in place of cigarettes or to help you quit. They still contain nicotine. • Check your feet each day for cuts, scratches, calluses, or other wounds. Look for redness and swelling, and feel for warmth. Wear shoes that fit well. Check your shoes for rocks or other objects that can hurt your feet.  Do not walk barefoot or wear shoes without socks. Wear cotton socks to help keep your feet dry. • Ask about vaccines you may need. You have a higher risk for serious illness if you get the flu, pneumonia, COVID-19, or hepatitis. Ask your provider if you should get vaccines to prevent these or other diseases, and when to get the vaccines. • Talk to your provider if you become stressed about diabetes care. Sometimes being able to fit diabetes care into your life can cause increased stress. The stress can cause you not to take care of yourself properly. Your care team providers can help by offering tips about self-care. Your providers may suggest you talk to a mental health provider who can listen and offer help with self-care issues. • Have your A1c checked as directed. Your provider may check your A1c every 3 months, or 2 times each year if your diabetes is controlled. An A1c test shows the average amount of sugar in your blood over the past 2 to 3 months. Your provider will tell you what your A1c level should be. • Have screening tests as directed. Your provider may recommend screening for complications of diabetes and other conditions that may develop. Some screenings may begin right away and some may happen within the first 5 years of diagnosis:    ? Examples of diabetes complications  include kidney problems, high cholesterol, high blood pressure, blood vessel problems, eye problems, and sleep apnea. ? You may be screened for a low vitamin B level  if you take oral diabetes medicine for a long time. ? Women of childbearing years may be screened  for polycystic ovarian syndrome (PCOS). Follow up with your doctor or diabetes care team providers as directed: You may need to have blood tests done before your follow-up visit. The test results will show if changes need to be made in your treatment or self-care. Talk to your provider if you cannot afford your medicine.  Write down your questions so you remember to ask them during your visits. © Copyright [employer] 2022 Information is for End User's use only and may not be sold, redistributed or otherwise used for commercial purposes. The above information is an  only. It is not intended as medical advice for individual conditions or treatments. Talk to your doctor, nurse or pharmacist before following any medical regimen to see if it is safe and effective for you.

## 2023-09-07 ENCOUNTER — APPOINTMENT (OUTPATIENT)
Dept: LAB | Facility: MEDICAL CENTER | Age: 53
End: 2023-09-07
Payer: COMMERCIAL

## 2023-09-07 DIAGNOSIS — E03.9 ACQUIRED HYPOTHYROIDISM: ICD-10-CM

## 2023-09-07 LAB
ALBUMIN SERPL BCP-MCNC: 3.9 G/DL (ref 3.5–5)
ALP SERPL-CCNC: 129 U/L (ref 34–104)
ALT SERPL W P-5'-P-CCNC: 10 U/L (ref 7–52)
ANION GAP SERPL CALCULATED.3IONS-SCNC: 8 MMOL/L
AST SERPL W P-5'-P-CCNC: 14 U/L (ref 13–39)
BASOPHILS # BLD AUTO: 0.08 THOUSANDS/ÂΜL (ref 0–0.1)
BASOPHILS NFR BLD AUTO: 1 % (ref 0–1)
BILIRUB SERPL-MCNC: 0.63 MG/DL (ref 0.2–1)
BUN SERPL-MCNC: 16 MG/DL (ref 5–25)
CALCIUM SERPL-MCNC: 9 MG/DL (ref 8.4–10.2)
CHLORIDE SERPL-SCNC: 106 MMOL/L (ref 96–108)
CHOLEST SERPL-MCNC: 129 MG/DL
CO2 SERPL-SCNC: 24 MMOL/L (ref 21–32)
CREAT SERPL-MCNC: 0.67 MG/DL (ref 0.6–1.3)
EOSINOPHIL # BLD AUTO: 0.28 THOUSAND/ÂΜL (ref 0–0.61)
EOSINOPHIL NFR BLD AUTO: 3 % (ref 0–6)
ERYTHROCYTE [DISTWIDTH] IN BLOOD BY AUTOMATED COUNT: 13.7 % (ref 11.6–15.1)
GFR SERPL CREATININE-BSD FRML MDRD: 99 ML/MIN/1.73SQ M
GLUCOSE P FAST SERPL-MCNC: 122 MG/DL (ref 65–99)
HCT VFR BLD AUTO: 48.1 % (ref 34.8–46.1)
HDLC SERPL-MCNC: 38 MG/DL
HGB BLD-MCNC: 14.9 G/DL (ref 11.5–15.4)
IMM GRANULOCYTES # BLD AUTO: 0.06 THOUSAND/UL (ref 0–0.2)
IMM GRANULOCYTES NFR BLD AUTO: 1 % (ref 0–2)
LDLC SERPL CALC-MCNC: 69 MG/DL (ref 0–100)
LYMPHOCYTES # BLD AUTO: 1.59 THOUSANDS/ÂΜL (ref 0.6–4.47)
LYMPHOCYTES NFR BLD AUTO: 16 % (ref 14–44)
MCH RBC QN AUTO: 29.3 PG (ref 26.8–34.3)
MCHC RBC AUTO-ENTMCNC: 31 G/DL (ref 31.4–37.4)
MCV RBC AUTO: 95 FL (ref 82–98)
MONOCYTES # BLD AUTO: 0.63 THOUSAND/ÂΜL (ref 0.17–1.22)
MONOCYTES NFR BLD AUTO: 6 % (ref 4–12)
NEUTROPHILS # BLD AUTO: 7.3 THOUSANDS/ÂΜL (ref 1.85–7.62)
NEUTS SEG NFR BLD AUTO: 73 % (ref 43–75)
NRBC BLD AUTO-RTO: 0 /100 WBCS
PLATELET # BLD AUTO: 255 THOUSANDS/UL (ref 149–390)
PMV BLD AUTO: 10.8 FL (ref 8.9–12.7)
POTASSIUM SERPL-SCNC: 4.3 MMOL/L (ref 3.5–5.3)
PROT SERPL-MCNC: 7 G/DL (ref 6.4–8.4)
RBC # BLD AUTO: 5.08 MILLION/UL (ref 3.81–5.12)
SODIUM SERPL-SCNC: 138 MMOL/L (ref 135–147)
T4 FREE SERPL-MCNC: 1.64 NG/DL (ref 0.61–1.12)
TRIGL SERPL-MCNC: 111 MG/DL
TSH SERPL DL<=0.05 MIU/L-ACNC: 0.06 UIU/ML (ref 0.45–4.5)
WBC # BLD AUTO: 9.94 THOUSAND/UL (ref 4.31–10.16)

## 2023-09-07 PROCEDURE — 80053 COMPREHEN METABOLIC PANEL: CPT | Performed by: FAMILY MEDICINE

## 2023-09-07 PROCEDURE — 80061 LIPID PANEL: CPT | Performed by: FAMILY MEDICINE

## 2023-09-07 PROCEDURE — 36415 COLL VENOUS BLD VENIPUNCTURE: CPT | Performed by: FAMILY MEDICINE

## 2023-09-07 PROCEDURE — 84439 ASSAY OF FREE THYROXINE: CPT | Performed by: FAMILY MEDICINE

## 2023-09-07 PROCEDURE — 85025 COMPLETE CBC W/AUTO DIFF WBC: CPT | Performed by: FAMILY MEDICINE

## 2023-09-07 PROCEDURE — 84443 ASSAY THYROID STIM HORMONE: CPT | Performed by: FAMILY MEDICINE

## 2023-09-08 DIAGNOSIS — E03.9 ACQUIRED HYPOTHYROIDISM: Primary | ICD-10-CM

## 2023-09-13 NOTE — PROGRESS NOTES
Assessment/Plan:    Recommended monthly SBE, annual CBE and the importance of annual screening mammo reviewed. ASCCP guidelines reviewed and pap with cotesting repeated. Colonoscopy noted to be up to date. Reviewed diet/activity recommendations Calcium 1200 mg and Vit D 600-1000 IU daily. Discussed postmenopausal considerations and symptoms to report. Kegel exercises as instructed. RTO in one year for routine annual gyn exam or sooner PRN. Diagnoses and all orders for this visit:    Encounter for gynecological examination (general) (routine) without abnormal findings        Subjective:      Patient ID: Chandler Ivy is a 45 y.o. female. This patient presents for routine annual gyn exam.  SANDY with almost every cough, sneeze, urogyn referral given at last visit. Today, she states it's not that bad and will wait to proceed with tx. She had benign EMB for endometrial cells noted on pap. Pap hx as follows:  8/17/21 ASCUS, +HPV, -16, -18  9/6/22 ASCUS, - HPV  11/28/22 colpo, no bx  4/18/23 pap and HPV normal  Just diagnosed with lymphoma  She denies  bleeding or spotting, VM sx, pelvic pain, dyspareunia, breast concerns, abnormal discharge, bowel dysfunction, depression/anx. , sexually active and is monogamous. Mammography up to date and normal, 1/18/21, ordered, not done to date, encouraged to do so. Colonoscopy up to date, 7/26/18. The following portions of the patient's history were reviewed and updated as appropriate: allergies, current medications, past family history, past medical history, past social history, past surgical history and problem list.    Review of Systems   Constitutional: Negative. Respiratory: Negative. Cardiovascular: Negative. Gastrointestinal: Negative. Endocrine: Negative. Genitourinary: Negative for dyspareunia, dysuria, frequency, pelvic pain, urgency, vaginal bleeding, vaginal discharge and vaginal pain. Musculoskeletal: Negative. Skin: Negative. Neurological: Negative. Psychiatric/Behavioral: Negative. Objective:      /60   Ht 5' 7" (1.702 m)   Wt 130 kg (286 lb)   BMI 44.79 kg/m²          Physical Exam  Vitals and nursing note reviewed. Exam conducted with a chaperone present. Constitutional:       Appearance: Normal appearance. She is well-developed. HENT:      Head: Normocephalic and atraumatic. Neck:      Thyroid: No thyroid mass or thyromegaly. Cardiovascular:      Rate and Rhythm: Normal rate and regular rhythm. Heart sounds: Normal heart sounds. Pulmonary:      Effort: Pulmonary effort is normal.      Breath sounds: Normal breath sounds. Chest:   Breasts:     Breasts are symmetrical.      Right: No inverted nipple, mass, nipple discharge, skin change or tenderness. Left: No inverted nipple, mass, nipple discharge, skin change or tenderness. Abdominal:      General: Bowel sounds are normal.      Palpations: Abdomen is soft. Tenderness: There is no abdominal tenderness. Hernia: There is no hernia in the left inguinal area or right inguinal area. Genitourinary:     General: Normal vulva. Exam position: Supine. Pubic Area: No rash. Labia:         Right: No rash, tenderness, lesion or injury. Left: No rash, tenderness, lesion or injury. Urethra: No prolapse, urethral pain, urethral swelling or urethral lesion. Vagina: Normal. No signs of injury and foreign body. No vaginal discharge, erythema, tenderness, bleeding, lesions or prolapsed vaginal walls. Cervix: No cervical motion tenderness, discharge, friability, lesion, erythema, cervical bleeding or eversion. Uterus: Not deviated, not enlarged, not fixed, not tender and no uterine prolapse. Adnexa:         Right: No mass, tenderness or fullness. Left: No mass, tenderness or fullness. Rectum: No external hemorrhoid.       Comments: Urethra normal without lesions  No bladder tenderness  Exam limited by body habitus  Musculoskeletal:         General: Normal range of motion. Cervical back: Normal range of motion and neck supple. Lymphadenopathy:      Lower Body: No right inguinal adenopathy. No left inguinal adenopathy. Skin:     General: Skin is warm and dry. Neurological:      Mental Status: She is alert and oriented to person, place, and time. Psychiatric:         Speech: Speech normal.         Behavior: Behavior normal. Behavior is cooperative.

## 2023-09-18 DIAGNOSIS — E78.2 MIXED HYPERLIPIDEMIA: ICD-10-CM

## 2023-09-18 RX ORDER — ATORVASTATIN CALCIUM 20 MG/1
20 TABLET, FILM COATED ORAL DAILY
Qty: 30 TABLET | Refills: 0 | Status: SHIPPED | OUTPATIENT
Start: 2023-09-18

## 2023-09-19 ENCOUNTER — ANNUAL EXAM (OUTPATIENT)
Dept: GYNECOLOGY | Facility: CLINIC | Age: 53
End: 2023-09-19
Payer: COMMERCIAL

## 2023-09-19 VITALS
SYSTOLIC BLOOD PRESSURE: 106 MMHG | DIASTOLIC BLOOD PRESSURE: 60 MMHG | WEIGHT: 286 LBS | BODY MASS INDEX: 44.89 KG/M2 | HEIGHT: 67 IN

## 2023-09-19 DIAGNOSIS — Z12.4 ENCOUNTER FOR PAPANICOLAOU SMEAR FOR CERVICAL CANCER SCREENING: ICD-10-CM

## 2023-09-19 DIAGNOSIS — Z01.419 ENCOUNTER FOR GYNECOLOGICAL EXAMINATION (GENERAL) (ROUTINE) WITHOUT ABNORMAL FINDINGS: Primary | ICD-10-CM

## 2023-09-19 PROCEDURE — G0476 HPV COMBO ASSAY CA SCREEN: HCPCS | Performed by: OBSTETRICS & GYNECOLOGY

## 2023-09-19 PROCEDURE — S0612 ANNUAL GYNECOLOGICAL EXAMINA: HCPCS | Performed by: OBSTETRICS & GYNECOLOGY

## 2023-09-19 PROCEDURE — G0145 SCR C/V CYTO,THINLAYER,RESCR: HCPCS | Performed by: OBSTETRICS & GYNECOLOGY

## 2023-09-23 LAB
LAB AP GYN PRIMARY INTERPRETATION: NORMAL
Lab: NORMAL

## 2023-10-05 ENCOUNTER — APPOINTMENT (OUTPATIENT)
Dept: LAB | Facility: MEDICAL CENTER | Age: 53
End: 2023-10-05
Payer: COMMERCIAL

## 2023-10-05 DIAGNOSIS — C85.80: ICD-10-CM

## 2023-10-05 DIAGNOSIS — E03.9 ACQUIRED HYPOTHYROIDISM: ICD-10-CM

## 2023-10-05 LAB
ALBUMIN SERPL BCP-MCNC: 3.8 G/DL (ref 3.5–5)
ALP SERPL-CCNC: 128 U/L (ref 34–104)
ALT SERPL W P-5'-P-CCNC: 10 U/L (ref 7–52)
ANION GAP SERPL CALCULATED.3IONS-SCNC: 10 MMOL/L
AST SERPL W P-5'-P-CCNC: 18 U/L (ref 13–39)
BASOPHILS # BLD AUTO: 0.06 THOUSANDS/ÂΜL (ref 0–0.1)
BASOPHILS NFR BLD AUTO: 1 % (ref 0–1)
BILIRUB SERPL-MCNC: 0.55 MG/DL (ref 0.2–1)
BUN SERPL-MCNC: 15 MG/DL (ref 5–25)
CALCIUM SERPL-MCNC: 9 MG/DL (ref 8.4–10.2)
CHLORIDE SERPL-SCNC: 104 MMOL/L (ref 96–108)
CO2 SERPL-SCNC: 25 MMOL/L (ref 21–32)
CREAT SERPL-MCNC: 0.68 MG/DL (ref 0.6–1.3)
EOSINOPHIL # BLD AUTO: 0.25 THOUSAND/ÂΜL (ref 0–0.61)
EOSINOPHIL NFR BLD AUTO: 3 % (ref 0–6)
ERYTHROCYTE [DISTWIDTH] IN BLOOD BY AUTOMATED COUNT: 13.4 % (ref 11.6–15.1)
GFR SERPL CREATININE-BSD FRML MDRD: 99 ML/MIN/1.73SQ M
GLUCOSE P FAST SERPL-MCNC: 134 MG/DL (ref 65–99)
HCT VFR BLD AUTO: 43.6 % (ref 34.8–46.1)
HGB BLD-MCNC: 14 G/DL (ref 11.5–15.4)
IMM GRANULOCYTES # BLD AUTO: 0.02 THOUSAND/UL (ref 0–0.2)
IMM GRANULOCYTES NFR BLD AUTO: 0 % (ref 0–2)
LYMPHOCYTES # BLD AUTO: 1.45 THOUSANDS/ÂΜL (ref 0.6–4.47)
LYMPHOCYTES NFR BLD AUTO: 18 % (ref 14–44)
MCH RBC QN AUTO: 29.8 PG (ref 26.8–34.3)
MCHC RBC AUTO-ENTMCNC: 32.1 G/DL (ref 31.4–37.4)
MCV RBC AUTO: 93 FL (ref 82–98)
MONOCYTES # BLD AUTO: 0.61 THOUSAND/ÂΜL (ref 0.17–1.22)
MONOCYTES NFR BLD AUTO: 8 % (ref 4–12)
NEUTROPHILS # BLD AUTO: 5.75 THOUSANDS/ÂΜL (ref 1.85–7.62)
NEUTS SEG NFR BLD AUTO: 70 % (ref 43–75)
NRBC BLD AUTO-RTO: 0 /100 WBCS
PLATELET # BLD AUTO: 231 THOUSANDS/UL (ref 149–390)
PMV BLD AUTO: 11 FL (ref 8.9–12.7)
POTASSIUM SERPL-SCNC: 4.1 MMOL/L (ref 3.5–5.3)
PROT SERPL-MCNC: 6.8 G/DL (ref 6.4–8.4)
RBC # BLD AUTO: 4.7 MILLION/UL (ref 3.81–5.12)
SODIUM SERPL-SCNC: 139 MMOL/L (ref 135–147)
TSH SERPL DL<=0.05 MIU/L-ACNC: 1.12 UIU/ML (ref 0.45–4.5)
WBC # BLD AUTO: 8.14 THOUSAND/UL (ref 4.31–10.16)

## 2023-10-05 PROCEDURE — 36415 COLL VENOUS BLD VENIPUNCTURE: CPT

## 2023-10-05 PROCEDURE — 84443 ASSAY THYROID STIM HORMONE: CPT

## 2023-10-05 PROCEDURE — 87340 HEPATITIS B SURFACE AG IA: CPT

## 2023-10-05 PROCEDURE — 80053 COMPREHEN METABOLIC PANEL: CPT

## 2023-10-05 PROCEDURE — 85025 COMPLETE CBC W/AUTO DIFF WBC: CPT

## 2023-10-05 PROCEDURE — 86706 HEP B SURFACE ANTIBODY: CPT

## 2023-10-05 PROCEDURE — 86704 HEP B CORE ANTIBODY TOTAL: CPT

## 2023-10-06 LAB
HBV CORE AB SER QL: NORMAL
HBV SURFACE AB SER-ACNC: <3 MIU/ML
HBV SURFACE AG SER QL: NORMAL

## 2023-10-13 DIAGNOSIS — E78.2 MIXED HYPERLIPIDEMIA: ICD-10-CM

## 2023-10-13 RX ORDER — ATORVASTATIN CALCIUM 20 MG/1
20 TABLET, FILM COATED ORAL DAILY
Qty: 30 TABLET | Refills: 0 | Status: SHIPPED | OUTPATIENT
Start: 2023-10-13

## 2023-10-30 ENCOUNTER — TELEPHONE (OUTPATIENT)
Dept: FAMILY MEDICINE CLINIC | Facility: CLINIC | Age: 53
End: 2023-10-30

## 2023-10-30 ENCOUNTER — OFFICE VISIT (OUTPATIENT)
Dept: FAMILY MEDICINE CLINIC | Facility: CLINIC | Age: 53
End: 2023-10-30
Payer: COMMERCIAL

## 2023-10-30 ENCOUNTER — APPOINTMENT (OUTPATIENT)
Dept: RADIOLOGY | Facility: MEDICAL CENTER | Age: 53
End: 2023-10-30
Payer: COMMERCIAL

## 2023-10-30 VITALS
DIASTOLIC BLOOD PRESSURE: 70 MMHG | WEIGHT: 293 LBS | SYSTOLIC BLOOD PRESSURE: 120 MMHG | OXYGEN SATURATION: 97 % | HEART RATE: 94 BPM | HEIGHT: 67 IN | BODY MASS INDEX: 45.99 KG/M2

## 2023-10-30 DIAGNOSIS — M79.672 LEFT FOOT PAIN: ICD-10-CM

## 2023-10-30 DIAGNOSIS — S97.122A CRUSHING INJURY OF FIFTH TOE OF LEFT FOOT, INITIAL ENCOUNTER: Primary | ICD-10-CM

## 2023-10-30 DIAGNOSIS — S97.122A CRUSHING INJURY OF FIFTH TOE OF LEFT FOOT, INITIAL ENCOUNTER: ICD-10-CM

## 2023-10-30 PROCEDURE — 73630 X-RAY EXAM OF FOOT: CPT

## 2023-10-30 PROCEDURE — 99213 OFFICE O/P EST LOW 20 MIN: CPT | Performed by: PHYSICIAN ASSISTANT

## 2023-10-30 PROCEDURE — 3078F DIAST BP <80 MM HG: CPT | Performed by: PHYSICIAN ASSISTANT

## 2023-10-30 PROCEDURE — 3074F SYST BP LT 130 MM HG: CPT | Performed by: PHYSICIAN ASSISTANT
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
## 2023-10-30 NOTE — PROGRESS NOTES
Name: Kandyce Lefort      : 1970      MRN: [id number]  Encounter Provider: Nasrin Ceja PA-C  Encounter Date: 10/30/2023   Encounter department: [address]     1. Crushing injury of fifth toe of left foot, initial encounter  Assessment & Plan:  X-ray ordered of left foot. Recommended ice. She may bear weight as tolerated. Orders:  -     XR foot 3+ vw left; Future; Expected date: 10/30/2023    2. Left foot pain  -     XR foot 3+ vw left; Future; Expected date: 10/30/2023           Subjective      Dandy Borden is a very pleasant 75-year-old female who is here today complaining of pain in her left fifth toe. She was [facility], and the trailer ran over her left foot. She admits that she did have some pain initially. She is able to move all of her toes. She was wearing sneakers when the incident happened. She admits to some bruising over her fifth toe. 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 She is able to bear weight. Review of Systems   Constitutional:  Negative for chills, diaphoresis, fatigue and fever. HENT:  Negative for congestion, ear pain, postnasal drip, rhinorrhea, sneezing, sore throat and trouble swallowing. Eyes:  Negative for pain and visual disturbance. Respiratory:  Negative for apnea, cough, shortness of breath and wheezing. Cardiovascular:  Negative for chest pain and palpitations. Gastrointestinal:  Negative for abdominal pain, constipation, diarrhea, nausea and vomiting. Genitourinary:  Negative for dysuria and hematuria. Musculoskeletal:  Positive for arthralgias (left 5th toe). Negative for gait problem and myalgias. Neurological:  Positive for numbness (neuropathy of bilateral lower extremities). Negative for dizziness, syncope, weakness, light-headedness and headaches. Psychiatric/Behavioral:  Negative for suicidal ideas.  The patient is not nervous/anxious. Current Outpatient Medications on File Prior to Visit   Medication Sig   • acetaminophen (TYLENOL) 325 mg tablet Take 650 mg by mouth every 4 (four) hours as needed   • Ascorbic Acid (vitamin C) 1000 MG tablet Take 2,000 mg by mouth daily   • atorvastatin (LIPITOR) 20 mg tablet Take 1 tablet by mouth once daily   • cholecalciferol (VITAMIN D3) 1,000 units tablet Take 1,000 Units by mouth every morning    • CINNAMON PO Take by mouth   • diltiazem (CARDIZEM CD) 120 mg 24 hr capsule TAKE ONE CAPSULE BY MOUTH EVERY DAY   • fluticasone-vilanterol (BREO ELLIPTA) 100-25 mcg/inh inhaler Inhale 1 puff every morning Rinse mouth after use. • levothyroxine (Euthyrox) 200 mcg tablet Take 1 tablet (200 mcg total) by mouth daily   • loratadine (CLARITIN) 10 mg tablet Take 10 mg by mouth daily   • metoprolol tartrate (LOPRESSOR) 100 mg tablet Take 1 tablet (100 mg total) by mouth 2 (two) times a day   • omeprazole (PriLOSEC) 20 mg delayed release capsule Take 20 mg by mouth every morning   • Probiotic Product (PROBIOTIC PO) Take by mouth   • rivaroxaban (Xarelto) 20 mg tablet Take 1 tablet (20 mg total) by mouth daily with dinner   • zinc gluconate 50 mg tablet Take 50 mg by mouth daily       Objective     /70   Pulse 94   Ht 5' 7" (1.702 m)   Wt 133 kg (293 lb 1.6 oz)   SpO2 97%   BMI 45.91 kg/m²     Physical Exam  Vitals and nursing note reviewed. Constitutional:       Appearance: She is well-developed. HENT:      Head: Normocephalic and atraumatic. Right Ear: External ear normal.      Left Ear: External ear normal.      Nose: Nose normal.   Eyes:      Extraocular Movements: Extraocular movements intact. Cardiovascular:      Rate and Rhythm: Normal rate and regular rhythm. Pulmonary:      Effort: Pulmonary effort is normal.      Breath sounds: Normal breath sounds. Musculoskeletal:         General: Normal range of motion.       Cervical back: Normal range of motion and neck supple. Left foot: Normal range of motion. Tenderness (mild tenderness of left 5th toe. Full ROM of all toes. ) present. No swelling or crepitus. Skin:     General: Skin is warm and dry. Neurological:      Mental Status: She is alert and oriented to person, place, and time. Psychiatric:         Behavior: Behavior normal.         Thought Content: Thought content normal. Thought content does not include homicidal or suicidal ideation. Thought content does not include homicidal or suicidal plan.          Judgment: Judgment normal.       Stacey Libman, PA-C

## 2023-11-08 DIAGNOSIS — E78.2 MIXED HYPERLIPIDEMIA: ICD-10-CM

## 2023-11-08 RX ORDER — ATORVASTATIN CALCIUM 20 MG/1
20 TABLET, FILM COATED ORAL DAILY
Qty: 30 TABLET | Refills: 0 | Status: SHIPPED | OUTPATIENT
Start: 2023-11-08

## 2023-11-16 ENCOUNTER — HOSPITAL ENCOUNTER (OUTPATIENT)
Dept: CT IMAGING | Facility: HOSPITAL | Age: 53
Discharge: HOME | End: 2023-11-16
Payer: COMMERCIAL

## 2023-11-16 DIAGNOSIS — C83.38 DIFFUSE LARGE B-CELL LYMPHOMA, LYMPH NODES OF MULTIPLE SITES (HCC): ICD-10-CM

## 2023-11-16 PROCEDURE — G1004 CDSM NDSC: HCPCS

## 2023-11-16 PROCEDURE — 70491 CT SOFT TISSUE NECK W/DYE: CPT

## 2023-11-16 RX ADMIN — IOHEXOL 85 ML: 350 INJECTION, SOLUTION INTRAVENOUS at 07:26

## 2023-12-04 ENCOUNTER — OFFICE VISIT (OUTPATIENT)
Dept: FAMILY MEDICINE CLINIC | Facility: CLINIC | Age: 53
End: 2023-12-04
Payer: COMMERCIAL

## 2023-12-04 VITALS
WEIGHT: 293 LBS | SYSTOLIC BLOOD PRESSURE: 124 MMHG | HEART RATE: 74 BPM | DIASTOLIC BLOOD PRESSURE: 66 MMHG | BODY MASS INDEX: 45.99 KG/M2 | TEMPERATURE: 98.2 F | OXYGEN SATURATION: 97 % | HEIGHT: 67 IN

## 2023-12-04 DIAGNOSIS — E11.9 TYPE 2 DIABETES MELLITUS WITHOUT COMPLICATION, WITHOUT LONG-TERM CURRENT USE OF INSULIN (HCC): Primary | ICD-10-CM

## 2023-12-04 DIAGNOSIS — E78.2 MIXED HYPERLIPIDEMIA: ICD-10-CM

## 2023-12-04 LAB — SL AMB POCT HEMOGLOBIN AIC: 8 (ref ?–6.5)

## 2023-12-04 PROCEDURE — 83036 HEMOGLOBIN GLYCOSYLATED A1C: CPT | Performed by: FAMILY MEDICINE

## 2023-12-04 PROCEDURE — 99214 OFFICE O/P EST MOD 30 MIN: CPT | Performed by: FAMILY MEDICINE

## 2023-12-04 RX ORDER — ATORVASTATIN CALCIUM 20 MG/1
20 TABLET, FILM COATED ORAL DAILY
Qty: 30 TABLET | Refills: 5 | Status: SHIPPED | OUTPATIENT
Start: 2023-12-04

## 2023-12-04 RX ORDER — ATORVASTATIN CALCIUM 20 MG/1
20 TABLET, FILM COATED ORAL DAILY
Qty: 30 TABLET | Refills: 0 | Status: SHIPPED | OUTPATIENT
Start: 2023-12-04 | End: 2023-12-04 | Stop reason: SDUPTHER

## 2023-12-04 NOTE — PROGRESS NOTES
Name: Cassandra Webster      : 1970      MRN: [id number]  Encounter Provider: Donny Dimas DO  Encounter Date: 2023   Encounter department: [facility]    Assessment & Plan   Hemoglobin A1c was higher at 8.0. I will start her on Jardiance 10 mg a day. She will continue to watch her carbohydrate intake. I will see her back in 4 months or as needed. She will follow-up with other specialist as scheduled. 1. Type 2 diabetes mellitus without complication, without long-term current use of insulin ([facility])  -     POCT hemoglobin A1c  -     Empagliflozin (Jardiance) 10 MG TABS tablet; Take 1 tablet (10 mg total) by mouth in the morning    2. Mixed hyperlipidemia  -     atorvastatin (LIPITOR) 20 mg tablet; Take 1 tablet (20 mg total) by mouth daily           Subjective     Patient today for follow-up. She denies any chest pain or shortness of breath. Feeling well. No new concerns. Review of Systems   Constitutional: Negative. Respiratory: Negative. Cardiovascular: Negative. Gastrointestinal: Negative. Genitourinary: Negative.         Past Medical History:   Diagnosis Date   • Allergic rhinitis     last assessed 16   • Arthritis     b/l feet   • Cancer ([address]) 2016   • Chronic allergic conjunctivitis    • Fluttering heart     takes magnesium for same   EKG OK   • Fluttering sensation of heart     "periodically, not often since on magnesium"   • Foot pain, left    • Full dentures     upper   • Genital warts 1986    Treated   • History of cancer chemotherapy    • History of dilation and curettage    • Hx of tonsillectomy    • Hypothyroid    • Irregular heart beat    • Lymph nodes enlarged     in chest pushing on her bronchioles necessitating inhalers   • Neck mass     R neck mass-excised 3/15/2016,, 18 noted enlarged lymph node right neck-bx today 2018   • Non Hodgkin's lymphoma ([address])     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   • Obese    • Obstructive sleep apnea    • Paroxysmal atrial flutter ([address])    • Port-A-Cath in place 2016    pt reports 'Has it flushed q 4weeks" right chest wall   • Post-menopausal     since chemo 4/2016  no menses   • Pulmonary embolism ([address])     last assessed 10/31/16 attributed to Non-Hodgkins hx   • Pulmonary embolism on right ([address]) 07/12/2018    Last Assessment & Plan:  She will need to hold her Xarelto 2 days prior to her procedure and may resume therapy 1-2 days post procedure (depending on whether she is producing blood tinged sputum or not).    • Shortness of breath     due to chest tumor   • Tachycardia     awaiting cardiology eval,,,4/23/18 "pt reports saw cardiologist at that time and placed on magnesium and "hardly notices it"   • Tinnitus     occas   • Wears glasses    • Wears partial dentures     /lower   • [place] teeth extracted      Past Surgical History:   Procedure Laterality Date   • COLONOSCOPY     • COLONOSCOPY N/A 7/26/2018    Procedure: COLONOSCOPY with multiple bx;  Surgeon: Jesus Lara MD;  Location: [address] [place] [address] OR;  Service: Gastroenterology   • CYST REMOVAL Left     L  neck   • DILATION AND CURETTAGE OF UTERUS     • ESOPHAGOGASTRODUODENOSCOPY N/A 7/26/2018    Procedure: ESOPHAGOGASTRODUODENOSCOPY (EGD) with multiple bx;  Surgeon: Jesus Lara MD;  Location: [address] [place] [address] OR;  Service: Gastroenterology   • FACIAL/NECK BIOPSY N/A 4/26/2018    Procedure: OPEN DEEP CERVICAL LYMPH NODE EXCISOION/BIOPSY;  Surgeon: Charlene Johnson MD;  [facility];  Service: ENT   • LYMPHADENECTOMY     • PORTACATH PLACEMENT     • AR BX/EXC LYMPH NODE OPEN DEEP CERVICAL NODE N/A 3/15/2016    Procedure: DISSECTION [address] NECK/DEEP NECK MASS ;  Surgeon: Romana Doffing, DO;  Location: [facility];  Service: ENT   • TONSILLECTOMY     • TUBAL LIGATION     • TUNNELED VENOUS PORT PLACEMENT Right 3/21/2019    Procedure: INSERTION VENOUS PORT (PORT-A-CATH) remove and replace;  Surgeon: Roseline Blood MD;  Location: [address] [place] [address] OR;  Service: General Family History   Problem Relation Age of Onset   • Coronary artery disease Father    • Diabetes Brother    • Diabetes Family    • Heart disease Family    • Cancer Maternal Uncle         throat   • Diabetes Brother            • Cancer Maternal Grandmother    • Stroke Neg Hx      Social History     Socioeconomic History   • Marital status: /Civil Union     Spouse name: None   • Number of children: None   • Years of education: None   • Highest education level: None   Occupational History   • None   Tobacco Use   • Smoking status: Former     Packs/day: 1.50     Years: 11.00     Total pack years: 16.50     Types: Cigarettes     Quit date: 1996     Years since quittin.9   • Smokeless tobacco: Never   Vaping Use   • Vaping Use: Never used   Substance and Sexual Activity   • Alcohol use: Yes     Comment: rare   • Drug use: Never   • Sexual activity: Yes     Partners: Male     Birth control/protection: Post-menopausal     Comment: Tubal   Other Topics Concern   • None   Social History Narrative   • None     Social Determinants of Health     Financial Resource Strain: Not on file   Food Insecurity: Not on file   Transportation Needs: Not on file   Physical Activity: Not on file   Stress: Not on file   Social Connections: Not on file   Intimate Partner Violence: Not on file   Housing Stability: Not on file     Current Outpatient Medications on File Prior to Visit   Medication Sig   • acetaminophen (TYLENOL) 325 mg tablet Take 650 mg by mouth every 4 (four) hours as needed   • Ascorbic Acid (vitamin C) 1000 MG tablet Take 2,000 mg by mouth daily   • cholecalciferol (VITAMIN D3) 1,000 units tablet Take 1,000 Units by mouth every morning    • CINNAMON PO Take by mouth   • diltiazem (CARDIZEM CD) 120 mg 24 hr capsule TAKE ONE CAPSULE BY MOUTH EVERY DAY   • fluticasone-vilanterol (BREO ELLIPTA) 100-25 mcg/inh inhaler Inhale 1 puff every morning Rinse mouth after use.    • levothyroxine (Euthyrox) 200 mcg tablet Take 1 tablet (200 mcg total) by mouth daily   • loratadine (CLARITIN) 10 mg tablet Take 10 mg by mouth daily   • metoprolol tartrate (LOPRESSOR) 100 mg tablet Take 1 tablet (100 mg total) by mouth 2 (two) times a day   • omeprazole (PriLOSEC) 20 mg delayed release capsule Take 20 mg by mouth every morning   • Probiotic Product (PROBIOTIC PO) Take by mouth   • rivaroxaban (Xarelto) 20 mg tablet Take 1 tablet (20 mg total) by mouth daily with dinner   • zinc gluconate 50 mg tablet Take 50 mg by mouth daily   • [DISCONTINUED] atorvastatin (LIPITOR) 20 mg tablet Take 1 tablet by mouth once daily     Allergies   Allergen Reactions   • Medical Tape      Redness, soreness on skin     Immunization History   Administered Date(s) Administered   • COVID-19 MODERNA VACC 0.25 ML IM BOOSTER 12/08/2021   • COVID-19 MODERNA VACC 0.5 ML IM 04/09/2021, 05/13/2021   • COVID-19 Moderna mRNA Vaccine 12 Yr+ 50 mcg/0.5 mL (Spikevax) 09/25/2023   • INFLUENZA 01/08/2020, 01/08/2022, 12/17/2022, 09/15/2023   • Influenza, injectable, quadrivalent, preservative free 0.5 mL 11/04/2020   • Influenza, recombinant, quadrivalent,injectable, preservative free 01/13/2020   • Tdap 01/21/2023   • Zoster Vaccine Recombinant 08/12/2021, 01/30/2022   • influenza, trivalent, adjuvanted 01/08/2020       Objective     /66   Pulse 74   Temp 98.2 °F (36.8 °C)   Ht 5' 7" (1.702 m)   Wt 135 kg (297 lb)   SpO2 97%   BMI 46.52 kg/m²     Physical Exam  Vitals reviewed. Constitutional:       General: She is not in acute distress. Appearance: Normal appearance. She is well-developed. She is not ill-appearing, toxic-appearing or diaphoretic. HENT:      Head: Normocephalic and atraumatic. Eyes:      Conjunctiva/sclera: Conjunctivae normal.   Cardiovascular:      Rate and Rhythm: Normal rate and regular rhythm. Heart sounds: Normal heart sounds. No murmur heard. No friction rub. No gallop.    Pulmonary:      Effort: Pulmonary effort is normal. No respiratory distress. Breath sounds: Normal breath sounds. No wheezing, rhonchi or rales. Musculoskeletal:      Right lower leg: No edema. Left lower leg: No edema. Neurological:      General: No focal deficit present. Mental Status: She is alert and oriented to person, place, and time. Psychiatric:         Mood and Affect: Mood normal.         Behavior: Behavior normal.         Thought Content:  Thought content normal.         Judgment: Judgment normal.       Veronika Seashore, DO

## 2023-12-06 ENCOUNTER — TELEPHONE (OUTPATIENT)
Dept: FAMILY MEDICINE CLINIC | Facility: CLINIC | Age: 53
End: 2023-12-06

## 2023-12-06 DIAGNOSIS — E11.9 TYPE 2 DIABETES MELLITUS WITHOUT COMPLICATION, WITHOUT LONG-TERM CURRENT USE OF INSULIN (HCC): Primary | ICD-10-CM

## 2023-12-06 NOTE — TELEPHONE ENCOUNTER
Please call patient. Her insurance will not cover the Jardiance. I will start her on metformin, 500 mg twice a day. She should call us if she has any problems tolerating it.

## 2023-12-06 NOTE — TELEPHONE ENCOUNTER
Pls advise, FED BC/BC pharm plan denial on file in patients chart, want METFORMIN trial and fail for patient

## 2023-12-15 ENCOUNTER — TELEPHONE (OUTPATIENT)
Dept: FAMILY MEDICINE CLINIC | Facility: CLINIC | Age: 53
End: 2023-12-15

## 2023-12-15 NOTE — TELEPHONE ENCOUNTER
If the rash is just on her 1 arm, I do not believe it is from the metformin. If it starts to spread, then I  would be concerned it is from the metformin, and would stop it. Call us if symptoms continue or increase.

## 2023-12-15 NOTE — TELEPHONE ENCOUNTER
----- Message from Ernie Abad sent at 12/15/2023 11:38 AM EST -----  Regarding: FW: [place]? Contact: [phone]    ----- Message -----  From: Paula Ann  Sent: 12/15/2023  11:38 AM EST  To: [facility]  Subject: [place]? Question. Is this rash from the medicine? I also had a rash on ky face on Monday but it was in my usual spot that gives me trouble so I didn't think anything if it, and then this morning my arm itched I scratch it pretty good didn't see anything right away but then this showed up?   Not sure what it is from thanks

## 2024-01-11 ENCOUNTER — OFFICE VISIT (OUTPATIENT)
Dept: SLEEP CENTER | Facility: CLINIC | Age: 54
End: 2024-01-11
Payer: COMMERCIAL

## 2024-01-11 VITALS
HEIGHT: 67 IN | OXYGEN SATURATION: 97 % | TEMPERATURE: 97.8 F | BODY MASS INDEX: 45.99 KG/M2 | SYSTOLIC BLOOD PRESSURE: 110 MMHG | DIASTOLIC BLOOD PRESSURE: 60 MMHG | WEIGHT: 293 LBS | HEART RATE: 65 BPM

## 2024-01-11 DIAGNOSIS — C83.30 MALIGNANT LYMPHOMA, LARGE CELL, DIFFUSE (HCC): ICD-10-CM

## 2024-01-11 DIAGNOSIS — Z86.711 HISTORY OF PULMONARY EMBOLISM: ICD-10-CM

## 2024-01-11 DIAGNOSIS — G47.34 SLEEP RELATED HYPOXIA: ICD-10-CM

## 2024-01-11 DIAGNOSIS — G47.33 OSA (OBSTRUCTIVE SLEEP APNEA): Primary | ICD-10-CM

## 2024-01-11 DIAGNOSIS — I48.3 TYPICAL ATRIAL FLUTTER (HCC): ICD-10-CM

## 2024-01-11 DIAGNOSIS — E66.01 MORBID OBESITY (HCC): ICD-10-CM

## 2024-01-11 DIAGNOSIS — R00.2 PALPITATIONS: ICD-10-CM

## 2024-01-11 PROCEDURE — 99214 OFFICE O/P EST MOD 30 MIN: CPT | Performed by: INTERNAL MEDICINE

## 2024-01-11 RX ORDER — CETIRIZINE HYDROCHLORIDE 10 MG/1
10 TABLET ORAL DAILY
COMMUNITY

## 2024-01-11 NOTE — PATIENT INSTRUCTIONS

## 2024-01-11 NOTE — PROGRESS NOTES
Follow-Up Note - Sleep Center   Michelle Monge  53 y.o. female  :1970  MRN:[id number]  DOS:2024    CC: I saw this patient for follow-up in clinic today for Sleep disordered breathing, Coexisting Sleep and Medical Problems.  She is using a Enma machine interval changes: None reported.    Results of prior studies in : Diagnostic study demonstrated: AHI 7 /hour , considerably higher during REM at 38 per hour. Moderate intensity  snoring  was noted and there were 3 respiratory effort-related arousals.  Minimum oxygen saturation 83 %  and 26.1 minutes of total sleep time was spent with saturations less than 90%.         The study demonstrated sleep disordered breathing that is partially remediated with positive airway pressure at 7 cm H2O. Since she was not observed during REM/supine, auto titrating Pap 7-12 cm H2O was recommended.     PFSH, Problem List, Medications & Allergies were reviewed in EMR.   She  has a past medical history of Allergic rhinitis, Arthritis, Cancer ([facility]) (), Chronic allergic conjunctivitis, Fluttering heart, Fluttering sensation of heart, Foot pain, left, Full dentures, Genital warts (), History of cancer chemotherapy (), History of dilation and curettage, tonsillectomy, Hypothyroid, Irregular heart beat, Lymph nodes enlarged, Neck mass, Non Hodgkin's lymphoma (), Obese, Obstructive sleep apnea, Paroxysmal atrial flutter ([facility]), Port-A-Cath in place (), Post-menopausal, Pulmonary embolism ([facility]), Pulmonary embolism on right ([facility]) (2018), Shortness of breath, Tachycardia, Tinnitus, Wears glasses, Wears partial dentures, and [place] teeth extracted.    She has a current medication list which includes the following prescription(s): atorvastatin, cetirizine, diltiazem, fluticasone-vilanterol, levothyroxine, metformin, metoprolol tartrate, omeprazole, rivaroxaban, acetaminophen, vitamin c, cholecalciferol, cinnamon, loratadine, probiotic product, and zinc  "gluconate.    PHYSIOLOGICAL DATA REVIEW : Using PAP > 4 hours/night 93%. Estimated SIHRLEY 0/hour with pressure of 8.8cm H2O@90th/95th percentile; patient has not been using non FDA approved devices to sanitize the machine.  INTERPRETATION: Compliance is excellent; Pressure setting is:optimal; ;   SUBJECTIVE: With respect to use of PAP,   is experiencing some adverse effects:dry mouth/throat.She derives benefit.  Is satisfied with sleep and daytime function.   Sleep Routine:  reports getting 6-8 hrs sleep; she has no difficulty initiating or maintaining sleep . She arises by disturbance from pets  and feels refreshed. denies excessive daytime sleepiness,.  She rated herself at Total score: 7 /24 on the [place] Sleepiness Scale.   Other issues: None reported.     Habits: Reports that she quit smoking about 28 years ago. Her smoking use included cigarettes. She started smoking about 39 years ago. She has a 16.5 pack-year smoking history. She has never used smokeless tobacco.,  Reports current alcohol use.,  Reports no history of drug use., Caffeine use:none; Exercise routine: regular.      ROS: Significant for weight fluctuates in the range of a few pounds.  She reported no nasal symptoms and uses Zyrtec because of allergies causing eye irritation..  She is on inhaler for cough/wheeze (since she had cancer of the lung).  She reports infrequent palpitations.  Acid reflux is controlled.    EXAM: /60 (BP Location: Left arm, Cuff Size: Large)   Pulse 65   Temp 97.8 °F (36.6 °C) (Temporal)   Ht 5' 7\" (1.702 m)   Wt 133 kg (294 lb)   SpO2 97%   BMI 46.05 kg/m²     Wt Readings from Last 3 Encounters:   01/11/24 133 kg (294 lb)   12/04/23 135 kg (297 lb)   10/30/23 133 kg (293 lb 1.6 oz)      Patient is well groomed; well appearing.   CNS: Alert, orientated, speech clear & coherent  Psych: cooperative and in no distress. Mental state: Appears normal.  H&N: EOMI; NC/AT: No facial pressure marks, " "no rashes.    Skin/Extrem: col & hydration normal; no edema  Resp: Respiratory effort is normal  Physical findings otherwise essentially unchanged from previous.    IMPRESSION: Problem List Items & Comorbidities Addressed this Visit    1. MARGARITO (obstructive sleep apnea)  PAP DME Resupply/Reorder      2. Sleep related hypoxia        3. Typical atrial flutter (HCC)        4. Palpitations        5. Morbid obesity (HCC)        6. Malignant lymphoma, large cell, diffuse (HCC)        7. History of pulmonary embolism            PLAN:  I reviewed results of prior studies and physiologic data with the patient.   I discussed treatment options with risks and benefits.  Treatment with  PAP is medically necessary and  is agreable to continue use.   Care of equipment, methods to improve comfort using PAP and importance of compliance with therapy were discussed.  Pressure setting: continue 7-12 cmH2O.    Rx provided to replace supplies and Care coordinated with DME provider.   Continue Breo Ellipta. I suggested antihistamine eyedrops in place of Zyrtec that can aggravate dry mouth.  Discussed strategies for weight reduction.  She was recently started on metformin that may assist with weight reduction.  Follow-up is advised in 1 year or sooner if needed to monitor progress, compliance and to adjust therapy.       Thank you for allowing me to participate in the care of this patient.    Sincerely,     Authenticated electronically on 01/11/24   Board Certified Specialist     Portions of the record may have been created with voice recognition software. Occasional wrong word or \"sound a like\" substitutions may have occurred due to the inherent limitations of voice recognition software. There may also be notations and random deletions of words or characters from malfunctioning software. Read the chart carefully and recognize, using context, where substitutions/deletions have occurred.    "

## 2024-01-12 ENCOUNTER — OFFICE VISIT (OUTPATIENT)
Dept: FAMILY MEDICINE CLINIC | Facility: CLINIC | Age: 54
End: 2024-01-12
Payer: COMMERCIAL

## 2024-01-12 ENCOUNTER — APPOINTMENT (OUTPATIENT)
Dept: RADIOLOGY | Facility: MEDICAL CENTER | Age: 54
End: 2024-01-12
Payer: COMMERCIAL

## 2024-01-12 ENCOUNTER — TELEPHONE (OUTPATIENT)
Dept: SLEEP CENTER | Facility: CLINIC | Age: 54
End: 2024-01-12

## 2024-01-12 VITALS
DIASTOLIC BLOOD PRESSURE: 70 MMHG | BODY MASS INDEX: 45.99 KG/M2 | SYSTOLIC BLOOD PRESSURE: 116 MMHG | TEMPERATURE: 98.4 F | HEIGHT: 67 IN | OXYGEN SATURATION: 96 % | HEART RATE: 69 BPM | WEIGHT: 293 LBS

## 2024-01-12 DIAGNOSIS — J40 BRONCHITIS: ICD-10-CM

## 2024-01-12 DIAGNOSIS — J40 BRONCHITIS: Primary | ICD-10-CM

## 2024-01-12 PROBLEM — Z92.89 HISTORY OF IMMUNOTHERAPY: Status: ACTIVE | Noted: 2023-12-13

## 2024-01-12 LAB
DME PARACHUTE DELIVERY DATE REQUESTED: NORMAL
DME PARACHUTE ITEM DESCRIPTION: NORMAL
DME PARACHUTE ORDER STATUS: NORMAL
DME PARACHUTE SUPPLIER NAME: NORMAL
DME PARACHUTE SUPPLIER PHONE: NORMAL

## 2024-01-12 PROCEDURE — 99213 OFFICE O/P EST LOW 20 MIN: CPT | Performed by: FAMILY MEDICINE

## 2024-01-12 PROCEDURE — 71046 X-RAY EXAM CHEST 2 VIEWS: CPT

## 2024-01-12 RX ORDER — AZITHROMYCIN 250 MG/1
TABLET, FILM COATED ORAL
Qty: 6 TABLET | Refills: 0 | Status: SHIPPED | OUTPATIENT
Start: 2024-01-12 | End: 2024-01-16

## 2024-01-12 RX ORDER — ALBUTEROL SULFATE 90 UG/1
2 AEROSOL, METERED RESPIRATORY (INHALATION) EVERY 6 HOURS PRN
Qty: 18 G | Refills: 0 | Status: SHIPPED | OUTPATIENT
Start: 2024-01-12

## 2024-01-12 NOTE — PROGRESS NOTES
"Name: Michelle Monge      : 1970      MRN: [id number]  Encounter Provider: Luis Encarnacion DO  Encounter Date: 2024   Encounter department: [facility]    Assessment & Plan   I will get a chest x-ray on her.  Rx for a Z-Juan and albuterol.  Get Mucinex over-the-counter.  Push fluids.  Call if symptoms continue or increase.  1. Bronchitis  -     azithromycin (ZITHROMAX) 250 mg tablet; Take 2 tablets today then 1 tablet daily x 4 days  -     albuterol (PROVENTIL HFA,VENTOLIN HFA) 90 mcg/act inhaler; Inhale 2 puffs every 6 (six) hours as needed for wheezing  -     XR chest pa & lateral; Future; Expected date: 2024           Subjective     Patient here today stating that yesterday started with upper chest congestion and a cough.  Feels raspy.  When she coughs, she feels it is loose in the chest, but she has not brought any cassette.  No fever or chills.  Feels a burning sensation in her upper chest.  Not a pressure feeling.  No diaphoresis.  No increased shortness of breath.  She tried Tylenol Cold over-the-counter.      Review of Systems   Constitutional: Negative.    Respiratory:  Positive for cough.         \"Burning\" in upper chest.  Not pressure   Cardiovascular: Negative.    Gastrointestinal: Negative.    Genitourinary: Negative.        Past Medical History:   Diagnosis Date   • Allergic rhinitis     last assessed 16   • Arthritis     b/l feet   • Cancer ([facility]) 2016   • Chronic allergic conjunctivitis    • Fluttering heart     takes magnesium for same   EKG OK   • Fluttering sensation of heart     \"periodically, not often since on magnesium\"   • Foot pain, left    • Full dentures     upper   • Genital warts 1986    Treated   • History of cancer chemotherapy 2016   • History of dilation and curettage    • Hx of tonsillectomy    • Hypothyroid    • Irregular heart beat    • Lymph nodes enlarged     in chest pushing on her bronchioles necessitating inhalers   • Neck mass     R " "neck mass-excised 3/15/2016,, 4/23/18 noted enlarged lymph node right neck-bx today 4/26/2018   • Non Hodgkin's lymphoma (HCC)     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   • Obese    • Obstructive sleep apnea    • Paroxysmal atrial flutter (HCC)    • Port-A-Cath in place 2016    pt reports 'Has it flushed q 4weeks\" right chest wall   • Post-menopausal     since chemo 4/2016  no menses   • Pulmonary embolism (HCC)     last assessed 10/31/16 attributed to Non-Hodgkins hx   • Pulmonary embolism on right (HCC) 07/12/2018    Last Assessment & Plan:  She will need to hold her Xarelto 2 days prior to her procedure and may resume therapy 1-2 days post procedure (depending on whether she is producing blood tinged sputum or not).   • Shortness of breath     due to chest tumor   • Tachycardia     awaiting cardiology eval,,,4/23/18 \"pt reports saw cardiologist at that time and placed on magnesium and \"hardly notices it\"   • Tinnitus     occas   • Wears glasses    • Wears partial dentures     /lower   • [place] teeth extracted      Past Surgical History:   Procedure Laterality Date   • COLONOSCOPY     • COLONOSCOPY N/A 7/26/2018    Procedure: COLONOSCOPY with multiple bx;  Surgeon: Davin Nunez MD;  Location:  MAIN OR;  Service: Gastroenterology   • CYST REMOVAL Left     L  neck   • DILATION AND CURETTAGE OF UTERUS     • ESOPHAGOGASTRODUODENOSCOPY N/A 7/26/2018    Procedure: ESOPHAGOGASTRODUODENOSCOPY (EGD) with multiple bx;  Surgeon: Davin Nunez MD;  Location:  MAIN OR;  Service: Gastroenterology   • FACIAL/NECK BIOPSY N/A 4/26/2018    Procedure: OPEN DEEP CERVICAL LYMPH NODE EXCISOION/BIOPSY;  Surgeon: Joshua Blanco MD;  [facility];  Service: ENT   • LYMPHADENECTOMY     • PORTACATH PLACEMENT     • MT BX/EXC LYMPH NODE OPEN DEEP CERVICAL NODE N/A 3/15/2016    Procedure: DISSECTION LYMH NODE NECK/DEEP NECK MASS ;  Surgeon: Ritchie Millan DO;  [facility];  Service: ENT   • TONSILLECTOMY     • TUBAL " LIGATION     • TUNNELED VENOUS PORT PLACEMENT Right 3/21/2019    Procedure: INSERTION VENOUS PORT (PORT-A-CATH) remove and replace;  Surgeon: John Mcrae MD;  Location:  MAIN OR;  Service: General     Family History   Problem Relation Age of Onset   • Coronary artery disease Father    • Diabetes Brother    • Diabetes Family    • Heart disease Family    • Cancer Maternal Uncle         throat   • Diabetes Brother            • Cancer Maternal Grandmother    • Stroke Neg Hx      Social History     Socioeconomic History   • Marital status: /Civil Union     Spouse name: None   • Number of children: None   • Years of education: None   • Highest education level: None   Occupational History   • None   Tobacco Use   • Smoking status: Former     Current packs/day: 0.00     Average packs/day: 1.5 packs/day for 11.0 years (16.5 ttl pk-yrs)     Types: Cigarettes     Start date: 1985     Quit date: 1996     Years since quittin.0   • Smokeless tobacco: Never   Vaping Use   • Vaping status: Never Used   Substance and Sexual Activity   • Alcohol use: Yes     Comment: rare   • Drug use: Never   • Sexual activity: Yes     Partners: Male     Birth control/protection: Post-menopausal     Comment: Tubal   Other Topics Concern   • None   Social History Narrative   • None     Social Determinants of Health     Financial Resource Strain: Not on file   Food Insecurity: Not on file   Transportation Needs: Not on file   Physical Activity: Not on file   Stress: Not on file   Social Connections: Not on file   Intimate Partner Violence: Not on file   Housing Stability: Not on file     Current Outpatient Medications on File Prior to Visit   Medication Sig   • atorvastatin (LIPITOR) 20 mg tablet Take 1 tablet (20 mg total) by mouth daily   • cetirizine (ZyrTEC) 10 mg tablet Take 10 mg by mouth daily   • diltiazem (CARDIZEM CD) 120 mg 24 hr capsule TAKE ONE CAPSULE BY MOUTH EVERY DAY   • fluticasone-vilanterol (BREO  ELLIPTA) 100-25 mcg/inh inhaler Inhale 1 puff every morning Rinse mouth after use.   • levothyroxine (Euthyrox) 200 mcg tablet Take 1 tablet (200 mcg total) by mouth daily   • metFORMIN (GLUCOPHAGE) 500 mg tablet Take 1 tablet (500 mg total) by mouth 2 (two) times a day with meals   • metoprolol tartrate (LOPRESSOR) 100 mg tablet Take 1 tablet (100 mg total) by mouth 2 (two) times a day   • omeprazole (PriLOSEC) 20 mg delayed release capsule Take 20 mg by mouth every morning   • rivaroxaban (Xarelto) 20 mg tablet Take 1 tablet (20 mg total) by mouth daily with dinner   • [DISCONTINUED] acetaminophen (TYLENOL) 325 mg tablet Take 650 mg by mouth every 4 (four) hours as needed (Patient not taking: Reported on 1/11/2024)   • [DISCONTINUED] Ascorbic Acid (vitamin C) 1000 MG tablet Take 2,000 mg by mouth daily (Patient not taking: Reported on 1/11/2024)   • [DISCONTINUED] cholecalciferol (VITAMIN D3) 1,000 units tablet Take 1,000 Units by mouth every morning  (Patient not taking: Reported on 1/11/2024)   • [DISCONTINUED] CINNAMON PO Take by mouth (Patient not taking: Reported on 1/11/2024)   • [DISCONTINUED] loratadine (CLARITIN) 10 mg tablet Take 10 mg by mouth daily (Patient not taking: Reported on 1/11/2024)   • [DISCONTINUED] Probiotic Product (PROBIOTIC PO) Take by mouth (Patient not taking: Reported on 1/11/2024)   • [DISCONTINUED] zinc gluconate 50 mg tablet Take 50 mg by mouth daily (Patient not taking: Reported on 1/11/2024)     Allergies   Allergen Reactions   • Medical Tape      Redness, soreness on skin     Immunization History   Administered Date(s) Administered   • COVID-19 MODERNA VACC 0.25 ML IM BOOSTER 12/08/2021   • COVID-19 MODERNA VACC 0.5 ML IM 04/09/2021, 05/13/2021   • COVID-19 Moderna mRNA Vaccine 12 Yr+ 50 mcg/0.5 mL (Spikevax) 09/25/2023   • INFLUENZA 01/08/2020, 01/08/2022, 12/17/2022, 09/15/2023   • Influenza, injectable, quadrivalent, preservative free 0.5 mL 11/04/2020   • Influenza,  "recombinant, quadrivalent,injectable, preservative free 01/13/2020   • Tdap 01/21/2023   • Zoster Vaccine Recombinant 08/12/2021, 01/30/2022   • influenza, trivalent, adjuvanted 01/08/2020       Objective     /70   Pulse 69   Temp 98.4 °F (36.9 °C)   Ht 5' 7\" (1.702 m)   Wt 133 kg (294 lb)   SpO2 96%   BMI 46.05 kg/m²     Physical Exam  Vitals reviewed.   Constitutional:       General: She is not in acute distress.     Appearance: Normal appearance. She is well-developed. She is not ill-appearing, toxic-appearing or diaphoretic.   HENT:      Head: Normocephalic and atraumatic.   Eyes:      Conjunctiva/sclera: Conjunctivae normal.   Cardiovascular:      Rate and Rhythm: Normal rate and regular rhythm.      Heart sounds: Normal heart sounds. No murmur heard.     No friction rub. No gallop.   Pulmonary:      Effort: Pulmonary effort is normal. No respiratory distress.      Breath sounds: No wheezing, rhonchi or rales.      Comments: Coarse breath sounds  Musculoskeletal:      Right lower leg: No edema.      Left lower leg: No edema.   Neurological:      General: No focal deficit present.      Mental Status: She is alert and oriented to person, place, and time.   Psychiatric:         Mood and Affect: Mood normal.         Behavior: Behavior normal.         Thought Content: Thought content normal.         Judgment: Judgment normal.       Luis Encarnacion, DO    "

## 2024-01-15 LAB

## 2024-01-16 ENCOUNTER — TELEPHONE (OUTPATIENT)
Dept: FAMILY MEDICINE CLINIC | Facility: CLINIC | Age: 54
End: 2024-01-16

## 2024-01-16 DIAGNOSIS — J40 BRONCHITIS: Primary | ICD-10-CM

## 2024-01-16 RX ORDER — CODEINE PHOSPHATE/GUAIFENESIN 10-100MG/5
5 LIQUID (ML) ORAL 4 TIMES DAILY PRN
Qty: 118 ML | Refills: 0 | Status: SHIPPED | OUTPATIENT
Start: 2024-01-16

## 2024-01-16 NOTE — TELEPHONE ENCOUNTER
Rx put in for Robitussin with codeine.  Have her call if her symptoms continue or increase over the next day or 2.  PDMP website reviewed, no red flags.

## 2024-01-16 NOTE — TELEPHONE ENCOUNTER
Patient is on zpak due to sick but her cough is horrible and asking for something for the cough, her ribs are killing her. Please advise.

## 2024-01-17 ENCOUNTER — TELEPHONE (OUTPATIENT)
Dept: FAMILY MEDICINE CLINIC | Facility: CLINIC | Age: 54
End: 2024-01-17

## 2024-01-17 ENCOUNTER — OFFICE VISIT (OUTPATIENT)
Dept: FAMILY MEDICINE CLINIC | Facility: CLINIC | Age: 54
End: 2024-01-17
Payer: COMMERCIAL

## 2024-01-17 VITALS
WEIGHT: 293 LBS | TEMPERATURE: 97.8 F | SYSTOLIC BLOOD PRESSURE: 122 MMHG | HEIGHT: 67 IN | OXYGEN SATURATION: 96 % | BODY MASS INDEX: 45.99 KG/M2 | DIASTOLIC BLOOD PRESSURE: 72 MMHG | HEART RATE: 75 BPM

## 2024-01-17 DIAGNOSIS — J40 BRONCHITIS: Primary | ICD-10-CM

## 2024-01-17 PROCEDURE — 99213 OFFICE O/P EST LOW 20 MIN: CPT | Performed by: FAMILY MEDICINE

## 2024-01-17 RX ORDER — PREDNISONE 10 MG/1
TABLET ORAL
Qty: 30 TABLET | Refills: 0 | Status: SHIPPED | OUTPATIENT
Start: 2024-01-17

## 2024-01-17 RX ORDER — AMOXICILLIN AND CLAVULANATE POTASSIUM 875; 125 MG/1; MG/1
1 TABLET, FILM COATED ORAL EVERY 12 HOURS SCHEDULED
Qty: 14 TABLET | Refills: 0 | Status: SHIPPED | OUTPATIENT
Start: 2024-01-17 | End: 2024-01-24

## 2024-01-17 NOTE — TELEPHONE ENCOUNTER
C/o temp of 101 last night with slight chills. Temp is 99.1 this morning, still congested. Finished z-pack. Do you want her to come in again for appt? She would like to stay home the rest of the week, can you do a work note for her?

## 2024-01-17 NOTE — LETTER
January 17, 2024     Patient: Michelle Monge  YOB: 1970  Date of Visit: 1/17/2024      To Whom it May Concern:    Michelle Monge is under my professional care.  was seen in my office on 1/17/2024. Please excuse from work 1/16/2024 through, and including 1/20/2024 due to bronchitis.   may return to work on 1/22/2024 .    If you have any questions or concerns, please don't hesitate to call.         Sincerely,          Luis Encarnacion, DO        CC: No Recipients

## 2024-01-17 NOTE — PROGRESS NOTES
"Name: Michelle Monge      : 1970      MRN: [id number]  Encounter Provider: Luis Encarnacion DO  Encounter Date: 2024   Encounter department: [facility]    Assessment & Plan   Rx for Augmentin and prednisone taper.  Push fluids.  Call us if symptoms continue or increase.  1. Bronchitis  -     predniSONE 10 mg tablet; 4 tablets daily for 3 days, then 3 tablets daily for 3 days, then 2 tablets daily for 3 days, then 1 tablet daily for 3 days  -     amoxicillin-clavulanate (AUGMENTIN) 875-125 mg per tablet; Take 1 tablet by mouth every 12 (twelve) hours for 7 days           Subjective     Patient here today with continued cough.  Now it is dry.  Having more nasal congestion.  Had a low-grade temperature this morning of 100.1.  She did finish her Z-Juan.  I called and Robitussin with codeine yesterday which did help quiet the cough a little.  No nausea or vomiting.    Cough  Associated symptoms include a fever (Tmax 100.1).     Review of Systems   Constitutional:  Positive for fever (Tmax 100.1).   HENT:  Positive for congestion.    Respiratory:  Positive for cough.    Cardiovascular: Negative.    Gastrointestinal: Negative.    Genitourinary: Negative.        Past Medical History:   Diagnosis Date   • Allergic rhinitis     last assessed 16   • Arthritis     b/l feet   • Cancer (HCC) 2016   • Chronic allergic conjunctivitis    • Fluttering heart     takes magnesium for same   EKG OK   • Fluttering sensation of heart     \"periodically, not often since on magnesium\"   • Foot pain, left    • Full dentures     upper   • Genital warts 1986    Treated   • History of cancer chemotherapy 2016   • History of dilation and curettage    • Hx of tonsillectomy    • Hypothyroid    • Irregular heart beat    • Lymph nodes enlarged     in chest pushing on her bronchioles necessitating inhalers   • Neck mass     R neck mass-excised 3/15/2016,, 18 noted enlarged lymph node right neck-bx today 2018 " "  • Non Hodgkin's lymphoma (HCC)     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   • Obese    • Obstructive sleep apnea    • Paroxysmal atrial flutter (HCC)    • Port-A-Cath in place 2016    pt reports 'Has it flushed q 4weeks\" right chest wall   • Post-menopausal     since chemo 4/2016  no menses   • Pulmonary embolism (HCC)     last assessed 10/31/16 attributed to Non-Hodgkins hx   • Pulmonary embolism on right (HCC) 07/12/2018    Last Assessment & Plan:  She will need to hold her Xarelto 2 days prior to her procedure and may resume therapy 1-2 days post procedure (depending on whether she is producing blood tinged sputum or not).   • Shortness of breath     due to chest tumor   • Tachycardia     awaiting cardiology eval,,,4/23/18 \"pt reports saw cardiologist at that time and placed on magnesium and \"hardly notices it\"   • Tinnitus     occas   • Wears glasses    • Wears partial dentures     /lower   • [place] teeth extracted      Past Surgical History:   Procedure Laterality Date   • COLONOSCOPY     • COLONOSCOPY N/A 7/26/2018    Procedure: COLONOSCOPY with multiple bx;  Surgeon: Davin Nunez MD;  Location:  MAIN OR;  Service: Gastroenterology   • CYST REMOVAL Left     L  neck   • DILATION AND CURETTAGE OF UTERUS     • ESOPHAGOGASTRODUODENOSCOPY N/A 7/26/2018    Procedure: ESOPHAGOGASTRODUODENOSCOPY (EGD) with multiple bx;  Surgeon: Davin Nunez MD;  Location:  MAIN OR;  Service: Gastroenterology   • FACIAL/NECK BIOPSY N/A 4/26/2018    Procedure: OPEN DEEP CERVICAL LYMPH NODE EXCISOION/BIOPSY;  Surgeon: Joshua Blanco MD;  [facility];  Service: ENT   • LYMPHADENECTOMY     • PORTACATH PLACEMENT     • [place] BX/EXC LYMPH NODE OPEN DEEP CERVICAL NODE N/A 3/15/2016    Procedure: DISSECTION LYMH NODE NECK/DEEP NECK MASS ;  Surgeon: Ritchie Millan DO;  [facility];  Service: ENT   • TONSILLECTOMY     • TUBAL LIGATION     • TUNNELED VENOUS PORT PLACEMENT Right 3/21/2019    Procedure: INSERTION VENOUS PORT " (PORT-A-CATH) remove and replace;  Surgeon: John Mcrae MD;  Location:  MAIN OR;  Service: General     Family History   Problem Relation Age of Onset   • Coronary artery disease Father    • Diabetes Brother    • Diabetes Family    • Heart disease Family    • Cancer Maternal Uncle         throat   • Diabetes Brother            • Cancer Maternal Grandmother    • Stroke Neg Hx      Social History     Socioeconomic History   • Marital status: /Civil Union     Spouse name: None   • Number of children: None   • Years of education: None   • Highest education level: None   Occupational History   • None   Tobacco Use   • Smoking status: Former     Current packs/day: 0.00     Average packs/day: 1.5 packs/day for 11.0 years (16.5 ttl pk-yrs)     Types: Cigarettes     Start date: 1985     Quit date: 1996     Years since quittin.0   • Smokeless tobacco: Never   Vaping Use   • Vaping status: Never Used   Substance and Sexual Activity   • Alcohol use: Yes     Comment: rare   • Drug use: Never   • Sexual activity: Yes     Partners: Male     Birth control/protection: Post-menopausal     Comment: Tubal   Other Topics Concern   • None   Social History Narrative   • None     Social Determinants of Health     Financial Resource Strain: Not on file   Food Insecurity: Not on file   Transportation Needs: Not on file   Physical Activity: Not on file   Stress: Not on file   Social Connections: Not on file   Intimate Partner Violence: Not on file   Housing Stability: Not on file     Current Outpatient Medications on File Prior to Visit   Medication Sig   • albuterol (PROVENTIL HFA,VENTOLIN HFA) 90 mcg/act inhaler Inhale 2 puffs every 6 (six) hours as needed for wheezing   • atorvastatin (LIPITOR) 20 mg tablet Take 1 tablet (20 mg total) by mouth daily   • cetirizine (ZyrTEC) 10 mg tablet Take 10 mg by mouth daily   • diltiazem (CARDIZEM CD) 120 mg 24 hr capsule TAKE ONE CAPSULE BY MOUTH EVERY DAY   •  "fluticasone-vilanterol (BREO ELLIPTA) 100-25 mcg/inh inhaler Inhale 1 puff every morning Rinse mouth after use.   • guaifenesin-codeine (GUAIFENESIN AC) 100-10 MG/5ML liquid Take 5 mL by mouth 4 (four) times a day as needed for cough   • levothyroxine (Euthyrox) 200 mcg tablet Take 1 tablet (200 mcg total) by mouth daily   • metFORMIN (GLUCOPHAGE) 500 mg tablet Take 1 tablet (500 mg total) by mouth 2 (two) times a day with meals   • metoprolol tartrate (LOPRESSOR) 100 mg tablet Take 1 tablet (100 mg total) by mouth 2 (two) times a day   • omeprazole (PriLOSEC) 20 mg delayed release capsule Take 20 mg by mouth every morning   • rivaroxaban (Xarelto) 20 mg tablet Take 1 tablet (20 mg total) by mouth daily with dinner   • [] azithromycin (ZITHROMAX) 250 mg tablet Take 2 tablets today then 1 tablet daily x 4 days (Patient not taking: Reported on 2024)     Allergies   Allergen Reactions   • Medical Tape      Redness, soreness on skin     Immunization History   Administered Date(s) Administered   • COVID-19 MODERNA VACC 0.25 ML IM BOOSTER 2021   • COVID-19 MODERNA VACC 0.5 ML IM 2021, 2021   • COVID-19 Moderna mRNA Vaccine 12 Yr+ 50 mcg/0.5 mL (Spikevax) 2023   • INFLUENZA 2020, 2022, 2022, 09/15/2023   • Influenza, injectable, quadrivalent, preservative free 0.5 mL 2020   • Influenza, recombinant, quadrivalent,injectable, preservative free 2020   • Tdap 2023   • Zoster Vaccine Recombinant 2021, 2022   • influenza, trivalent, adjuvanted 2020       Objective     /72   Pulse 75   Temp 97.8 °F (36.6 °C)   Ht 5' 7\" (1.702 m)   Wt 134 kg (295 lb 3.2 oz)   SpO2 96%   BMI 46.23 kg/m²     Physical Exam  Vitals reviewed.   Constitutional:       General: She is not in acute distress.     Appearance: Normal appearance. She is well-developed. She is not ill-appearing, toxic-appearing or diaphoretic.   HENT:      Head: Normocephalic " and atraumatic.      Right Ear: Tympanic membrane is bulging. Tympanic membrane is not erythematous.      Left Ear: Tympanic membrane is bulging. Tympanic membrane is not erythematous.      Nose: Congestion present.   Eyes:      Conjunctiva/sclera: Conjunctivae normal.   Cardiovascular:      Rate and Rhythm: Normal rate and regular rhythm.      Heart sounds: Normal heart sounds. No murmur heard.     No friction rub. No gallop.   Pulmonary:      Effort: Pulmonary effort is normal. No respiratory distress.      Breath sounds: Rhonchi (Very faint rhonchi in the left base) present. No wheezing or rales.   Musculoskeletal:      Right lower leg: No edema.      Left lower leg: No edema.   Neurological:      General: No focal deficit present.      Mental Status: She is alert and oriented to person, place, and time.   Psychiatric:         Mood and Affect: Mood normal.         Behavior: Behavior normal.         Thought Content: Thought content normal.         Judgment: Judgment normal.       Luis Encarnacion, DO

## 2024-01-18 ENCOUNTER — TELEPHONE (OUTPATIENT)
Dept: FAMILY MEDICINE CLINIC | Facility: CLINIC | Age: 54
End: 2024-01-18

## 2024-01-18 DIAGNOSIS — J40 BRONCHITIS: Primary | ICD-10-CM

## 2024-01-18 DIAGNOSIS — R06.02 SOB (SHORTNESS OF BREATH): ICD-10-CM

## 2024-01-18 NOTE — TELEPHONE ENCOUNTER
I ordered a new nebulizer for her through [employer].  They should be contacting her.  She should call us if she has not heard from them later today

## 2024-01-18 NOTE — TELEPHONE ENCOUNTER
Pt has a nebulizer at home but medication is .  Asking if you want her to do treatments.  If so, she will need a prescription for Ipratropium-bromide.

## 2024-01-18 NOTE — TELEPHONE ENCOUNTER
Pt aware and states she will dig out the machine. Asking if there is anyway she can get a new machine?  Last one she believes was in 2019.

## 2024-01-22 ENCOUNTER — TELEPHONE (OUTPATIENT)
Dept: FAMILY MEDICINE CLINIC | Facility: CLINIC | Age: 54
End: 2024-01-22

## 2024-01-22 NOTE — TELEPHONE ENCOUNTER
Pt called asking for an extension on her work note as she did not feel well enough to go back today and she will try to go in tomorrow.

## 2024-01-23 ENCOUNTER — TELEPHONE (OUTPATIENT)
Dept: FAMILY MEDICINE CLINIC | Facility: CLINIC | Age: 54
End: 2024-01-23

## 2024-01-23 NOTE — TELEPHONE ENCOUNTER
----- Message from Magui Lopez sent at 1/23/2024 12:27 PM EST -----  Regarding: FW: Return to work letter  Contact: [phone]    ----- Message -----  From: Michelle Monge  Sent: 1/23/2024  12:22 PM EST  To: [facility]  Subject: Return to work letter                            Sorry to bother you again... but I thought I would be back to work today but stayed home.  I am feeling better but felt I needed another day to rest. If I could get new letter with the return date of tomorrow I would appreciate it!

## 2024-01-25 LAB
DME PARACHUTE DELIVERY DATE ACTUAL: NORMAL
DME PARACHUTE DELIVERY DATE REQUESTED: NORMAL
DME PARACHUTE ITEM DESCRIPTION: NORMAL
DME PARACHUTE ORDER STATUS: NORMAL
DME PARACHUTE SUPPLIER NAME: NORMAL
DME PARACHUTE SUPPLIER PHONE: NORMAL

## 2024-02-12 DIAGNOSIS — I48.92 PAROXYSMAL ATRIAL FLUTTER (HCC): ICD-10-CM

## 2024-02-12 RX ORDER — DILTIAZEM HYDROCHLORIDE 120 MG/1
CAPSULE, COATED, EXTENDED RELEASE ORAL
Qty: 90 CAPSULE | Refills: 3 | Status: SHIPPED | OUTPATIENT
Start: 2024-02-12

## 2024-03-11 DIAGNOSIS — E11.9 TYPE 2 DIABETES MELLITUS WITHOUT COMPLICATION, WITHOUT LONG-TERM CURRENT USE OF INSULIN (HCC): ICD-10-CM

## 2024-03-15 ENCOUNTER — APPOINTMENT (OUTPATIENT)
Dept: LAB | Facility: HOSPITAL | Age: 54
End: 2024-03-15
Payer: COMMERCIAL

## 2024-03-15 DIAGNOSIS — C84.48 PERIPHERAL T CELL LYMPHOMA OF LYMPH NODES OF MULTIPLE SITES (HCC): ICD-10-CM

## 2024-03-15 DIAGNOSIS — C83.38 DIFFUSE LARGE B-CELL LYMPHOMA OF LYMPH NODES OF MULTIPLE SITES (HCC): ICD-10-CM

## 2024-03-15 LAB
ALBUMIN SERPL BCP-MCNC: 4 G/DL (ref 3.5–5)
ALP SERPL-CCNC: 124 U/L (ref 34–104)
ALT SERPL W P-5'-P-CCNC: 15 U/L (ref 7–52)
ANION GAP SERPL CALCULATED.3IONS-SCNC: 9 MMOL/L (ref 4–13)
AST SERPL W P-5'-P-CCNC: 25 U/L (ref 13–39)
BASOPHILS # BLD AUTO: 0.08 THOUSANDS/ÂΜL (ref 0–0.1)
BASOPHILS NFR BLD AUTO: 1 % (ref 0–1)
BILIRUB SERPL-MCNC: 0.59 MG/DL (ref 0.2–1)
BUN SERPL-MCNC: 18 MG/DL (ref 5–25)
CALCIUM SERPL-MCNC: 9.6 MG/DL (ref 8.4–10.2)
CHLORIDE SERPL-SCNC: 102 MMOL/L (ref 96–108)
CO2 SERPL-SCNC: 25 MMOL/L (ref 21–32)
CREAT SERPL-MCNC: 0.73 MG/DL (ref 0.6–1.3)
EOSINOPHIL # BLD AUTO: 0.32 THOUSAND/ÂΜL (ref 0–0.61)
EOSINOPHIL NFR BLD AUTO: 4 % (ref 0–6)
ERYTHROCYTE [DISTWIDTH] IN BLOOD BY AUTOMATED COUNT: 13.1 % (ref 11.6–15.1)
GFR SERPL CREATININE-BSD FRML MDRD: 93 ML/MIN/1.73SQ M
GLUCOSE SERPL-MCNC: 129 MG/DL (ref 65–140)
HCT VFR BLD AUTO: 43.3 % (ref 34.8–46.1)
HGB BLD-MCNC: 14.1 G/DL (ref 11.5–15.4)
IMM GRANULOCYTES # BLD AUTO: 0.03 THOUSAND/UL (ref 0–0.2)
IMM GRANULOCYTES NFR BLD AUTO: 0 % (ref 0–2)
LDH SERPL-CCNC: 159 U/L (ref 140–271)
LYMPHOCYTES # BLD AUTO: 1.08 THOUSANDS/ÂΜL (ref 0.6–4.47)
LYMPHOCYTES NFR BLD AUTO: 12 % (ref 14–44)
MCH RBC QN AUTO: 30.3 PG (ref 26.8–34.3)
MCHC RBC AUTO-ENTMCNC: 32.6 G/DL (ref 31.4–37.4)
MCV RBC AUTO: 93 FL (ref 82–98)
MONOCYTES # BLD AUTO: 0.65 THOUSAND/ÂΜL (ref 0.17–1.22)
MONOCYTES NFR BLD AUTO: 7 % (ref 4–12)
NEUTROPHILS # BLD AUTO: 6.91 THOUSANDS/ÂΜL (ref 1.85–7.62)
NEUTS SEG NFR BLD AUTO: 76 % (ref 43–75)
NRBC BLD AUTO-RTO: 0 /100 WBCS
PLATELET # BLD AUTO: 297 THOUSANDS/UL (ref 149–390)
PMV BLD AUTO: 10.8 FL (ref 8.9–12.7)
POTASSIUM SERPL-SCNC: 3.9 MMOL/L (ref 3.5–5.3)
PROT SERPL-MCNC: 6.5 G/DL (ref 6.4–8.4)
RBC # BLD AUTO: 4.65 MILLION/UL (ref 3.81–5.12)
SODIUM SERPL-SCNC: 136 MMOL/L (ref 135–147)
URATE SERPL-MCNC: 4.3 MG/DL (ref 2–7.5)
WBC # BLD AUTO: 9.07 THOUSAND/UL (ref 4.31–10.16)

## 2024-03-15 PROCEDURE — 83615 LACTATE (LD) (LDH) ENZYME: CPT

## 2024-03-15 PROCEDURE — 85025 COMPLETE CBC W/AUTO DIFF WBC: CPT

## 2024-03-15 PROCEDURE — 80053 COMPREHEN METABOLIC PANEL: CPT

## 2024-03-15 PROCEDURE — 84550 ASSAY OF BLOOD/URIC ACID: CPT

## 2024-03-15 PROCEDURE — 36415 COLL VENOUS BLD VENIPUNCTURE: CPT

## 2024-03-21 ENCOUNTER — OFFICE VISIT (OUTPATIENT)
Dept: CARDIOLOGY CLINIC | Facility: CLINIC | Age: 54
End: 2024-03-21
Payer: COMMERCIAL

## 2024-03-21 VITALS
BODY MASS INDEX: 45.99 KG/M2 | WEIGHT: 293 LBS | HEIGHT: 67 IN | DIASTOLIC BLOOD PRESSURE: 60 MMHG | HEART RATE: 64 BPM | SYSTOLIC BLOOD PRESSURE: 112 MMHG

## 2024-03-21 DIAGNOSIS — I48.92 ATRIAL FLUTTER, PAROXYSMAL (HCC): ICD-10-CM

## 2024-03-21 DIAGNOSIS — I47.19 ATRIAL TACHYCARDIA, PAROXYSMAL: ICD-10-CM

## 2024-03-21 DIAGNOSIS — I49.3 PREMATURE VENTRICULAR CONTRACTIONS: ICD-10-CM

## 2024-03-21 DIAGNOSIS — Z86.711 HISTORY OF PULMONARY EMBOLISM: ICD-10-CM

## 2024-03-21 DIAGNOSIS — G47.33 OSA (OBSTRUCTIVE SLEEP APNEA): Primary | ICD-10-CM

## 2024-03-21 PROBLEM — E78.5 DYSLIPIDEMIA ASSOCIATED WITH TYPE 2 DIABETES MELLITUS: Status: ACTIVE | Noted: 2022-02-16

## 2024-03-21 PROBLEM — E11.69 DYSLIPIDEMIA ASSOCIATED WITH TYPE 2 DIABETES MELLITUS: Status: ACTIVE | Noted: 2022-02-16

## 2024-03-21 PROCEDURE — 99214 OFFICE O/P EST MOD 30 MIN: CPT | Performed by: INTERNAL MEDICINE

## 2024-03-21 NOTE — PROGRESS NOTES
Patient ID: Michelle Monge is a 53 y.o. female.        Plan:      Premature ventricular contractions  Quiescent.  Continue Metoprolol 100 mg BID.    History of pulmonary embolism  On chronic DOAC Rx.  She avoids Asa and NSAIDs.    Atrial flutter, paroxysmal (HCC)  Examines in SR    MARGARITO (obstructive sleep apnea)  Tolerating CPAP    Dyslipidemia associated with type 2 diabetes mellitus     Lab Results   Component Value Date    HGBA1C 8.0 (A) 12/04/2023     LDL at goal, continue Lipitor.    Atrial tachycardia, paroxysmal  Quiescent.  Continue Diltiazem 120 mg QD       Follow up Plan/Other summary comments:   has no signs nor sx reminiscent of angina, CHF nor dysrhythmia.  Echo ordered in 6 months time.  Return in about 6 months (around 9/21/2024).  Call sooner with issues.    HPI:  is here for routine FU.  Offers no alarming complaints for me today.  Denies any CP, SOB, palps.  No (pre)syncope, tia or malini like sx.  No edema nor orthopnea.  Using her CPAP.  Follows regularly with Hem/Onc.  No current treatment underway.      Most recent or relevant cardiac/vascular testing:    10/20 Echo  Systolic function was normal. Ejection fraction was estimated to be 65 %.       Past Surgical History:   Procedure Laterality Date    COLONOSCOPY      COLONOSCOPY N/A 7/26/2018    Procedure: COLONOSCOPY with multiple bx;  Surgeon: Davin Nunez MD;  Location:  MAIN OR;  Service: Gastroenterology    CYST REMOVAL Left     L  neck    DILATION AND CURETTAGE OF UTERUS      ESOPHAGOGASTRODUODENOSCOPY N/A 7/26/2018    Procedure: ESOPHAGOGASTRODUODENOSCOPY (EGD) with multiple bx;  Surgeon: Davin Nunez MD;  Location:  MAIN OR;  Service: Gastroenterology    FACIAL/NECK BIOPSY N/A 4/26/2018    Procedure: OPEN DEEP CERVICAL LYMPH NODE EXCISOION/BIOPSY;  Surgeon: Joshua Blanco MD;  Location: [facility];  Service: ENT    LYMPHADENECTOMY      PORTACATH PLACEMENT      TN BX/EXC LYMPH NODE OPEN DEEP CERVICAL  "NODE N/A 3/15/2016    Procedure: DISSECTION LYMH NODE NECK/DEEP NECK MASS ;  Surgeon: Ritchie Millan DO;  Location: [facility];  Service: ENT    TONSILLECTOMY      TUBAL LIGATION      TUNNELED VENOUS PORT PLACEMENT Right 3/21/2019    Procedure: INSERTION VENOUS PORT (PORT-A-CATH) remove and replace;  Surgeon: John Mcrae MD;  Location:  MAIN OR;  Service: General       Lipid Profile: Reviewed      Review of Systems   10  point ROS  was otherwise non pertinent or negative except as per HPI or as below.           Objective:     /60 (BP Location: Left arm, Patient Position: Sitting)   Pulse 64   Ht 5' 7\" (1.702 m)   Wt 133 kg (293 lb)   BMI 45.89 kg/m²     PHYSICAL EXAM:    General:  Normal appearance in no distress.  Eyes:  Anicteric.  Oral mucosa:  Moist.  Neck:  No JVD. Carotid upstrokes are brisk without bruits.  No masses.  Chest:  Clear to auscultation.  Cardiac:  No palpable PMI.  Normal S1 and S2.  No murmur gallop or rub.  Abdomen:  Soft and nontender. No palpable organomegaly or aortic enlargement.  Extremities:  No peripheral edema.  Musculoskeletal:  Symmetric.   Vascular:     Pedal pulses are intact.  Neuro:  Grossly symmetric.  Psych:  Alert and oriented x3.      Meds reviewed.    Past Medical History:   Diagnosis Date    Allergic rhinitis     last assessed 04/06/16    Arthritis     b/l feet    Cancer (HCC) 2016    Chronic allergic conjunctivitis     Fluttering heart     takes magnesium for same   EKG OK    Fluttering sensation of heart     \"periodically, not often since on magnesium\"    Foot pain, left     Full dentures     upper    Genital warts 1986    Treated    History of cancer chemotherapy 2016    History of dilation and curettage     Hx of tonsillectomy     Hypothyroid     Irregular heart beat     Lymph nodes enlarged     in chest pushing on her bronchioles necessitating inhalers    Neck mass     R neck mass-excised 3/15/2016,, 4/23/18 noted enlarged lymph node right neck-bx " "today 2018    Non Hodgkin's lymphoma (HCC)     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    Obese     Obstructive sleep apnea     Paroxysmal atrial flutter (HCC)     Paroxysmal atrial tachycardia     Port-A-Cath in place     pt reports 'Has it flushed q 4weeks\" right chest wall    Post-menopausal     since chemo 2016  no menses    Premature ventricular contractions     Pulmonary embolism (HCC)     last assessed 10/31/16 attributed to Non-Hodgkins hx    Pulmonary embolism on right (HCC) 2018    Last Assessment & Plan:  She will need to hold her Xarelto 2 days prior to her procedure and may resume therapy 1-2 days post procedure (depending on whether she is producing blood tinged sputum or not).    Shortness of breath     due to chest tumor    Tachycardia     awaiting cardiology eval,,,18 \"pt reports saw cardiologist at that time and placed on magnesium and \"hardly notices it\"    Tinnitus     occas    Wears glasses     Wears partial dentures     /lower    [place] teeth extracted            Social History     Tobacco Use   Smoking Status Former    Current packs/day: 0.00    Average packs/day: 1.5 packs/day for 11.0 years (16.5 ttl pk-yrs)    Types: Cigarettes    Start date: 1985    Quit date: 1996    Years since quittin.2   Smokeless Tobacco Never             "

## 2024-03-21 NOTE — PATIENT INSTRUCTIONS
Cholesterol and Your Health   AMBULATORY CARE:   Cholesterol  is a waxy, fat-like substance. Your body uses cholesterol to make hormones and new cells, and to protect nerves. Cholesterol is made by your body. It also comes from certain foods you eat, such as meat and dairy products. Your healthcare provider can help you set goals for your cholesterol levels. Your provider can help you create a plan to meet your goals.  Cholesterol level goals:  Your cholesterol level goals depend on your risk for heart disease, your age, and your other health conditions. The following are general guidelines:  Total cholesterol  includes low-density lipoprotein (LDL), high-density lipoprotein (HDL), and triglyceride levels. The total cholesterol level should be lower than 200 mg/dL and is best at about 150 mg/dL.    LDL cholesterol  is called bad cholesterol  because it forms plaque in your arteries. As plaque builds up, your arteries become narrow, and less blood flows through. When plaque decreases blood flow to your heart, you may have chest pain. If plaque completely blocks an artery that brings blood to your heart, you may have a heart attack. Plaque can break off and form blood clots. Blood clots may block arteries in your brain and cause a stroke. The level should be less than 130 mg/dL and is best at about 100 mg/dL.         HDL cholesterol  is called good cholesterol  because it helps remove LDL cholesterol from your arteries. It does this by attaching to LDL cholesterol and carrying it to your liver. Your liver breaks down LDL cholesterol so your body can get rid of it. High levels of HDL cholesterol can help prevent a heart attack and stroke. Low levels of HDL cholesterol can increase your risk for heart disease, heart attack, and stroke. The level should be at least 40 mg/dL in males or at least 50 mg/dL in females.    Triglycerides  are a type of fat that store energy from foods you eat. High levels of triglycerides also  cause plaque buildup. This can increase your risk for a heart attack or stroke. If your triglyceride level is high, your LDL cholesterol level may also be high. The level should be less than 150 mg/dL.    Any of the following can increase your risk for high cholesterol:   Smoking or drinking large amounts of alcohol    Having overweight or obesity, or not getting enough exercise    A medical condition such as hypertension (high blood pressure) or diabetes    A family history of high cholesterol    Age older than 65    What you need to know about having your cholesterol levels checked:  Adults 20 to 45 years of age should have their cholesterol levels checked every 4 to 6 years. Adults 45 years or older should have their cholesterol checked every 1 to 2 years. You may need your cholesterol checked more often, or at a younger age, if you have risk factors for heart disease. You may also need to have your cholesterol checked more often if you have other health conditions, such as diabetes. Blood tests are used to check cholesterol levels. Blood tests measure your levels of triglycerides, LDL cholesterol, and HDL cholesterol.  How healthy fats affect your cholesterol levels:  Healthy fats, also called unsaturated fats, help lower LDL cholesterol and triglyceride levels. Healthy fats include the following:  Monounsaturated fats  are found in foods such as olive oil, canola oil, avocado, nuts, and olives.    Polyunsaturated fats,  such as omega 3 fats, are found in fish, such as salmon, trout, and tuna. They can also be found in plant foods such as flaxseed, walnuts, and soybeans.    How unhealthy fats affect your cholesterol levels:  Unhealthy fats increase LDL cholesterol and triglyceride levels. They are found in foods high in cholesterol, saturated fat, and trans fat:  Cholesterol  is found in eggs, dairy, and meat.    Saturated fat  is found in butter, cheese, ice cream, whole milk, and coconut oil. Saturated fat is  also found in meat, such as sausage, hot dogs, and bologna.    Trans fat  is found in liquid oils and is used in fried and baked foods. Foods that contain trans fats include chips, crackers, muffins, sweet rolls, microwave popcorn, and cookies.    Treatment  for high cholesterol will also decrease your risk of heart disease, heart attack, and stroke. Treatment may include any of the following:  Lifestyle changes  may include food, exercise, weight loss, and quitting smoking. You may also need to decrease the amount of alcohol you drink. Your healthcare provider will want you to start with lifestyle changes. Other treatment may be added if lifestyle changes are not enough. Your healthcare provider may recommend you work with a team to manage hyperlipidemia. The team may include medical experts such as a dietitian, an exercise or physical therapist, and a behavior therapist. Your family members may be included in helping you create lifestyle changes.    Medicines  may be given to lower your LDL cholesterol, triglyceride levels, or total cholesterol level. You may need medicines to lower your cholesterol if any of the following is true:    You have a history of stroke, TIA, unstable angina, or a heart attack.    Your LDL cholesterol level is 190 mg/dL or higher.    You are age 40 to 75 years, have diabetes or heart disease risk factors, and your LDL cholesterol is 70 mg/dL or higher.    Supplements  include fish oil, red yeast rice, and garlic. Fish oil may help lower your triglyceride and LDL cholesterol levels. It may also increase your HDL cholesterol level. Red yeast rice may help decrease your total cholesterol level and LDL cholesterol level. Garlic may help lower your total cholesterol level. Do not take any supplements without talking to your healthcare provider.    Food changes you can make to lower your cholesterol levels:  A dietitian can help you create a healthy eating plan. Your dietitian can show you how  to read food labels and choose foods low in saturated fat, trans fats, and cholesterol.     Decrease the total amount of fat you eat.  Choose lean meats, fat-free or 1% fat milk, and low-fat dairy products, such as yogurt and cheese. Try to limit or avoid red meats. Limit or do not eat fried foods or baked goods, such as cookies.    Replace unhealthy fats with healthy fats.  Cook foods in olive oil or canola oil. Choose soft margarines that are low in saturated fat and trans fat. Seeds, nuts, and avocados are other examples of healthy fats.    Eat foods with omega-3 fats.  Examples include salmon, tuna, mackerel, walnuts, and flaxseed. Eat fish 2 times per week. Pregnant women should not eat fish that have high levels of mercury, such as shark, swordfish, and molly mackerel.         Increase the amount of high-fiber foods you eat.  High-fiber foods can help lower your LDL cholesterol. Aim to get between 20 and 30 grams of fiber each day. Fruits and vegetables are high in fiber. Eat at least 5 servings each day. Other high-fiber foods are whole-grain or whole-wheat breads, pastas, or cereals, and brown rice. Eat 3 ounces of whole-grain foods each day. Increase fiber slowly. You may have abdominal discomfort, bloating, and gas if you add fiber to your diet too quickly.         Eat healthy protein foods.  Examples include low-fat dairy products, skinless chicken and turkey, fish, and nuts.    Limit foods and drinks that are high in sugar.  Your dietitian or healthcare provider can help you create daily limits for high-sugar foods and drinks. The limit may be lower if you have diabetes or another health condition. Limits can also help you lose weight if needed.  Lifestyle changes you can make to lower your cholesterol levels:   Maintain a healthy weight.  Ask your healthcare provider what a healthy weight is for you. Ask your provider to help you create a weight loss plan if needed. Weight loss can decrease your total  cholesterol and triglyceride levels. Weight loss may also help keep your blood pressure at a healthy level.    Be physically active throughout the day.  Physical activity, such as exercise, can help lower your total cholesterol level and maintain a healthy weight. Physical activity can also help increase your HDL cholesterol level. Work with your healthcare provider to create an program that is right for you. Get at least 30 to 40 minutes of moderate physical activity most days of the week. Examples of exercise include brisk walking, swimming, or biking. Also include strength training at least 2 times each week. Your healthcare providers can help you create a physical activity plan.            Do not smoke.  Nicotine and other chemicals in cigarettes and cigars can raise your cholesterol levels. Ask your healthcare provider for information if you currently smoke and need help to quit. E-cigarettes or smokeless tobacco still contain nicotine. Talk to your healthcare provider before you use these products.         Limit or do not drink alcohol.  Alcohol can increase your triglyceride levels. Ask your healthcare provider before you drink alcohol. Ask how much is okay for you to drink in 24 hours or 1 week.    Follow up with your doctor as directed:  Write down your questions so you remember to ask them during your visits.  © Copyright [employer] 2023 Information is for End User's use only and may not be sold, redistributed or otherwise used for commercial purposes.  The above information is an  only. It is not intended as medical advice for individual conditions or treatments. Talk to your doctor, nurse or pharmacist before following any medical regimen to see if it is safe and effective for you.

## 2024-03-21 NOTE — ASSESSMENT & PLAN NOTE
Lab Results   Component Value Date    HGBA1C 8.0 (A) 12/04/2023     LDL at goal, continue Lipitor.

## 2024-04-08 ENCOUNTER — OFFICE VISIT (OUTPATIENT)
Dept: FAMILY MEDICINE CLINIC | Facility: CLINIC | Age: 54
End: 2024-04-08
Payer: COMMERCIAL

## 2024-04-08 VITALS
DIASTOLIC BLOOD PRESSURE: 74 MMHG | WEIGHT: 292.4 LBS | TEMPERATURE: 97.3 F | SYSTOLIC BLOOD PRESSURE: 126 MMHG | HEART RATE: 72 BPM | OXYGEN SATURATION: 97 % | BODY MASS INDEX: 45.89 KG/M2 | HEIGHT: 67 IN

## 2024-04-08 DIAGNOSIS — E11.9 TYPE 2 DIABETES MELLITUS WITHOUT COMPLICATION, WITHOUT LONG-TERM CURRENT USE OF INSULIN (HCC): Primary | ICD-10-CM

## 2024-04-08 DIAGNOSIS — E78.5 DYSLIPIDEMIA ASSOCIATED WITH TYPE 2 DIABETES MELLITUS  (HCC): ICD-10-CM

## 2024-04-08 DIAGNOSIS — E11.69 DYSLIPIDEMIA ASSOCIATED WITH TYPE 2 DIABETES MELLITUS  (HCC): ICD-10-CM

## 2024-04-08 LAB — SL AMB POCT HEMOGLOBIN AIC: 7 (ref ?–6.5)

## 2024-04-08 PROCEDURE — 99214 OFFICE O/P EST MOD 30 MIN: CPT | Performed by: FAMILY MEDICINE

## 2024-04-08 PROCEDURE — 83036 HEMOGLOBIN GLYCOSYLATED A1C: CPT | Performed by: FAMILY MEDICINE

## 2024-04-08 NOTE — PROGRESS NOTES
"Name: Michelle Monge      : 1970      MRN: [id number]  Encounter Provider: Luis Encarnacion DO  Encounter Date: 2024   Encounter department: [facility]    Assessment & Plan   Hemoglobin A1c 7.0.  Continue to watch diet.  Continue same medications.  Follow-up with specialist as scheduled.  Follow-up here in 4 months or as needed.  1. Type 2 diabetes mellitus without complication, without long-term current use of insulin (HCC)  -     POCT hemoglobin A1c    2. Dyslipidemia associated with type 2 diabetes mellitus  (HCC)        Depression Screening and Follow-up Plan: Patient was screened for depression during today's encounter. They screened negative with a PHQ-2 score of 0.        Subjective     Patient here today for follow-up.  Denies any chest pain or shortness of breath.  Has been watching her diet.  Tolerating metformin well.      Review of Systems   Constitutional: Negative.    Respiratory: Negative.     Cardiovascular: Negative.    Gastrointestinal: Negative.    Genitourinary: Negative.        Past Medical History:   Diagnosis Date   • Allergic rhinitis     last assessed 16   • Arthritis     b/l feet   • Cancer (HCC) 2016   • Chronic allergic conjunctivitis    • Fluttering heart     takes magnesium for same   EKG OK   • Fluttering sensation of heart     \"periodically, not often since on magnesium\"   • Foot pain, left    • Full dentures     upper   • Genital warts 1986    Treated   • History of cancer chemotherapy    • History of dilation and curettage    • Hx of tonsillectomy    • Hypothyroid    • Irregular heart beat    • Lymph nodes enlarged     in chest pushing on her bronchioles necessitating inhalers   • Neck mass     R neck mass-excised 3/15/2016,, 18 noted enlarged lymph node right neck-bx today 2018   • Non Hodgkin's lymphoma (HCC)     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   • Obese    • Obstructive sleep apnea    • Paroxysmal atrial flutter (HCC)    • Paroxysmal " "atrial tachycardia    • Port-A-Cath in place 2016    pt reports 'Has it flushed q 4weeks\" right chest wall   • Post-menopausal     since chemo 4/2016  no menses   • Premature ventricular contractions    • Pulmonary embolism (HCC)     last assessed 10/31/16 attributed to Non-Hodgkins hx   • Pulmonary embolism on right (HCC) 07/12/2018    Last Assessment & Plan:  She will need to hold her Xarelto 2 days prior to her procedure and may resume therapy 1-2 days post procedure (depending on whether she is producing blood tinged sputum or not).   • Shortness of breath     due to chest tumor   • Tachycardia     awaiting cardiology eval,,,4/23/18 \"pt reports saw cardiologist at that time and placed on magnesium and \"hardly notices it\"   • Tinnitus     occas   • Wears glasses    • Wears partial dentures     /lower   • [place] teeth extracted      Past Surgical History:   Procedure Laterality Date   • COLONOSCOPY     • COLONOSCOPY N/A 7/26/2018    Procedure: COLONOSCOPY with multiple bx;  Surgeon: Davin Nunez MD;  Location: [facility] OR;  Service: Gastroenterology   • CYST REMOVAL Left     L  neck   • DILATION AND CURETTAGE OF UTERUS     • ESOPHAGOGASTRODUODENOSCOPY N/A 7/26/2018    Procedure: ESOPHAGOGASTRODUODENOSCOPY (EGD) with multiple bx;  Surgeon: Davin Nunez MD;  Location: [facility] OR;  Service: Gastroenterology   • FACIAL/NECK BIOPSY N/A 4/26/2018    Procedure: OPEN DEEP CERVICAL LYMPH NODE EXCISOION/BIOPSY;  Surgeon: Joshua Blanco MD;  [facility];  Service: ENT   • LYMPHADENECTOMY     • PORTACATH PLACEMENT     • MS BX/EXC LYMPH NODE OPEN DEEP CERVICAL NODE N/A 3/15/2016    Procedure: DISSECTION LYMH NODE NECK/DEEP NECK MASS ;  Surgeon: Ritchie Millan DO;  Location: [facility];  Service: ENT   • TONSILLECTOMY     • TUBAL LIGATION     • TUNNELED VENOUS PORT PLACEMENT Right 3/21/2019    Procedure: INSERTION VENOUS PORT (PORT-A-CATH) remove and replace;  Surgeon: John Mcrae MD;  Location: [facility]" MAIN OR;  Service: General     Family History   Problem Relation Age of Onset   • Coronary artery disease Father    • Diabetes Brother    • Diabetes Family    • Heart disease Family    • Cancer Maternal Uncle         throat   • Diabetes Brother            • Cancer Maternal Grandmother    • Stroke Neg Hx      Social History     Socioeconomic History   • Marital status: /Civil Union     Spouse name: None   • Number of children: None   • Years of education: None   • Highest education level: None   Occupational History   • None   Tobacco Use   • Smoking status: Former     Current packs/day: 0.00     Average packs/day: 1.5 packs/day for 11.0 years (16.5 ttl pk-yrs)     Types: Cigarettes     Start date: 1985     Quit date: 1996     Years since quittin.2   • Smokeless tobacco: Never   Vaping Use   • Vaping status: Never Used   Substance and Sexual Activity   • Alcohol use: Yes     Comment: rare   • Drug use: Never   • Sexual activity: Yes     Partners: Male     Birth control/protection: Post-menopausal     Comment: Tubal   Other Topics Concern   • None   Social History Narrative   • None     Social Determinants of Health     Financial Resource Strain: Not on file   Food Insecurity: Not on file   Transportation Needs: Not on file   Physical Activity: Not on file   Stress: Not on file   Social Connections: Not on file   Intimate Partner Violence: Not on file   Housing Stability: Not on file     Current Outpatient Medications on File Prior to Visit   Medication Sig   • albuterol (PROVENTIL HFA,VENTOLIN HFA) 90 mcg/act inhaler Inhale 2 puffs every 6 (six) hours as needed for wheezing   • atorvastatin (LIPITOR) 20 mg tablet Take 1 tablet (20 mg total) by mouth daily   • cetirizine (ZyrTEC) 10 mg tablet Take 10 mg by mouth daily   • diltiazem (CARDIZEM CD) 120 mg 24 hr capsule TAKE ONE CAPSULE BY MOUTH EVERY DAY   • fluticasone-vilanterol (BREO ELLIPTA) 100-25 mcg/inh inhaler Inhale 1 puff every  "morning Rinse mouth after use.   • ipratropium (ATROVENT) 0.02 % nebulizer solution Take 2.5 mL (0.5 mg total) by nebulization every 6 (six) hours as needed for wheezing or shortness of breath   • levothyroxine (Euthyrox) 200 mcg tablet Take 1 tablet (200 mcg total) by mouth daily   • metFORMIN (GLUCOPHAGE) 500 mg tablet TAKE ONE (1) TABLET (500 MG TOTAL) BY MOUTH TWO (2) (TWO) TIMES A DAY WITH MEALS   • metoprolol tartrate (LOPRESSOR) 100 mg tablet Take 1 tablet (100 mg total) by mouth 2 (two) times a day   • omeprazole (PriLOSEC) 20 mg delayed release capsule Take 20 mg by mouth every morning   • rivaroxaban (Xarelto) 20 mg tablet Take 1 tablet (20 mg total) by mouth daily with dinner   • [DISCONTINUED] guaifenesin-codeine (GUAIFENESIN AC) 100-10 MG/5ML liquid Take 5 mL by mouth 4 (four) times a day as needed for cough (Patient not taking: Reported on 3/21/2024)   • [DISCONTINUED] predniSONE 10 mg tablet 4 tablets daily for 3 days, then 3 tablets daily for 3 days, then 2 tablets daily for 3 days, then 1 tablet daily for 3 days (Patient not taking: Reported on 3/21/2024)     Allergies   Allergen Reactions   • Medical Tape      Redness, soreness on skin     Immunization History   Administered Date(s) Administered   • COVID-19 MODERNA VACC 0.25 ML IM BOOSTER 12/08/2021   • COVID-19 MODERNA VACC 0.5 ML IM 04/09/2021, 05/13/2021   • COVID-19 Moderna mRNA Vaccine 12 Yr+ 50 mcg/0.5 mL (Spikevax) 09/25/2023   • INFLUENZA 01/08/2020, 01/08/2022, 12/17/2022, 09/15/2023   • Influenza, injectable, quadrivalent, preservative free 0.5 mL 11/04/2020   • Influenza, recombinant, quadrivalent,injectable, preservative free 01/13/2020   • Tdap 01/21/2023   • Zoster Vaccine Recombinant 08/12/2021, 01/30/2022   • influenza, trivalent, adjuvanted 01/08/2020       Objective     /74   Pulse 72   Temp (!) 97.3 °F (36.3 °C)   Ht 5' 7\" (1.702 m)   Wt 133 kg (292 lb 6.4 oz)   SpO2 97%   BMI 45.80 kg/m²     Physical Exam  Vitals " reviewed.   Constitutional:       General: She is not in acute distress.     Appearance: Normal appearance. She is well-developed. She is not ill-appearing, toxic-appearing or diaphoretic.   HENT:      Head: Normocephalic and atraumatic.   Eyes:      Conjunctiva/sclera: Conjunctivae normal.   Cardiovascular:      Rate and Rhythm: Normal rate and regular rhythm.      Pulses: no weak pulses.           Dorsalis pedis pulses are 2+ on the right side and 2+ on the left side.        Posterior tibial pulses are 2+ on the right side and 2+ on the left side.      Heart sounds: Normal heart sounds. No murmur heard.     No friction rub. No gallop.   Pulmonary:      Effort: Pulmonary effort is normal. No respiratory distress.      Breath sounds: Normal breath sounds. No wheezing, rhonchi or rales.   Musculoskeletal:      Right lower leg: No edema.      Left lower leg: No edema.   Feet:      Right foot:      Skin integrity: No ulcer, skin breakdown, erythema, warmth, callus or dry skin.      Left foot:      Skin integrity: No ulcer, skin breakdown, erythema, warmth, callus or dry skin.   Neurological:      General: No focal deficit present.      Mental Status: She is alert and oriented to person, place, and time.   Psychiatric:         Mood and Affect: Mood normal.         Behavior: Behavior normal.         Thought Content: Thought content normal.         Judgment: Judgment normal.     Patient's shoes and socks removed.    Right Foot/Ankle   Right Foot Inspection  Skin Exam: skin normal and skin intact. No dry skin, no warmth, no callus, no erythema, no maceration, no abnormal color, no pre-ulcer, no ulcer and no callus.     Sensory   Monofilament testing: intact    Vascular  The right DP pulse is 2+. The right PT pulse is 2+.     Left Foot/Ankle  Left Foot Inspection  Skin Exam: skin normal and skin intact. No dry skin, no warmth, no erythema, no maceration, normal color, no pre-ulcer, no ulcer and no callus.     Sensory    Monofilament testing: intact    Vascular  The left DP pulse is 2+. The left PT pulse is 2+.     Assign Risk Category  No deformity present  No loss of protective sensation  No weak pulses  Risk: 0     Luis Encarnacion DO

## 2024-04-08 NOTE — PATIENT INSTRUCTIONS

## 2024-04-15 ENCOUNTER — HOSPITAL ENCOUNTER (OUTPATIENT)
Dept: MAMMOGRAPHY | Facility: HOSPITAL | Age: 54
Discharge: HOME | End: 2024-04-15
Payer: COMMERCIAL

## 2024-04-15 DIAGNOSIS — E03.9 ACQUIRED HYPOTHYROIDISM: ICD-10-CM

## 2024-04-15 DIAGNOSIS — Z12.31 ENCOUNTER FOR SCREENING MAMMOGRAM FOR MALIGNANT NEOPLASM OF BREAST: ICD-10-CM

## 2024-04-15 DIAGNOSIS — E11.9 TYPE 2 DIABETES MELLITUS WITHOUT COMPLICATION, WITHOUT LONG-TERM CURRENT USE OF INSULIN (HCC): ICD-10-CM

## 2024-04-15 PROCEDURE — 77067 SCR MAMMO BI INCL CAD: CPT

## 2024-04-15 PROCEDURE — 77063 BREAST TOMOSYNTHESIS BI: CPT

## 2024-04-15 RX ORDER — LEVOTHYROXINE SODIUM 0.2 MG/1
200 TABLET ORAL DAILY
Qty: 90 TABLET | Refills: 1 | Status: SHIPPED | OUTPATIENT
Start: 2024-04-15

## 2024-04-20 NOTE — PROGRESS NOTES
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 [facility])  The patient presents with a 1year-old right subclavian vein MediPort who is catheter has fractured between the right clavicle and right 1st rib  She is in between cycles of chemotherapy and wishes to have it replaced prior to the next scheduled treatment in 2 weeks  After review of the report from a [facility] dated March 7, 2019 I have recommended replacement of the MediPort catheter  I will attempt to replace it over wire  If this is not technically possible we will simply remove the existing catheter in replace the entirely with anyone  The technical details of the procedures well as the benefits and risks were explained to the patient the presence of her  including the risks related to anesthesia, bleeding, infection, neurovascular injury, and 2% risk of pneumothorax requiring admission and chest tube placement  All questions were answered to the satisfaction of the patient and informed consent obtained to proceed  Pulmonary embolism on right [facility])  Patient was instructed to hold her Xarelto 5 days prior to the scheduled procedure  Diagnoses and all orders for this visit:    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 [facility])    Pulmonary embolism on right ([address] )    Other orders  -     senna-docusate sodium (SENOKOT-S) 8 6-50 mg per tablet; Take 1 tablet by mouth 2 (two) times a day as needed  -     acetaminophen (TYLENOL) 325 mg tablet; Take 325 mg by mouth  -     zinc sulfate (ZINCATE) 220 mg capsule; Take 50 mg by mouth  -     loratadine (CLARITIN) 10 mg tablet; Take 10 mg by mouth          Subjective:      Patient ID: Emerald Mon is a 46 y o  female  03- Patient is here today for removal of Mediport of right chest and pre op Mediport of left chest    The Mediport in her right chest has a crack in it   W[employer]ones      The following portions of the ED Purposeful Rounding: Patient updated with plan of care. Patient pain, bathroom needs, positioning and comfort (warm blankets, dim lights, TV, Bed positioning, personal items) needs addressed. Patient given and instructed on use of call light.       patient's history were reviewed and updated as appropriate: allergies, current medications, past family history, past medical history, past social history, past surgical history and problem list     Review of Systems   Hematological:        Patient has a remote history for pulmonary embolism 3 years ago for which she is currently still on Xarelto  All other systems reviewed and are negative  Objective:      /68   Pulse 88   Temp 98 9 °F (37 2 °C) (Tympanic)   Resp 20   Ht 5' 8" (1 727 m)   Wt 123 kg (271 lb)   BMI 41 21 kg/m²          Physical Exam   Constitutional: She is oriented to person, place, and time  She appears well-developed and well-nourished  HENT:   Head: Normocephalic and atraumatic  Eyes: Pupils are equal, round, and reactive to light  Conjunctivae are normal    Neck: Normal range of motion  Neck supple  Cardiovascular: Normal rate and regular rhythm  Pulmonary/Chest: Effort normal and breath sounds normal    Abdominal: Soft  Bowel sounds are normal    Musculoskeletal: Normal range of motion  Neurological: She is alert and oriented to person, place, and time  Skin: Skin is warm and dry  Right subclavian vein MediPort in its normal position on the right chest wall     Psychiatric: Her behavior is normal  Judgment and thought content normal

## 2024-05-16 ENCOUNTER — HOSPITAL ENCOUNTER (OUTPATIENT)
Dept: ULTRASOUND IMAGING | Facility: HOSPITAL | Age: 54
Discharge: HOME | End: 2024-05-16
Payer: COMMERCIAL

## 2024-05-16 ENCOUNTER — HOSPITAL ENCOUNTER (OUTPATIENT)
Dept: MAMMOGRAPHY | Facility: HOSPITAL | Age: 54
Discharge: HOME | End: 2024-05-16
Payer: COMMERCIAL

## 2024-05-16 VITALS — WEIGHT: 292 LBS | BODY MASS INDEX: 45.83 KG/M2 | HEIGHT: 67 IN

## 2024-05-16 DIAGNOSIS — R92.8 ABNORMAL SCREENING MAMMOGRAM: ICD-10-CM

## 2024-05-16 PROCEDURE — 76642 ULTRASOUND BREAST LIMITED: CPT

## 2024-05-16 PROCEDURE — G0279 TOMOSYNTHESIS, MAMMO: HCPCS

## 2024-05-16 PROCEDURE — 77065 DX MAMMO INCL CAD UNI: CPT

## 2024-05-23 ENCOUNTER — OFFICE VISIT (OUTPATIENT)
Dept: URGENT CARE | Facility: MEDICAL CENTER | Age: 54
End: 2024-05-23
Payer: COMMERCIAL

## 2024-05-23 VITALS
HEART RATE: 59 BPM | BODY MASS INDEX: 45.83 KG/M2 | DIASTOLIC BLOOD PRESSURE: 60 MMHG | WEIGHT: 292 LBS | TEMPERATURE: 98.4 F | OXYGEN SATURATION: 96 % | SYSTOLIC BLOOD PRESSURE: 110 MMHG | HEIGHT: 67 IN

## 2024-05-23 DIAGNOSIS — R10.11 RUQ ABDOMINAL PAIN: Primary | ICD-10-CM

## 2024-05-23 PROCEDURE — 99212 OFFICE O/P EST SF 10 MIN: CPT | Performed by: PHYSICIAN ASSISTANT

## 2024-05-23 RX ORDER — AMOXICILLIN 250 MG
CAPSULE ORAL
COMMUNITY
Start: 2024-05-01

## 2024-05-23 RX ORDER — BUTYROSPERMUM PARKII(SHEA BUTTER), SIMMONDSIA CHINENSIS (JOJOBA) SEED OIL, ALOE BARBADENSIS LEAF EXTRACT .01; 1; 3.5 G/100G; G/100G; G/100G
LIQUID TOPICAL
COMMUNITY

## 2024-05-23 NOTE — PROGRESS NOTES
[facility]        NAME: Michelle Monge is a 53 y.o. female  : 1970    MRN: [id number]  DATE: May 23, 2024  TIME: 7:40 PM    Assessment and Plan   RUQ abdominal pain [R10.11]  1. RUQ abdominal pain              Patient Instructions     Take Tylenol for pain  If symptoms worsen go to the ER for further evaluation  Follow up with PCP in 3-5 days.  Proceed to  ER if symptoms worsen.    If tests have been performed at [facility], our office will contact you with results if changes need to be made to the care plan discussed with you at the visit.  [facility] on [facility]    Chief Complaint     Chief Complaint   Patient presents with    Pain     R pain under the breast and rib. Pain is more like an ache. Pain started about a week about. She has had this pain before in the past but it came and went this time it is not going away. She is trying to be more active. She did put biofreeze on this morning and that did help.          History of Present Illness       Patient presents with right upper quadrant abdominal pain which started earlier this week and has been coming more persistent today.  She describes it as an ache.  She denies fever, nausea, vomiting, diarrhea.  She placed Biofreeze on the area with some improvement but it did not take it away fully.  Patient denies chest pain, shortness of breath or palpitations.  Patient was immunized for shingles after her stem cell transplant. Has had similar symptoms in past but now more frequent. On Xarelto for PE in 2019.    Abdominal Pain  This is a recurrent problem. The current episode started in the past 7 days. The onset quality is undetermined. The problem occurs constantly. The most recent episode lasted 6 hours. The problem has been waxing and waning. The pain is located in the right flank. The pain is at a severity of 3/10. The quality of the pain is aching. The abdominal pain radiates to the right flank. Associated  symptoms include arthralgias and weight loss. Pertinent negatives include no anorexia, belching, constipation, diarrhea, dysuria, fever, flatus, frequency, headaches, hematochezia, hematuria, melena, myalgias, nausea or vomiting. Nothing aggravates the pain. The pain is relieved by Being still, certain positions and recumbency.       Review of Systems   Review of Systems   Constitutional:  Positive for weight loss. Negative for fever.   Respiratory:  Negative for shortness of breath and wheezing.    Cardiovascular:  Negative for chest pain and palpitations.   Gastrointestinal:  Positive for abdominal pain. Negative for anorexia, constipation, diarrhea, flatus, hematochezia, melena, nausea and vomiting.   Genitourinary:  Negative for dysuria, frequency and hematuria.   Musculoskeletal:  Positive for arthralgias. Negative for myalgias.   Neurological:  Negative for headaches.         Current Medications       Current Outpatient Medications:     albuterol (PROVENTIL HFA,VENTOLIN HFA) 90 mcg/act inhaler, Inhale 2 puffs every 6 (six) hours as needed for wheezing, Disp: 18 g, Rfl: 0    atorvastatin (LIPITOR) 20 mg tablet, Take 1 tablet (20 mg total) by mouth daily, Disp: 30 tablet, Rfl: 5    cetirizine (ZyrTEC) 10 mg tablet, Take 10 mg by mouth daily, Disp: , Rfl:     Cholecalciferol (D3 2000) 50 MCG (2000 UT) CAPS, , Disp: , Rfl:     Cobalamin Combinations (B-12) 100-5000 MCG SUBL, , Disp: , Rfl:     diltiazem (CARDIZEM CD) 120 mg 24 hr capsule, TAKE ONE CAPSULE BY MOUTH EVERY DAY, Disp: 90 capsule, Rfl: 3    fluticasone-vilanterol (BREO ELLIPTA) 100-25 mcg/inh inhaler, Inhale 1 puff every morning Rinse mouth after use., Disp: , Rfl:     ipratropium (ATROVENT) 0.02 % nebulizer solution, Take 2.5 mL (0.5 mg total) by nebulization every 6 (six) hours as needed for wheezing or shortness of breath, Disp: 150 mL, Rfl: 3    levothyroxine (Euthyrox) 200 mcg tablet, Take 1 tablet (200 mcg total) by mouth daily, Disp: 90 tablet,  "Rfl: 1    metFORMIN (GLUCOPHAGE) 500 mg tablet, Take 1 tablet (500 mg total) by mouth 2 (two) times a day with meals, Disp: 60 tablet, Rfl: 5    metoprolol tartrate (LOPRESSOR) 100 mg tablet, Take 1 tablet (100 mg total) by mouth 2 (two) times a day, Disp: 180 tablet, Rfl: 3    omeprazole (PriLOSEC) 20 mg delayed release capsule, Take 20 mg by mouth every morning, Disp: , Rfl:     rivaroxaban (Xarelto) 20 mg tablet, Take 1 tablet (20 mg total) by mouth daily with dinner, Disp: 90 tablet, Rfl: 3    Current Allergies     Allergies as of 05/23/2024 - Reviewed 05/23/2024   Allergen Reaction Noted    Medical tape  04/26/2019            The following portions of the patient's history were reviewed and updated as appropriate: allergies, current medications, past family history, past medical history, past social history, past surgical history and problem list.     Past Medical History:   Diagnosis Date    Allergic rhinitis     last assessed 04/06/16    Arthritis     b/l feet    Cancer (HCC) 2016    Chronic allergic conjunctivitis     Fluttering heart     takes magnesium for same   EKG OK    Fluttering sensation of heart     \"periodically, not often since on magnesium\"    Foot pain, left     Full dentures     upper    Genital warts 1986    Treated    History of cancer chemotherapy 2016    History of chemotherapy     History of dilation and curettage     Hx of radiation therapy     Hx of tonsillectomy     Hypothyroid     Irregular heart beat     Lymph nodes enlarged     in chest pushing on her bronchioles necessitating inhalers    Neck mass     R neck mass-excised 3/15/2016,, 4/23/18 noted enlarged lymph node right neck-bx today 4/26/2018    Non Hodgkin's lymphoma (HCC)     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    Obese     Obstructive sleep apnea     Paroxysmal atrial flutter (HCC)     Paroxysmal atrial tachycardia     Port-A-Cath in place 2016    pt reports 'Has it flushed q 4weeks\" right chest wall    Post-menopausal     since chemo " "4/2016  no menses    Premature ventricular contractions     Pulmonary embolism (HCC)     last assessed 10/31/16 attributed to Non-Hodgkins hx    Pulmonary embolism on right (HCC) 07/12/2018    Last Assessment & Plan:  She will need to hold her Xarelto 2 days prior to her procedure and may resume therapy 1-2 days post procedure (depending on whether she is producing blood tinged sputum or not).    Shortness of breath     due to chest tumor    Tachycardia     awaiting cardiology eval,,,4/23/18 \"pt reports saw cardiologist at that time and placed on magnesium and \"hardly notices it\"    Tinnitus     occas    Wears glasses     Wears partial dentures     /lower    [place] teeth extracted        Past Surgical History:   Procedure Laterality Date    COLONOSCOPY      COLONOSCOPY N/A 7/26/2018    Procedure: COLONOSCOPY with multiple bx;  Surgeon: Davin Nunez MD;  Location:  MAIN OR;  Service: Gastroenterology    CYST REMOVAL Left     L  neck    DILATION AND CURETTAGE OF UTERUS      ESOPHAGOGASTRODUODENOSCOPY N/A 7/26/2018    Procedure: ESOPHAGOGASTRODUODENOSCOPY (EGD) with multiple bx;  Surgeon: Davin Nunez MD;  Location:  MAIN OR;  Service: Gastroenterology    FACIAL/NECK BIOPSY N/A 4/26/2018    Procedure: OPEN DEEP CERVICAL LYMPH NODE EXCISOION/BIOPSY;  Surgeon: Joshua Blanco MD;  Location: [facility];  Service: ENT    LYMPHADENECTOMY      PORTACATH PLACEMENT      MO BX/EXC LYMPH NODE OPEN DEEP CERVICAL NODE N/A 3/15/2016    Procedure: DISSECTION LYMH NODE NECK/DEEP NECK MASS ;  Surgeon: Ritchie Millan DO;  Location: [facility];  Service: ENT    TONSILLECTOMY      TUBAL LIGATION      TUNNELED VENOUS PORT PLACEMENT Right 3/21/2019    Procedure: INSERTION VENOUS PORT (PORT-A-CATH) remove and replace;  Surgeon: John Mcrae MD;  Location:  MAIN OR;  Service: General       Family History   Problem Relation Age of Onset    No Known Problems Mother     Coronary artery disease Father     No Known " "Problems Daughter     Cancer Maternal Grandmother         unknown origin    Dementia Maternal Grandfather     No Known Problems Paternal Grandmother     No Known Problems Paternal Grandfather     Diabetes Brother     Diabetes Brother             Cancer Maternal Uncle         throat    Diabetes Family     Heart disease Family     Cancer Paternal Aunt         unknown origin    Stroke Neg Hx          Medications have been verified.        Objective   /60   Pulse 59   Temp 98.4 °F (36.9 °C)   Ht 5' 7\" (1.702 m)   Wt 132 kg (292 lb)   SpO2 96%   BMI 45.73 kg/m²   No LMP recorded. (Menstrual status: Chemotherapy/radiation).       Physical Exam     Physical Exam  Vitals and nursing note reviewed.   Constitutional:       Appearance: Normal appearance.   HENT:      Head: Normocephalic and atraumatic.   Cardiovascular:      Rate and Rhythm: Normal rate.      Comments: No reproducible chest pain  Pulmonary:      Effort: Pulmonary effort is normal.   Abdominal:      Tenderness: There is no guarding or rebound.      Comments: Patient states she has an ache in the right upper quadrant on palpation.  No overt tenderness.   Skin:     General: Skin is warm.   Neurological:      Mental Status: She is alert.                   "

## 2024-06-05 DIAGNOSIS — I48.92 ATRIAL FLUTTER, UNSPECIFIED TYPE (HCC): ICD-10-CM

## 2024-06-05 RX ORDER — RIVAROXABAN 20 MG/1
20 TABLET, FILM COATED ORAL
Qty: 90 TABLET | Refills: 1 | Status: SHIPPED | OUTPATIENT
Start: 2024-06-05

## 2024-08-07 DIAGNOSIS — I48.92 PAROXYSMAL ATRIAL FLUTTER (HCC): ICD-10-CM

## 2024-08-07 RX ORDER — DILTIAZEM HYDROCHLORIDE 120 MG/1
120 CAPSULE, COATED, EXTENDED RELEASE ORAL DAILY
Qty: 90 CAPSULE | Refills: 1 | Status: SHIPPED | OUTPATIENT
Start: 2024-08-07

## 2024-08-12 ENCOUNTER — OFFICE VISIT (OUTPATIENT)
Dept: FAMILY MEDICINE CLINIC | Facility: CLINIC | Age: 54
End: 2024-08-12
Payer: COMMERCIAL

## 2024-08-12 VITALS
BODY MASS INDEX: 41.67 KG/M2 | DIASTOLIC BLOOD PRESSURE: 68 MMHG | HEIGHT: 67 IN | HEART RATE: 70 BPM | WEIGHT: 265.5 LBS | SYSTOLIC BLOOD PRESSURE: 120 MMHG | OXYGEN SATURATION: 98 %

## 2024-08-12 DIAGNOSIS — C85.80 MARGINAL ZONE LYMPHOMA (HCC): ICD-10-CM

## 2024-08-12 DIAGNOSIS — C85.80 OTHER SPECIFIED TYPES OF NON-HODGKIN LYMPHOMA, UNSPECIFIED SITE (HCC): ICD-10-CM

## 2024-08-12 DIAGNOSIS — E11.9 TYPE 2 DIABETES MELLITUS WITHOUT COMPLICATION, WITHOUT LONG-TERM CURRENT USE OF INSULIN (HCC): Primary | ICD-10-CM

## 2024-08-12 DIAGNOSIS — G47.33 OSA (OBSTRUCTIVE SLEEP APNEA): ICD-10-CM

## 2024-08-12 DIAGNOSIS — E78.5 DYSLIPIDEMIA ASSOCIATED WITH TYPE 2 DIABETES MELLITUS  (HCC): ICD-10-CM

## 2024-08-12 DIAGNOSIS — C83.30 MALIGNANT LYMPHOMA, LARGE CELL, DIFFUSE (HCC): ICD-10-CM

## 2024-08-12 DIAGNOSIS — E11.69 DYSLIPIDEMIA ASSOCIATED WITH TYPE 2 DIABETES MELLITUS  (HCC): ICD-10-CM

## 2024-08-12 DIAGNOSIS — E03.9 ACQUIRED HYPOTHYROIDISM: ICD-10-CM

## 2024-08-12 DIAGNOSIS — I47.19 ATRIAL TACHYCARDIA, PAROXYSMAL: ICD-10-CM

## 2024-08-12 DIAGNOSIS — I48.92 ATRIAL FLUTTER, PAROXYSMAL (HCC): ICD-10-CM

## 2024-08-12 LAB
CREAT UR-MCNC: 175 MG/DL
LEFT EYE DIABETIC RETINOPATHY: ABNORMAL
LEFT EYE IMAGE QUALITY: ABNORMAL
LEFT EYE MACULAR EDEMA: ABNORMAL
LEFT EYE OTHER RETINOPATHY: ABNORMAL
MICROALBUMIN UR-MCNC: 20.5 MG/L
MICROALBUMIN/CREAT 24H UR: 12 MG/G CREATININE (ref 0–30)
RIGHT EYE DIABETIC RETINOPATHY: ABNORMAL
RIGHT EYE IMAGE QUALITY: ABNORMAL
RIGHT EYE MACULAR EDEMA: ABNORMAL
RIGHT EYE OTHER RETINOPATHY: ABNORMAL
SEVERITY (EYE EXAM): ABNORMAL
SL AMB POCT HEMOGLOBIN AIC: 6.1 (ref ?–6.5)

## 2024-08-12 PROCEDURE — 99214 OFFICE O/P EST MOD 30 MIN: CPT | Performed by: PHYSICIAN ASSISTANT

## 2024-08-12 PROCEDURE — 92250 FUNDUS PHOTOGRAPHY W/I&R: CPT | Performed by: PHYSICIAN ASSISTANT

## 2024-08-12 PROCEDURE — 83036 HEMOGLOBIN GLYCOSYLATED A1C: CPT | Performed by: PHYSICIAN ASSISTANT

## 2024-08-12 PROCEDURE — 82570 ASSAY OF URINE CREATININE: CPT | Performed by: PHYSICIAN ASSISTANT

## 2024-08-12 PROCEDURE — 82043 UR ALBUMIN QUANTITATIVE: CPT | Performed by: PHYSICIAN ASSISTANT

## 2024-08-12 RX ORDER — SACCHAROMYCES BOULARDII 250 MG
250 CAPSULE ORAL 2 TIMES DAILY
COMMUNITY

## 2024-08-13 NOTE — ASSESSMENT & PLAN NOTE
A1c is excellent at 6.1  Continue metformin 500 mg twice daily  Encouraged patient to continue to lose weight    Lab Results   Component Value Date    HGBA1C 6.1 08/12/2024

## 2024-08-13 NOTE — ASSESSMENT & PLAN NOTE
Patient is in remission.  Continue to follow regularly with [facility] oncology.

## 2024-08-13 NOTE — PROGRESS NOTES
Ambulatory Visit  Name: Michelle Monge      : 1970      MRN: [id number]  Encounter Provider: Isabel Reardon PA-C  Encounter Date: 2024   Encounter department: [facility]    Assessment & Plan   1. Type 2 diabetes mellitus without complication, without long-term current use of insulin (HCC)  Assessment & Plan:  A1c is excellent at 6.1  Continue metformin 500 mg twice daily  Encouraged patient to continue to lose weight    Lab Results   Component Value Date    HGBA1C 6.1 2024     Orders:  -     POCT hemoglobin A1c  -     IRIS Diabetic eye exam  -     Albumin / creatinine urine ratio; Future  -     Albumin / creatinine urine ratio  -     Comprehensive metabolic panel; Future; Expected date: 2024  -     CBC and differential; Future; Expected date: 2024  -     Lipid panel; Future; Expected date: 2024  -     Hemoglobin A1C; Future; Expected date: 2024  2. Dyslipidemia associated with type 2 diabetes mellitus  (HCC)  Assessment & Plan:  A1c is excellent at 6.1  Continue metformin 500 mg twice daily  Encouraged patient to continue to lose weight    Lab Results   Component Value Date    HGBA1C 6.1 2024     Orders:  -     Comprehensive metabolic panel; Future; Expected date: 2024  -     Lipid panel; Future; Expected date: 2024  3. Atrial flutter, paroxysmal (HCC)  Assessment & Plan:  Stable.  Continue to follow with cardiology  4. Atrial tachycardia, paroxysmal  Assessment & Plan:  Stable.  Continue to follow with cardiology  5. MARGARITO (obstructive sleep apnea)  Assessment & Plan:  Patient is compliant with CPAP.  Continue to follow with sleep medicine.  6. Acquired hypothyroidism  Assessment & Plan:  Stable.  Continue levothyroxine.  Will recheck labs before next visit  Orders:  -     TSH, 3rd generation with Free T4 reflex; Future; Expected date: 2024  7. Malignant lymphoma, large cell, diffuse (HCC)  Assessment & Plan:  Patient is in remission.   "Continue to follow regularly with [facility] oncology.  8. Marginal zone lymphoma (HCC)  Assessment & Plan:  Patient is in remission.  Continue to follow regularly with [facility] oncology.  9. Other specified types of non-hodgkin lymphoma, unspecified site (HCC)  Assessment & Plan:  Patient is in remission.  Continue to follow regularly with [facility] oncology.       History of Present Illness      is a very pleasant 53-year-old female who is here today to transition care from Dr. Encarnacion.  She admits that she has been trying to lose weight.  Her blood sugars have been well-controlled.  She is doing fine on her metformin.  She continues to follow regularly with cardiology for paroxysmal atrial flutter and paroxysmal atrial tachycardia.  She admits that her cardiac problems have been stable.  She is compliant with her CPAP machine.  She continues to follow with [facility] every 6 months.  She is in remission for lymphoma.  She denies any other acute concerns or problems.        Review of Systems   Constitutional:  Negative for chills, diaphoresis, fatigue and fever.   HENT:  Negative for congestion, ear pain, postnasal drip, rhinorrhea, sneezing, sore throat and trouble swallowing.    Eyes:  Negative for pain and visual disturbance.   Respiratory:  Negative for apnea, cough, shortness of breath and wheezing.    Cardiovascular:  Negative for chest pain and palpitations.   Gastrointestinal:  Negative for abdominal pain, constipation, diarrhea, nausea and vomiting.   Genitourinary:  Negative for dysuria and hematuria.   Musculoskeletal:  Negative for arthralgias, gait problem and myalgias.   Neurological:  Negative for dizziness, syncope, weakness, light-headedness, numbness and headaches.   Psychiatric/Behavioral:  Negative for suicidal ideas. The patient is not nervous/anxious.        Objective     /68   Pulse 70   Ht 5' 7\" (1.702 m)   " Wt 120 kg (265 lb 8 oz)   SpO2 98%   BMI 41.58 kg/m²     Physical Exam  Vitals and nursing note reviewed.   Constitutional:       General: She is not in acute distress.     Appearance: She is well-developed. She is not diaphoretic.   HENT:      Head: Normocephalic and atraumatic.      Right Ear: Hearing, tympanic membrane, ear canal and external ear normal.      Left Ear: Hearing, tympanic membrane, ear canal and external ear normal.      Nose: Nose normal. No mucosal edema or rhinorrhea.      Mouth/Throat:      Pharynx: No oropharyngeal exudate or posterior oropharyngeal erythema.   Eyes:      Extraocular Movements: Extraocular movements intact.   Cardiovascular:      Rate and Rhythm: Normal rate and regular rhythm.      Heart sounds: Normal heart sounds. No murmur heard.     No friction rub. No gallop.   Pulmonary:      Effort: Pulmonary effort is normal. No respiratory distress.      Breath sounds: Normal breath sounds. No wheezing or rales.   Abdominal:      General: Bowel sounds are normal. There is no distension.      Palpations: Abdomen is soft.      Tenderness: There is no abdominal tenderness. There is no guarding.   Musculoskeletal:         General: Normal range of motion.      Cervical back: Normal range of motion and neck supple.   Lymphadenopathy:      Cervical: No cervical adenopathy.   Skin:     General: Skin is warm and dry.      Findings: No rash.   Neurological:      Mental Status: She is alert and oriented to person, place, and time.   Psychiatric:         Behavior: Behavior normal.         Thought Content: Thought content normal.         Judgment: Judgment normal.       Administrative Statements

## 2024-08-19 DIAGNOSIS — I48.92 ATRIAL FLUTTER, UNSPECIFIED TYPE (HCC): ICD-10-CM

## 2024-08-19 DIAGNOSIS — R00.2 PALPITATIONS: ICD-10-CM

## 2024-08-19 RX ORDER — METOPROLOL TARTRATE 100 MG
100 TABLET ORAL 2 TIMES DAILY
Qty: 180 TABLET | Refills: 0 | Status: SHIPPED | OUTPATIENT
Start: 2024-08-19

## 2024-08-28 DIAGNOSIS — E78.2 MIXED HYPERLIPIDEMIA: ICD-10-CM

## 2024-08-28 RX ORDER — ATORVASTATIN CALCIUM 20 MG/1
20 TABLET, FILM COATED ORAL DAILY
Qty: 30 TABLET | Refills: 5 | Status: SHIPPED | OUTPATIENT
Start: 2024-08-28

## 2024-08-28 NOTE — TELEPHONE ENCOUNTER
Reason for call:   [x] Refill   [] Prior Auth  [] Other:     Office:   [x] PCP/Provider - [facility]  Authorized By: Luis Encarnacion DO  [] Specialty/Provider -     Medication: atorvastatin (LIPITOR) 20 mg tablet     Dose/Frequency:  Take 1 tablet (20 mg total) by mouth daily     Quantity: 30 tablet     Pharmacy: [address], [address]    Does the patient have enough for 3 days?   [] Yes   [x] No - Send as HP to POD

## 2024-09-23 ENCOUNTER — PATIENT MESSAGE (OUTPATIENT)
Dept: FAMILY MEDICINE CLINIC | Facility: CLINIC | Age: 54
End: 2024-09-23

## 2024-09-23 DIAGNOSIS — E11.9 TYPE 2 DIABETES MELLITUS WITHOUT COMPLICATION, WITHOUT LONG-TERM CURRENT USE OF INSULIN (HCC): ICD-10-CM

## 2024-09-24 NOTE — PATIENT COMMUNICATION
Reason for call:   [x] Refill   [] Prior Auth  [] Other:     Office:   [x] PCP/Provider - [facility]     [] Specialty/Provider -     Medication: metFORMIN (GLUCOPHAGE) 500 mg tablet     Dose/Frequency: Take 1 tablet (500 mg total) by mouth 2 (two) times a day with meals     Quantity: 60    Pharmacy:  [facility] [place] [facility] - [address]     Does the patient have enough for 3 days?   [] Yes   [x] No - Send as HP to POD

## 2024-10-24 ENCOUNTER — HOSPITAL ENCOUNTER (OUTPATIENT)
Dept: NON INVASIVE DIAGNOSTICS | Facility: CLINIC | Age: 54
Discharge: HOME | End: 2024-10-24
Payer: COMMERCIAL

## 2024-10-24 VITALS
WEIGHT: 265 LBS | SYSTOLIC BLOOD PRESSURE: 120 MMHG | DIASTOLIC BLOOD PRESSURE: 68 MMHG | HEART RATE: 75 BPM | HEIGHT: 67 IN | BODY MASS INDEX: 41.59 KG/M2

## 2024-10-24 DIAGNOSIS — Z86.711 HISTORY OF PULMONARY EMBOLISM: ICD-10-CM

## 2024-10-24 DIAGNOSIS — G47.33 OSA (OBSTRUCTIVE SLEEP APNEA): ICD-10-CM

## 2024-10-24 LAB
AORTIC ROOT: 2.9 CM
AORTIC VALVE MEAN VELOCITY: 9.4 M/S
APICAL FOUR CHAMBER EJECTION FRACTION: 71 %
ASCENDING AORTA: 3.4 CM
AV AREA BY CONTINUOUS VTI: 3 CM2
AV AREA PEAK VELOCITY: 2.8 CM2
AV LVOT MEAN GRADIENT: 3 MMHG
AV LVOT PEAK GRADIENT: 6 MMHG
AV MEAN GRADIENT: 4 MMHG
AV PEAK GRADIENT: 8 MMHG
AV VALVE AREA: 2.96 CM2
AV VELOCITY RATIO: 0.91
BSA FOR ECHO PROCEDURE: 2.28 M2
DOP CALC AO PEAK VEL: 1.38 M/S
DOP CALC AO VTI: 28.39 CM
DOP CALC LVOT AREA: 3.14 CM2
DOP CALC LVOT DIAMETER: 2 CM
DOP CALC LVOT PEAK VEL VTI: 26.74 CM
DOP CALC LVOT PEAK VEL: 1.25 M/S
DOP CALC LVOT STROKE INDEX: 35.5 ML/M2
DOP CALC LVOT STROKE VOLUME: 81
DOP CALC MV VTI: 49.08 CM
E WAVE DECELERATION TIME: 297 MS
E/A RATIO: 1.15
FRACTIONAL SHORTENING: 33 (ref 28–44)
INTERVENTRICULAR SEPTUM IN DIASTOLE (PARASTERNAL SHORT AXIS VIEW): 1.3 CM
INTERVENTRICULAR SEPTUM: 1.3 CM (ref 0.6–1.1)
LAAS-AP2: 25.8 CM2
LAAS-AP4: 20.5 CM2
LEFT ATRIUM SIZE: 3.8 CM
LEFT ATRIUM VOLUME (MOD BIPLANE): 71 ML
LEFT ATRIUM VOLUME INDEX (MOD BIPLANE): 31.1 ML/M2
LEFT INTERNAL DIMENSION IN SYSTOLE: 3 CM (ref 2.1–4)
LEFT VENTRICULAR INTERNAL DIMENSION IN DIASTOLE: 4.5 CM (ref 3.5–6)
LEFT VENTRICULAR POSTERIOR WALL IN END DIASTOLE: 1.2 CM
LEFT VENTRICULAR STROKE VOLUME: 57 ML
LVSV (TEICH): 57 ML
MV E'TISSUE VEL-SEP: 8 CM/S
MV MEAN GRADIENT: 5 MMHG
MV PEAK A VEL: 1.22 M/S
MV PEAK E VEL: 140 CM/S
MV PEAK GRADIENT: 10 MMHG
MV STENOSIS PRESSURE HALF TIME: 88 MS
MV VALVE AREA BY CONTINUITY EQUATION: 1.71 CM2
MV VALVE AREA P 1/2 METHOD: 2.5
RIGHT ATRIUM AREA SYSTOLE A4C: 14.5 CM2
RIGHT VENTRICLE ID DIMENSION: 3.1 CM
SL CV LEFT ATRIUM LENGTH A2C: 6.2 CM
SL CV LV EF: 65
SL CV PED ECHO LEFT VENTRICLE DIASTOLIC VOLUME (MOD BIPLANE) 2D: 92 ML
SL CV PED ECHO LEFT VENTRICLE SYSTOLIC VOLUME (MOD BIPLANE) 2D: 35 ML
TRICUSPID ANNULAR PLANE SYSTOLIC EXCURSION: 2.3 CM

## 2024-10-24 PROCEDURE — 93306 TTE W/DOPPLER COMPLETE: CPT

## 2024-10-24 PROCEDURE — 93306 TTE W/DOPPLER COMPLETE: CPT | Performed by: INTERNAL MEDICINE

## 2024-10-29 ENCOUNTER — APPOINTMENT (OUTPATIENT)
Dept: LAB | Facility: MEDICAL CENTER | Age: 54
End: 2024-10-29
Payer: COMMERCIAL

## 2024-10-29 ENCOUNTER — TRANSCRIBE ORDERS (OUTPATIENT)
Dept: LAB | Facility: MEDICAL CENTER | Age: 54
End: 2024-10-29

## 2024-10-29 DIAGNOSIS — E78.5 DYSLIPIDEMIA ASSOCIATED WITH TYPE 2 DIABETES MELLITUS  (HCC): ICD-10-CM

## 2024-10-29 DIAGNOSIS — E03.9 ACQUIRED HYPOTHYROIDISM: ICD-10-CM

## 2024-10-29 DIAGNOSIS — C85.80 LYMPHOSARCOMA, MIXED CELL TYPE (HCC): ICD-10-CM

## 2024-10-29 DIAGNOSIS — C85.80 LYMPHOSARCOMA, MIXED CELL TYPE (HCC): Primary | ICD-10-CM

## 2024-10-29 DIAGNOSIS — C83.38 DIFFUSE LARGE B-CELL LYMPHOMA OF LYMPH NODES OF MULTIPLE REGIONS (HCC): ICD-10-CM

## 2024-10-29 DIAGNOSIS — E11.9 TYPE 2 DIABETES MELLITUS WITHOUT COMPLICATION, WITHOUT LONG-TERM CURRENT USE OF INSULIN (HCC): ICD-10-CM

## 2024-10-29 DIAGNOSIS — E11.69 DYSLIPIDEMIA ASSOCIATED WITH TYPE 2 DIABETES MELLITUS  (HCC): ICD-10-CM

## 2024-10-29 LAB
ALBUMIN SERPL BCG-MCNC: 3.8 G/DL (ref 3.5–5)
ALP SERPL-CCNC: 119 U/L (ref 34–104)
ALT SERPL W P-5'-P-CCNC: 12 U/L (ref 7–52)
ANION GAP SERPL CALCULATED.3IONS-SCNC: 9 MMOL/L (ref 4–13)
AST SERPL W P-5'-P-CCNC: 17 U/L (ref 13–39)
BASOPHILS # BLD AUTO: 0.07 THOUSANDS/ΜL (ref 0–0.1)
BASOPHILS NFR BLD AUTO: 1 % (ref 0–1)
BILIRUB SERPL-MCNC: 0.62 MG/DL (ref 0.2–1)
BUN SERPL-MCNC: 14 MG/DL (ref 5–25)
CALCIUM SERPL-MCNC: 9 MG/DL (ref 8.4–10.2)
CHLORIDE SERPL-SCNC: 102 MMOL/L (ref 96–108)
CHOLEST SERPL-MCNC: 128 MG/DL
CO2 SERPL-SCNC: 29 MMOL/L (ref 21–32)
CREAT SERPL-MCNC: 0.68 MG/DL (ref 0.6–1.3)
EOSINOPHIL # BLD AUTO: 0.23 THOUSAND/ΜL (ref 0–0.61)
EOSINOPHIL NFR BLD AUTO: 3 % (ref 0–6)
ERYTHROCYTE [DISTWIDTH] IN BLOOD BY AUTOMATED COUNT: 13.6 % (ref 11.6–15.1)
EST. AVERAGE GLUCOSE BLD GHB EST-MCNC: 143 MG/DL
GFR SERPL CREATININE-BSD FRML MDRD: 98 ML/MIN/1.73SQ M
GLUCOSE P FAST SERPL-MCNC: 120 MG/DL (ref 65–99)
HBA1C MFR BLD: 6.6 %
HCT VFR BLD AUTO: 45.3 % (ref 34.8–46.1)
HDLC SERPL-MCNC: 40 MG/DL
HGB BLD-MCNC: 14.5 G/DL (ref 11.5–15.4)
IMM GRANULOCYTES # BLD AUTO: 0.04 THOUSAND/UL (ref 0–0.2)
IMM GRANULOCYTES NFR BLD AUTO: 1 % (ref 0–2)
LDH SERPL-CCNC: 161 U/L (ref 140–271)
LDLC SERPL CALC-MCNC: 65 MG/DL (ref 0–100)
LYMPHOCYTES # BLD AUTO: 1.11 THOUSANDS/ΜL (ref 0.6–4.47)
LYMPHOCYTES NFR BLD AUTO: 14 % (ref 14–44)
MCH RBC QN AUTO: 29.8 PG (ref 26.8–34.3)
MCHC RBC AUTO-ENTMCNC: 32 G/DL (ref 31.4–37.4)
MCV RBC AUTO: 93 FL (ref 82–98)
MONOCYTES # BLD AUTO: 0.56 THOUSAND/ΜL (ref 0.17–1.22)
MONOCYTES NFR BLD AUTO: 7 % (ref 4–12)
NEUTROPHILS # BLD AUTO: 6.24 THOUSANDS/ΜL (ref 1.85–7.62)
NEUTS SEG NFR BLD AUTO: 74 % (ref 43–75)
NONHDLC SERPL-MCNC: 88 MG/DL
NRBC BLD AUTO-RTO: 0 /100 WBCS
PLATELET # BLD AUTO: 262 THOUSANDS/UL (ref 149–390)
PMV BLD AUTO: 11.4 FL (ref 8.9–12.7)
POTASSIUM SERPL-SCNC: 4.2 MMOL/L (ref 3.5–5.3)
PROT SERPL-MCNC: 6.7 G/DL (ref 6.4–8.4)
RBC # BLD AUTO: 4.87 MILLION/UL (ref 3.81–5.12)
SODIUM SERPL-SCNC: 140 MMOL/L (ref 135–147)
TRIGL SERPL-MCNC: 114 MG/DL
TSH SERPL DL<=0.05 MIU/L-ACNC: 0.46 UIU/ML (ref 0.45–4.5)
WBC # BLD AUTO: 8.25 THOUSAND/UL (ref 4.31–10.16)

## 2024-10-29 PROCEDURE — 80061 LIPID PANEL: CPT

## 2024-10-29 PROCEDURE — 84443 ASSAY THYROID STIM HORMONE: CPT

## 2024-10-29 PROCEDURE — 83036 HEMOGLOBIN GLYCOSYLATED A1C: CPT

## 2024-10-29 PROCEDURE — 80053 COMPREHEN METABOLIC PANEL: CPT

## 2024-10-29 PROCEDURE — 83615 LACTATE (LD) (LDH) ENZYME: CPT

## 2024-10-29 PROCEDURE — 36415 COLL VENOUS BLD VENIPUNCTURE: CPT

## 2024-10-29 PROCEDURE — 85025 COMPLETE CBC W/AUTO DIFF WBC: CPT

## 2024-10-29 RX ORDER — LEVOTHYROXINE SODIUM 200 UG/1
200 TABLET ORAL DAILY
Qty: 90 TABLET | Refills: 1 | Status: SHIPPED | OUTPATIENT
Start: 2024-10-29

## 2024-10-29 NOTE — TELEPHONE ENCOUNTER
Reason for call:   [x] Refill   [] Prior Auth  [] Other:     Office:   [x] PCP/Provider - bharathi Reardon  [] Specialty/Provider -     Medication:   Levothyroxine 200 mcg, 1 qd, 90      Pharmacy:   [facility]      Does the patient have enough for 3 days?   [] Yes   [x] No - Send as HP to POD

## 2024-11-12 DIAGNOSIS — I48.92 ATRIAL FLUTTER, UNSPECIFIED TYPE (HCC): ICD-10-CM

## 2024-11-12 DIAGNOSIS — R00.2 PALPITATIONS: ICD-10-CM

## 2024-11-13 RX ORDER — METOPROLOL TARTRATE 100 MG/1
100 TABLET ORAL 2 TIMES DAILY
Qty: 180 TABLET | Refills: 1 | Status: SHIPPED | OUTPATIENT
Start: 2024-11-13

## 2024-11-13 NOTE — TELEPHONE ENCOUNTER
Called patient and left message for patient to call and schedule an overdue appointment with Dr Mojica.  She did have her echo done already.

## 2024-11-28 DIAGNOSIS — I48.92 ATRIAL FLUTTER, UNSPECIFIED TYPE (HCC): ICD-10-CM

## 2024-11-29 RX ORDER — RIVAROXABAN 20 MG/1
TABLET, FILM COATED ORAL
Qty: 90 TABLET | Refills: 1 | Status: SHIPPED | OUTPATIENT
Start: 2024-11-29

## 2024-12-14 LAB
LEFT EYE DIABETIC RETINOPATHY: NORMAL
RIGHT EYE DIABETIC RETINOPATHY: NORMAL

## 2024-12-24 ENCOUNTER — OFFICE VISIT (OUTPATIENT)
Dept: FAMILY MEDICINE CLINIC | Facility: CLINIC | Age: 54
End: 2024-12-24
Payer: COMMERCIAL

## 2024-12-24 ENCOUNTER — TELEPHONE (OUTPATIENT)
Dept: ADMINISTRATIVE | Facility: OTHER | Age: 54
End: 2024-12-24

## 2024-12-24 VITALS
DIASTOLIC BLOOD PRESSURE: 66 MMHG | SYSTOLIC BLOOD PRESSURE: 114 MMHG | HEART RATE: 67 BPM | TEMPERATURE: 97.4 F | WEIGHT: 285.2 LBS | HEIGHT: 67 IN | BODY MASS INDEX: 44.76 KG/M2 | OXYGEN SATURATION: 98 %

## 2024-12-24 DIAGNOSIS — I48.92 ATRIAL FLUTTER, PAROXYSMAL (HCC): ICD-10-CM

## 2024-12-24 DIAGNOSIS — I47.19 ATRIAL TACHYCARDIA, PAROXYSMAL (HCC): ICD-10-CM

## 2024-12-24 DIAGNOSIS — B02.29 POSTHERPETIC NEURALGIA: ICD-10-CM

## 2024-12-24 DIAGNOSIS — Z00.00 ANNUAL PHYSICAL EXAM: Primary | ICD-10-CM

## 2024-12-24 DIAGNOSIS — E11.9 TYPE 2 DIABETES MELLITUS WITHOUT COMPLICATION, WITHOUT LONG-TERM CURRENT USE OF INSULIN (HCC): ICD-10-CM

## 2024-12-24 DIAGNOSIS — M79.672 LEFT FOOT PAIN: ICD-10-CM

## 2024-12-24 DIAGNOSIS — E03.9 ACQUIRED HYPOTHYROIDISM: ICD-10-CM

## 2024-12-24 DIAGNOSIS — E66.01 MORBID OBESITY WITH BMI OF 40.0-44.9, ADULT (HCC): ICD-10-CM

## 2024-12-24 DIAGNOSIS — G47.33 OSA (OBSTRUCTIVE SLEEP APNEA): ICD-10-CM

## 2024-12-24 DIAGNOSIS — C83.30 MALIGNANT LYMPHOMA, LARGE CELL, DIFFUSE (HCC): ICD-10-CM

## 2024-12-24 PROCEDURE — 99396 PREV VISIT EST AGE 40-64: CPT | Performed by: PHYSICIAN ASSISTANT

## 2024-12-24 RX ORDER — GABAPENTIN 100 MG/1
100 CAPSULE ORAL 2 TIMES DAILY
Qty: 60 CAPSULE | Refills: 3 | Status: SHIPPED | OUTPATIENT
Start: 2024-12-24

## 2024-12-24 NOTE — TELEPHONE ENCOUNTER
----- Message from Paulette BRAMBILA sent at 12/24/2024  8:25 AM EST -----  Regarding: care gap request  12/24/24 8:25 AM    Hello, our patient attached above has had Diabetic Eye Exam completed/performed. Please assist in updating the patient chart by making an External outreach to [facility] facility located in [facility]. The date of service is with in the past 6 months.    Thank you,  Paulette Marin  [facility]

## 2024-12-24 NOTE — PATIENT INSTRUCTIONS
"Patient Education     Routine physical for adults   The Basics   Written by the doctors and editors at [facility]   What is a physical? -- A physical is a routine visit, or \"check-up,\" with your doctor. You might also hear it called a \"wellness visit\" or \"preventive visit.\"  During each visit, the doctor will:   Ask about your physical and mental health   Ask about your habits, behaviors, and lifestyle   Do an exam   Give you vaccines if needed   Talk to you about any medicines you take   Give advice about your health   Answer your questions  Getting regular check-ups is an important part of taking care of your health. It can help your doctor find and treat any problems you have. But it's also important for preventing health problems.  A routine physical is different from a \"sick visit.\" A sick visit is when you see a doctor because of a health concern or problem. Since physicals are scheduled ahead of time, you can think about what you want to ask the doctor.  How often should I get a physical? -- It depends on your age and health. In general, for people age 21 years and older:   If you are younger than 50 years, you might be able to get a physical every 3 years.   If you are 50 years or older, your doctor might recommend a physical every year.  If you have an ongoing health condition, like diabetes or high blood pressure, your doctor will probably want to see you more often.  What happens during a physical? -- In general, each visit will include:   Physical exam - The doctor or nurse will check your height, weight, heart rate, and blood pressure. They will also look at your eyes and ears. They will ask about how you are feeling and whether you have any symptoms that bother you.   Medicines - It's a good idea to bring a list of all the medicines you take to each doctor visit. Your doctor will talk to you about your medicines and answer any questions. Tell them if you are having any side effects that bother you. You " "should also tell them if you are having trouble paying for any of your medicines.   Habits and behaviors - This includes:   Your diet   Your exercise habits   Whether you smoke, drink alcohol, or use drugs   Whether you are sexually active   Whether you feel safe at home  Your doctor will talk to you about things you can do to improve your health and lower your risk of health problems. They will also offer help and support. For example, if you want to quit smoking, they can give you advice and might prescribe medicines. If you want to improve your diet or get more physical activity, they can help you with this, too.   Lab tests, if needed - The tests you get will depend on your age and situation. For example, your doctor might want to check your:   Cholesterol   Blood sugar   Iron level   Vaccines - The recommended vaccines will depend on your age, health, and what vaccines you already had. Vaccines are very important because they can prevent certain serious or deadly infections.   Discussion of screening - \"Screening\" means checking for diseases or other health problems before they cause symptoms. Your doctor can recommend screening based on your age, risk, and preferences. This might include tests to check for:   Cancer, such as breast, prostate, cervical, ovarian, colorectal, prostate, lung, or skin cancer   Sexually transmitted infections, such as chlamydia and gonorrhea   Mental health conditions like depression and anxiety  Your doctor will talk to you about the different types of screening tests. They can help you decide which screenings to have. They can also explain what the results might mean.   Answering questions - The physical is a good time to ask the doctor or nurse questions about your health. If needed, they can refer you to other doctors or specialists, too.  Adults older than 65 years often need other care, too. As you get older, your doctor will talk to you about:   How to prevent falling at " home   Hearing or vision tests   Memory testing   How to take your medicines safely   Making sure that you have the help and support you need at home  All topics are updated as new evidence becomes available and our peer review process is complete.  This topic retrieved from [employer] on: May 02, 2024.  Topic 275380 Version 1.0  Release: 32.4.3 - C32.122  © 2024 UpToDate, Inc. and/or its affiliates. All rights reserved.  Consumer Information Use and Disclaimer   Disclaimer: This generalized information is a limited summary of diagnosis, treatment, and/or medication information. It is not meant to be comprehensive and should be used as a tool to help the user understand and/or assess potential diagnostic and treatment options. It does NOT include all information about conditions, treatments, medications, side effects, or risks that may apply to a specific patient. It is not intended to be medical advice or a substitute for the medical advice, diagnosis, or treatment of a health care provider based on the health care provider's examination and assessment of a patient's specific and unique circumstances. Patients must speak with a health care provider for complete information about their health, medical questions, and treatment options, including any risks or benefits regarding use of medications. This information does not endorse any treatments or medications as safe, effective, or approved for treating a specific patient. UpToDate, Inc. and its affiliates disclaim any warranty or liability relating to this information or the use thereof.The use of this information is governed by the Terms of Use, available at https://www.woltersHelmedixuwer.com/en/know/clinical-effectiveness-terms. 2024© UpToDate, Inc. and its affiliates and/or licensors. All rights reserved.  Copyright   © 2024 UpToDate, Inc. and/or its affiliates. All rights reserved.

## 2024-12-24 NOTE — LETTER
Diabetic Eye Exam Form  Second Request    Date Requested: 24  Patient: Michelle Monge  Patient : 1970   Referring Provider: Isabel Reardon PA-C      DIABETIC Eye Exam Date _______________________________      Type of Exam MUST be documented for Diabetic Eye Exams. Please CHECK ONE.     Retinal Exam       Dilated Retinal Exam       OCT       Optomap-Iris Exam      Fundus Photography       Left Eye - Please check Retinopathy or No Retinopathy        Exam did show retinopathy    Exam did not show retinopathy       Right Eye - Please check Retinopathy or No Retinopathy       Exam did show retinopathy    Exam did not show retinopathy       Comments __________________________________________________________    Practice Providing Exam ______________________________________________    Exam Performed By (print name) _______________________________________      Provider Signature ___________________________________________________      These reports are needed for  compliance.  Please fax this completed form and a copy of the Diabetic Eye Exam report to our office located at [address] [place] [id number] as soon as possible via Fax [phone] attention Abbi: Phone [phone]  We thank you for your assistance in treating our mutual patient.

## 2024-12-24 NOTE — TELEPHONE ENCOUNTER
Upon review of the In Basket request and the patient's chart, initial outreach has been made via fax to facility. Please see Contacts section for details.     Thank you  Abbi Quevedo

## 2024-12-24 NOTE — PROGRESS NOTES
Adult Annual Physical  Name: Michelle Monge      : 1970      MRN: [id number]  Encounter Provider: Isabel Reardon PA-C  Encounter Date: 2024   Encounter department: [facility]    Assessment & Plan  Annual physical exam  Labs are up-to-date  Up-to-date with eye exams and dental cleanings  Follows regularly with GYN  Up-to-date with mammograms  Up-to-date with colon cancer screenings-next due   Orders:  •  Comprehensive metabolic panel; Future  •  TSH, 3rd generation with Free T4 reflex; Future  •  CBC and differential; Future  •  Lipid panel; Future    Morbid obesity with BMI of 40.0-44.9, adult (HCC)  Patient is scheduled to follow-up with weight management in January         Type 2 diabetes mellitus without complication, without long-term current use of insulin (HCC)  A1c is stable at 6.6.  Continue metformin 500 mg twice daily.  Lab Results   Component Value Date    HGBA1C 6.6 (H) 10/29/2024          Acquired hypothyroidism  Stable.  Continue levothyroxine 200 mcg daily       Atrial tachycardia, paroxysmal (HCC)  Stable.  Continue to follow with cardiology       Atrial flutter, paroxysmal (HCC)  Stable.  Continue to follow with cardiology       MARGARITO (obstructive sleep apnea)  Patient is compliant with CPAP.  Continue to follow with medicine       Left foot pain  We will get x-ray of left foot.  Symptoms seem to be consistent with tendon strain.  Recommended ice, stretching, and rest.  Recommended wearing supportive shoes.  Orders:  •  XR foot 3+ vw left; Future    Postherpetic neuralgia  Will try starting on gabapentin 100 mg twice daily.  Explained to patient that we may need to titrate dose up.  She will call us in a few weeks to let us know how she is doing.  Orders:  •  gabapentin (Neurontin) 100 mg capsule; Take 1 capsule (100 mg total) by mouth 2 (two) times a day    Malignant lymphoma, large cell, diffuse (HCC)  Patient is in remission.  Continue to follow regularly with  [facility].       Immunizations and preventive care screenings were discussed with patient today. Appropriate education was printed on patient's after visit summary.    Counseling:  Alcohol/drug use: discussed moderation in alcohol intake, the recommendations for healthy alcohol use, and avoidance of illicit drug use.  Dental Health: discussed importance of regular tooth brushing, flossing, and dental visits.  Injury prevention: discussed safety/seat belts, safety helmets, smoke detectors, carbon monoxide detectors, and smoking near bedding or upholstery.  Sexual health: discussed sexually transmitted diseases, partner selection, use of condoms, avoidance of unintended pregnancy, and contraceptive alternatives.  Exercise: the importance of regular exercise/physical activity was discussed. Recommend exercise 3-5 times per week for at least 30 minutes.          History of Present Illness     Adult Annual Physical:  Patient presents for annual physical.  is a very pleasant 53-year-old female who is here today for her annual physical.  She is up-to-date with eye exams, dental cleanings, breast cancer screenings, and colon cancer screenings.  She continues to follow with [facility] on a regular basis for follow-up on her history of lymphoma.  She is scheduled for a PET scan in January.  She admits that her blood sugars have been well-controlled.  She has been trying to follow a diabetic diet.  She is very frustrated with her weight.  She is scheduled to see weight management in January.  She continues to follow with cardiology.  She admits that her cardiac conditions have been stable.  She is complaining of left foot pain.  She admits that it hurts the top of her foot.  It is worse at the beginning of the day, and seems to get better as she stretches and moves her foot.  She has not tried any over-the-counter medications.  She is also complaining of nerve pain in her left shoulder.   "She was diagnosed with shingles in 2021.  She admits that following her diagnosis, she continued with nerve pain.  She was on gabapentin after the initial diagnosis, but this was discontinued..     Diet and Physical Activity:  - Diet/Nutrition: diabetic diet.  - Exercise: walking.    Depression Screening:  - PHQ-2 Score: 1    General Health:  - Sleep: sleeps well. Compliant with CPAP  - Hearing: normal hearing bilateral ears.  - Vision: no vision problems, goes for regular eye exams and wears glasses.  - Dental: regular dental visits.    /GYN Health:  - Follows with GYN: yes.   - Menopause: postmenopausal.     Review of Systems   Constitutional:  Negative for chills, diaphoresis, fatigue and fever.   HENT:  Negative for congestion, ear pain, postnasal drip, rhinorrhea, sneezing, sore throat and trouble swallowing.    Eyes:  Negative for pain and visual disturbance.   Respiratory:  Negative for apnea, cough, shortness of breath and wheezing.    Cardiovascular:  Negative for chest pain and palpitations.   Gastrointestinal:  Negative for abdominal pain, constipation, diarrhea, nausea and vomiting.   Genitourinary:  Negative for dysuria and hematuria.   Musculoskeletal:  Positive for arthralgias (Left foot pain). Negative for gait problem and myalgias.   Neurological:  Positive for numbness (Neuropathy in left shoulder). Negative for dizziness, syncope, weakness, light-headedness and headaches.   Psychiatric/Behavioral:  Negative for suicidal ideas. The patient is not nervous/anxious.          Objective   /66   Pulse 67   Temp (!) 97.4 °F (36.3 °C)   Ht 5' 7\" (1.702 m)   Wt 129 kg (285 lb 3.2 oz)   SpO2 98%   BMI 44.67 kg/m²     Physical Exam  Vitals and nursing note reviewed.   Constitutional:       General: She is not in acute distress.     Appearance: She is well-developed. She is not diaphoretic.   HENT:      Head: Normocephalic and atraumatic.      Right Ear: Hearing, tympanic membrane, ear canal and " external ear normal.      Left Ear: Hearing, tympanic membrane, ear canal and external ear normal.      Nose: Nose normal. No mucosal edema or rhinorrhea.      Mouth/Throat:      Pharynx: No oropharyngeal exudate or posterior oropharyngeal erythema.   Eyes:      Extraocular Movements: Extraocular movements intact.   Cardiovascular:      Rate and Rhythm: Normal rate and regular rhythm.      Heart sounds: Normal heart sounds. No murmur heard.     No friction rub. No gallop.   Pulmonary:      Effort: Pulmonary effort is normal. No respiratory distress.      Breath sounds: Normal breath sounds. No wheezing or rales.   Musculoskeletal:         General: Normal range of motion.      Cervical back: Normal range of motion and neck supple.        Feet:    Skin:     General: Skin is warm and dry.      Findings: No rash.      Comments: No rash over left shoulder.  Full range of motion of left shoulder.  Symptoms are consistent with postherpetic neuralgia   Neurological:      Mental Status: She is alert and oriented to person, place, and time.   Psychiatric:         Behavior: Behavior normal.         Thought Content: Thought content normal.         Judgment: Judgment normal.

## 2024-12-24 NOTE — ASSESSMENT & PLAN NOTE
A1c is stable at 6.6.  Continue metformin 500 mg twice daily.  Lab Results   Component Value Date    HGBA1C 6.6 (H) 10/29/2024

## 2024-12-24 NOTE — LETTER
Diabetic Eye Exam Form    Date Requested: 24  Patient: Michelle Monge  Patient : 1970   Referring Provider: Isabel Reardon PA-C      DIABETIC Eye Exam Date _______________________________      Type of Exam MUST be documented for Diabetic Eye Exams. Please CHECK ONE.     Retinal Exam       Dilated Retinal Exam       OCT       Optomap-Iris Exam      Fundus Photography       Left Eye - Please check Retinopathy or No Retinopathy        Exam did show retinopathy    Exam did not show retinopathy       Right Eye - Please check Retinopathy or No Retinopathy       Exam did show retinopathy    Exam did not show retinopathy       Comments __________________________________________________________    Practice Providing Exam ______________________________________________    Exam Performed By (print name) _______________________________________      Provider Signature ___________________________________________________      These reports are needed for  compliance.  Please fax this completed form and a copy of the Diabetic Eye Exam report to our office located at [address] [place] [id number] as soon as possible via Fax [phone] attention Abbi: Phone [phone]  We thank you for your assistance in treating our mutual patient.

## 2024-12-24 NOTE — ASSESSMENT & PLAN NOTE
Will try starting on gabapentin 100 mg twice daily.  Explained to patient that we may need to titrate dose up.  She will call us in a few weeks to let us know how she is doing.  Orders:  •  gabapentin (Neurontin) 100 mg capsule; Take 1 capsule (100 mg total) by mouth 2 (two) times a day

## 2024-12-30 NOTE — TELEPHONE ENCOUNTER
As a follow-up, a second attempt has been made for outreach via fax to facility. Please see Contacts section for details.    Thank you  Abbi Quevedo

## 2024-12-31 ENCOUNTER — HOSPITAL ENCOUNTER (OUTPATIENT)
Dept: NUCLEAR MEDICINE | Facility: HOSPITAL | Age: 54
Discharge: HOME | End: 2024-12-31
Payer: COMMERCIAL

## 2024-12-31 DIAGNOSIS — C84.48: ICD-10-CM

## 2024-12-31 DIAGNOSIS — C85.80 OTHER SPECIFIED TYPES OF NON-HODGKIN LYMPHOMA, UNSPECIFIED SITE (HCC): ICD-10-CM

## 2024-12-31 DIAGNOSIS — E11.42 TYPE 2 DIABETES MELLITUS WITH DIABETIC POLYNEUROPATHY (HCC): ICD-10-CM

## 2024-12-31 DIAGNOSIS — C83.38 DIFFUSE LARGE B-CELL LYMPHOMA, LYMPH NODES OF MULTIPLE SITES (HCC): ICD-10-CM

## 2024-12-31 LAB — GLUCOSE SERPL-MCNC: 99 MG/DL (ref 65–140)

## 2024-12-31 PROCEDURE — 82948 REAGENT STRIP/BLOOD GLUCOSE: CPT

## 2024-12-31 PROCEDURE — A9552 F18 FDG: HCPCS

## 2024-12-31 PROCEDURE — 78815 PET IMAGE W/CT SKULL-THIGH: CPT

## 2025-01-02 NOTE — TELEPHONE ENCOUNTER
As a final attempt, a third outreach has been made via telephone call to facility. Please see Contacts section for details. This encounter will be closed and completed by end of day. Should we receive the requested information because of previous outreach attempts, the requested patient's chart will be updated appropriately.     Thank you  Abbi Quevedo

## 2025-01-02 NOTE — TELEPHONE ENCOUNTER
Upon review of the In Basket request we were able to locate, review, and update the patient chart as requested for Diabetic Eye Exam.    Any additional questions or concerns should be emailed to the Practice Liaisons via the appropriate education email address, please do not reply via In Basket.    Thank you  Abbi Quevedo   [facility]

## 2025-01-03 ENCOUNTER — APPOINTMENT (OUTPATIENT)
Dept: LAB | Facility: HOSPITAL | Age: 55
End: 2025-01-03
Payer: COMMERCIAL

## 2025-01-03 DIAGNOSIS — C85.80 MALIGNANT LYMPHOMA, DIFFUSE (HCC): ICD-10-CM

## 2025-01-03 DIAGNOSIS — C83.30 DIFFUSE LARGE B-CELL LYMPHOMA, UNSPECIFIED BODY REGION (HCC): ICD-10-CM

## 2025-01-03 DIAGNOSIS — E11.42 CONTROLLED TYPE 2 DIABETES MELLITUS WITH DIABETIC POLYNEUROPATHY, WITHOUT LONG-TERM CURRENT USE OF INSULIN (HCC): ICD-10-CM

## 2025-01-03 DIAGNOSIS — C84.48 PERIPHERAL T CELL LYMPHOMA OF LYMPH NODES OF MULTIPLE SITES (HCC): ICD-10-CM

## 2025-01-03 LAB
ALBUMIN SERPL BCG-MCNC: 4 G/DL (ref 3.5–5)
ALP SERPL-CCNC: 134 U/L (ref 34–104)
ALT SERPL W P-5'-P-CCNC: 12 U/L (ref 7–52)
ANION GAP SERPL CALCULATED.3IONS-SCNC: 10 MMOL/L (ref 4–13)
APTT PPP: 31 SECONDS (ref 23–34)
AST SERPL W P-5'-P-CCNC: 18 U/L (ref 13–39)
BASOPHILS # BLD AUTO: 0.06 THOUSANDS/ΜL (ref 0–0.1)
BASOPHILS NFR BLD AUTO: 1 % (ref 0–1)
BILIRUB SERPL-MCNC: 0.36 MG/DL (ref 0.2–1)
BUN SERPL-MCNC: 13 MG/DL (ref 5–25)
CALCIUM SERPL-MCNC: 9.1 MG/DL (ref 8.4–10.2)
CHLORIDE SERPL-SCNC: 103 MMOL/L (ref 96–108)
CO2 SERPL-SCNC: 25 MMOL/L (ref 21–32)
CREAT SERPL-MCNC: 0.73 MG/DL (ref 0.6–1.3)
EOSINOPHIL # BLD AUTO: 0.26 THOUSAND/ΜL (ref 0–0.61)
EOSINOPHIL NFR BLD AUTO: 3 % (ref 0–6)
ERYTHROCYTE [DISTWIDTH] IN BLOOD BY AUTOMATED COUNT: 13.3 % (ref 11.6–15.1)
GFR SERPL CREATININE-BSD FRML MDRD: 92 ML/MIN/1.73SQ M
GLUCOSE SERPL-MCNC: 182 MG/DL (ref 65–140)
HCT VFR BLD AUTO: 44.1 % (ref 34.8–46.1)
HGB BLD-MCNC: 13.9 G/DL (ref 11.5–15.4)
IMM GRANULOCYTES # BLD AUTO: 0.03 THOUSAND/UL (ref 0–0.2)
IMM GRANULOCYTES NFR BLD AUTO: 0 % (ref 0–2)
INR PPP: 1.15 (ref 0.85–1.19)
LDH SERPL-CCNC: 121 U/L (ref 140–271)
LYMPHOCYTES # BLD AUTO: 1.47 THOUSANDS/ΜL (ref 0.6–4.47)
LYMPHOCYTES NFR BLD AUTO: 17 % (ref 14–44)
MCH RBC QN AUTO: 28.9 PG (ref 26.8–34.3)
MCHC RBC AUTO-ENTMCNC: 31.5 G/DL (ref 31.4–37.4)
MCV RBC AUTO: 92 FL (ref 82–98)
MONOCYTES # BLD AUTO: 0.55 THOUSAND/ΜL (ref 0.17–1.22)
MONOCYTES NFR BLD AUTO: 6 % (ref 4–12)
NEUTROPHILS # BLD AUTO: 6.46 THOUSANDS/ΜL (ref 1.85–7.62)
NEUTS SEG NFR BLD AUTO: 73 % (ref 43–75)
NRBC BLD AUTO-RTO: 0 /100 WBCS
PLATELET # BLD AUTO: 263 THOUSANDS/UL (ref 149–390)
PMV BLD AUTO: 10.8 FL (ref 8.9–12.7)
POTASSIUM SERPL-SCNC: 3.8 MMOL/L (ref 3.5–5.3)
PROT SERPL-MCNC: 6.8 G/DL (ref 6.4–8.4)
PROTHROMBIN TIME: 15.2 SECONDS (ref 12.3–15)
RBC # BLD AUTO: 4.81 MILLION/UL (ref 3.81–5.12)
SODIUM SERPL-SCNC: 138 MMOL/L (ref 135–147)
WBC # BLD AUTO: 8.83 THOUSAND/UL (ref 4.31–10.16)

## 2025-01-03 PROCEDURE — 36415 COLL VENOUS BLD VENIPUNCTURE: CPT

## 2025-01-03 PROCEDURE — 85730 THROMBOPLASTIN TIME PARTIAL: CPT

## 2025-01-03 PROCEDURE — 85610 PROTHROMBIN TIME: CPT

## 2025-01-03 PROCEDURE — 80053 COMPREHEN METABOLIC PANEL: CPT

## 2025-01-03 PROCEDURE — 84443 ASSAY THYROID STIM HORMONE: CPT

## 2025-01-03 PROCEDURE — 83615 LACTATE (LD) (LDH) ENZYME: CPT

## 2025-01-03 PROCEDURE — 85025 COMPLETE CBC W/AUTO DIFF WBC: CPT

## 2025-01-05 LAB — TSH SERPL DL<=0.05 MIU/L-ACNC: 0.8 UIU/ML (ref 0.45–4.5)

## 2025-01-26 DIAGNOSIS — I48.92 PAROXYSMAL ATRIAL FLUTTER (HCC): ICD-10-CM

## 2025-01-27 ENCOUNTER — OFFICE VISIT (OUTPATIENT)
Dept: SLEEP CENTER | Facility: CLINIC | Age: 55
End: 2025-01-27
Payer: COMMERCIAL

## 2025-01-27 VITALS
HEIGHT: 67 IN | DIASTOLIC BLOOD PRESSURE: 67 MMHG | WEIGHT: 281.4 LBS | TEMPERATURE: 98.6 F | SYSTOLIC BLOOD PRESSURE: 99 MMHG | BODY MASS INDEX: 44.17 KG/M2 | HEART RATE: 59 BPM | RESPIRATION RATE: 16 BRPM | OXYGEN SATURATION: 95 %

## 2025-01-27 DIAGNOSIS — E66.01 MORBID OBESITY (HCC): ICD-10-CM

## 2025-01-27 DIAGNOSIS — G47.34 SLEEP RELATED HYPOXIA: ICD-10-CM

## 2025-01-27 DIAGNOSIS — Z86.711 HISTORY OF PULMONARY EMBOLISM: ICD-10-CM

## 2025-01-27 DIAGNOSIS — G47.33 OSA (OBSTRUCTIVE SLEEP APNEA): Primary | ICD-10-CM

## 2025-01-27 DIAGNOSIS — C83.30 MALIGNANT LYMPHOMA, LARGE CELL, DIFFUSE (HCC): ICD-10-CM

## 2025-01-27 DIAGNOSIS — I48.3 TYPICAL ATRIAL FLUTTER (HCC): ICD-10-CM

## 2025-01-27 PROCEDURE — 99214 OFFICE O/P EST MOD 30 MIN: CPT | Performed by: INTERNAL MEDICINE

## 2025-01-27 RX ORDER — DILTIAZEM HYDROCHLORIDE 120 MG/1
CAPSULE, COATED, EXTENDED RELEASE ORAL
Qty: 30 CAPSULE | Refills: 1 | Status: SHIPPED | OUTPATIENT
Start: 2025-01-27

## 2025-01-27 NOTE — PROGRESS NOTES
Name: Michelle Monge      : 1970      MRN: [id number]  Encounter Provider: Wing Vincent MD  Encounter Date: 2025   Encounter [facility]  :  Assessment & Plan  MARGARITO (obstructive sleep apnea)    Orders:    PAP DME Resupply/Reorder    Sleep related hypoxia         Typical atrial flutter (HCC)         Morbid obesity (HCC)         Malignant lymphoma, large cell, diffuse (HCC)         History of pulmonary embolism          PLAN:   Problems & Comorbidities Addressed this Visit as listed.  Stable/controlled conditions reviewed in notes.  I reviewed results of prior studies and physiologic data with the patient.   I discussed treatment options with risks and benefits.  Treatment with  PAP is medically necessary and  is agreable to continue use.   Care of equipment, methods to improve comfort using PAP and importance of compliance with therapy were discussed.  Pressure setting: continue 7-12 cmH2O using a fullface mask.    Rx provided to replace supplies and Care coordinated with DME provider.   Discussed strategies for weight reduction.    Follow-up is advised in 1 year or sooner if needed to monitor progress, compliance and to adjust therapy.      History of Present Illness   HPI          Follow-Up Note - Sleep Center   Michelle Monge  54 y.o. female  :1970  MRN:[id number]  DOS:2025    CC: I saw this patient for follow-up in clinic today for Sleep disordered breathing, Coexisting Sleep and Medical Problems.  She is using a Enma machine. Interval changes: None reported.      Results of prior studies: The diagnostic study in emonstrated: AHI 7 /hour , considerably higher during REM at 38 per hour. Moderate intensity  snoring  was noted and there were 3 respiratory effort-related arousals.  Minimum oxygen saturation 83 %  and 26.1 minutes of total sleep time was spent with saturations less than 90%.       The subsequent titration study  demonstrated sleep disordered breathing that is partially remediated with positive airway pressure at 7 cm H2O. Since she was not observed during REM/supine, auto titrating Pap 7-12 cm H2O was recommended.    PFSH, Problem List, Medications & Allergies were reviewed in EMR.   She  has a past medical history of Allergic rhinitis, Arthritis, Cancer ([facility]) (2016), Chronic allergic conjunctivitis, Fluttering heart, Fluttering sensation of heart, Foot pain, left, Full dentures, Genital warts (1986), History of cancer chemotherapy (2016), History of chemotherapy, History of dilation and curettage, radiation therapy, tonsillectomy, Hypothyroid, Irregular heart beat, Lymph nodes enlarged, Neck mass, Non Hodgkin's lymphoma (HCC), Obese, Obstructive sleep apnea, Paroxysmal atrial flutter (HCC), Paroxysmal atrial tachycardia ([facility]), Port-A-Cath in place (2016), Post-menopausal, Premature ventricular contractions, Pulmonary embolism ([facility]), Pulmonary embolism on right ([facility]) (07/12/2018), Shortness of breath, Tachycardia, Tinnitus, Wears glasses, Wears partial dentures, and [place] teeth extracted.    She has a current medication list which includes the following prescription(s): albuterol, atorvastatin, cetirizine, d3 2000, cinnamon, b-12, diltiazem, fluticasone-vilanterol, gabapentin, ipratropium, levothyroxine, metformin, metoprolol tartrate, omeprazole, xarelto, and saccharomyces boulardii.    PHYSIOLOGICAL DATA REVIEW : Using PAP > 4 hours/night 100%. Estimated SHIRLEY 0.1/hour with pressure of 8.8cm H2O@90th/95th percentile;.  INTERPRETATION: Compliance is excellent; Pressure setting is:optimal; ;     SUBJECTIVE: With respect to use of PAP,   is experiencing some adverse effects: mask causes redness / facial marks .She derives benefit.. Is satisfied with sleep and daytime function.     Sleep Routine:  reports getting 6-8 hrs sleep; she has no difficulty initiating or maintaining sleep . She arises by disturbance  "from pets  and feels refreshed.]denies excessive daytime sleepiness,.  She rated herself at Total score: 8 /24 on the [place] Sleepiness Scale.   Other issues: None reported.     Habits: Reports that she quit smoking about 29 years ago. Her smoking use included cigarettes. She started smoking about 40 years ago. She has a 16.5 pack-year smoking history. She has never used smokeless tobacco.,  Reports current alcohol use.,  Reports no history of drug use., Caffeine use: none until  ; Exercise routine: none.      ROS: Significant for weight is stable within a few pounds.  She is reporting no nasal or respiratory symptoms.  She has had no recent episodes of cardiac arrhythmia/palpitations..    EXAM: BP 99/67 (BP Location: Right arm, Patient Position: Sitting, Cuff Size: Large)   Pulse 59   Temp 98.6 °F (37 °C) (Temporal)   Resp 16   Ht 5' 7\" (1.702 m)   Wt 128 kg (281 lb 6.4 oz)   SpO2 95%   BMI 44.07 kg/m²     Wt Readings from Last 3 Encounters:   01/27/25 128 kg (281 lb 6.4 oz)   12/24/24 129 kg (285 lb 3.2 oz)   10/24/24 120 kg (265 lb)      Patient is well groomed; well appearing.   CNS: Alert, orientated, speech clear & coherent  Psych: cooperative and in no distress. Mental state: Appears normal.  H&N: EOMI; NC/AT: No facial pressure marks, no rashes.    Skin/Extrem: col & hydration normal; no edema  Resp: Respiratory effort is normal  Physical findings otherwise essentially unchanged from previous.    Sincerely,     Authenticated electronically on 01/27/25   Board Certified Specialist     Portions of the record may have been created with voice recognition software. Occasional wrong word or \"sound a like\" substitutions may have occurred due to the inherent limitations of voice recognition software. There may also be notations and random deletions of words or characters from malfunctioning software. Read the chart carefully and recognize, using context, where substitutions/deletions have " "occurred.    Sitting and reading: Slight chance of dozing  Watching TV: Slight chance of dozing  Sitting, inactive in a public place (e.g. a theatre or a meeting): Would never doze  As a passenger in a car for an hour without a break: High chance of dozing  Lying down to rest in the afternoon when circumstances permit: High chance of dozing  Sitting and talking to someone: Would never doze  Sitting quietly after a lunch without alcohol: Would never doze  In a car, while stopped for a few minutes in traffic: Would never doze  Total score: 8     Review of Systems  Pertinent positives/negatives included in HPI and also as noted:       Objective   BP 99/67 (BP Location: Right arm, Patient Position: Sitting, Cuff Size: Large)   Pulse 59   Temp 98.6 °F (37 °C) (Temporal)   Resp 16   Ht 5' 7\" (1.702 m)   Wt 128 kg (281 lb 6.4 oz)   SpO2 95%   BMI 44.07 kg/m²     Neck Circumference: 16.75  Physical Exam    Data  Lab Results   Component Value Date    HGB 13.9 01/03/2025    HCT 44.1 01/03/2025    MCV 92 01/03/2025      Lab Results   Component Value Date    GLUCOSE 146 (H) 05/26/2019    CALCIUM 9.1 01/03/2025     07/22/2015    K 3.8 01/03/2025    CO2 25 01/03/2025     01/03/2025    BUN 13 01/03/2025    CREATININE 0.73 01/03/2025     Lab Results   Component Value Date    FERRITIN 203.7 06/20/2024     Lab Results   Component Value Date    AST 18 01/03/2025    ALT 12 01/03/2025         "

## 2025-01-27 NOTE — ASSESSMENT & PLAN NOTE
PLAN:   Problems & Comorbidities Addressed this Visit as listed.  Stable/controlled conditions reviewed in notes.  I reviewed results of prior studies and physiologic data with the patient.   I discussed treatment options with risks and benefits.  Treatment with  PAP is medically necessary and  is agreable to continue use.   Care of equipment, methods to improve comfort using PAP and importance of compliance with therapy were discussed.  Pressure setting: continue 7-12 cmH2O using a fullface mask.    Rx provided to replace supplies and Care coordinated with DME provider.   Discussed strategies for weight reduction.    Follow-up is advised in 1 year or sooner if needed to monitor progress, compliance and to adjust therapy.

## 2025-01-28 ENCOUNTER — TELEPHONE (OUTPATIENT)
Dept: SLEEP CENTER | Facility: CLINIC | Age: 55
End: 2025-01-28

## 2025-02-09 DIAGNOSIS — E78.2 MIXED HYPERLIPIDEMIA: ICD-10-CM

## 2025-02-10 RX ORDER — ATORVASTATIN CALCIUM 20 MG/1
20 TABLET, FILM COATED ORAL DAILY
Qty: 90 TABLET | Refills: 1 | Status: SHIPPED | OUTPATIENT
Start: 2025-02-10

## 2025-03-21 ENCOUNTER — HOSPITAL ENCOUNTER (OUTPATIENT)
Dept: ULTRASOUND IMAGING | Facility: HOSPITAL | Age: 55
Discharge: HOME | End: 2025-03-21
Payer: COMMERCIAL

## 2025-03-21 DIAGNOSIS — C85.80 OTHER SPECIFIED TYPES OF NON-HODGKIN LYMPHOMA, UNSPECIFIED SITE (HCC): ICD-10-CM

## 2025-03-21 PROCEDURE — 76536 US EXAM OF HEAD AND NECK: CPT

## 2025-03-24 ENCOUNTER — OFFICE VISIT (OUTPATIENT)
Age: 55
End: 2025-03-24
Payer: COMMERCIAL

## 2025-03-24 VITALS
TEMPERATURE: 98.1 F | WEIGHT: 284.8 LBS | SYSTOLIC BLOOD PRESSURE: 114 MMHG | HEIGHT: 67 IN | HEART RATE: 65 BPM | OXYGEN SATURATION: 96 % | BODY MASS INDEX: 44.7 KG/M2 | DIASTOLIC BLOOD PRESSURE: 62 MMHG

## 2025-03-24 DIAGNOSIS — G47.33 OSA (OBSTRUCTIVE SLEEP APNEA): ICD-10-CM

## 2025-03-24 DIAGNOSIS — E66.813 CLASS 3 OBESITY: Primary | ICD-10-CM

## 2025-03-24 DIAGNOSIS — E78.2 MIXED HYPERLIPIDEMIA: ICD-10-CM

## 2025-03-24 DIAGNOSIS — E11.9 TYPE 2 DIABETES MELLITUS WITHOUT COMPLICATION, WITHOUT LONG-TERM CURRENT USE OF INSULIN (HCC): ICD-10-CM

## 2025-03-24 PROCEDURE — 99214 OFFICE O/P EST MOD 30 MIN: CPT | Performed by: PHYSICIAN ASSISTANT

## 2025-03-24 NOTE — PATIENT INSTRUCTIONS
Recommend checking your blood sugar one-three times a day. Goal is . Notify the provider if consistently outside of this range   Monitor blood pressure and notify the provider if consistently around 100/60 or you have symptoms of low blood pressure (lightheadedness/dizziness)    For Wegovy/Ozempic:  Inject 0.25 mg weekly for 4 weeks then  0.5 mg weekly for 4 weeks then  1 mg weekly for 4 weeks then  1.7 mg for 4 weeks and then increase to 2.4 mg    Visit wegovy.com for further information/injection instructions. Please eat small frequent meals to help reduce nausea. Lemon water and saltine crackers may help with this. If you experience fever, nausea/vomiting, and pain radiating to your back this may be a sign of pancreatitis. Please have ER evaluation with this occurs. Wegovy should be stopped two months prior to trying to conceive.   Wegovy should be held 1 week prior to surgery. Please call the provider for instructions should you need to have surgery.  -Clean area before injecting. Rotate injection site each time.

## 2025-03-24 NOTE — ASSESSMENT & PLAN NOTE
-Patient is pursuing Conservative Program  -Initial weight loss goal of 5-10% weight loss for improved health - met  -Screening labs: CMP reviewed from 2/6/25, TSH reviewed from 1/3/25, A1c reviewed from 10/29/24  -dietary recall reviewed, suggestions provided  -exercise recs given   -try to consistently add veg to dinner   Patient denies personal and family history of MEN2 tumors and medullary thyroid/thyroid carcinoma. Patient denies personal history of pancreatitis. Add ozempic to metformin    Initial: 313 lbs  Current: 284.9 lbs (-7.9 lbs since 08/2022)  Change: -28.1 lbs  Goal: below 200 lbs

## 2025-03-24 NOTE — ASSESSMENT & PLAN NOTE
Lab Results   Component Value Date    HGBA1C 6.6 (H) 10/29/2024     -On metformin, add Ozempic   -c/w lifestyle changes

## 2025-03-24 NOTE — PROGRESS NOTES
"Assessment/Plan:    Class 3 obesity  -Patient is pursuing Conservative Program  -Initial weight loss goal of 5-10% weight loss for improved health - met  -Screening labs: CMP reviewed from 2/6/25, TSH reviewed from 1/3/25, A1c reviewed from 10/29/24  -dietary recall reviewed, suggestions provided  -exercise recs given   -try to consistently add veg to dinner   Patient denies personal and family history of MEN2 tumors and medullary thyroid/thyroid carcinoma. Patient denies personal history of pancreatitis. Add ozempic to metformin    Initial: 313 lbs  Current: 284.9 lbs (-7.9 lbs since 08/2022)  Change: -28.1 lbs  Goal: below 200 lbs    Type 2 diabetes mellitus without complication, without long-term current use of insulin (HCC)    Lab Results   Component Value Date    HGBA1C 6.6 (H) 10/29/2024     -On metformin, add Ozempic   -c/w lifestyle changes     Follow up in 6 weeks with RD and in approximately 3 months with Non-Surgical Physician/Advanced Practitioner.    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 While these LN are enlarged she is asymptomatic with no evidence of recurrent large cell. We will observe and have her return with ultrasounds in 3 months.\"  Pt states she did inform her oncologist of plan to f/u with [facility] and discuss GLP-1, and states her oncologist was okay with this.   Goals:  Food log (ie.) www.[email or web address],[email or web address],SeeWhyit.com,[employer].com,etc. baritastic  No sugary beverages. At least 64oz of water daily.  Increase physical activity by 10 minutes daily. Gradually increase physical activity to a goal of 5 days per week for 30 minutes of MODERATE intensity PLUS 2 days per week of FULL BODY resistance training  5-10 servings of fruits and vegetables per day and 25-35 grams of dietary fiber per day, gradually increasing  5086-9415 calories     Diagnoses and all orders for this " visit:    Class 3 obesity    Type 2 diabetes mellitus without complication, without long-term current use of insulin (HCC)  -     semaglutide, 0.25 or 0.5 mg/dose, (Ozempic, 0.25 or 0.5 MG/DOSE,) 2 mg/3 mL injection pen; Inject 0.25 mg SQ weekly for 4 weeks then increase to 0.5 mg SQ weekly    MARGARITO (obstructive sleep apnea)  -     semaglutide, 0.25 or 0.5 mg/dose, (Ozempic, 0.25 or 0.5 MG/DOSE,) 2 mg/3 mL injection pen; Inject 0.25 mg SQ weekly for 4 weeks then increase to 0.5 mg SQ weekly    Mixed hyperlipidemia  -     semaglutide, 0.25 or 0.5 mg/dose, (Ozempic, 0.25 or 0.5 MG/DOSE,) 2 mg/3 mL injection pen; Inject 0.25 mg SQ weekly for 4 weeks then increase to 0.5 mg SQ weekly    Body mass index 40.0-44.9, adult (HCC)        Subjective:   Chief Complaint   Patient presents with    Follow-up     NEWP to be established in our office last seen 8/2022 in [place]     Patient ID: Michelle Monge  is a 54 y.o. female with excess weight/obesity here to pursue weight management.  Patient is pursuing Conservative Program.     HPI  The patient presents for [facility] follow up. Last seen 8/30/22, lost to follow up.    Has maintained the 20+ lb weight loss since 2022, but has been unable to lose more. Has been following previus recs, but does note cravings in the evenings     B: protein shake + coffee   S: pretzels  L: half sandwich - time constraints; sometimes had full sandwich   S: fruit or pretzels   D: nuggets and tator tots tonight; usually met + starch - doesn't like veg   S: yes- increase hunger and cravings; tries to make SF pudding     Exercise: no, but active at work, would like to add exercise   Hydration: one diet soda, otherwise water   Sleep: 6-7 hours     The following portions of the patient's history were reviewed and updated as appropriate: allergies, current medications, past family history, past medical history, past social history, past surgical history, and problem list.    Review of Systems  "  Cardiovascular:  Negative for chest pain and palpitations.   Gastrointestinal:  Negative for constipation and diarrhea.   Psychiatric/Behavioral:          Occasional down/anxious, but does not find this is happening daily; Denies SI/HI      Objective:    /62   Pulse 65   Temp 98.1 °F (36.7 °C)   Ht 5' 7\" (1.702 m)   Wt 129 kg (284 lb 12.8 oz)   SpO2 96%   BMI 44.61 kg/m²      Physical Exam  Vitals and nursing note reviewed.     Constitutional   General appearance: Abnormal.  well developed and obese.   Pulmonary   Respiratory effort: No increased work of breathing or signs of respiratory distress.    Abdomen   Abdomen: Abnormal.  The abdomen was obese.   Musculoskeletal   Gait and station: Normal.    Psychiatric   Orientation to person, place and time: Normal.    Affect: appropriate    "

## 2025-03-25 ENCOUNTER — TELEPHONE (OUTPATIENT)
Dept: FAMILY MEDICINE CLINIC | Facility: CLINIC | Age: 55
End: 2025-03-25

## 2025-03-31 DIAGNOSIS — I48.92 PAROXYSMAL ATRIAL FLUTTER (HCC): ICD-10-CM

## 2025-04-02 RX ORDER — DILTIAZEM HYDROCHLORIDE 120 MG/1
120 CAPSULE, COATED, EXTENDED RELEASE ORAL DAILY
Qty: 30 CAPSULE | Refills: 5 | Status: SHIPPED | OUTPATIENT
Start: 2025-04-02

## 2025-04-02 NOTE — TELEPHONE ENCOUNTER
PA for (Ozempic, 0.25 or 0.5 MG/DOSE,) 2 mg/3 mL SUBMITTED to     via    []CMM-KEY:  [x]Surescripts-Case ID # [id number]   []Availity-Auth ID # NDC #   []Faxed to plan   []Other website   []Phone call Case ID #     [x]PA sent as URGENT    All office notes, labs and other pertaining documents and studies sent. Clinical questions answered. Awaiting determination from insurance company.     Turnaround time for your insurance to make a decision on your Prior Authorization can take 7-21 business days.

## 2025-04-03 NOTE — TELEPHONE ENCOUNTER
PA for (Ozempic, 0.25 or 0.5 MG/DOSE,) 2 mg/3 mL  APPROVED     Date(s) approved 03/03/2025 through 04/02/2026    Case #[id number]     Patient advised by          []HAULt Message  []Phone call   [x]LMOM  []L/M to call office as no active Communication consent on file  []Unable to leave detailed message as VM not approved on Communication consent       Pharmacy advised by    [x]Fax  []Phone call  []Secure Chat       Approval letter scanned into Media Yes

## 2025-04-07 DIAGNOSIS — E03.9 ACQUIRED HYPOTHYROIDISM: ICD-10-CM

## 2025-04-08 RX ORDER — LEVOTHYROXINE SODIUM 200 UG/1
200 TABLET ORAL DAILY
Qty: 90 TABLET | Refills: 1 | Status: SHIPPED | OUTPATIENT
Start: 2025-04-08

## 2025-04-24 ENCOUNTER — OFFICE VISIT (OUTPATIENT)
Dept: FAMILY MEDICINE CLINIC | Facility: CLINIC | Age: 55
End: 2025-04-24
Payer: COMMERCIAL

## 2025-04-24 ENCOUNTER — APPOINTMENT (OUTPATIENT)
Dept: LAB | Facility: MEDICAL CENTER | Age: 55
End: 2025-04-24
Attending: PHYSICIAN ASSISTANT
Payer: COMMERCIAL

## 2025-04-24 VITALS
HEART RATE: 81 BPM | OXYGEN SATURATION: 95 % | DIASTOLIC BLOOD PRESSURE: 64 MMHG | SYSTOLIC BLOOD PRESSURE: 108 MMHG | WEIGHT: 283.4 LBS | BODY MASS INDEX: 44.48 KG/M2 | TEMPERATURE: 97.6 F | HEIGHT: 67 IN

## 2025-04-24 DIAGNOSIS — Z12.31 ENCOUNTER FOR SCREENING MAMMOGRAM FOR MALIGNANT NEOPLASM OF BREAST: ICD-10-CM

## 2025-04-24 DIAGNOSIS — Z00.00 ANNUAL PHYSICAL EXAM: ICD-10-CM

## 2025-04-24 DIAGNOSIS — G47.33 OSA (OBSTRUCTIVE SLEEP APNEA): ICD-10-CM

## 2025-04-24 DIAGNOSIS — E03.9 ACQUIRED HYPOTHYROIDISM: ICD-10-CM

## 2025-04-24 DIAGNOSIS — E11.42 TYPE 2 DIABETES MELLITUS WITH DIABETIC POLYNEUROPATHY, WITHOUT LONG-TERM CURRENT USE OF INSULIN (HCC): ICD-10-CM

## 2025-04-24 DIAGNOSIS — B02.29 POSTHERPETIC NEURALGIA: ICD-10-CM

## 2025-04-24 DIAGNOSIS — C84.48 PERIPHERAL T CELL LYMPHOMA OF LYMPH NODES OF MULTIPLE SITES (HCC): ICD-10-CM

## 2025-04-24 DIAGNOSIS — E78.2 MIXED HYPERLIPIDEMIA: ICD-10-CM

## 2025-04-24 DIAGNOSIS — I48.92 ATRIAL FLUTTER, PAROXYSMAL (HCC): ICD-10-CM

## 2025-04-24 DIAGNOSIS — I47.19 ATRIAL TACHYCARDIA, PAROXYSMAL (HCC): ICD-10-CM

## 2025-04-24 DIAGNOSIS — E11.42 TYPE 2 DIABETES MELLITUS WITH DIABETIC POLYNEUROPATHY, WITHOUT LONG-TERM CURRENT USE OF INSULIN (HCC): Primary | ICD-10-CM

## 2025-04-24 PROBLEM — Z45.2 ENCOUNTER FOR CARE RELATED TO VASCULAR ACCESS PORT: Status: RESOLVED | Noted: 2019-04-05 | Resolved: 2025-04-24

## 2025-04-24 PROBLEM — S97.122A: Status: RESOLVED | Noted: 2023-10-30 | Resolved: 2025-04-24

## 2025-04-24 PROBLEM — R06.02 SOB (SHORTNESS OF BREATH): Status: RESOLVED | Noted: 2020-08-06 | Resolved: 2025-04-24

## 2025-04-24 LAB
ALBUMIN SERPL BCG-MCNC: 3.9 G/DL (ref 3.5–5)
ALP SERPL-CCNC: 129 U/L (ref 34–104)
ALT SERPL W P-5'-P-CCNC: 15 U/L (ref 7–52)
ANION GAP SERPL CALCULATED.3IONS-SCNC: 10 MMOL/L (ref 4–13)
AST SERPL W P-5'-P-CCNC: 23 U/L (ref 13–39)
BASOPHILS # BLD AUTO: 0.07 THOUSANDS/ÂΜL (ref 0–0.1)
BASOPHILS NFR BLD AUTO: 1 % (ref 0–1)
BILIRUB SERPL-MCNC: 0.43 MG/DL (ref 0.2–1)
BUN SERPL-MCNC: 15 MG/DL (ref 5–25)
CALCIUM SERPL-MCNC: 9.2 MG/DL (ref 8.4–10.2)
CHLORIDE SERPL-SCNC: 104 MMOL/L (ref 96–108)
CHOLEST SERPL-MCNC: 119 MG/DL (ref ?–200)
CO2 SERPL-SCNC: 26 MMOL/L (ref 21–32)
CREAT SERPL-MCNC: 0.69 MG/DL (ref 0.6–1.3)
EOSINOPHIL # BLD AUTO: 0.27 THOUSAND/ÂΜL (ref 0–0.61)
EOSINOPHIL NFR BLD AUTO: 3 % (ref 0–6)
ERYTHROCYTE [DISTWIDTH] IN BLOOD BY AUTOMATED COUNT: 13.1 % (ref 11.6–15.1)
EST. AVERAGE GLUCOSE BLD GHB EST-MCNC: 148 MG/DL
GFR SERPL CREATININE-BSD FRML MDRD: 97 ML/MIN/1.73SQ M
GLUCOSE P FAST SERPL-MCNC: 119 MG/DL (ref 65–99)
HBA1C MFR BLD: 6.8 %
HCT VFR BLD AUTO: 44.8 % (ref 34.8–46.1)
HDLC SERPL-MCNC: 36 MG/DL
HGB BLD-MCNC: 14.7 G/DL (ref 11.5–15.4)
IMM GRANULOCYTES # BLD AUTO: 0.03 THOUSAND/UL (ref 0–0.2)
IMM GRANULOCYTES NFR BLD AUTO: 0 % (ref 0–2)
LDLC SERPL CALC-MCNC: 56 MG/DL (ref 0–100)
LYMPHOCYTES # BLD AUTO: 1.22 THOUSANDS/ÂΜL (ref 0.6–4.47)
LYMPHOCYTES NFR BLD AUTO: 14 % (ref 14–44)
MCH RBC QN AUTO: 30.3 PG (ref 26.8–34.3)
MCHC RBC AUTO-ENTMCNC: 32.8 G/DL (ref 31.4–37.4)
MCV RBC AUTO: 92 FL (ref 82–98)
MONOCYTES # BLD AUTO: 0.63 THOUSAND/ÂΜL (ref 0.17–1.22)
MONOCYTES NFR BLD AUTO: 7 % (ref 4–12)
NEUTROPHILS # BLD AUTO: 6.55 THOUSANDS/ÂΜL (ref 1.85–7.62)
NEUTS SEG NFR BLD AUTO: 75 % (ref 43–75)
NONHDLC SERPL-MCNC: 83 MG/DL
NRBC BLD AUTO-RTO: 0 /100 WBCS
PLATELET # BLD AUTO: 272 THOUSANDS/UL (ref 149–390)
PMV BLD AUTO: 11.2 FL (ref 8.9–12.7)
POTASSIUM SERPL-SCNC: 4 MMOL/L (ref 3.5–5.3)
PROT SERPL-MCNC: 7.6 G/DL (ref 6.4–8.4)
RBC # BLD AUTO: 4.85 MILLION/UL (ref 3.81–5.12)
SODIUM SERPL-SCNC: 140 MMOL/L (ref 135–147)
T4 FREE SERPL-MCNC: 1.67 NG/DL (ref 0.61–1.12)
TRIGL SERPL-MCNC: 134 MG/DL (ref ?–150)
TSH SERPL DL<=0.05 MIU/L-ACNC: 0.1 UIU/ML (ref 0.45–4.5)
WBC # BLD AUTO: 8.77 THOUSAND/UL (ref 4.31–10.16)

## 2025-04-24 PROCEDURE — 36415 COLL VENOUS BLD VENIPUNCTURE: CPT

## 2025-04-24 PROCEDURE — 80053 COMPREHEN METABOLIC PANEL: CPT

## 2025-04-24 PROCEDURE — 85025 COMPLETE CBC W/AUTO DIFF WBC: CPT

## 2025-04-24 PROCEDURE — 84443 ASSAY THYROID STIM HORMONE: CPT

## 2025-04-24 PROCEDURE — 84439 ASSAY OF FREE THYROXINE: CPT

## 2025-04-24 PROCEDURE — 83036 HEMOGLOBIN GLYCOSYLATED A1C: CPT

## 2025-04-24 PROCEDURE — 80061 LIPID PANEL: CPT

## 2025-04-24 PROCEDURE — 99214 OFFICE O/P EST MOD 30 MIN: CPT | Performed by: PHYSICIAN ASSISTANT

## 2025-04-24 RX ORDER — GABAPENTIN 100 MG/1
200 CAPSULE ORAL 2 TIMES DAILY
Qty: 360 CAPSULE | Refills: 2 | Status: SHIPPED | OUTPATIENT
Start: 2025-04-24

## 2025-04-24 NOTE — ASSESSMENT & PLAN NOTE
Patient did notice improvement with gabapentin.  Will increase dose to 200 mg twice daily.  She will let us know how she is doing.  Orders:  •  gabapentin (Neurontin) 100 mg capsule; Take 2 capsules (200 mg total) by mouth 2 (two) times a day

## 2025-04-24 NOTE — ASSESSMENT & PLAN NOTE
Labs ordered to evaluate.  Patient was recently started on Ozempic by weight management.  Continue metformin 500 mg twice daily.  Lab Results   Component Value Date    HGBA1C 6.6 (H) 10/29/2024       Orders:  •  Hemoglobin A1C; Future

## 2025-04-24 NOTE — PROGRESS NOTES
Name: Michelle Monge      : 1970      MRN: [id number]  Encounter Provider: Isabel Reardon PA-C  Encounter Date: 2025   Encounter department: [facility]  :  Assessment & Plan  Type 2 diabetes mellitus with diabetic polyneuropathy, without long-term current use of insulin (HCC)  Labs ordered to evaluate.  Patient was recently started on Ozempic by weight management.  Continue metformin 500 mg twice daily.  Lab Results   Component Value Date    HGBA1C 6.6 (H) 10/29/2024       Orders:  •  Hemoglobin A1C; Future    Postherpetic neuralgia  Patient did notice improvement with gabapentin.  Will increase dose to 200 mg twice daily.  She will let us know how she is doing.  Orders:  •  gabapentin (Neurontin) 100 mg capsule; Take 2 capsules (200 mg total) by mouth 2 (two) times a day    Acquired hypothyroidism  Labs ordered to evaluate.  Continue levothyroxine       Atrial flutter, paroxysmal (HCC)  Stable.  Continue to follow with cardiology.       Atrial tachycardia, paroxysmal (HCC)  Stable.  Continue to follow with cardiology       Encounter for screening mammogram for malignant neoplasm of breast    Orders:  •  Mammo screening bilateral w 3d and cad; Future    Mixed hyperlipidemia  Labs ordered to evaluate.  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  Continue to follow with [facility]         MARGARITO (obstructive sleep apnea)  Patient is compliant with CPAP.  Continue to follow with sleep medicine              History of Present Illness    is a very pleasant 54-year-old female who is here today for a routine follow-up.  She is following with bariatrics.  They recently started her on Ozempic.  She is tolerating medication without difficulty.  She is doing well on her metformin.  She continues to follow with [facility] for her history of lymphoma.  She remains in remission.  " She has not had any cardiac problems.  She continues to follow yearly with her cardiologist.  She is compliant with her CPAP.  She admits that starting gabapentin did help with her postherpetic neuralgia on her left shoulder.  It has also helped with the neuropathy in her feet.  She denies any acute concerns or problems.  She is up-to-date with eye exams.  She is due for her yearly mammogram.      Review of Systems   Constitutional:  Negative for chills, diaphoresis, fatigue and fever.   HENT:  Negative for congestion, ear pain, postnasal drip, rhinorrhea, sneezing, sore throat and trouble swallowing.    Eyes:  Negative for pain and visual disturbance.   Respiratory:  Negative for apnea, cough, shortness of breath and wheezing.    Cardiovascular:  Negative for chest pain and palpitations.   Gastrointestinal:  Negative for abdominal pain, constipation, diarrhea, nausea and vomiting.   Genitourinary:  Negative for dysuria and hematuria.   Musculoskeletal:  Negative for arthralgias, gait problem and myalgias.   Neurological:  Positive for numbness (Neuropathy in left shoulder and bilateral feet). Negative for dizziness, syncope, weakness, light-headedness and headaches.   Psychiatric/Behavioral:  Negative for suicidal ideas. The patient is not nervous/anxious.        Objective   /64   Pulse 81   Temp 97.6 °F (36.4 °C)   Ht 5' 7\" (1.702 m)   Wt 129 kg (283 lb 6.4 oz)   SpO2 95%   BMI 44.39 kg/m²      Physical Exam  Vitals and nursing note reviewed.   Constitutional:       General: She is not in acute distress.     Appearance: She is well-developed. She is not diaphoretic.   HENT:      Head: Normocephalic and atraumatic.      Right Ear: Hearing, tympanic membrane, ear canal and external ear normal.      Left Ear: Hearing, tympanic membrane, ear canal and external ear normal.      Nose: Nose normal. No mucosal edema or rhinorrhea.      Mouth/Throat:      Pharynx: No oropharyngeal exudate or posterior " oropharyngeal erythema.   Eyes:      Extraocular Movements: Extraocular movements intact.   Cardiovascular:      Rate and Rhythm: Normal rate and regular rhythm.      Heart sounds: Normal heart sounds. No murmur heard.     No friction rub. No gallop.   Pulmonary:      Effort: Pulmonary effort is normal. No respiratory distress.      Breath sounds: Normal breath sounds. No wheezing or rales.   Musculoskeletal:      Left shoulder: No swelling. Normal range of motion.      Cervical back: Normal range of motion and neck supple.   Skin:     General: Skin is warm and dry.      Findings: No rash.   Neurological:      Mental Status: She is alert and oriented to person, place, and time.   Psychiatric:         Behavior: Behavior normal.         Thought Content: Thought content normal.         Judgment: Judgment normal.

## 2025-04-25 DIAGNOSIS — E11.9 TYPE 2 DIABETES MELLITUS WITHOUT COMPLICATION, WITHOUT LONG-TERM CURRENT USE OF INSULIN (HCC): ICD-10-CM

## 2025-04-29 DIAGNOSIS — E03.9 ACQUIRED HYPOTHYROIDISM: ICD-10-CM

## 2025-04-30 RX ORDER — LEVOTHYROXINE SODIUM 200 UG/1
200 TABLET ORAL DAILY
Qty: 90 TABLET | Refills: 1 | Status: SHIPPED | OUTPATIENT
Start: 2025-04-30 | End: 2025-05-01 | Stop reason: SDUPTHER

## 2025-05-01 ENCOUNTER — RESULTS FOLLOW-UP (OUTPATIENT)
Dept: FAMILY MEDICINE CLINIC | Facility: CLINIC | Age: 55
End: 2025-05-01

## 2025-05-01 DIAGNOSIS — E03.9 ACQUIRED HYPOTHYROIDISM: ICD-10-CM

## 2025-05-01 RX ORDER — LEVOTHYROXINE SODIUM 175 UG/1
175 TABLET ORAL DAILY
Qty: 90 TABLET | Refills: 0 | Status: SHIPPED | OUTPATIENT
Start: 2025-05-01

## 2025-05-05 ENCOUNTER — CLINICAL SUPPORT (OUTPATIENT)
Age: 55
End: 2025-05-05

## 2025-05-05 VITALS — BODY MASS INDEX: 43.34 KG/M2 | HEIGHT: 67 IN | WEIGHT: 276.1 LBS

## 2025-05-05 DIAGNOSIS — R63.5 ABNORMAL WEIGHT GAIN: Primary | ICD-10-CM

## 2025-05-05 PROCEDURE — RECHECK: Performed by: DIETITIAN, REGISTERED

## 2025-05-05 PROCEDURE — WMDI30: Performed by: DIETITIAN, REGISTERED

## 2025-05-05 NOTE — PROGRESS NOTES
"Weight Management Medical Nutrition Assessment   presented today for meal-planning session. Today she weighed 276.1# which reflects a loss of 8.7# since appointment with [facility] provider 3/24/25. Taking Ozempic 0.25; will be starting 0.5 mg next 5/12/25. Stated meds have helped her self-regulate food intake. Gets \"little waves of nausea here and there\" which are short-lived. Moving bowels- denied issues. A1c 6.8 on 4/24/25. Feels less bloated. Works part-time. Started using My[employer]itness Pal; does not log much at night; not consistent. Stated she is more mindful overall of what she is putting in her mouth. Provided  with and reviewed meal-planning session materials including calorie-controlled, balanced meal plan and handouts Carbohydrate Counting for People with Diabetes and  Diabetes Label-Reading.  with good understanding. No RD follow-up scheduled at this time.       Patient seen by Medical Provider in past 6 months:  yes  Requested to schedule appointment with Medical Provider: No      Anthropometric Measurements  Start Weight (#) & Date: 284.8# at appointment with [facility] provider 3/24/25 (had not had appointment since 8/30/22); 313# at initial appointment with provider 4/26/22  Current Weight (#): 276.1#  TBW % Change from start weight:3.1%  Ideal Body Weight (#): 170# adjusted BW (77 kg)   Goal Weight (#):<200, maybe 175#  Highest:not sure   Lowest: 160#    Weight Loss History  Previous weight loss attempts: slimfast, meal replacements, self created diets (as documented in provider's note 4/26/22)    Food and Nutrition Related History  Wake up: 7:30 AM or 5 AM if working     Bed Time:9 PM; sleeps well; no overnight eating    Food Recall  Breakfast: Javvy protein coffee (10 g protein) with Premier protein shake added; never a big-breakfast person   Snack:walnuts or pretzels  Lunch:12:30 PM- cottage cheese bowl (chicken eliceo or pizza); no vegetable   Snack:pretzels or tbsp of PB or walnuts "   Dinner:6-6:30 PM- chicken nuggets and tater tots or pork chop and potatoes  Snack: may have small bowl of ice cream ([employer] Smart of nsa or sf pudding      Beverages: water- diet soda- 12 oz, water- 135 oz; ETOH occ  Volume of beverage intake: 147 oz    Weekends: worse- b/c at [employer] and likely snacks more   Cravings: sweets   Trouble area of day: night when watching TV     Frequency of Eating out: every other weekend takes out   Food restrictions:none   Cooking: self   Food Shopping: self    Physical Activity Intake  Activity: started doing some light wakes and plans to walk more   Frequency: not identified  Physical limitations/barriers to exercise: none     Estimated Needs  Energy  SECA: BMR:n/a      X 1.3 -1000 =  [place]  Ailyn Energy Needs: BMR: 1875   1-2# loss weekly sedentary:  0454-1879             1-2# loss weekly lightly active:6146-6656  Maintenance calories for sedentary activity level: 2250  Protein: g      (1.2-1.5g/kg IBW)  Fluid Requirement Calculator   Total Fluid: 120   oz  ([place]-Segar Method)  Free Fluid: 96 oz (S[place]ia-Segar Method - 20%)    Nutrition Diagnosis  Yes;    Overweight/obesity  related to Excess energy intake as evidenced by  BMI more than normative standard for age and sex (obesity-grade III 40+)       Nutrition Intervention    Nutrition Prescription  Calories:~8105-8181  Protein:  Fluid: oz    Meal Plan   Provided  with 2589-1915 kcal sample meal plan.     Nutrition Education:   Calorie controlled menu  Lean protein food choices  Healthy snack options  Food journaling tips      Nutrition Counseling:  Strategies: meal planning, portion sizes, healthy snack choices, hydration, fiber intake, protein intake, exercise, food journal      Monitoring and Evaluation:  Evaluation criteria:  Energy Intake  Meet protein needs  Maintain adequate hydration  Monitor weekly weight  Meal planning/preparation  Food journal   Decreased portions at mealtimes and  snacks  Physical activity     Barriers to learning:none  Readiness to change: Action:  (Changing behavior)  Comprehension: good  Expected Compliance: good

## 2025-05-08 DIAGNOSIS — R00.2 PALPITATIONS: ICD-10-CM

## 2025-05-08 DIAGNOSIS — I48.92 ATRIAL FLUTTER, UNSPECIFIED TYPE (HCC): ICD-10-CM

## 2025-05-08 RX ORDER — METOPROLOL TARTRATE 100 MG/1
100 TABLET ORAL 2 TIMES DAILY
Qty: 180 TABLET | Refills: 3 | Status: SHIPPED | OUTPATIENT
Start: 2025-05-08

## 2025-05-25 DIAGNOSIS — I48.92 ATRIAL FLUTTER, UNSPECIFIED TYPE (HCC): ICD-10-CM

## 2025-05-25 RX ORDER — RIVAROXABAN 20 MG/1
TABLET, FILM COATED ORAL
Qty: 90 TABLET | Refills: 1 | Status: SHIPPED | OUTPATIENT
Start: 2025-05-25

## 2025-05-29 NOTE — PROGRESS NOTES
Assessment/Plan:     Class 3 obesity  - Patient is pursuing Conservative Program and follow up visits with medical weight management provider  - Initial weight loss goal of 5-10% weight loss for improved health. Weight loss can improve patient's co-morbid conditions and/or prevent weight-related complications.  - Explained the importance of continuing lifestyle changes in addition to any anti-obesity medications.   - Labs reviewed from 4/2025    General Recommendations:  Nutrition:  Eat breakfast daily.  Do not skip meals.      Food log (ie.) www.[email or web address], sparkpeople.com, loseit.com, calorieking.com, etc.     Practice mindful eating.  Be sure to set aside time to eat, eat slowly, and savor your food.     Hydration:    At least 64oz of water daily.  No sugar sweetened beverages.  No juice (eat the fruit instead).     Exercise:  Studies have shown that the ideal exercise goal is somewhere between 150 to 300 minutes of moderate intensity exercise a week.  Start with exercising 10 minutes every other day and gradually increase physical activity with a goal of at least 150 minutes of moderate intensity exercise a week, divided over at least 3 days a week.  An example of this would be exercising 30 minutes a day, 5 days a week.  Resistance training can increase muscle mass and increase our resting metabolic rate.   FULL BODY resistance training is recommended 2-3 times a week.  Do not do this on consecutive days to allow for muscle recovery.     Aim for a bare minimum 5000 steps, even on days you do not exercise.     Monitoring:   Weigh yourself daily.  If this causes undue stress, then just weigh yourself once a week.  Weigh yourself the same time of the day with the same amount of clothing on.  Preferably this should be done after waking up, before you eat, and with no clothing or minimal clothing on.     Specific Goals:  -Discuss her nutrition and lifestyle and she is congratulated on her efforts to make the  diet changes required for her to maximize her weight loss. She is encouraged to continue what she is doing.   Discussed her Ozempic and she will finish out her current 0.5mg dosing and then increase to 1 mg and stay there until her follow up.   Patient denies personal history of pancreatitis. Patient also denies personal and family history of medullary thyroid cancer and multiple endocrine neoplasia type 2 (MEN 2 tumor).           was seen today for follow-up.    Diagnoses and all orders for this visit:    Obesity, Class III, BMI 40-49.9 (morbid obesity)  -     semaglutide, 1 mg/dose, (Ozempic, 1 MG/DOSE,) 4 mg/3 mL injection pen; Inject 0.75 mL (1 mg total) under the skin every 7 days    Type 2 diabetes mellitus without complication, without long-term current use of insulin (HCC)  -     semaglutide, 1 mg/dose, (Ozempic, 1 MG/DOSE,) 4 mg/3 mL injection pen; Inject 0.75 mL (1 mg total) under the skin every 7 days    MARGARITO (obstructive sleep apnea)  -     semaglutide, 1 mg/dose, (Ozempic, 1 MG/DOSE,) 4 mg/3 mL injection pen; Inject 0.75 mL (1 mg total) under the skin every 7 days    Class 3 obesity        Total time spent reviewing chart, interviewing patient, examining patient, discussing plan, answering all questions, and documentin minutes with >50% face-to-face time with the patient.    Follow up in approximately 3 months with Non-Surgical Physician/Advanced Practitioner.    Subjective:   Chief Complaint   Patient presents with    Follow-up     3 month follow up, pt is doing very well       Patient ID: Michelle Monge  is a 54 y.o. female with excess weight/obesity here to pursue weight management.  Patient is pursuing Conservative Program.   Most recent notes and records were reviewed.    HPI    Wt Readings from Last 20 Encounters:   25 123 kg (271 lb 11.2 oz)   25 125 kg (276 lb 1.6 oz)   25 129 kg (283 lb 6.4 oz)   25 129 kg (284 lb 12.8 oz)   25 128 kg (281 lb 6.4  "oz)   12/24/24 129 kg (285 lb 3.2 oz)   10/24/24 120 kg (265 lb)   08/12/24 120 kg (265 lb 8 oz)   05/23/24 132 kg (292 lb)   05/16/24 132 kg (292 lb)   04/08/24 133 kg (292 lb 6.4 oz)   03/21/24 133 kg (293 lb)   01/17/24 134 kg (295 lb 3.2 oz)   01/12/24 133 kg (294 lb)   01/11/24 133 kg (294 lb)   12/04/23 135 kg (297 lb)   10/30/23 133 kg (293 lb 1.6 oz)   09/19/23 130 kg (286 lb)   08/31/23 132 kg (291 lb 12.8 oz)   08/09/23 126 kg (278 lb)       Patient presents today to medical weight management office for follow up. She has been on ozempic and is now on 0.5mg and tolerating well. She is having no side effects and feeling good appetite control. Se has increased her water intake and decreased her soda. She has also increased her physical activity and is using light weights and strength training and trying to do some squats in addition to her daily walking.       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 While these LN are enlarged she is asymptomatic with no evidence of recurrent large cell. We will observe and have her return with ultrasounds in 3 months.\"  Pt states she did inform her oncologist of plan to f/u with [facility] and discuss GLP-1, and states her oncologist was okay with this.     Weight loss medication and dose: ozempic 0.5 mg   Started weight and date: 313 lbs/  lbs 3/24/2025  Current weight: 271  Difference: -42 lbs (13 % loss)  Current BMI: 42    B: protein shake + coffee   S: pretzels  L: half sandwich - time constraints; sometimes had full sandwich   S: fruit or pretzels   D: nuggets and tator tots tonight; usually met + starch - doesn't like veg   S: yes- increase hunger and cravings; tries to make SF pudding      Exercise: no, but active at work, Adding light weight exercises   Hydration: Increased her water and only one diet soda a day   Sleep: 6-7 hours       The following portions " "of the patient's history were reviewed and updated as appropriate: allergies, current medications, past family history, past medical history, past social history, past surgical history, and problem list.    Family History[1]     Review of Systems   Constitutional:  Negative for fatigue.   HENT:  Negative for sore throat.    Respiratory:  Negative for cough and shortness of breath.    Cardiovascular:  Negative for chest pain, palpitations and leg swelling.   Gastrointestinal:  Negative for abdominal pain, constipation, diarrhea, nausea and vomiting.   Genitourinary:  Negative for dysuria.   Musculoskeletal:  Negative for arthralgias and back pain.   Skin:  Negative for rash.   Neurological:  Negative for headaches.   Psychiatric/Behavioral:  Negative for dysphoric mood. The patient is not nervous/anxious.        Objective:  /88   Pulse 79   Temp 98.1 °F (36.7 °C)   Ht 5' 7\" (1.702 m)   Wt 123 kg (271 lb 11.2 oz)   SpO2 98%   BMI 42.55 kg/m²     Physical Exam  Vitals and nursing note reviewed.   Constitutional:       Appearance: Normal appearance. She is obese.   HENT:      Head: Normocephalic.   Pulmonary:      Effort: Pulmonary effort is normal.     Neurological:      Mental Status: She is oriented to person, place, and time.     Psychiatric:         Mood and Affect: Mood normal.         Behavior: Behavior normal.         Thought Content: Thought content normal.         Judgment: Judgment normal.            Labs   Most recent labs reviewed   Lab Results   Component Value Date     07/22/2015    SODIUM 140 04/24/2025    K 4.0 04/24/2025     04/24/2025    CO2 26 04/24/2025    ANIONGAP 11 07/22/2015    AGAP 10 04/24/2025    BUN 15 04/24/2025    CREATININE 0.69 04/24/2025    GLUC 114 (H) 02/06/2025    GLUF 119 (H) 04/24/2025    CALCIUM 9.2 04/24/2025    AST 23 04/24/2025    ALT 15 04/24/2025    ALKPHOS 129 (H) 04/24/2025    PROT 7.0 07/22/2015    TP 7.6 04/24/2025    BILITOT 0.49 07/22/2015    " TBILI 0.43 2025    EGFR 97 2025     Lab Results   Component Value Date    HGBA1C 6.8 (H) 2025     Lab Results   Component Value Date    [id number] 0.103 (L) 2025     Lab Results   Component Value Date    CHOLESTEROL 119 2025     Lab Results   Component Value Date    HDL 36 (L) 2025     Lab Results   Component Value Date    TRIG 134 2025     Lab Results   Component Value Date    LDLCALC 56 2025          [1]   Family History  Problem Relation Name Age of Onset    No Known Problems Mother      Coronary artery disease Father      No Known Problems Daughter      Cancer Maternal Grandmother ??         unknown origin    Dementia Maternal Grandfather      No Known Problems Paternal Grandmother      No Known Problems Paternal Grandfather      Diabetes Brother Mario Alberto     Diabetes Brother Franco             Cancer Maternal Uncle          throat    Diabetes Family      Heart disease Family      Cancer Paternal Aunt          unknown origin    Stroke Neg Hx

## 2025-05-30 ENCOUNTER — TELEPHONE (OUTPATIENT)
Dept: FAMILY MEDICINE CLINIC | Facility: CLINIC | Age: 55
End: 2025-05-30

## 2025-05-30 ENCOUNTER — OFFICE VISIT (OUTPATIENT)
Age: 55
End: 2025-05-30
Payer: COMMERCIAL

## 2025-05-30 VITALS
WEIGHT: 271.7 LBS | BODY MASS INDEX: 42.64 KG/M2 | OXYGEN SATURATION: 98 % | TEMPERATURE: 98.1 F | HEIGHT: 67 IN | HEART RATE: 79 BPM | SYSTOLIC BLOOD PRESSURE: 112 MMHG | DIASTOLIC BLOOD PRESSURE: 88 MMHG

## 2025-05-30 DIAGNOSIS — E11.9 TYPE 2 DIABETES MELLITUS WITHOUT COMPLICATION, WITHOUT LONG-TERM CURRENT USE OF INSULIN (HCC): ICD-10-CM

## 2025-05-30 DIAGNOSIS — E66.813 OBESITY, CLASS III, BMI 40-49.9 (MORBID OBESITY): Primary | ICD-10-CM

## 2025-05-30 DIAGNOSIS — G47.33 OSA (OBSTRUCTIVE SLEEP APNEA): ICD-10-CM

## 2025-05-30 DIAGNOSIS — E66.813 CLASS 3 OBESITY: ICD-10-CM

## 2025-05-30 PROCEDURE — 99214 OFFICE O/P EST MOD 30 MIN: CPT

## 2025-05-30 NOTE — ASSESSMENT & PLAN NOTE
- Patient is pursuing Conservative Program and follow up visits with medical weight management provider  - Initial weight loss goal of 5-10% weight loss for improved health. Weight loss can improve patient's co-morbid conditions and/or prevent weight-related complications.  - Explained the importance of continuing lifestyle changes in addition to any anti-obesity medications.   - Labs reviewed from 4/2025    General Recommendations:  Nutrition:  Eat breakfast daily.  Do not skip meals.      Food log (ie.) www.[email or web address], sparkpeople.com, loseit.com, calorieking.com, etc.     Practice mindful eating.  Be sure to set aside time to eat, eat slowly, and savor your food.     Hydration:    At least 64oz of water daily.  No sugar sweetened beverages.  No juice (eat the fruit instead).     Exercise:  Studies have shown that the ideal exercise goal is somewhere between 150 to 300 minutes of moderate intensity exercise a week.  Start with exercising 10 minutes every other day and gradually increase physical activity with a goal of at least 150 minutes of moderate intensity exercise a week, divided over at least 3 days a week.  An example of this would be exercising 30 minutes a day, 5 days a week.  Resistance training can increase muscle mass and increase our resting metabolic rate.   FULL BODY resistance training is recommended 2-3 times a week.  Do not do this on consecutive days to allow for muscle recovery.     Aim for a bare minimum 5000 steps, even on days you do not exercise.     Monitoring:   Weigh yourself daily.  If this causes undue stress, then just weigh yourself once a week.  Weigh yourself the same time of the day with the same amount of clothing on.  Preferably this should be done after waking up, before you eat, and with no clothing or minimal clothing on.     Specific Goals:  -Discuss her nutrition and lifestyle and she is congratulated on her efforts to make the diet changes required for her to  maximize her weight loss. She is encouraged to continue what she is doing.   Discussed her Ozempic and she will finish out her current 0.5mg dosing and then increase to 1 mg and stay there until her follow up.   Patient denies personal history of pancreatitis. Patient also denies personal and family history of medullary thyroid cancer and multiple endocrine neoplasia type 2 (MEN 2 tumor).

## 2025-07-27 DIAGNOSIS — E66.813 OBESITY, CLASS III, BMI 40-49.9 (MORBID OBESITY): ICD-10-CM

## 2025-07-27 DIAGNOSIS — E11.9 TYPE 2 DIABETES MELLITUS WITHOUT COMPLICATION, WITHOUT LONG-TERM CURRENT USE OF INSULIN (HCC): ICD-10-CM

## 2025-07-27 DIAGNOSIS — E03.9 ACQUIRED HYPOTHYROIDISM: ICD-10-CM

## 2025-07-27 DIAGNOSIS — G47.33 OSA (OBSTRUCTIVE SLEEP APNEA): ICD-10-CM

## 2025-07-28 RX ORDER — LEVOTHYROXINE SODIUM 175 UG/1
175 TABLET ORAL DAILY
Qty: 90 TABLET | Refills: 1 | Status: SHIPPED | OUTPATIENT
Start: 2025-07-28

## 2025-08-01 ENCOUNTER — OFFICE VISIT (OUTPATIENT)
Dept: CARDIOLOGY CLINIC | Facility: CLINIC | Age: 55
End: 2025-08-01
Payer: COMMERCIAL

## 2025-08-01 VITALS
RESPIRATION RATE: 18 BRPM | DIASTOLIC BLOOD PRESSURE: 74 MMHG | HEART RATE: 76 BPM | BODY MASS INDEX: 40.18 KG/M2 | OXYGEN SATURATION: 95 % | SYSTOLIC BLOOD PRESSURE: 116 MMHG | WEIGHT: 256 LBS | HEIGHT: 67 IN

## 2025-08-01 DIAGNOSIS — I48.92 ATRIAL FLUTTER, PAROXYSMAL (HCC): Primary | ICD-10-CM

## 2025-08-01 DIAGNOSIS — Z86.711 HISTORY OF PULMONARY EMBOLISM: ICD-10-CM

## 2025-08-01 DIAGNOSIS — E78.2 MIXED HYPERLIPIDEMIA: ICD-10-CM

## 2025-08-01 DIAGNOSIS — I49.3 PREMATURE VENTRICULAR CONTRACTIONS: ICD-10-CM

## 2025-08-01 DIAGNOSIS — I48.92 PAROXYSMAL ATRIAL FLUTTER (HCC): ICD-10-CM

## 2025-08-01 PROCEDURE — 99214 OFFICE O/P EST MOD 30 MIN: CPT | Performed by: NURSE PRACTITIONER

## 2025-08-01 PROCEDURE — 93000 ELECTROCARDIOGRAM COMPLETE: CPT | Performed by: NURSE PRACTITIONER

## 2025-08-01 RX ORDER — DILTIAZEM HYDROCHLORIDE 120 MG/1
120 CAPSULE, COATED, EXTENDED RELEASE ORAL DAILY
Qty: 30 CAPSULE | Refills: 12 | Status: SHIPPED | OUTPATIENT
Start: 2025-08-01

## 2025-08-03 DIAGNOSIS — E78.2 MIXED HYPERLIPIDEMIA: ICD-10-CM

## 2025-08-05 RX ORDER — ATORVASTATIN CALCIUM 20 MG/1
20 TABLET, FILM COATED ORAL DAILY
Qty: 90 TABLET | Refills: 1 | Status: SHIPPED | OUTPATIENT
Start: 2025-08-05

## 2025-08-14 ENCOUNTER — APPOINTMENT (OUTPATIENT)
Dept: LAB | Facility: MEDICAL CENTER | Age: 55
End: 2025-08-14
Attending: PHYSICIAN ASSISTANT
Payer: COMMERCIAL

## 2025-08-15 ENCOUNTER — RESULTS FOLLOW-UP (OUTPATIENT)
Dept: FAMILY MEDICINE CLINIC | Facility: CLINIC | Age: 55
End: 2025-08-15

## 2025-08-15 DIAGNOSIS — E03.9 HYPOTHYROIDISM, UNSPECIFIED TYPE: Primary | ICD-10-CM

## 2025-08-15 DIAGNOSIS — E03.9 ACQUIRED HYPOTHYROIDISM: ICD-10-CM

## 2025-08-18 DIAGNOSIS — E03.9 ACQUIRED HYPOTHYROIDISM: ICD-10-CM

## 2025-08-18 RX ORDER — LEVOTHYROXINE SODIUM 175 UG/1
175 TABLET ORAL DAILY
Qty: 90 TABLET | Refills: 1 | Status: SHIPPED | OUTPATIENT
Start: 2025-08-18

## (undated) DEVICE — DISPOSABLE BIOPSY FORCEPS: Brand: DISPOSABLE BIOPSY FORCEPS

## (undated) DEVICE — THE SINGLE USE BIOGUARD AIR WATER VALVE, OLYMPUS IS USED TO CONTROL THE AIR WATER FUNCTION OF AN ENDOSCOPE DURING GI ENDOSCOPIC PROCEDURES.: Brand: BIOGUARD

## (undated) DEVICE — THE TORRENT IRRIGATION SCOPE CONNECTOR IS USED WITH THE TORRENT IRRIGATION TUBING TO PROVIDE IRRIGATION FLUIDS SUCH AS STERILE WATER DURING GASTROINTESTINAL ENDOSCOPIC PROCEDURES WHEN USED IN CONJUNCTION WITH AN IRRIGATION PUMP (OR ELECTROSURGICAL UNIT).: Brand: TORRENT

## (undated) DEVICE — SYRINGE 10ML LL

## (undated) DEVICE — BITE BLOCK MAXI 60FR LF STRAP

## (undated) DEVICE — INTENDED FOR TISSUE SEPARATION, AND OTHER PROCEDURES THAT REQUIRE A SHARP SURGICAL BLADE TO PUNCTURE OR CUT.: Brand: BARD-PARKER ® CARBON RIB-BACK BLADES

## (undated) DEVICE — INTENDED FOR TISSUE SEPARATION, AND OTHER PROCEDURES THAT REQUIRE A SHARP SURGICAL BLADE TO PUNCTURE OR CUT.: Brand: BARD-PARKER SAFETY BLADES SIZE 15, STERILE

## (undated) DEVICE — POOLE SUCTION HANDLE W/TUBING: Brand: CARDINAL HEALTH

## (undated) DEVICE — CONMED ACCESSORY ELECTRODE, FLAT BLADE WITH EXTENDED INSULATION: Brand: CONMED

## (undated) DEVICE — 3M™ STERI-STRIP™ REINFORCED ADHESIVE SKIN CLOSURES, R1547, 1/2 IN X 4 IN (12 MM X 100 MM), 6 STRIPS/ENVELOPE: Brand: 3M™ STERI-STRIP™

## (undated) DEVICE — ADHESIVE SKN CLSR HISTOACRYL FLEX 0.5ML LF

## (undated) DEVICE — 10FR FRAZIER SUCTION HANDLE: Brand: CARDINAL HEALTH

## (undated) DEVICE — SUT VICRYL 4-0 SH 27 IN J415H

## (undated) DEVICE — THE BIOSHIELD BIOPSY VALVE - STERILE IS USED TO COVER THE OPENING TO THE BIOPSY/SUCTION CHANNEL INLET OF A GASTROINTESTINAL ENDOSCOPE. IT PROVIDES ACCESS FOR ENDOSCOPIC DEVICE PASSAGE AND EXCHANGE, HELPS MAINTAIN INSUFFLATION AND MINIMIZES LEAKAGE OF BIOMATERIAL FROM THE BIOPSY PORT THROUGHOUT THE GASTROINTESTINAL ENDOSCOPIC PROCEDURE.: Brand: BIOSHIELD

## (undated) DEVICE — PENCIL ROCKER SWITCH CAUTERY HAND CONTROL

## (undated) DEVICE — GLOVE INDICATOR UNDERGLOVE SZ 7.5 GREEN

## (undated) DEVICE — GLOVE INDICATOR PI UNDERGLOVE SZ 7.5 BLUE

## (undated) DEVICE — NEEDLE 25G X 1 1/2

## (undated) DEVICE — GLOVE SRG BIOGEL 7.5

## (undated) DEVICE — SUT MONOCRYL 4-0 PS-2 27 IN Y426H

## (undated) DEVICE — KIT ENDOSCOPY BASIC

## (undated) DEVICE — SUT MONOCRYL 5-0 P-3 18 IN Y493G

## (undated) DEVICE — BAG DECANTER

## (undated) DEVICE — COVER,TABLE,44X90,STERILE: Brand: MEDLINE

## (undated) DEVICE — SYRINGE 5ML LL

## (undated) DEVICE — ASTOUND IMPERVIOUS SURGICAL GOWN: Brand: CONVERTORS

## (undated) DEVICE — BETHLEHEM UNIVERSAL MINOR GEN: Brand: CARDINAL HEALTH

## (undated) DEVICE — NEEDLE 18 G X 1 1/2

## (undated) DEVICE — PACK UNIVERSAL NECK

## (undated) DEVICE — CUP MEDICINE 1OZ 5000/CS 50/PLT: Brand: MEDEGEN MEDICAL PRODUCTS, LLC

## (undated) DEVICE — CHLORAPREP HI-LITE 26ML ORANGE

## (undated) DEVICE — BIPOLAR CORD DISP

## (undated) DEVICE — DRAPE C-ARM X-RAY

## (undated) DEVICE — ADHESIVE SKIN CLSR DERMABOND NX

## (undated) DEVICE — SPECIMEN CONTAINER STERILE PEEL PACK

## (undated) DEVICE — TIBURON SPLIT SHEET: Brand: CONVERTORS

## (undated) DEVICE — THE TORRENT IRRIGATION TUBING IS INTENDED TO PROVIDE IRRIGATION VIA IRRIGATION FLUIDS, SUCH AS STERILE WATER, DURING GASTROINTESTINAL ENDOSCOPIC PROCEDURES WHEN USED IN CONJUNCTION WITH AN IRRIGATION PUMP OR ELECTROSURGICAL UNIT.: Brand: TORRENT

## (undated) DEVICE — GLOVE SRG BIOGEL 7

## (undated) DEVICE — GROUNDING PAD UNIVERSAL SLW